# Patient Record
Sex: FEMALE | Race: BLACK OR AFRICAN AMERICAN | NOT HISPANIC OR LATINO | Employment: PART TIME | URBAN - METROPOLITAN AREA
[De-identification: names, ages, dates, MRNs, and addresses within clinical notes are randomized per-mention and may not be internally consistent; named-entity substitution may affect disease eponyms.]

---

## 2017-01-05 ENCOUNTER — CONSULT (OUTPATIENT)
Dept: ONCOLOGY | Facility: CLINIC | Age: 32
End: 2017-01-05
Attending: INTERNAL MEDICINE

## 2017-01-05 ENCOUNTER — APPOINTMENT (OUTPATIENT)
Dept: ONCOLOGY | Facility: CLINIC | Age: 32
End: 2017-01-05

## 2017-01-05 ENCOUNTER — LAB (OUTPATIENT)
Dept: LAB | Facility: HOSPITAL | Age: 32
End: 2017-01-05
Attending: INTERNAL MEDICINE

## 2017-01-05 ENCOUNTER — APPOINTMENT (OUTPATIENT)
Dept: LAB | Facility: HOSPITAL | Age: 32
End: 2017-01-05
Attending: INTERNAL MEDICINE

## 2017-01-05 VITALS
SYSTOLIC BLOOD PRESSURE: 92 MMHG | TEMPERATURE: 98.2 F | RESPIRATION RATE: 16 BRPM | BODY MASS INDEX: 20.66 KG/M2 | WEIGHT: 121 LBS | OXYGEN SATURATION: 100 % | DIASTOLIC BLOOD PRESSURE: 60 MMHG | HEART RATE: 99 BPM | HEIGHT: 64 IN

## 2017-01-05 DIAGNOSIS — D50.0 IRON DEFICIENCY ANEMIA DUE TO CHRONIC BLOOD LOSS: Primary | ICD-10-CM

## 2017-01-05 DIAGNOSIS — D50.8 OTHER IRON DEFICIENCY ANEMIA: Primary | ICD-10-CM

## 2017-01-05 LAB
BASOPHILS # BLD AUTO: 0.01 10*3/MM3 (ref 0–0.1)
BASOPHILS NFR BLD AUTO: 0.1 % (ref 0–1.1)
DEPRECATED RDW RBC AUTO: 40.6 FL (ref 37–49)
EOSINOPHIL # BLD AUTO: 0.08 10*3/MM3 (ref 0–0.36)
EOSINOPHIL NFR BLD AUTO: 1.2 % (ref 1–5)
ERYTHROCYTE [DISTWIDTH] IN BLOOD BY AUTOMATED COUNT: 15.5 % (ref 11.7–14.5)
FERRITIN SERPL-MCNC: 23.8 NG/ML (ref 11–207)
HCT VFR BLD AUTO: 35 % (ref 34–45)
HGB BLD-MCNC: 10.7 G/DL (ref 11.5–14.9)
HOLD SPECIMEN: NORMAL
IMM GRANULOCYTES # BLD: 0.01 10*3/MM3 (ref 0–0.03)
IMM GRANULOCYTES NFR BLD: 0.1 % (ref 0–0.5)
IRON 24H UR-MRATE: 65 MCG/DL (ref 37–145)
IRON SATN MFR SERPL: 16 % (ref 14–48)
LYMPHOCYTES # BLD AUTO: 3.02 10*3/MM3 (ref 1–3.5)
LYMPHOCYTES NFR BLD AUTO: 43.6 % (ref 20–49)
MCH RBC QN AUTO: 22.3 PG (ref 27–33)
MCHC RBC AUTO-ENTMCNC: 30.6 G/DL (ref 32–35)
MCV RBC AUTO: 73.1 FL (ref 83–97)
MONOCYTES # BLD AUTO: 0.36 10*3/MM3 (ref 0.25–0.8)
MONOCYTES NFR BLD AUTO: 5.2 % (ref 4–12)
NEUTROPHILS # BLD AUTO: 3.45 10*3/MM3 (ref 1.5–7)
NEUTROPHILS NFR BLD AUTO: 49.8 % (ref 39–75)
NRBC BLD MANUAL-RTO: 0 /100 WBC (ref 0–0)
PLATELET # BLD AUTO: 277 10*3/MM3 (ref 150–375)
PMV BLD AUTO: 9.8 FL (ref 8.9–12.1)
RBC # BLD AUTO: 4.79 10*6/MM3 (ref 3.9–5)
TIBC SERPL-MCNC: 414 MCG/DL (ref 249–505)
TRANSFERRIN SERPL-MCNC: 296 MG/DL (ref 200–360)
WBC NRBC COR # BLD: 6.93 10*3/MM3 (ref 4–10)
WHOLE BLOOD HOLD SPECIMEN: NORMAL

## 2017-01-05 PROCEDURE — 85025 COMPLETE CBC W/AUTO DIFF WBC: CPT | Performed by: INTERNAL MEDICINE

## 2017-01-05 PROCEDURE — 83540 ASSAY OF IRON: CPT | Performed by: INTERNAL MEDICINE

## 2017-01-05 PROCEDURE — 36415 COLL VENOUS BLD VENIPUNCTURE: CPT

## 2017-01-05 PROCEDURE — 99244 OFF/OP CNSLTJ NEW/EST MOD 40: CPT | Performed by: INTERNAL MEDICINE

## 2017-01-05 PROCEDURE — 82728 ASSAY OF FERRITIN: CPT | Performed by: INTERNAL MEDICINE

## 2017-01-05 PROCEDURE — 84466 ASSAY OF TRANSFERRIN: CPT | Performed by: INTERNAL MEDICINE

## 2017-01-05 RX ORDER — METHYLPREDNISOLONE 4 MG/1
TABLET ORAL
Qty: 21 TABLET | Refills: 0 | Status: SHIPPED | OUTPATIENT
Start: 2017-01-05 | End: 2017-03-06

## 2017-01-05 RX ORDER — PROPRANOLOL HCL 60 MG
CAPSULE, EXTENDED RELEASE 24HR ORAL
Refills: 5 | COMMUNITY
Start: 2016-12-30 | End: 2017-02-09

## 2017-01-05 RX ORDER — DIPHENHYDRAMINE HCL 25 MG
25 CAPSULE ORAL ONCE
Status: CANCELLED | OUTPATIENT
Start: 2017-01-09

## 2017-01-05 RX ORDER — SODIUM CHLORIDE 9 MG/ML
250 INJECTION, SOLUTION INTRAVENOUS ONCE
Status: CANCELLED | OUTPATIENT
Start: 2017-01-09

## 2017-01-05 RX ORDER — DESOGESTREL AND ETHINYL ESTRADIOL 0.15-0.03
KIT ORAL
Refills: 3 | COMMUNITY
Start: 2016-12-20 | End: 2017-03-06

## 2017-01-05 NOTE — PROGRESS NOTES
Subjective     REASON FOR CONSULTATION:  Iron deficiency anemia, intolerant of oral iron therapy; iron deficiency most likely secondary to menorrhagia  Provide an opinion on any further workup or treatment                             REQUESTING PHYSICIAN:  K[place]    RECORDS OBTAINED:  Records of the patients history including those obtained from the referring provider were reviewed and summarized in detail.    History of Present Illness   Ms. Espino is a 31-year-old woman seen today for evaluation of anemia at the request of her primary care physician.  She indicates that she has had a lifelong issue with anemia.  Reviewing her records, she actually had a fairly normal CBC in January 2014 with hemoglobin of 12.0 and MCV of 82.  However, over the past 3 years she has had a steady decline in the hemoglobin currently to 10.7, and her red blood cell indices have become more microcytic and hypochromic with a current MCV of 73 and MCHC 30.6.  She had iron studies performed in November which showed a ferritin of 33 and an iron saturation of 22%.  The patient has also been having recent issues with hyperthyroidism, so her ferritin may be partially falsely elevated as an acute phase reactant.  B12 and folic acid levels were normal in November.  She does report very heavy menstrual cycles her entire adult life with 5-7 days of heavy bleeding.  She has tried to take oral iron in the past but cannot tolerate iron because of GI upset.  She has never required packed red blood cell transfusion or iron infusions.    Past Medical History   Diagnosis Date   • Allergic rhinitis    • Anemia    • Asthma    • GERD (gastroesophageal reflux disease)    • Hyperthyroidism    • Ovarian cyst         No past surgical history on file.     Current Outpatient Prescriptions on File Prior to Visit   Medication Sig Dispense Refill   • albuterol (PROVENTIL HFA;VENTOLIN HFA) 108 (90 BASE) MCG/ACT inhaler Inhale 2 puffs Every 6 (Six) Hours.      • b complex vitamins capsule Take 1 capsule by mouth Daily. 30 capsule 11   • budesonide-formoterol (SYMBICORT) 160-4.5 MCG/ACT inhaler Symbicort 160-4.5 MCG/ACT Inhalation Aerosol; Patient Sig: Symbicort 160-4.5 MCG/ACT Inhalation Aerosol INHALE TWO PUFFS BY MOUTH TWICE A DAY; 10.2; 11; 05-Nov-2014; Active     • ergocalciferol (DRISDOL) 37652 UNITS capsule Take 1 capsule by mouth 1 (One) Time Per Week. 13 capsule 3   • levocetirizine (XYZAL) 5 MG tablet TAKE 1 TABLET BY MOUTH AT BEDTIME  3   • montelukast (SINGULAIR) 10 MG tablet TAKE 1 TABLET BY MOUTH AT BEDTIME  3   • raNITIdine (ZANTAC) 300 MG tablet TAKE 1 TABLET BY MOUTH AT BEDTIME  11   • [DISCONTINUED] propranolol (INDERAL) 20 MG tablet Take 1 tablet by mouth 2 (Two) Times a Day. 60 tablet 5     No current facility-administered medications on file prior to visit.         ALLERGIES:    Allergies   Allergen Reactions   • Metoprolol      Face swelling, itching     • Propranolol      Face swelling, itching          Social History     Social History   • Marital status: Single     Spouse name: N/A   • Number of children: N/A   • Years of education: N/A     Occupational History   •  [employer]     Social History Main Topics   • Smoking status: Never Smoker   • Smokeless tobacco: None   • Alcohol use Yes      Comment: Occasional   • Drug use: No   • Sexual activity: Yes     Partners: Male     Other Topics Concern   • None     Social History Narrative        Family History   Problem Relation Age of Onset   • Hypertension Mother    • Diabetes Mother    • Hypertension Father    • Hyperlipidemia Father    • Diabetes Father    • Alcohol abuse Other    • Diabetes Other    • Hypertension Other         Review of Systems   Constitutional: Positive for fatigue. Negative for appetite change and fever.        Hyperthyroidism   HENT: Positive for nosebleeds. Negative for congestion, hearing loss and mouth sores.    Eyes: Negative for photophobia and redness.   Respiratory: Positive  "for shortness of breath. Negative for choking, chest tightness and wheezing.    Cardiovascular: Positive for palpitations. Negative for chest pain and leg swelling.   Gastrointestinal: Positive for nausea and vomiting. Negative for abdominal distention, anal bleeding and blood in stool.   Endocrine: Positive for polyuria. Negative for polydipsia.   Genitourinary: Positive for frequency and menstrual problem. Negative for difficulty urinating, vaginal discharge and vaginal pain.   Musculoskeletal: Positive for arthralgias. Negative for gait problem and neck stiffness.   Skin: Positive for rash. Negative for color change, pallor and wound.   Neurological: Positive for dizziness. Negative for weakness and numbness.   Hematological: Negative for adenopathy. Does not bruise/bleed easily.   Psychiatric/Behavioral: Negative for agitation.        Objective     Vitals:    01/05/17 1258   BP: 92/60   Pulse: 99   Resp: 16   Temp: 98.2 °F (36.8 °C)   TempSrc: Oral   SpO2: 100%   Weight: 121 lb (54.9 kg)   Height: 64\" (162.6 cm)  Comment: new    PainSc: 4  Comment: c/o stomach cramps today.     Current Status 1/5/2017   ECOG score 0       Physical Exam    Gen: pleasant young black female nad  HEENT: no icterus or thrush, face mildly swollen?  NECK: no jvd  CV: RRR, S1S2  CHEST: CTA bilat  ABD: soft, ndnd, +bs, no edema  MS: no edema  NEURO: no focal deficits    RECENT LABS:  Hematology WBC   Date Value Ref Range Status   01/05/2017 6.93 4.00 - 10.00 10*3/mm3 Final   11/14/2016 5.38 4.50 - 10.70 10*3/mm3 Final     RBC   Date Value Ref Range Status   01/05/2017 4.79 3.90 - 5.00 10*6/mm3 Final   11/14/2016 4.80 3.90 - 5.20 10*6/mm3 Final     HEMOGLOBIN   Date Value Ref Range Status   01/05/2017 10.7 (L) 11.5 - 14.9 g/dL Final   11/14/2016 10.9 (L) 11.9 - 15.5 g/dL Final     HEMATOCRIT   Date Value Ref Range Status   01/05/2017 35.0 34.0 - 45.0 % Final   11/14/2016 35.0 (L) 35.6 - 45.5 % Final     PLATELETS   Date Value Ref Range " Status   01/05/2017 277 150 - 375 10*3/mm3 Final   11/14/2016 246 140 - 500 10*3/mm3 Final          Assessment/Plan     Ms. Espino is a 31-year-old woman with microcytic and hypochromic anemia with history of menorrhagia.  This is most likely iron deficiency secondary to chronic blood loss from menorrhagia.  Even though her iron studies in November did not look horrible, they were checked while the patient was  hyperthyroid so the ferritin is likely falsely elevated as an inflammatory marker.  The patient cannot tolerate oral iron because of GI upset.  I recommended that we give her 2 doses of intravenous iron with Feraheme and discussed with her the risk of infusion-related reactions, headache, skin discoloration if extravasation occurs.  She agreed to proceed with intravenous iron.  I recommended we recheck her CBC and iron studies 2 months after.  She has a fairly extensive positive review of systems most of which are probably related to her hyperthyroidism as opposed to anemia and/or iron deficiency.

## 2017-01-06 ENCOUNTER — TELEPHONE (OUTPATIENT)
Dept: ENDOCRINOLOGY | Age: 32
End: 2017-01-06

## 2017-01-06 NOTE — TELEPHONE ENCOUNTER
----- Message from Rashmi Iqbal MD sent at 1/5/2017  2:49 PM EST -----  Contact: patient  I have is started her on Medrol Dosepak for her allergic reaction.  Also find out when she is going to have her radioactive iodine therapy?  It should be very soon.  ----- Message -----     From: Carmencita Dallas MA     Sent: 1/5/2017   1:51 PM       To: Rashmi Iqbal MD        ----- Message -----     From: Misty Trejo     Sent: 1/5/2017   1:48 PM       To: Carmencita Dallas MA     Switched to propranolol LA INDERAL LA 60 MG 24 hr capsule  same symptoms  Face is swelling  Stopped taking it    Benadryl is not working       ----- Message -----     From: Misty Trejo     Sent: 12/29/2016   2:38 PM       To: Carmencita Dallas MA    metoprolol succinate XL (TOPROL XL) 50 MG 24 hr tablet 30 tablet 11 12/15/2016 12/15/2017      Take 1 tablet by mouth Daily.     Started medicine last week  Hives and rashes and welps all over her body  Body is hot with redness     Pt is taking the vitamin d but didn't take the above and still has the symptoms           Spoke to patient. She is waiting for a call back to schedule. i informed her to call next week if she has not heard anything from the hospital. She is going to let us know of scheduled date for the therapy when it gets scheduled. 01/06/2017

## 2017-01-10 ENCOUNTER — INFUSION (OUTPATIENT)
Dept: ONCOLOGY | Facility: HOSPITAL | Age: 32
End: 2017-01-10
Attending: INTERNAL MEDICINE

## 2017-01-10 VITALS
TEMPERATURE: 98.1 F | HEART RATE: 106 BPM | BODY MASS INDEX: 21.15 KG/M2 | DIASTOLIC BLOOD PRESSURE: 67 MMHG | WEIGHT: 123.2 LBS | SYSTOLIC BLOOD PRESSURE: 110 MMHG

## 2017-01-10 DIAGNOSIS — D50.0 IRON DEFICIENCY ANEMIA DUE TO CHRONIC BLOOD LOSS: Primary | ICD-10-CM

## 2017-01-10 PROCEDURE — 96374 THER/PROPH/DIAG INJ IV PUSH: CPT | Performed by: INTERNAL MEDICINE

## 2017-01-10 PROCEDURE — 96375 TX/PRO/DX INJ NEW DRUG ADDON: CPT | Performed by: INTERNAL MEDICINE

## 2017-01-10 PROCEDURE — 63710000001 DIPHENHYDRAMINE PER 50 MG: Performed by: INTERNAL MEDICINE

## 2017-01-10 PROCEDURE — 25010000002 FERUMOXYTOL 510 MG/17ML SOLUTION 510 MG VIAL: Performed by: INTERNAL MEDICINE

## 2017-01-10 RX ORDER — SODIUM CHLORIDE 9 MG/ML
250 INJECTION, SOLUTION INTRAVENOUS ONCE
Status: COMPLETED | OUTPATIENT
Start: 2017-01-10 | End: 2017-01-10

## 2017-01-10 RX ORDER — DIPHENHYDRAMINE HCL 25 MG
25 CAPSULE ORAL ONCE
Status: CANCELLED | OUTPATIENT
Start: 2017-01-16

## 2017-01-10 RX ORDER — SODIUM CHLORIDE 9 MG/ML
250 INJECTION, SOLUTION INTRAVENOUS ONCE
Status: CANCELLED | OUTPATIENT
Start: 2017-01-16

## 2017-01-10 RX ORDER — DIPHENHYDRAMINE HCL 25 MG
25 CAPSULE ORAL ONCE
Status: COMPLETED | OUTPATIENT
Start: 2017-01-10 | End: 2017-01-10

## 2017-01-10 RX ADMIN — SODIUM CHLORIDE 250 ML: 900 INJECTION, SOLUTION INTRAVENOUS at 08:34

## 2017-01-10 RX ADMIN — FERUMOXYTOL 510 MG: 510 INJECTION INTRAVENOUS at 09:02

## 2017-01-10 RX ADMIN — FAMOTIDINE 20 MG: 10 INJECTION INTRAVENOUS at 08:34

## 2017-01-10 RX ADMIN — DIPHENHYDRAMINE HYDROCHLORIDE 25 MG: 25 CAPSULE ORAL at 08:34

## 2017-01-12 ENCOUNTER — DOCUMENTATION (OUTPATIENT)
Dept: INTERNAL MEDICINE | Facility: CLINIC | Age: 32
End: 2017-01-12

## 2017-01-12 ENCOUNTER — TELEPHONE (OUTPATIENT)
Dept: ENDOCRINOLOGY | Age: 32
End: 2017-01-12

## 2017-01-12 RX ORDER — HYDROXYZINE HYDROCHLORIDE 10 MG/1
10 TABLET, FILM COATED ORAL 3 TIMES DAILY PRN
Qty: 30 TABLET | Refills: 0 | Status: SHIPPED | OUTPATIENT
Start: 2017-01-12 | End: 2017-03-06

## 2017-01-12 RX ORDER — METHYLPREDNISOLONE 4 MG/1
TABLET ORAL
Qty: 21 TABLET | Refills: 0 | Status: SHIPPED | OUTPATIENT
Start: 2017-01-12 | End: 2017-03-02

## 2017-01-12 NOTE — TELEPHONE ENCOUNTER
----- Message from Rashmi Iqbal MD sent at 1/11/2017  1:56 PM EST -----  Contact: patient  She needs to take all her medications and be sure to avoid any major exercise and pressure as well as taking any non-is steroidal medications such as Aleve or Advil.  ----- Message -----     From: Carmencita Dallas MA     Sent: 1/11/2017  12:44 PM       To: Rashmi Iqbal MD        ----- Message -----     From: Misty Trejo     Sent: 1/10/2017   2:30 PM       To: Carmencita Dallas MA    WANTS NITIN TO BE AWARE THAT PT HAS SCHEDULED AN APPOINTMENT FOR HER ABRASION ON Monday 1/30/2017    Spoke to patient 01/12/2017 2:24PM

## 2017-01-17 ENCOUNTER — INFUSION (OUTPATIENT)
Dept: ONCOLOGY | Facility: HOSPITAL | Age: 32
End: 2017-01-17
Attending: INTERNAL MEDICINE

## 2017-01-17 VITALS
TEMPERATURE: 98.3 F | WEIGHT: 120.6 LBS | SYSTOLIC BLOOD PRESSURE: 115 MMHG | BODY MASS INDEX: 20.7 KG/M2 | HEART RATE: 121 BPM | DIASTOLIC BLOOD PRESSURE: 56 MMHG

## 2017-01-17 DIAGNOSIS — D50.0 IRON DEFICIENCY ANEMIA DUE TO CHRONIC BLOOD LOSS: Primary | ICD-10-CM

## 2017-01-17 PROCEDURE — 63710000001 DIPHENHYDRAMINE PER 50 MG: Performed by: INTERNAL MEDICINE

## 2017-01-17 PROCEDURE — 25010000002 FERUMOXYTOL 510 MG/17ML SOLUTION 510 MG VIAL: Performed by: INTERNAL MEDICINE

## 2017-01-17 RX ORDER — SODIUM CHLORIDE 9 MG/ML
250 INJECTION, SOLUTION INTRAVENOUS ONCE
Status: CANCELLED | OUTPATIENT
Start: 2017-01-24

## 2017-01-17 RX ORDER — DIPHENHYDRAMINE HCL 25 MG
25 CAPSULE ORAL ONCE
Status: CANCELLED | OUTPATIENT
Start: 2017-01-24

## 2017-01-17 RX ORDER — SODIUM CHLORIDE 9 MG/ML
250 INJECTION, SOLUTION INTRAVENOUS ONCE
Status: COMPLETED | OUTPATIENT
Start: 2017-01-17 | End: 2017-01-17

## 2017-01-17 RX ORDER — DIPHENHYDRAMINE HCL 25 MG
25 CAPSULE ORAL ONCE
Status: COMPLETED | OUTPATIENT
Start: 2017-01-17 | End: 2017-01-17

## 2017-01-17 RX ADMIN — FERUMOXYTOL 510 MG: 510 INJECTION INTRAVENOUS at 09:41

## 2017-01-17 RX ADMIN — DIPHENHYDRAMINE HYDROCHLORIDE 25 MG: 25 CAPSULE ORAL at 09:17

## 2017-01-17 RX ADMIN — FAMOTIDINE 20 MG: 10 INJECTION INTRAVENOUS at 09:18

## 2017-01-17 RX ADMIN — SODIUM CHLORIDE 250 ML: 900 INJECTION, SOLUTION INTRAVENOUS at 09:18

## 2017-01-26 DIAGNOSIS — N91.2 AMENORRHEA: Primary | ICD-10-CM

## 2017-01-30 ENCOUNTER — HOSPITAL ENCOUNTER (OUTPATIENT)
Dept: NUCLEAR MEDICINE | Facility: HOSPITAL | Age: 32
Discharge: HOME | End: 2017-01-30
Attending: INTERNAL MEDICINE

## 2017-01-30 ENCOUNTER — APPOINTMENT (OUTPATIENT)
Dept: LAB | Facility: HOSPITAL | Age: 32
End: 2017-01-30

## 2017-01-30 DIAGNOSIS — E05.00 THYROTOXICOSIS WITH DIFFUSE GOITER AND WITHOUT THYROID STORM: ICD-10-CM

## 2017-01-30 LAB — HCG SERPL QL: NEGATIVE

## 2017-01-30 PROCEDURE — 36415 COLL VENOUS BLD VENIPUNCTURE: CPT | Performed by: INTERNAL MEDICINE

## 2017-01-30 PROCEDURE — 84703 CHORIONIC GONADOTROPIN ASSAY: CPT | Performed by: INTERNAL MEDICINE

## 2017-01-30 PROCEDURE — 79005 NUCLEAR RX ORAL ADMIN: CPT

## 2017-01-30 PROCEDURE — A9517 I131 IODIDE CAP, RX: HCPCS | Performed by: INTERNAL MEDICINE

## 2017-01-30 PROCEDURE — 0 SODIUM IODIDE CAPSULE: Performed by: INTERNAL MEDICINE

## 2017-01-30 RX ADMIN — SODIUM IODIDE I 131 1 CAPSULE: 100 CAPSULE ORAL at 10:00

## 2017-01-31 ENCOUNTER — RESULTS ENCOUNTER (OUTPATIENT)
Dept: ENDOCRINOLOGY | Age: 32
End: 2017-01-31

## 2017-01-31 ENCOUNTER — TELEPHONE (OUTPATIENT)
Dept: ENDOCRINOLOGY | Age: 32
End: 2017-01-31

## 2017-01-31 DIAGNOSIS — N91.2 AMENORRHEA: ICD-10-CM

## 2017-01-31 NOTE — TELEPHONE ENCOUNTER
Message  Received: Today       MD Carmencita Ruiz MA       Caller: PATIENT                     She can take Benadryl 25 mg 3-4 times a day as well as I will send her prescription for Zantac 150 mg twice daily.       Message  Received: Today       MD Carmencita Ruiz MA       Caller: PATIENT                     Also last time she called I sent her a prescription for an ointment to be used twice daily.       ----- Message from Rashmi Iqbal MD sent at 1/31/2017 11:31 AM EST -----  Contact: patient  I think she is taking everything that fights rash and hives.  This probably related to her Graves' disease and as her thyroid becomes more normal she should get better otherwise she needs to see an allergist if it does not get any better in that couple of weeks.  ----- Message -----     From: Carmencita Dallas MA     Sent: 1/30/2017   9:55 AM       To: Rashmi Iqbal MD    Patient has been doing the cream you prescribed and she said it isn't helping much.   ----- Message -----     From: Leta Holliday     Sent: 1/30/2017   9:43 AM       To: Carmencita Dallas MA    She said it hasn't helped much.  ----- Message -----     From: Carmencita Dallas MA     Sent: 1/30/2017   9:21 AM       To: Leta Iqbal sent a RX to her pharmacy for an ointment to put on her rash to help with the itching. Has she started this?  ----- Message -----     From: Leta Holliday     Sent: 1/30/2017   8:44 AM       To: Carmencita Dallas MA    Patient says she is calling back because her hives still itch and she would like something else called in. She says the steroids have not helped  Send to [facility]  [phone] (Phone)  [phone] (Fax)    Spoke to patient. She will start Zantac and continue with Benadryl and ointment. I informed her to follow up with an allergist in a couple weeks and give time for the iodine treatment to help as her thyroid becomes normal as per Dr. Iqbal message 01/31/2017 12:58PM

## 2017-02-04 ENCOUNTER — DOCUMENTATION (OUTPATIENT)
Dept: INTERNAL MEDICINE | Facility: CLINIC | Age: 32
End: 2017-02-04

## 2017-02-04 RX ORDER — HYDROXYZINE PAMOATE 25 MG/1
25 CAPSULE ORAL 3 TIMES DAILY PRN
Qty: 30 CAPSULE | Refills: 0 | Status: SHIPPED | OUTPATIENT
Start: 2017-02-04 | End: 2017-03-06

## 2017-02-04 RX ORDER — PREDNISONE 10 MG/1
10 TABLET ORAL DAILY
Qty: 5 TABLET | Refills: 0 | Status: SHIPPED | OUTPATIENT
Start: 2017-02-04 | End: 2017-03-06

## 2017-02-09 ENCOUNTER — OFFICE VISIT (OUTPATIENT)
Dept: INTERNAL MEDICINE | Facility: CLINIC | Age: 32
End: 2017-02-09

## 2017-02-09 VITALS
BODY MASS INDEX: 20.99 KG/M2 | SYSTOLIC BLOOD PRESSURE: 126 MMHG | TEMPERATURE: 98.4 F | DIASTOLIC BLOOD PRESSURE: 66 MMHG | WEIGHT: 126 LBS | HEIGHT: 65 IN

## 2017-02-09 DIAGNOSIS — G47.01 INSOMNIA DUE TO MEDICAL CONDITION: ICD-10-CM

## 2017-02-09 DIAGNOSIS — L50.9 URTICARIA: Primary | ICD-10-CM

## 2017-02-09 DIAGNOSIS — J30.1 NON-SEASONAL ALLERGIC RHINITIS DUE TO POLLEN: ICD-10-CM

## 2017-02-09 PROCEDURE — 99213 OFFICE O/P EST LOW 20 MIN: CPT | Performed by: INTERNAL MEDICINE

## 2017-02-09 RX ORDER — TRAZODONE HYDROCHLORIDE 50 MG/1
50 TABLET ORAL NIGHTLY
Qty: 90 TABLET | Refills: 1 | Status: SHIPPED | OUTPATIENT
Start: 2017-02-09 | End: 2017-03-08

## 2017-02-17 ENCOUNTER — HOSPITAL ENCOUNTER (EMERGENCY)
Facility: HOSPITAL | Age: 32
Discharge: HOME | End: 2017-02-17
Attending: EMERGENCY MEDICINE | Admitting: EMERGENCY MEDICINE

## 2017-02-17 ENCOUNTER — TELEPHONE (OUTPATIENT)
Dept: ENDOCRINOLOGY | Age: 32
End: 2017-02-17

## 2017-02-17 ENCOUNTER — APPOINTMENT (OUTPATIENT)
Dept: GENERAL RADIOLOGY | Facility: HOSPITAL | Age: 32
End: 2017-02-17

## 2017-02-17 ENCOUNTER — TELEPHONE (OUTPATIENT)
Dept: INTERNAL MEDICINE | Facility: CLINIC | Age: 32
End: 2017-02-17

## 2017-02-17 VITALS
WEIGHT: 125 LBS | BODY MASS INDEX: 21.34 KG/M2 | DIASTOLIC BLOOD PRESSURE: 68 MMHG | TEMPERATURE: 98.6 F | OXYGEN SATURATION: 99 % | HEART RATE: 10 BPM | HEIGHT: 64 IN | SYSTOLIC BLOOD PRESSURE: 102 MMHG | RESPIRATION RATE: 16 BRPM

## 2017-02-17 DIAGNOSIS — J04.0 LARYNGITIS: Primary | ICD-10-CM

## 2017-02-17 DIAGNOSIS — J40 BRONCHITIS: ICD-10-CM

## 2017-02-17 LAB
FLUAV AG NPH QL: NEGATIVE
FLUBV AG NPH QL IA: NEGATIVE
S PYO AG THROAT QL: NEGATIVE

## 2017-02-17 PROCEDURE — 87880 STREP A ASSAY W/OPTIC: CPT | Performed by: EMERGENCY MEDICINE

## 2017-02-17 PROCEDURE — 87081 CULTURE SCREEN ONLY: CPT | Performed by: EMERGENCY MEDICINE

## 2017-02-17 PROCEDURE — 87804 INFLUENZA ASSAY W/OPTIC: CPT | Performed by: EMERGENCY MEDICINE

## 2017-02-17 PROCEDURE — 99283 EMERGENCY DEPT VISIT LOW MDM: CPT

## 2017-02-17 PROCEDURE — 71020 HC CHEST PA AND LATERAL: CPT

## 2017-02-17 RX ORDER — AZITHROMYCIN 250 MG/1
TABLET, FILM COATED ORAL
Qty: 6 TABLET | Refills: 0 | Status: SHIPPED | OUTPATIENT
Start: 2017-02-17 | End: 2017-03-06

## 2017-02-17 NOTE — ED NOTES
Pt reports green/yellow sputum with productive cough x few days      Maksim Coronado, MARISSA  02/17/17 1545

## 2017-02-17 NOTE — TELEPHONE ENCOUNTER
----- Message from Rashmi Taveras MD sent at 2/17/2017  3:10 PM EST -----  Contact: PATIENT  This does not seem to be related to thyroid.  I think she needs to go to any mediate care can be checked out for any respiratory infection.  ----- Message -----     From: Carmencita Dallas MA     Sent: 2/17/2017  10:54 AM       To: Rashmi Taveras MD        ----- Message -----     From: Kayy Berrios     Sent: 2/17/2017  10:45 AM       To: Carmencita Dallas MA    PATIENT LAST SAW DR. TAVERAS   SHE SAID SHE HAD RADIOACTIVE IODINE 2 WEEKS AGO AND IS SICK WITH SYMPTOMS COUGHING NON STOP SINCE LAST WEEK.   PHELGM STARTED YESTERDAY  AND  LOST HER VOICE YESTERDAY.     SHE HAS HAD A  HEADACHE OFF AND ON FOR A WEEK BUT YESTERDAY ALL DAY.     HER FACE AND LIPS HAS BEEN SWOLLEN SINCE SHE HAD HIVES FOR  4 WEEKS AND WENT AWAY A COUPLE OF DAYS AGO.   SHE IS WONDERING IF THIS IS HER THYROID AND NOT A COLD.   SHE WAS TOLD THAT SHE COULD TAKE TYLENOL AND ASKING IF SHE CAN TAKE SOMETHING ELSE? -[phone]    I spoke with the patient and passed along the message that Dr. Taveras suggested that she go to Urgent Treatment.

## 2017-02-18 NOTE — ED PROVIDER NOTES
EMERGENCY DEPARTMENT ENCOUNTER       CHIEF COMPLAINT  Chief Complaint: Cough  History given by: Patient  History limited by: N/A  Room Number: 30/30  PMD: Paras Hi MD      HPI:  HPI Comments: Pt c/o productive cough with green/yellow sputum onset about 3-4 days ago. Pt has also had a hoarse voice, nausea, and mild diarrhea, but denies CP, dyspnea, sore throat, vomiting, and documented fever. Pt has received the influenza vaccine this season. There are no other complaints at this time.     Patient is a 31 y.o. female presenting with cough.   Cough   Cough characteristics:  Productive  Sputum characteristics:  Green and yellow  Severity:  Moderate  Onset quality:  Gradual  Duration: onset about 3-4 days ago.  Timing:  Intermittent  Progression:  Waxing and waning  Context: upper respiratory infection    Relieved by:  Nothing  Worsened by:  Nothing  Associated symptoms: no chest pain, no fever (pt denies documented fever), no headaches, no shortness of breath, no sinus congestion and no sore throat          PAST MEDICAL HISTORY  Active Ambulatory Problems     Diagnosis Date Noted   • Magnetic resonance imaging of brain abnormal 10/21/2013   • Abnormal electrocardiogram 11/14/2016   • Abnormal magnetic resonance imaging study 08/28/2014   • Allergic rhinitis 11/14/2016   • Anemia 11/14/2016   • Asthma 05/01/2013   • Chronic post-traumatic headache 10/21/2013   • Gastroesophageal reflux disease 11/14/2016   • Intractable chronic migraine without aura 05/01/2013   • Lumbosacral radiculopathy 01/06/2014   • Menorrhagia 05/01/2013   • Menstrual disorder 11/14/2016   • Neck pain 10/21/2013   • Paresthesia of lower extremity 10/21/2013   • Numbness and tingling sensation of skin 05/01/2013   • Muscle weakness (generalized) 08/21/2013   • Health care maintenance 11/14/2016   • Thyrotoxicosis without thyroid storm 12/15/2016   • Weight loss 12/15/2016   • Urticaria 02/09/2017   • Insomnia due to medical condition  02/09/2017     Resolved Ambulatory Problems     Diagnosis Date Noted   • No Resolved Ambulatory Problems     Past Medical History   Diagnosis Date   • GERD (gastroesophageal reflux disease)    • Hyperthyroidism    • Ovarian cyst          PAST SURGICAL HISTORY  Past Surgical History   Procedure Laterality Date   • Mandible surgery     • Appendectomy  2012         FAMILY HISTORY  Family History   Problem Relation Age of Onset   • Hypertension Mother    • Diabetes Mother    • Hypertension Father    • Hyperlipidemia Father    • Diabetes Father    • Alcohol abuse Other    • Diabetes Other    • Hypertension Other    • Diabetes Maternal Aunt    • Hypertension Maternal Uncle    • Diabetes Maternal Uncle          SOCIAL HISTORY  Social History     Social History   • Marital status: Single     Spouse name: N/A   • Number of children: N/A   • Years of education: College     Occupational History   •  [employer]     Social History Main Topics   • Smoking status: Never Smoker   • Smokeless tobacco: Never Used   • Alcohol use Yes      Comment: Occasional   • Drug use: No   • Sexual activity: Yes     Partners: Male     Other Topics Concern   • Not on file     Social History Narrative   • No narrative on file         ALLERGIES  Beta adrenergic blockers; Metoprolol; and Propranolol        REVIEW OF SYSTEMS  Review of Systems   Constitutional: Negative.  Negative for fever (pt denies documented fever).   HENT: Negative for sore throat.         Voice hoarseness   Eyes: Negative for visual disturbance.   Respiratory: Positive for cough. Negative for shortness of breath.    Cardiovascular: Negative for chest pain.   Gastrointestinal: Positive for diarrhea (mild) and nausea. Negative for abdominal pain and vomiting.   Musculoskeletal: Negative for neck pain.   Skin: Negative.    Neurological: Negative for syncope, weakness, numbness and headaches.   Psychiatric/Behavioral: Negative.    All other systems reviewed and are  negative.           PHYSICAL EXAM  ED Triage Vitals   Temp Heart Rate Resp BP SpO2   02/17/17 1815 02/17/17 1805 02/17/17 1805 02/17/17 1813 02/17/17 1805   97.6 °F (36.4 °C) 111 18 102/69 100 % WNL      Temp src Heart Rate Source Patient Position BP Location FiO2 (%)   02/17/17 1815 02/17/17 1805 -- 02/17/17 1813 --   Tympanic Monitor  Right arm        Physical Exam   Constitutional: She is oriented to person, place, and time and well-developed, well-nourished, and in no distress.   HENT:   Mouth/Throat: No oropharyngeal exudate or posterior oropharyngeal erythema.   Hoarse voice.    Eyes: EOM are normal.   Neck: Normal range of motion. Neck supple.   Cardiovascular: Normal rate and regular rhythm.    Pulmonary/Chest: Effort normal and breath sounds normal. No respiratory distress. She has no decreased breath sounds. She has no wheezes. She has no rhonchi. She has no rales.   Neurological: She is alert and oriented to person, place, and time. She has normal sensation and normal strength.   Skin: Skin is warm and dry.   Psychiatric: Affect normal.   Nursing note and vitals reviewed.          LAB RESULTS  Recent Results (from the past 24 hour(s))   Influenza Antigen    Collection Time: 02/17/17  7:12 PM   Result Value Ref Range    Influenza A Ag, EIA Negative Negative    Influenza B Ag, EIA Negative Negative   Rapid Strep A Screen    Collection Time: 02/17/17  7:32 PM   Result Value Ref Range    Strep A Ag Negative Negative       Ordered the above labs and reviewed the results.        RADIOLOGY  XR Chest 2 View - Negative acute. Interpreted by radiologist. Independently viewed by me.             Ordered the above noted radiological studies. Reviewed by me in PACS.       PROCEDURES  Procedures        PROGRESS AND CONSULTS  ED Course   Comment By Time   9:02 PM  Patient here for cough, congestion and laryngitis.  Work up here is negative.  Most likely viral.  Patient concerned about bacterial causes.  Will give  azithromycin but told to hold for a few days.  If it gets worse she can start antibiotic. Quinton Bowen MD 02/17 2103     7:07 PM: Strep and flu screens and CXR ordered for further evaluation.     8:55 PM: Rechecked pt. Pt is resting comfortably and appears in no acute distress. Informed pt that her flu and strep screens are both negative. CXR is negative acute. Pt advised to f/u with PMD. Will provide rx for Azithromycin (pt advised to hold this for several days and to fill it if pt's sx persist or worsen). RTER warnings given. Pt agrees with plan for discharge.           MEDICAL DECISION MAKING      MDM  Number of Diagnoses or Management Options     Amount and/or Complexity of Data Reviewed  Clinical lab tests: ordered and reviewed (Strep and flu screens are negative.)  Tests in the radiology section of CPT®: ordered and reviewed (CXR is negative acute. )    Patient Progress  Patient progress: stable             DIAGNOSIS  Final diagnoses:   Laryngitis   Bronchitis         DISPOSITION  Pt discharged.    DISCHARGE    Patient discharged in stable condition.    Reviewed implications of results, diagnosis, meds, responsibility to follow up, warning signs and symptoms of possible worsening, potential complications and reasons to return to ER.    Patient/Family voiced understanding of above instructions.    Discussed plan for discharge, as there is no emergent indication for admission.  Pt/family is agreeable and understands need for follow up and repeat testing.  Pt is aware that discharge does not mean that nothing is wrong but it indicates no emergency is present that requires admission and they must continue care with follow-up as given below or physician of their choice.     FOLLOW-UP  Paras Hi MD  [address] [id number] [phone]    Schedule an appointment as soon as possible for a visit           Medication List      Current Discharge Medication List      START taking  these medications    Details   azithromycin (ZITHROMAX) 250 MG tablet Take 2 tablets the first day, then 1 tablet daily for 4 days.  Qty: 6 tablet, Refills: 0             Latest Documented Vital Signs:  As of 9:04 PM  BP- 110/67 HR- 97 Temp- 97.6 °F (36.4 °C) (Tympanic) O2 sat- 99%        --  Documentation assistance provided by lizzette English for Dr. Andrés MD.  Information recorded by the scribe was done at my direction and has been verified and validated by me.            Mary English  02/17/17 4732       Quinton Bowen MD  02/17/17 6296

## 2017-02-19 LAB — BACTERIA SPEC AEROBE CULT: NORMAL

## 2017-02-19 NOTE — PROGRESS NOTES
Subjective   Sundeep Espino is a 31 y.o. female.   She is here today for urticaria along with allergic rhinitis and insomnia and so far the workup has not found the cause of the urticaria  History of Present Illness   She is here today for urticaria along with allergic rhinitis and insomnia  The following portions of the patient's history were reviewed and updated as appropriate: allergies, current medications, past family history, past medical history, past social history, past surgical history and problem list.    Review of Systems   Skin: Positive for rash (urticaria which comes and goes).   All other systems reviewed and are negative.      Objective   Physical Exam   Constitutional: She is oriented to person, place, and time. She appears well-developed and well-nourished. She is cooperative.   HENT:   Head: Normocephalic and atraumatic.   Right Ear: Hearing, tympanic membrane, external ear and ear canal normal.   Left Ear: Hearing, tympanic membrane, external ear and ear canal normal.   Nose: Nose normal.   Mouth/Throat: Oropharynx is clear and moist.   Eyes: Conjunctivae, EOM and lids are normal. Pupils are equal, round, and reactive to light.   Neck: Phonation normal. Neck supple. Carotid bruit is not present.   Cardiovascular: Normal rate, regular rhythm and normal heart sounds.  Exam reveals no gallop and no friction rub.    No murmur heard.  Pulmonary/Chest: Effort normal and breath sounds normal. No respiratory distress.   Abdominal: Soft. Bowel sounds are normal. She exhibits no distension and no mass. There is no hepatosplenomegaly. There is no tenderness. There is no rebound and no guarding. No hernia.   Musculoskeletal: She exhibits no edema.   Neurological: She is alert and oriented to person, place, and time. Coordination and gait normal.   Skin: Skin is warm and dry. Rash noted. Rash is urticarial (over various extremities).   Psychiatric: She has a normal mood and affect. Her speech is normal and  behavior is normal. Judgment and thought content normal.   Nursing note and vitals reviewed.      Assessment/Plan   Diagnoses and all orders for this visit:    Urticaria  -     Ambulatory Referral to Dermatology    Non-seasonal allergic rhinitis due to pollen    Insomnia due to medical condition    Other orders  -     traZODone (DESYREL) 50 MG tablet; Take 1 tablet by mouth Every Night.      Urticaria refer to dermatology  Allergic rhinitis supportive meds  Insomnia we will try trazodone

## 2017-02-20 ENCOUNTER — TELEPHONE (OUTPATIENT)
Dept: SOCIAL WORK | Facility: HOSPITAL | Age: 32
End: 2017-02-20

## 2017-02-20 NOTE — TELEPHONE ENCOUNTER
Spoke with pt today in f/u and she states that she is still horse but is doing a little better. She was able to fill her script for ABX and is taking as directed. She has a f/u appt with her PCP in early March. No other questions or concerns voiced by pt at this time. Diana GILL

## 2017-03-02 ENCOUNTER — LAB (OUTPATIENT)
Dept: LAB | Facility: HOSPITAL | Age: 32
End: 2017-03-02
Attending: INTERNAL MEDICINE

## 2017-03-02 ENCOUNTER — OFFICE VISIT (OUTPATIENT)
Dept: ONCOLOGY | Facility: CLINIC | Age: 32
End: 2017-03-02
Attending: INTERNAL MEDICINE

## 2017-03-02 VITALS
HEIGHT: 64 IN | RESPIRATION RATE: 16 BRPM | WEIGHT: 135.6 LBS | BODY MASS INDEX: 23.15 KG/M2 | OXYGEN SATURATION: 100 % | TEMPERATURE: 98.5 F | HEART RATE: 76 BPM | DIASTOLIC BLOOD PRESSURE: 70 MMHG | SYSTOLIC BLOOD PRESSURE: 108 MMHG

## 2017-03-02 DIAGNOSIS — D50.0 IRON DEFICIENCY ANEMIA DUE TO CHRONIC BLOOD LOSS: Primary | ICD-10-CM

## 2017-03-02 DIAGNOSIS — R90.89 MAGNETIC RESONANCE IMAGING OF BRAIN ABNORMAL: Primary | ICD-10-CM

## 2017-03-02 LAB
BASOPHILS # BLD AUTO: 0 10*3/MM3 (ref 0–0.1)
BASOPHILS NFR BLD AUTO: 0 % (ref 0–1.1)
DEPRECATED RDW RBC AUTO: 51.6 FL (ref 37–49)
EOSINOPHIL # BLD AUTO: 0.25 10*3/MM3 (ref 0–0.36)
EOSINOPHIL NFR BLD AUTO: 5 % (ref 1–5)
ERYTHROCYTE [DISTWIDTH] IN BLOOD BY AUTOMATED COUNT: 18.8 % (ref 11.7–14.5)
FERRITIN SERPL-MCNC: 277.2 NG/ML (ref 11–207)
HCT VFR BLD AUTO: 38.1 % (ref 34–45)
HGB BLD-MCNC: 12.1 G/DL (ref 11.5–14.9)
HOLD SPECIMEN: NORMAL
HOLD SPECIMEN: NORMAL
IMM GRANULOCYTES # BLD: 0.01 10*3/MM3 (ref 0–0.03)
IMM GRANULOCYTES NFR BLD: 0.2 % (ref 0–0.5)
IRON 24H UR-MRATE: 83 MCG/DL (ref 37–145)
IRON SATN MFR SERPL: 34 % (ref 14–48)
LYMPHOCYTES # BLD AUTO: 2.11 10*3/MM3 (ref 1–3.5)
LYMPHOCYTES NFR BLD AUTO: 42.5 % (ref 20–49)
MCH RBC QN AUTO: 24.7 PG (ref 27–33)
MCHC RBC AUTO-ENTMCNC: 31.8 G/DL (ref 32–35)
MCV RBC AUTO: 77.8 FL (ref 83–97)
MONOCYTES # BLD AUTO: 0.37 10*3/MM3 (ref 0.25–0.8)
MONOCYTES NFR BLD AUTO: 7.4 % (ref 4–12)
NEUTROPHILS # BLD AUTO: 2.23 10*3/MM3 (ref 1.5–7)
NEUTROPHILS NFR BLD AUTO: 44.9 % (ref 39–75)
NRBC BLD MANUAL-RTO: 0 /100 WBC (ref 0–0)
PLATELET # BLD AUTO: 180 10*3/MM3 (ref 150–375)
PMV BLD AUTO: 9.1 FL (ref 8.9–12.1)
RBC # BLD AUTO: 4.9 10*6/MM3 (ref 3.9–5)
TIBC SERPL-MCNC: 244 MCG/DL (ref 249–505)
TRANSFERRIN SERPL-MCNC: 174 MG/DL (ref 200–360)
WBC NRBC COR # BLD: 4.97 10*3/MM3 (ref 4–10)

## 2017-03-02 PROCEDURE — 82728 ASSAY OF FERRITIN: CPT

## 2017-03-02 PROCEDURE — 84466 ASSAY OF TRANSFERRIN: CPT

## 2017-03-02 PROCEDURE — 83540 ASSAY OF IRON: CPT

## 2017-03-02 PROCEDURE — 36415 COLL VENOUS BLD VENIPUNCTURE: CPT

## 2017-03-02 PROCEDURE — 85025 COMPLETE CBC W/AUTO DIFF WBC: CPT

## 2017-03-02 PROCEDURE — 99213 OFFICE O/P EST LOW 20 MIN: CPT | Performed by: INTERNAL MEDICINE

## 2017-03-02 NOTE — PROGRESS NOTES
Subjective     REASON FOR FOLLOW-UP:  Iron deficiency anemia, intolerant of oral iron therapy; iron deficiency most likely secondary to menorrhagia, is post 2 doses of Feraheme January 2017                               REQUESTING PHYSICIAN:  [place]    RECORDS OBTAINED:  Records of the patients history including those obtained from the referring provider were reviewed and summarized in detail.    History of Present Illness   Ms. Espino is a 31-year-old woman seen today for evaluation of anemia at the request of her primary care physician.  She indicates that she has had a lifelong issue with anemia.  Reviewing her records, she actually had a fairly normal CBC in January 2014 with hemoglobin of 12.0 and MCV of 82.  However, over the past 3 years she has had a steady decline in the hemoglobin currently to 10.7, and her red blood cell indices have become more microcytic and hypochromic with a current MCV of 73 and MCHC 30.6.  She had iron studies performed in November which showed a ferritin of 33 and an iron saturation of 22%.  The patient has also been having recent issues with hyperthyroidism, so her ferritin may be partially falsely elevated as an acute phase reactant.  B12 and folic acid levels were normal in November.  She does report very heavy menstrual cycles her entire adult life with 5-7 days of heavy bleeding.  She has tried to take oral iron in the past but cannot tolerate iron because of GI upset.  She has never required packed red blood cell transfusion or iron infusions.    She was given 2 doses of Feraheme in January which she tolerated well.  Her hemoglobin has normalized but she has not noted any significant improvement in her fatigue.  She complains now of weight gain and facial swelling.    Past Medical History   Diagnosis Date   • Allergic rhinitis    • Anemia    • Asthma    • GERD (gastroesophageal reflux disease)    • Hyperthyroidism    • Ovarian cyst         Past Surgical History    Procedure Laterality Date   • Mandible surgery     • Appendectomy  2012        Current Outpatient Prescriptions on File Prior to Visit   Medication Sig Dispense Refill   • albuterol (PROVENTIL HFA;VENTOLIN HFA) 108 (90 BASE) MCG/ACT inhaler Inhale 2 puffs Every 6 (Six) Hours.     • APRI 0.15-30 MG-MCG per tablet TAKE 1 TABLET BY MOUTH DAILY FOR 90 DAYS.  3   • azithromycin (ZITHROMAX) 250 MG tablet Take 2 tablets the first day, then 1 tablet daily for 4 days. 6 tablet 0   • b complex vitamins capsule Take 1 capsule by mouth Daily. 30 capsule 11   • budesonide-formoterol (SYMBICORT) 160-4.5 MCG/ACT inhaler Symbicort 160-4.5 MCG/ACT Inhalation Aerosol; Patient Sig: Symbicort 160-4.5 MCG/ACT Inhalation Aerosol INHALE TWO PUFFS BY MOUTH TWICE A DAY; 10.2; 11; 05-Nov-2014; Active     • ergocalciferol (DRISDOL) 89149 UNITS capsule Take 1 capsule by mouth 1 (One) Time Per Week. 13 capsule 3   • hydrOXYzine (ATARAX) 10 MG tablet Take 1 tablet by mouth 3 (Three) Times a Day As Needed for itching. 30 tablet 0   • hydrOXYzine (VISTARIL) 25 MG capsule Take 1 capsule by mouth 3 (Three) Times a Day As Needed for itching. 30 capsule 0   • levocetirizine (XYZAL) 5 MG tablet TAKE 1 TABLET BY MOUTH AT BEDTIME  3   • MethylPREDNISolone (MEDROL, NUSRAT,) 4 MG tablet Take as directed on package instructions. 21 tablet 0   • montelukast (SINGULAIR) 10 MG tablet TAKE 1 TABLET BY MOUTH AT BEDTIME  3   • predniSONE (DELTASONE) 10 MG tablet Take 1 tablet by mouth Daily. 5 tablet 0   • raNITIdine (ZANTAC) 300 MG tablet TAKE 1 TABLET BY MOUTH AT BEDTIME  11   • traZODone (DESYREL) 50 MG tablet Take 1 tablet by mouth Every Night. 90 tablet 1   • triamcinolone (KENALOG) 0.1 % ointment Apply  topically 2 (Two) Times a Day. 80 g 2   • [DISCONTINUED] MethylPREDNISolone (MEDROL) 4 MG tablet Take as directed on package instructions. 21 tablet 0     No current facility-administered medications on file prior to visit.         ALLERGIES:    Allergies    Allergen Reactions   • Beta Adrenergic Blockers    • Metoprolol      Face swelling, itching     • Propranolol      Face swelling, itching          Social History     Social History   • Marital status: Single     Spouse name: N/A   • Number of children: N/A   • Years of education: College     Occupational History   •  [employer]     Social History Main Topics   • Smoking status: Never Smoker   • Smokeless tobacco: Never Used   • Alcohol use Yes      Comment: Occasional   • Drug use: No   • Sexual activity: Yes     Partners: Male     Other Topics Concern   • None     Social History Narrative        Family History   Problem Relation Age of Onset   • Hypertension Mother    • Diabetes Mother    • Hypertension Father    • Hyperlipidemia Father    • Diabetes Father    • Alcohol abuse Other    • Diabetes Other    • Hypertension Other    • Diabetes Maternal Aunt    • Hypertension Maternal Uncle    • Diabetes Maternal Uncle         Review of Systems   Constitutional: Positive for fatigue and unexpected weight change. Negative for appetite change and fever.        Hyperthyroidism   HENT: Positive for nosebleeds. Negative for congestion, hearing loss and mouth sores.    Eyes: Negative for photophobia and redness.   Respiratory: Positive for shortness of breath. Negative for choking, chest tightness and wheezing.    Cardiovascular: Positive for palpitations. Negative for chest pain and leg swelling.   Gastrointestinal: Positive for nausea and vomiting. Negative for abdominal distention, anal bleeding and blood in stool.   Endocrine: Positive for polyuria. Negative for polydipsia.   Genitourinary: Positive for frequency and menstrual problem. Negative for difficulty urinating, vaginal discharge and vaginal pain.   Musculoskeletal: Positive for arthralgias. Negative for gait problem and neck stiffness.   Skin: Positive for rash. Negative for color change, pallor and wound.   Neurological: Positive for dizziness.  "Negative for weakness and numbness.   Hematological: Negative for adenopathy. Does not bruise/bleed easily.   Psychiatric/Behavioral: Negative for agitation.        Objective     Vitals:    03/02/17 0942   BP: 108/70   Pulse: 76   Resp: 16   Temp: 98.5 °F (36.9 °C)   SpO2: 100%   Weight: 135 lb 9.6 oz (61.5 kg)   Height: 64\" (162.6 cm)   PainSc: 8  Comment: back pain     Current Status 3/2/2017   ECOG score 0       Physical Exam    Gen: pleasant young black female nad  HEENT: no icterus or thrush, face mildly swollen?  NECK: no jvd  CV: RRR, S1S2  CHEST: CTA bilat  ABD: soft, ndnd, +bs, no edema  MS: no edema  NEURO: no focal deficits    RECENT LABS:  Hematology WBC   Date Value Ref Range Status   03/02/2017 4.97 4.00 - 10.00 10*3/mm3 Final   11/14/2016 5.38 4.50 - 10.70 10*3/mm3 Final     RBC   Date Value Ref Range Status   03/02/2017 4.90 3.90 - 5.00 10*6/mm3 Final   11/14/2016 4.80 3.90 - 5.20 10*6/mm3 Final     HEMOGLOBIN   Date Value Ref Range Status   03/02/2017 12.1 11.5 - 14.9 g/dL Final   11/14/2016 10.9 (L) 11.9 - 15.5 g/dL Final     HEMATOCRIT   Date Value Ref Range Status   03/02/2017 38.1 34.0 - 45.0 % Final   11/14/2016 35.0 (L) 35.6 - 45.5 % Final     PLATELETS   Date Value Ref Range Status   03/02/2017 180 150 - 375 10*3/mm3 Final   11/14/2016 246 140 - 500 10*3/mm3 Final          Assessment/Plan     Ms. Espino is a 31-year-old woman with microcytic and hypochromic anemia with history of menorrhagia.  This is most likely iron deficiency secondary to chronic blood loss from menorrhagia.  She received 2 doses of Feraheme in January with normalization of the hemoglobin today to 12.1.  Her MCV has increased to 77.  The RDW is elevated indicating ongoing utilization of the previous iron.  I recommended that we repeat her CBC and iron studies in 3 months and we can give further Feraheme if required.  She has multiple other complaints of fatigue, weight gain, shortness of breath etc. which I think is more " likely related to her thyroid dysregulation.  I recommended she follow-up with endocrinology.

## 2017-03-06 ENCOUNTER — OFFICE VISIT (OUTPATIENT)
Dept: INTERNAL MEDICINE | Facility: CLINIC | Age: 32
End: 2017-03-06

## 2017-03-06 VITALS
DIASTOLIC BLOOD PRESSURE: 72 MMHG | WEIGHT: 132 LBS | BODY MASS INDEX: 22.53 KG/M2 | TEMPERATURE: 98 F | SYSTOLIC BLOOD PRESSURE: 108 MMHG | OXYGEN SATURATION: 99 % | HEART RATE: 74 BPM | HEIGHT: 64 IN

## 2017-03-06 DIAGNOSIS — L50.9 URTICARIA: ICD-10-CM

## 2017-03-06 DIAGNOSIS — K21.9 GASTROESOPHAGEAL REFLUX DISEASE WITHOUT ESOPHAGITIS: ICD-10-CM

## 2017-03-06 DIAGNOSIS — J30.1 SEASONAL ALLERGIC RHINITIS DUE TO POLLEN: ICD-10-CM

## 2017-03-06 DIAGNOSIS — D47.09 MAST CELL DISEASE: ICD-10-CM

## 2017-03-06 DIAGNOSIS — J45.20 MILD INTERMITTENT ASTHMA WITHOUT COMPLICATION: ICD-10-CM

## 2017-03-06 DIAGNOSIS — R22.0 FACIAL SWELLING: Primary | ICD-10-CM

## 2017-03-06 PROCEDURE — 99214 OFFICE O/P EST MOD 30 MIN: CPT | Performed by: INTERNAL MEDICINE

## 2017-03-08 ENCOUNTER — RESULTS ENCOUNTER (OUTPATIENT)
Dept: ENDOCRINOLOGY | Age: 32
End: 2017-03-08

## 2017-03-08 ENCOUNTER — OFFICE VISIT (OUTPATIENT)
Dept: ENDOCRINOLOGY | Age: 32
End: 2017-03-08

## 2017-03-08 VITALS
HEIGHT: 65 IN | DIASTOLIC BLOOD PRESSURE: 68 MMHG | SYSTOLIC BLOOD PRESSURE: 104 MMHG | WEIGHT: 137 LBS | BODY MASS INDEX: 22.82 KG/M2

## 2017-03-08 DIAGNOSIS — N92.6 MENSTRUAL DISORDER: ICD-10-CM

## 2017-03-08 DIAGNOSIS — E05.00 GRAVES DISEASE: ICD-10-CM

## 2017-03-08 DIAGNOSIS — M62.81 MUSCLE WEAKNESS (GENERALIZED): ICD-10-CM

## 2017-03-08 DIAGNOSIS — R63.4 WEIGHT LOSS: ICD-10-CM

## 2017-03-08 DIAGNOSIS — R20.2 NUMBNESS AND TINGLING SENSATION OF SKIN: ICD-10-CM

## 2017-03-08 DIAGNOSIS — R20.2 PARESTHESIA OF LOWER EXTREMITY: ICD-10-CM

## 2017-03-08 DIAGNOSIS — R63.5 ABNORMAL WEIGHT GAIN: Primary | ICD-10-CM

## 2017-03-08 DIAGNOSIS — E05.90 THYROTOXICOSIS WITHOUT THYROID STORM, UNSPECIFIED THYROTOXICOSIS TYPE: ICD-10-CM

## 2017-03-08 DIAGNOSIS — R20.0 NUMBNESS AND TINGLING SENSATION OF SKIN: ICD-10-CM

## 2017-03-08 DIAGNOSIS — Z92.3 H/O RADIOACTIVE IODINE THYROID ABLATION: ICD-10-CM

## 2017-03-08 PROCEDURE — 99214 OFFICE O/P EST MOD 30 MIN: CPT | Performed by: NURSE PRACTITIONER

## 2017-03-08 NOTE — PROGRESS NOTES
"Subjective   Sundeep Espino is a 31 y.o. female is here today for follow-up.  Chief Complaint   Patient presents with   • Hyperthyroidism     labs done at [facility], patient is experincing swelling in your, loss of voice, heavy menstrual cycle   • Vitamin D Deficiency     Visit Vitals   • /68   • Ht 64.5\" (163.8 cm)   • Wt 137 lb (62.1 kg)   • LMP 02/12/2017   • BMI 23.15 kg/m2     Current Outpatient Prescriptions on File Prior to Visit   Medication Sig   • albuterol (PROVENTIL HFA;VENTOLIN HFA) 108 (90 BASE) MCG/ACT inhaler Inhale 2 puffs Every 6 (Six) Hours.   • b complex vitamins capsule Take 1 capsule by mouth Daily.   • budesonide-formoterol (SYMBICORT) 160-4.5 MCG/ACT inhaler Symbicort 160-4.5 MCG/ACT Inhalation Aerosol; Patient Sig: Symbicort 160-4.5 MCG/ACT Inhalation Aerosol INHALE TWO PUFFS BY MOUTH TWICE A DAY; 10.2; 11; 05-Nov-2014; Active   • ergocalciferol (DRISDOL) 21066 UNITS capsule Take 1 capsule by mouth 1 (One) Time Per Week.   • levocetirizine (XYZAL) 5 MG tablet TAKE 1 TABLET BY MOUTH AT BEDTIME   • montelukast (SINGULAIR) 10 MG tablet TAKE 1 TABLET BY MOUTH AT BEDTIME   • raNITIdine (ZANTAC) 300 MG tablet TAKE 1 TABLET BY MOUTH AT BEDTIME   • traZODone (DESYREL) 50 MG tablet Take 1 tablet by mouth Every Night.     No current facility-administered medications on file prior to visit.      Family History   Problem Relation Age of Onset   • Hypertension Mother    • Diabetes Mother    • Hypertension Father    • Hyperlipidemia Father    • Diabetes Father    • Alcohol abuse Other    • Diabetes Other    • Hypertension Other    • Diabetes Maternal Aunt    • Hypertension Maternal Uncle    • Diabetes Maternal Uncle      Social History   Substance Use Topics   • Smoking status: Never Smoker   • Smokeless tobacco: Never Used   • Alcohol use Yes      Comment: Occasional     Allergies   Allergen Reactions   • Beta Adrenergic Blockers    • Metoprolol      Face swelling, itching     • Propranolol      Face " swelling, itching           History of Present Illness  Encounter Diagnoses   Name Primary?   • Abnormal weight gain Yes   • Thyrotoxicosis without thyroid storm, unspecified thyrotoxicosis type    • Menstrual disorder     this is a 31-year-old female patient here today for routine follow-up visit of the above-mentioned problems.  She states on January.  She had her thyroid ablated.  Since then she has gained weight.  Her highest weight though was 170 pounds.  She has not been exercising due to thyrotoxicosis.  She states her heart rate now is down in the 60s where it was in the 120s.  She has had no tremors or palpitations.  She has a history of iron deficiency anemia and is followed by [facility] for iron transfusions.  Her energy level is down.  Her menses has become more heavy.  She is currently not on any birth control pills.  She was unable to take a beta blocker and developed hives as a result.  She is followed by an allergist for treatment of urticaria.  She states she is taking her medications as prescribed.  She is currently on no medication for graves.  She complains of facial swelling and swelling around her ankles above her socks.      The following portions of the patient's history were reviewed and updated as appropriate: allergies, current medications, past family history, past medical history, past social history, past surgical history and problem list.    Review of Systems   Constitutional: Positive for fatigue.   HENT: Positive for voice change.    Eyes: Negative for visual disturbance.   Respiratory: Negative for shortness of breath.    Cardiovascular: Negative for leg swelling.   Gastrointestinal: Negative for nausea.   Endocrine: Negative for polydipsia.   Genitourinary: Negative for urgency.   Musculoskeletal: Negative for joint swelling.   Skin: Negative for wound.   Allergic/Immunologic: Positive for food allergies.   Neurological: Positive for headaches.   Hematological: Does not  bruise/bleed easily.   Psychiatric/Behavioral: Positive for sleep disturbance.       Objective   Physical Exam   Constitutional: She is oriented to person, place, and time. She appears well-developed and well-nourished. No distress.   HENT:   Head: Normocephalic and atraumatic.   Right Ear: External ear normal.   Left Ear: External ear normal.   Nose: Nose normal.   Mouth/Throat: Oropharynx is clear and moist. No oropharyngeal exudate.   Face puffy. C/o face swelling.    Eyes: Conjunctivae and EOM are normal. Pupils are equal, round, and reactive to light. Right eye exhibits no discharge. Left eye exhibits no discharge.   Asymmetric eyes ,left eye is more prominent than the right.   Neck: Normal range of motion. Neck supple. No JVD present. No tracheal deviation present. Thyromegaly present.   Diffuse the enlarged thyroid gland to about 3-4 times normal.  It moves freely with swallowing and is nontender.   Cardiovascular: Normal rate, regular rhythm, normal heart sounds and intact distal pulses.  Exam reveals no gallop and no friction rub.    No murmur heard.  Pulmonary/Chest: Effort normal and breath sounds normal. No stridor. No respiratory distress. She has no wheezes. She has no rales. She exhibits no tenderness.   Abdominal: Soft. Bowel sounds are normal. She exhibits no distension and no mass. There is no tenderness. There is no rebound and no guarding. No hernia.   Musculoskeletal: Normal range of motion. She exhibits no edema, tenderness or deformity.   Lymphadenopathy:     She has no cervical adenopathy.   Neurological: She is alert and oriented to person, place, and time. She has normal reflexes. She displays normal reflexes. No cranial nerve deficit. She exhibits normal muscle tone. Coordination normal.   Skin: Skin is warm and dry. No rash noted. She is not diaphoretic. No erythema. No pallor.   Psychiatric: She has a normal mood and affect. Her behavior is normal. Judgment and thought content normal.    Nursing note and vitals reviewed.        Assessment/Plan   Sundeep was seen today for hyperthyroidism and vitamin d deficiency.    Diagnoses and all orders for this visit:    Abnormal weight gain    Thyrotoxicosis without thyroid storm, unspecified thyrotoxicosis type    Menstrual disorder    H/O radioactive iodine thyroid ablation    Graves disease      In summary, patient was seen and examined.  She will continue all her current medications as prescribed and will have labs done today.  She'll be notified of the results along with any further recommendations.  At today's visit she exhibits no signs and symptoms of hyperthyroidism.  Her weight is stable.  Her blood pressure is stable.  Her previous labs and thyroid uptake and scan were reviewed.  She will follow-up with Dr. Iqbal or myself in 4 months with labs prior.  I've encouraged her to contact the office should she have any questions or concerns prior to then.

## 2017-03-09 LAB
25(OH)D3+25(OH)D2 SERPL-MCNC: 26.3 NG/ML (ref 30–100)
ALBUMIN SERPL-MCNC: 3.7 G/DL (ref 3.5–5.2)
ALBUMIN/GLOB SERPL: 1.3 G/DL
ALP SERPL-CCNC: 98 U/L (ref 39–117)
ALT SERPL-CCNC: 15 U/L (ref 1–33)
AST SERPL-CCNC: 20 U/L (ref 1–32)
BILIRUB SERPL-MCNC: 0.2 MG/DL (ref 0.1–1.2)
BUN SERPL-MCNC: 8 MG/DL (ref 6–20)
BUN/CREAT SERPL: 11.1 (ref 7–25)
CALCIUM SERPL-MCNC: 9.2 MG/DL (ref 8.6–10.5)
CHLORIDE SERPL-SCNC: 103 MMOL/L (ref 98–107)
CO2 SERPL-SCNC: 26.1 MMOL/L (ref 22–29)
CREAT SERPL-MCNC: 0.72 MG/DL (ref 0.57–1)
FT4I SERPL CALC-MCNC: 0.2 (ref 1.2–4.9)
GLOBULIN SER CALC-MCNC: 2.8 GM/DL
GLUCOSE SERPL-MCNC: 93 MG/DL (ref 65–99)
POTASSIUM SERPL-SCNC: 4.2 MMOL/L (ref 3.5–5.2)
PROT SERPL-MCNC: 6.5 G/DL (ref 6–8.5)
SODIUM SERPL-SCNC: 140 MMOL/L (ref 136–145)
T3FREE SERPL-MCNC: 0.5 PG/ML (ref 2–4.4)
T3RU NFR SERPL: 22 % (ref 24–39)
T4 FREE SERPL-MCNC: 0.16 NG/DL (ref 0.93–1.7)
T4 SERPL-MCNC: 1 UG/DL (ref 4.5–12)
THYROGLOB AB SERPL-ACNC: 16.2 IU/ML (ref 0–0.9)
THYROPEROXIDASE AB SERPL-ACNC: 383 IU/ML (ref 0–34)
TSH SERPL DL<=0.005 MIU/L-ACNC: 26.48 UIU/ML (ref 0.45–4.5)

## 2017-03-10 DIAGNOSIS — E55.9 VITAMIN D DEFICIENCY: ICD-10-CM

## 2017-03-10 DIAGNOSIS — E03.9 PRIMARY HYPOTHYROIDISM: Primary | ICD-10-CM

## 2017-03-10 RX ORDER — ERGOCALCIFEROL 1.25 MG/1
CAPSULE ORAL
Qty: 24 CAPSULE | Refills: 1 | Status: SHIPPED | OUTPATIENT
Start: 2017-03-10 | End: 2017-08-01 | Stop reason: SDUPTHER

## 2017-03-10 RX ORDER — LEVOTHYROXINE SODIUM 75 MCG
75 TABLET ORAL DAILY
Qty: 30 TABLET | Refills: 5 | Status: SHIPPED | OUTPATIENT
Start: 2017-03-10 | End: 2017-06-05 | Stop reason: SDUPTHER

## 2017-03-13 ENCOUNTER — DOCUMENTATION (OUTPATIENT)
Dept: ENDOCRINOLOGY | Age: 32
End: 2017-03-13

## 2017-03-15 ENCOUNTER — APPOINTMENT (OUTPATIENT)
Dept: ONCOLOGY | Facility: CLINIC | Age: 32
End: 2017-03-15
Attending: INTERNAL MEDICINE

## 2017-03-15 ENCOUNTER — APPOINTMENT (OUTPATIENT)
Dept: LAB | Facility: HOSPITAL | Age: 32
End: 2017-03-15
Attending: INTERNAL MEDICINE

## 2017-03-17 NOTE — PROGRESS NOTES
Subjective   Sundeep Espino is a 31 y.o. female.   She is here today for facial swelling bilaterally along with allergic rhinitis asthma GERD and urticaria at times as well as mast cell disease for which she gets hives  History of Present Illness    She is here today for facial swelling bilaterally along with allergic rhinitis asthma GERD and urticaria at times as well which comes and goes  The following portions of the patient's history were reviewed and updated as appropriate: allergies, current medications, past family history, past medical history, past social history, past surgical history and problem list.    Review of Systems   HENT: Positive for facial swelling.    Skin:        Urticaria at times throughout body   All other systems reviewed and are negative.      Objective   Physical Exam   Constitutional: She is oriented to person, place, and time. She appears well-developed and well-nourished. She is cooperative.   HENT:   Head: Normocephalic and atraumatic.       Right Ear: Hearing, tympanic membrane, external ear and ear canal normal.   Left Ear: Hearing, tympanic membrane, external ear and ear canal normal.   Nose: Nose normal.   Mouth/Throat: Uvula is midline, oropharynx is clear and moist and mucous membranes are normal.   Eyes: Conjunctivae, EOM and lids are normal. Pupils are equal, round, and reactive to light.   Neck: Phonation normal. Neck supple. Carotid bruit is not present.   Cardiovascular: Normal rate, regular rhythm and normal heart sounds.  Exam reveals no gallop and no friction rub.    No murmur heard.  Pulmonary/Chest: Effort normal and breath sounds normal. No respiratory distress.   Abdominal: Soft. Bowel sounds are normal. She exhibits no distension and no mass. There is no hepatosplenomegaly. There is no tenderness. There is no rebound and no guarding. No hernia.   Musculoskeletal: She exhibits no edema.   Neurological: She is alert and oriented to person, place, and time.  Coordination and gait normal.   Skin: Skin is warm and dry. Rash noted. Rash is urticarial (various parts of her body).   Psychiatric: She has a normal mood and affect. Her speech is normal and behavior is normal. Judgment and thought content normal.   Nursing note and vitals reviewed.      Assessment/Plan   Diagnoses and all orders for this visit:    Facial swelling    Seasonal allergic rhinitis due to pollen    Mild intermittent asthma without complication    Gastroesophageal reflux disease without esophagitis    Urticaria    Mast cell disease  -     Ambulatory Referral to Hematology / Oncology      Facial swelling may be related to mast cell disease and get x-rays well to rule out sprue vena cava syndrome  Seasonal allergic rhinitis supportive meds for this  Asthma stable on current medication  GERD stable on current medication  Urticaria C above and refer to hematology for possible mast cell disease  Mast cell disease referral to hematology

## 2017-03-20 ENCOUNTER — OFFICE VISIT (OUTPATIENT)
Dept: ONCOLOGY | Facility: CLINIC | Age: 32
End: 2017-03-20
Attending: INTERNAL MEDICINE

## 2017-03-20 ENCOUNTER — LAB (OUTPATIENT)
Dept: LAB | Facility: HOSPITAL | Age: 32
End: 2017-03-20
Attending: INTERNAL MEDICINE

## 2017-03-20 VITALS
RESPIRATION RATE: 14 BRPM | DIASTOLIC BLOOD PRESSURE: 68 MMHG | OXYGEN SATURATION: 100 % | TEMPERATURE: 98 F | BODY MASS INDEX: 22.81 KG/M2 | SYSTOLIC BLOOD PRESSURE: 106 MMHG | WEIGHT: 133.6 LBS | HEIGHT: 64 IN | HEART RATE: 70 BPM

## 2017-03-20 DIAGNOSIS — D47.09 MAST CELL DISEASE: Primary | ICD-10-CM

## 2017-03-20 DIAGNOSIS — D50.0 IRON DEFICIENCY ANEMIA DUE TO CHRONIC BLOOD LOSS: Primary | ICD-10-CM

## 2017-03-20 LAB
BASOPHILS # BLD AUTO: 0.01 10*3/MM3 (ref 0–0.1)
BASOPHILS NFR BLD AUTO: 0.2 % (ref 0–1.1)
DEPRECATED RDW RBC AUTO: 55.8 FL (ref 37–49)
EOSINOPHIL # BLD AUTO: 0.29 10*3/MM3 (ref 0–0.36)
EOSINOPHIL NFR BLD AUTO: 4.6 % (ref 1–5)
ERYTHROCYTE [DISTWIDTH] IN BLOOD BY AUTOMATED COUNT: 19.8 % (ref 11.7–14.5)
HCT VFR BLD AUTO: 37.3 % (ref 34–45)
HGB BLD-MCNC: 12 G/DL (ref 11.5–14.9)
IMM GRANULOCYTES # BLD: 0.01 10*3/MM3 (ref 0–0.03)
IMM GRANULOCYTES NFR BLD: 0.2 % (ref 0–0.5)
LYMPHOCYTES # BLD AUTO: 4.09 10*3/MM3 (ref 1–3.5)
LYMPHOCYTES NFR BLD AUTO: 64.9 % (ref 20–49)
MCH RBC QN AUTO: 25.5 PG (ref 27–33)
MCHC RBC AUTO-ENTMCNC: 32.2 G/DL (ref 32–35)
MCV RBC AUTO: 79.4 FL (ref 83–97)
MONOCYTES # BLD AUTO: 0.4 10*3/MM3 (ref 0.25–0.8)
MONOCYTES NFR BLD AUTO: 6.3 % (ref 4–12)
NEUTROPHILS # BLD AUTO: 1.5 10*3/MM3 (ref 1.5–7)
NEUTROPHILS NFR BLD AUTO: 23.8 % (ref 39–75)
NRBC BLD MANUAL-RTO: 0 /100 WBC (ref 0–0)
PLATELET # BLD AUTO: 230 10*3/MM3 (ref 150–375)
PMV BLD AUTO: 9.9 FL (ref 8.9–12.1)
RBC # BLD AUTO: 4.7 10*6/MM3 (ref 3.9–5)
WBC NRBC COR # BLD: 6.3 10*3/MM3 (ref 4–10)

## 2017-03-20 PROCEDURE — 36416 COLLJ CAPILLARY BLOOD SPEC: CPT | Performed by: INTERNAL MEDICINE

## 2017-03-20 PROCEDURE — 85025 COMPLETE CBC W/AUTO DIFF WBC: CPT | Performed by: INTERNAL MEDICINE

## 2017-03-20 PROCEDURE — 36415 COLL VENOUS BLD VENIPUNCTURE: CPT | Performed by: INTERNAL MEDICINE

## 2017-03-20 PROCEDURE — 99214 OFFICE O/P EST MOD 30 MIN: CPT | Performed by: INTERNAL MEDICINE

## 2017-03-20 NOTE — PROGRESS NOTES
Subjective     REASON FOR FOLLOW-UP:  Iron deficiency anemia, intolerant of oral iron therapy; iron deficiency most likely secondary to menorrhagia, is post 2 doses of Feraheme January 2017  I am asked to see the patient back today for evaluation of possible mast cell disease                               REQUESTING PHYSICIAN:  Kolton    RECORDS OBTAINED:  Records of the patients history including those obtained from the referring provider were reviewed and summarized in detail.    History of Present Illness   Ms. Espino is a 31-year-old woman seen previously by me for evaluation of anemia. She indicates that she has had a lifelong issue with anemia.  Reviewing her records, she actually had a fairly normal CBC in January 2014 with hemoglobin of 12.0 and MCV of 82.  However, over the past 3 years she has had a steady decline in the hemoglobin currently to 10.7, and her red blood cell indices have become more microcytic and hypochromic with a current MCV of 73 and MCHC 30.6.  B12 and folic acid levels were normal in November.  She does report very heavy menstrual cycles her entire adult life with 5-7 days of heavy bleeding.  She has tried to take oral iron in the past but cannot tolerate iron because of GI upset. She had iron studies performed in November 2016 which showed a ferritin of 33 and an iron saturation of 22%. She was given 2 doses of Feraheme in January which she tolerated well.  Her hemoglobin has normalized but she has not noted any significant improvement in her fatigue.     The patient has also been having recent issues with hyperthyroidism.  Several weeks ago she was placed on 2 separate beta blockers which caused the patient to develop urticaria.  Urticaria was difficult to treat with poor response to antihistamines and she eventually required steroid treatment which helped some.  Her urticaria has completely resolved since discontinuation of the beta blockers.  She denies any other skin rash  having noted no hyperpigmented skin lesions, macules, or papules.  She denies pruritus of the skin.  She denies Darier's sign.  She does complain of facial swelling which has been present for approximately 2-3 months.  She notices the swelling primarily periorbital location.  The swelling is constant without any relieving or exacerbating factors.  She has not noted association with any certain foods, stress, or other triggers.  She denies shortness of breath or stridor.  A recent chest x-ray was normal.    The patient denies any history of anaphylactic episodes.  She states her blood pressure commonly runs low, but this is normal for her. She is not excessively symptomatic to bee stings.  She has not experienced any weight loss.  She has had normal liverer function testing and no cytopenias since correction of her iron deficiency.  She states that she coughs sometimes while eating but denies significant nausea or diarrhea.    Past Medical History   Diagnosis Date   • Allergic rhinitis    • Anemia    • Asthma    • GERD (gastroesophageal reflux disease)    • Hyperthyroidism    • Ovarian cyst    • Vitamin D deficiency         Past Surgical History   Procedure Laterality Date   • Mandible surgery     • Appendectomy  2012        Current Outpatient Prescriptions on File Prior to Visit   Medication Sig Dispense Refill   • albuterol (PROVENTIL HFA;VENTOLIN HFA) 108 (90 BASE) MCG/ACT inhaler Inhale 2 puffs Every 6 (Six) Hours.     • b complex vitamins capsule Take 1 capsule by mouth Daily. 30 capsule 11   • budesonide-formoterol (SYMBICORT) 160-4.5 MCG/ACT inhaler Symbicort 160-4.5 MCG/ACT Inhalation Aerosol; Patient Sig: Symbicort 160-4.5 MCG/ACT Inhalation Aerosol INHALE TWO PUFFS BY MOUTH TWICE A DAY; 10.2; 11; 05-Nov-2014; Active     • ergocalciferol (DRISDOL) 36578 UNITS capsule Take 1 cap twice weekly 24 capsule 1   • levocetirizine (XYZAL) 5 MG tablet TAKE 1 TABLET BY MOUTH AT BEDTIME  3   • montelukast (SINGULAIR) 10  MG tablet TAKE 1 TABLET BY MOUTH AT BEDTIME  3   • raNITIdine (ZANTAC) 300 MG tablet TAKE 1 TABLET BY MOUTH AT BEDTIME  11   • SYNTHROID 75 MCG tablet Take 1 tablet by mouth Daily. 30 tablet 5     No current facility-administered medications on file prior to visit.         ALLERGIES:    Allergies   Allergen Reactions   • Beta Adrenergic Blockers    • Metoprolol      Face swelling, itching     • Propranolol      Face swelling, itching          Social History     Social History   • Marital status: Single     Spouse name: N/A   • Number of children: N/A   • Years of education: College     Occupational History   •  [employer]     Social History Main Topics   • Smoking status: Never Smoker   • Smokeless tobacco: Never Used   • Alcohol use Yes      Comment: Occasional   • Drug use: No   • Sexual activity: Yes     Partners: Male     Other Topics Concern   • None     Social History Narrative        Family History   Problem Relation Age of Onset   • Hypertension Mother    • Diabetes Mother    • Hypertension Father    • Hyperlipidemia Father    • Diabetes Father    • Alcohol abuse Other    • Diabetes Other    • Hypertension Other    • Diabetes Maternal Aunt    • Hypertension Maternal Uncle    • Diabetes Maternal Uncle         Review of Systems   Constitutional: Positive for fatigue. Negative for appetite change, fever and unexpected weight change.        Hyperthyroidism   HENT: Negative for congestion, hearing loss, mouth sores and nosebleeds.    Eyes: Negative for photophobia and redness.   Respiratory: Positive for shortness of breath. Negative for choking, chest tightness and wheezing.    Cardiovascular: Negative for chest pain, palpitations and leg swelling.   Gastrointestinal: Negative for abdominal distention, anal bleeding, blood in stool, nausea and vomiting.   Endocrine: Positive for polyuria. Negative for polydipsia.   Genitourinary: Positive for frequency and menstrual problem. Negative for  "difficulty urinating, vaginal discharge and vaginal pain.   Musculoskeletal: Positive for arthralgias. Negative for gait problem and neck stiffness.   Skin: Negative for color change, pallor, rash and wound.   Neurological: Positive for dizziness. Negative for weakness and numbness.   Hematological: Negative for adenopathy. Does not bruise/bleed easily.   Psychiatric/Behavioral: Negative for agitation.        Objective     Vitals:    03/20/17 0750   BP: 106/68   Pulse: 70   Resp: 14   Temp: 98 °F (36.7 °C)   SpO2: 100%   Weight: 133 lb 9.6 oz (60.6 kg)   Height: 64\" (162.6 cm)   PainSc: 0-No pain     Current Status 3/20/2017   ECOG score 0       Physical Exam    Gen: pleasant young black female nad  HEENT: no icterus or thrush, ? Mild periorbital edema per her but difficult to see without knowing her baseline  NECK: no jvd  CV: RRR, S1S2  CHEST: CTA bilat  ABD: soft, ndnd, +bs, no edema  MS: no edema  NEURO: no focal deficits  SKIN: no rash, macules, papules, hives; negative Darier's sign    RECENT LABS:  Hematology WBC   Date Value Ref Range Status   03/20/2017 6.30 4.00 - 10.00 10*3/mm3 Final   11/14/2016 5.38 4.50 - 10.70 10*3/mm3 Final     RBC   Date Value Ref Range Status   03/20/2017 4.70 3.90 - 5.00 10*6/mm3 Final   11/14/2016 4.80 3.90 - 5.20 10*6/mm3 Final     HEMOGLOBIN   Date Value Ref Range Status   03/20/2017 12.0 11.5 - 14.9 g/dL Final   11/14/2016 10.9 (L) 11.9 - 15.5 g/dL Final     HEMATOCRIT   Date Value Ref Range Status   03/20/2017 37.3 34.0 - 45.0 % Final   11/14/2016 35.0 (L) 35.6 - 45.5 % Final     PLATELETS   Date Value Ref Range Status   03/20/2017 230 150 - 375 10*3/mm3 Final   11/14/2016 246 140 - 500 10*3/mm3 Final        Lab Results   Component Value Date    GLUCOSE 108 (H) 01/09/2014    BUN 8 03/08/2017    CREATININE 0.72 03/08/2017    EGFRIFNONA 95 03/08/2017    EGFRIFAFRI 115 03/08/2017    BCR 11.1 03/08/2017    K 4.2 03/08/2017    CO2 26.1 03/08/2017    CALCIUM 9.2 03/08/2017    " PROTENTOTREF 6.5 03/08/2017    ALBUMIN 3.70 03/08/2017    LABIL2 1.3 03/08/2017    AST 20 03/08/2017    ALT 15 03/08/2017         Assessment/Plan     1.  Iron deficiency anemia likely secondary to menorrhagia.e received 2 doses of Feraheme in January with normalization of the hemoglobin.  She has follow-up CBC and iron studies scheduled in 2 months.    2.  She is seen back today for evaluation of possible mast cell disease.  This was brought into question after the patient developed recent urticaria related to a beta blocker allergy.  Urticaria was apparently difficult to treat, but is now completely resolved.  She also complains of mild periorbital edema.  Her history and exam are negative for current skin rash, pruritus or hives.  She has no history of anaphylactic-type episodes.  She had a negative Darier's sign today.  She has no cytopenias now that her iron has been replaced and a normal CMP.  My suspicion for systemic mastocytosis is low.  I will screen with a serum tryptase level which is usually greater than 20 in the setting of systemic mastocytosis.  I would not plan a bone marrow exam at this time unless her serum tryptase level returns elevated.  Could her periorbital edema be related to her thyroid disease?

## 2017-03-22 LAB — TRYPTASE SERPL-MCNC: 3.9 UG/L (ref 2.2–13.2)

## 2017-03-23 ENCOUNTER — TELEPHONE (OUTPATIENT)
Dept: ONCOLOGY | Facility: HOSPITAL | Age: 32
End: 2017-03-23

## 2017-03-23 NOTE — TELEPHONE ENCOUNTER
----- Message from Javon Schaeffer MD sent at 3/23/2017 10:54 AM EDT -----  Please let her know tryptase level was normal so my suspicion for mast cell disease is low.    ----- Message -----     From: Lab, Background User     Sent: 3/22/2017   3:26 PM       To: Javon Schaeffer MD          Tried calling patient, no answer. Left v/m for her to call us back to go over lab result from .

## 2017-05-10 ENCOUNTER — RESULTS ENCOUNTER (OUTPATIENT)
Dept: ENDOCRINOLOGY | Age: 32
End: 2017-05-10

## 2017-05-10 DIAGNOSIS — E03.9 PRIMARY HYPOTHYROIDISM: ICD-10-CM

## 2017-05-19 ENCOUNTER — LAB (OUTPATIENT)
Dept: LAB | Facility: HOSPITAL | Age: 32
End: 2017-05-19
Attending: INTERNAL MEDICINE

## 2017-05-19 DIAGNOSIS — D50.0 IRON DEFICIENCY ANEMIA DUE TO CHRONIC BLOOD LOSS: ICD-10-CM

## 2017-05-19 LAB
BASOPHILS # BLD AUTO: 0 10*3/MM3 (ref 0–0.1)
BASOPHILS NFR BLD AUTO: 0 % (ref 0–1.1)
DEPRECATED RDW RBC AUTO: 44.8 FL (ref 37–49)
EOSINOPHIL # BLD AUTO: 0.13 10*3/MM3 (ref 0–0.36)
EOSINOPHIL NFR BLD AUTO: 2.2 % (ref 1–5)
ERYTHROCYTE [DISTWIDTH] IN BLOOD BY AUTOMATED COUNT: 13.9 % (ref 11.7–14.5)
FERRITIN SERPL-MCNC: 73.9 NG/ML (ref 11–207)
HCT VFR BLD AUTO: 37 % (ref 34–45)
HGB BLD-MCNC: 12.2 G/DL (ref 11.5–14.9)
IMM GRANULOCYTES # BLD: 0.01 10*3/MM3 (ref 0–0.03)
IMM GRANULOCYTES NFR BLD: 0.2 % (ref 0–0.5)
IRON 24H UR-MRATE: 47 MCG/DL (ref 37–145)
IRON SATN MFR SERPL: 15 % (ref 14–48)
LYMPHOCYTES # BLD AUTO: 2.8 10*3/MM3 (ref 1–3.5)
LYMPHOCYTES NFR BLD AUTO: 47.1 % (ref 20–49)
MCH RBC QN AUTO: 29 PG (ref 27–33)
MCHC RBC AUTO-ENTMCNC: 33 G/DL (ref 32–35)
MCV RBC AUTO: 88.1 FL (ref 83–97)
MONOCYTES # BLD AUTO: 0.54 10*3/MM3 (ref 0.25–0.8)
MONOCYTES NFR BLD AUTO: 9.1 % (ref 4–12)
NEUTROPHILS # BLD AUTO: 2.47 10*3/MM3 (ref 1.5–7)
NEUTROPHILS NFR BLD AUTO: 41.4 % (ref 39–75)
NRBC BLD MANUAL-RTO: 0 /100 WBC (ref 0–0)
PLATELET # BLD AUTO: 175 10*3/MM3 (ref 150–375)
PMV BLD AUTO: 9.1 FL (ref 8.9–12.1)
RBC # BLD AUTO: 4.2 10*6/MM3 (ref 3.9–5)
TIBC SERPL-MCNC: 307 MCG/DL (ref 249–505)
TRANSFERRIN SERPL-MCNC: 219 MG/DL (ref 200–360)
WBC NRBC COR # BLD: 5.95 10*3/MM3 (ref 4–10)

## 2017-05-19 PROCEDURE — 36415 COLL VENOUS BLD VENIPUNCTURE: CPT | Performed by: INTERNAL MEDICINE

## 2017-05-19 PROCEDURE — 83540 ASSAY OF IRON: CPT | Performed by: INTERNAL MEDICINE

## 2017-05-19 PROCEDURE — 82728 ASSAY OF FERRITIN: CPT | Performed by: INTERNAL MEDICINE

## 2017-05-19 PROCEDURE — 85025 COMPLETE CBC W/AUTO DIFF WBC: CPT | Performed by: INTERNAL MEDICINE

## 2017-05-19 PROCEDURE — 84466 ASSAY OF TRANSFERRIN: CPT | Performed by: INTERNAL MEDICINE

## 2017-05-26 ENCOUNTER — APPOINTMENT (OUTPATIENT)
Dept: LAB | Facility: HOSPITAL | Age: 32
End: 2017-05-26
Attending: INTERNAL MEDICINE

## 2017-05-26 ENCOUNTER — OFFICE VISIT (OUTPATIENT)
Dept: ONCOLOGY | Facility: CLINIC | Age: 32
End: 2017-05-26
Attending: INTERNAL MEDICINE

## 2017-05-26 ENCOUNTER — APPOINTMENT (OUTPATIENT)
Dept: ONCOLOGY | Facility: HOSPITAL | Age: 32
End: 2017-05-26
Attending: INTERNAL MEDICINE

## 2017-05-26 VITALS
BODY MASS INDEX: 25.72 KG/M2 | RESPIRATION RATE: 14 BRPM | WEIGHT: 154.4 LBS | OXYGEN SATURATION: 100 % | HEART RATE: 70 BPM | SYSTOLIC BLOOD PRESSURE: 114 MMHG | HEIGHT: 65 IN | TEMPERATURE: 98.4 F | DIASTOLIC BLOOD PRESSURE: 70 MMHG

## 2017-05-26 DIAGNOSIS — D50.0 IRON DEFICIENCY ANEMIA DUE TO CHRONIC BLOOD LOSS: Primary | ICD-10-CM

## 2017-05-26 PROCEDURE — G0463 HOSPITAL OUTPT CLINIC VISIT: HCPCS | Performed by: INTERNAL MEDICINE

## 2017-05-26 PROCEDURE — 99213 OFFICE O/P EST LOW 20 MIN: CPT | Performed by: INTERNAL MEDICINE

## 2017-06-05 RX ORDER — LEVOTHYROXINE SODIUM 75 MCG
75 TABLET ORAL DAILY
Qty: 90 TABLET | Refills: 0 | Status: SHIPPED | OUTPATIENT
Start: 2017-06-05 | End: 2017-06-12 | Stop reason: SDUPTHER

## 2017-06-12 RX ORDER — LEVOTHYROXINE SODIUM 0.07 MG/1
75 TABLET ORAL DAILY
Qty: 90 TABLET | Refills: 0 | Status: SHIPPED | OUTPATIENT
Start: 2017-06-12 | End: 2017-08-01

## 2017-07-14 ENCOUNTER — LAB (OUTPATIENT)
Dept: LAB | Facility: HOSPITAL | Age: 32
End: 2017-07-14
Attending: INTERNAL MEDICINE

## 2017-07-14 DIAGNOSIS — D50.0 IRON DEFICIENCY ANEMIA DUE TO CHRONIC BLOOD LOSS: ICD-10-CM

## 2017-07-14 LAB
BASOPHILS # BLD AUTO: 0.01 10*3/MM3 (ref 0–0.1)
BASOPHILS NFR BLD AUTO: 0.2 % (ref 0–1.1)
DEPRECATED RDW RBC AUTO: 41.4 FL (ref 37–49)
EOSINOPHIL # BLD AUTO: 0.12 10*3/MM3 (ref 0–0.36)
EOSINOPHIL NFR BLD AUTO: 2.1 % (ref 1–5)
ERYTHROCYTE [DISTWIDTH] IN BLOOD BY AUTOMATED COUNT: 12.6 % (ref 11.7–14.5)
FERRITIN SERPL-MCNC: 54.8 NG/ML (ref 11–207)
HCT VFR BLD AUTO: 38.7 % (ref 34–45)
HGB BLD-MCNC: 12.2 G/DL (ref 11.5–14.9)
IMM GRANULOCYTES # BLD: 0.01 10*3/MM3 (ref 0–0.03)
IMM GRANULOCYTES NFR BLD: 0.2 % (ref 0–0.5)
IRON 24H UR-MRATE: 44 MCG/DL (ref 37–145)
IRON SATN MFR SERPL: 14 % (ref 14–48)
LYMPHOCYTES # BLD AUTO: 2.76 10*3/MM3 (ref 1–3.5)
LYMPHOCYTES NFR BLD AUTO: 47.5 % (ref 20–49)
MCH RBC QN AUTO: 28.2 PG (ref 27–33)
MCHC RBC AUTO-ENTMCNC: 31.5 G/DL (ref 32–35)
MCV RBC AUTO: 89.4 FL (ref 83–97)
MONOCYTES # BLD AUTO: 0.49 10*3/MM3 (ref 0.25–0.8)
MONOCYTES NFR BLD AUTO: 8.4 % (ref 4–12)
NEUTROPHILS # BLD AUTO: 2.42 10*3/MM3 (ref 1.5–7)
NEUTROPHILS NFR BLD AUTO: 41.6 % (ref 39–75)
NRBC BLD MANUAL-RTO: 0 /100 WBC (ref 0–0)
PLATELET # BLD AUTO: 213 10*3/MM3 (ref 150–375)
PMV BLD AUTO: 9.3 FL (ref 8.9–12.1)
RBC # BLD AUTO: 4.33 10*6/MM3 (ref 3.9–5)
TIBC SERPL-MCNC: 325 MCG/DL (ref 249–505)
TRANSFERRIN SERPL-MCNC: 232 MG/DL (ref 200–360)
WBC NRBC COR # BLD: 5.81 10*3/MM3 (ref 4–10)

## 2017-07-14 PROCEDURE — 84466 ASSAY OF TRANSFERRIN: CPT

## 2017-07-14 PROCEDURE — 36415 COLL VENOUS BLD VENIPUNCTURE: CPT

## 2017-07-14 PROCEDURE — 82728 ASSAY OF FERRITIN: CPT

## 2017-07-14 PROCEDURE — 83540 ASSAY OF IRON: CPT

## 2017-07-14 PROCEDURE — 85025 COMPLETE CBC W/AUTO DIFF WBC: CPT

## 2017-07-18 LAB
25(OH)D3+25(OH)D2 SERPL-MCNC: 23.9 NG/ML (ref 30–100)
ALBUMIN SERPL-MCNC: 3.9 G/DL (ref 3.5–5.2)
ALBUMIN/GLOB SERPL: 1.3 G/DL
ALP SERPL-CCNC: 120 U/L (ref 39–117)
ALT SERPL-CCNC: 17 U/L (ref 1–33)
AST SERPL-CCNC: 20 U/L (ref 1–32)
BILIRUB SERPL-MCNC: 0.3 MG/DL (ref 0.1–1.2)
BUN SERPL-MCNC: 11 MG/DL (ref 6–20)
BUN/CREAT SERPL: 11 (ref 7–25)
CALCIUM SERPL-MCNC: 9.2 MG/DL (ref 8.6–10.5)
CHLORIDE SERPL-SCNC: 100 MMOL/L (ref 98–107)
CHOLEST SERPL-MCNC: 171 MG/DL (ref 0–200)
CO2 SERPL-SCNC: 25.6 MMOL/L (ref 22–29)
CREAT SERPL-MCNC: 1 MG/DL (ref 0.57–1)
GLOBULIN SER CALC-MCNC: 3.1 GM/DL
GLUCOSE SERPL-MCNC: 110 MG/DL (ref 65–99)
HDLC SERPL-MCNC: 53 MG/DL (ref 40–60)
LDLC SERPL CALC-MCNC: 99 MG/DL (ref 0–100)
POTASSIUM SERPL-SCNC: 4.2 MMOL/L (ref 3.5–5.2)
PROT SERPL-MCNC: 7 G/DL (ref 6–8.5)
SODIUM SERPL-SCNC: 138 MMOL/L (ref 136–145)
T3FREE SERPL-MCNC: 1.8 PG/ML (ref 2–4.4)
T4 FREE SERPL-MCNC: 0.92 NG/DL (ref 0.93–1.7)
T4 SERPL-MCNC: 5.32 MCG/DL (ref 4.5–11.7)
THYROGLOB AB SERPL-ACNC: 39.2 IU/ML (ref 0–0.9)
THYROGLOB SERPL-MCNC: 16 NG/ML
TRIGL SERPL-MCNC: 97 MG/DL (ref 0–150)
TSH SERPL DL<=0.005 MIU/L-ACNC: 54.01 MIU/ML (ref 0.27–4.2)
VLDLC SERPL CALC-MCNC: 19.4 MG/DL (ref 5–40)

## 2017-07-21 ENCOUNTER — APPOINTMENT (OUTPATIENT)
Dept: ONCOLOGY | Facility: HOSPITAL | Age: 32
End: 2017-07-21
Attending: INTERNAL MEDICINE

## 2017-07-21 ENCOUNTER — APPOINTMENT (OUTPATIENT)
Dept: LAB | Facility: HOSPITAL | Age: 32
End: 2017-07-21
Attending: INTERNAL MEDICINE

## 2017-07-21 ENCOUNTER — APPOINTMENT (OUTPATIENT)
Dept: ONCOLOGY | Facility: CLINIC | Age: 32
End: 2017-07-21
Attending: INTERNAL MEDICINE

## 2017-07-24 ENCOUNTER — INFUSION (OUTPATIENT)
Dept: ONCOLOGY | Facility: HOSPITAL | Age: 32
End: 2017-07-24
Attending: INTERNAL MEDICINE

## 2017-07-24 ENCOUNTER — OFFICE VISIT (OUTPATIENT)
Dept: ONCOLOGY | Facility: CLINIC | Age: 32
End: 2017-07-24
Attending: INTERNAL MEDICINE

## 2017-07-24 ENCOUNTER — APPOINTMENT (OUTPATIENT)
Dept: LAB | Facility: HOSPITAL | Age: 32
End: 2017-07-24
Attending: INTERNAL MEDICINE

## 2017-07-24 VITALS
HEART RATE: 84 BPM | OXYGEN SATURATION: 98 % | TEMPERATURE: 98.2 F | RESPIRATION RATE: 18 BRPM | WEIGHT: 165.4 LBS | BODY MASS INDEX: 27.56 KG/M2 | HEIGHT: 65 IN | DIASTOLIC BLOOD PRESSURE: 68 MMHG | SYSTOLIC BLOOD PRESSURE: 108 MMHG

## 2017-07-24 VITALS — DIASTOLIC BLOOD PRESSURE: 44 MMHG | SYSTOLIC BLOOD PRESSURE: 110 MMHG

## 2017-07-24 DIAGNOSIS — D50.0 IRON DEFICIENCY ANEMIA DUE TO CHRONIC BLOOD LOSS: Primary | ICD-10-CM

## 2017-07-24 PROBLEM — D47.09 MAST CELL DISEASE: Status: RESOLVED | Noted: 2017-03-06 | Resolved: 2017-07-24

## 2017-07-24 PROCEDURE — 63710000001 DIPHENHYDRAMINE PER 50 MG: Performed by: INTERNAL MEDICINE

## 2017-07-24 PROCEDURE — 25010000002 FERUMOXYTOL 510 MG/17ML SOLUTION 510 MG VIAL: Performed by: INTERNAL MEDICINE

## 2017-07-24 PROCEDURE — 96374 THER/PROPH/DIAG INJ IV PUSH: CPT

## 2017-07-24 PROCEDURE — 99213 OFFICE O/P EST LOW 20 MIN: CPT | Performed by: INTERNAL MEDICINE

## 2017-07-24 PROCEDURE — 96375 TX/PRO/DX INJ NEW DRUG ADDON: CPT

## 2017-07-24 RX ORDER — SODIUM CHLORIDE 9 MG/ML
250 INJECTION, SOLUTION INTRAVENOUS ONCE
Status: CANCELLED | OUTPATIENT
Start: 2017-07-24

## 2017-07-24 RX ORDER — DIPHENHYDRAMINE HCL 25 MG
25 CAPSULE ORAL ONCE
Status: COMPLETED | OUTPATIENT
Start: 2017-07-24 | End: 2017-07-24

## 2017-07-24 RX ORDER — DIPHENHYDRAMINE HCL 25 MG
25 CAPSULE ORAL ONCE
Status: CANCELLED | OUTPATIENT
Start: 2017-07-24

## 2017-07-24 RX ORDER — SODIUM CHLORIDE 9 MG/ML
250 INJECTION, SOLUTION INTRAVENOUS ONCE
Status: COMPLETED | OUTPATIENT
Start: 2017-07-24 | End: 2017-07-24

## 2017-07-24 RX ADMIN — FERUMOXYTOL 510 MG: 510 INJECTION INTRAVENOUS at 10:29

## 2017-07-24 RX ADMIN — FAMOTIDINE 20 MG: 10 INJECTION INTRAVENOUS at 09:53

## 2017-07-24 RX ADMIN — DIPHENHYDRAMINE HYDROCHLORIDE 25 MG: 25 CAPSULE ORAL at 09:53

## 2017-07-24 RX ADMIN — SODIUM CHLORIDE 250 ML: 900 INJECTION, SOLUTION INTRAVENOUS at 09:53

## 2017-07-24 NOTE — PROGRESS NOTES
Subjective     REASON FOR FOLLOW-UP:  Iron deficiency anemia, intolerant of oral iron therapy; iron deficiency most likely secondary to menorrhagia, is post 2 doses of Feraheme January 2017                                   RECORDS OBTAINED:  Records of the patients history including those obtained from the referring provider were reviewed and summarized in detail.    History of Present Illness   Ms. Espino is a 31-year-old woman seen previously by me for evaluation of anemia. She indicates that she has had a lifelong issue with anemia.  Reviewing her records, she actually had a fairly normal CBC in January 2014 with hemoglobin of 12.0 and MCV of 82.  However, over the past 3 years she has had a steady decline in the hemoglobin currently to 10.7, and her red blood cell indices have become more microcytic and hypochromic with a current MCV of 73 and MCHC 30.6.  B12 and folic acid levels were normal in November.  She does report very heavy menstrual cycles her entire adult life with 5-7 days of heavy bleeding.  She has tried to take oral iron in the past but cannot tolerate iron because of GI upset. She had iron studies performed in November 2016 which showed a ferritin of 33 and an iron saturation of 22%. She was given 2 doses of Feraheme in January 2017 which she tolerated well.       She returns today complaining of fatigue.  She continues to gain weight most likely related to her thyroid disease and regulation.  She has hives undergoing evaluation by allergy and immunology.  She denies shortness of breath or lightheadedness.  She continues to have heavy menstrual cycles.    Past Medical History:   Diagnosis Date   • Allergic rhinitis    • Anemia    • Asthma    • GERD (gastroesophageal reflux disease)    • Hyperthyroidism    • Ovarian cyst    • Vitamin D deficiency         Past Surgical History:   Procedure Laterality Date   • APPENDECTOMY  2012   • MANDIBLE SURGERY  05/2016        Current Outpatient  Prescriptions on File Prior to Visit   Medication Sig Dispense Refill   • albuterol (PROVENTIL HFA;VENTOLIN HFA) 108 (90 BASE) MCG/ACT inhaler Inhale 2 puffs Every 6 (Six) Hours.     • b complex vitamins capsule Take 1 capsule by mouth Daily. 30 capsule 11   • budesonide-formoterol (SYMBICORT) 160-4.5 MCG/ACT inhaler Symbicort 160-4.5 MCG/ACT Inhalation Aerosol; Patient Sig: Symbicort 160-4.5 MCG/ACT Inhalation Aerosol INHALE TWO PUFFS BY MOUTH TWICE A DAY; 10.2; 11; 05-Nov-2014; Active     • ergocalciferol (DRISDOL) 91759 UNITS capsule Take 1 cap twice weekly 24 capsule 1   • levocetirizine (XYZAL) 5 MG tablet TAKE 1 TABLET BY MOUTH AT BEDTIME  3   • levothyroxine (SYNTHROID) 75 MCG tablet Take 1 tablet by mouth Daily. 90 tablet 0   • montelukast (SINGULAIR) 10 MG tablet TAKE 1 TABLET BY MOUTH AT BEDTIME  3   • raNITIdine (ZANTAC) 300 MG tablet TAKE 1 TABLET BY MOUTH AT BEDTIME  11     No current facility-administered medications on file prior to visit.         ALLERGIES:    Allergies   Allergen Reactions   • Beta Adrenergic Blockers    • Metoprolol      Face swelling, itching     • Propranolol      Face swelling, itching          Social History     Social History   • Marital status: Single     Spouse name: N/A   • Number of children: 0   • Years of education: College     Occupational History   •  [employer]     Social History Main Topics   • Smoking status: Never Smoker   • Smokeless tobacco: Never Used   • Alcohol use Yes      Comment: Occasional   • Drug use: No   • Sexual activity: Yes     Partners: Male     Other Topics Concern   • None     Social History Narrative        Family History   Problem Relation Age of Onset   • Hypertension Mother    • Diabetes Mother    • Hypertension Father    • Hyperlipidemia Father    • Diabetes Father    • Heart disease Father    • Alcohol abuse Other    • Diabetes Other    • Hypertension Other    • Diabetes Maternal Aunt    • Hypertension  "Maternal Uncle    • Diabetes Maternal Uncle         Review of Systems   Constitutional: Positive for fatigue. Negative for appetite change, fever and unexpected weight change.        Hyperthyroidism   HENT: Negative for congestion, hearing loss, mouth sores and nosebleeds.    Eyes: Negative for photophobia and redness.   Respiratory: Negative for choking, chest tightness and wheezing.    Cardiovascular: Negative for chest pain, palpitations and leg swelling.   Gastrointestinal: Negative for abdominal distention, anal bleeding, blood in stool, nausea and vomiting.   Endocrine: Negative for polydipsia.   Genitourinary: Positive for menstrual problem. Negative for difficulty urinating, vaginal discharge and vaginal pain.   Musculoskeletal: Positive for arthralgias. Negative for gait problem and neck stiffness.   Skin: Negative for color change, pallor, rash and wound.   Neurological: Negative for weakness and numbness.   Hematological: Negative for adenopathy. Does not bruise/bleed easily.   Psychiatric/Behavioral: Negative for agitation.        Objective     Vitals:    07/24/17 0903   BP: 108/68   Pulse: 84   Resp: 18   Temp: 98.2 °F (36.8 °C)   SpO2: 98%   Weight: 165 lb 6.4 oz (75 kg)   Height: 64.5\" (163.8 cm)   PainSc: 0-No pain     Current Status 7/24/2017   ECOG score 0       Physical Exam    Gen: pleasant young black female nad  HEENT: no icterus or thrush, ? Mild periorbital edema per her but difficult to see without knowing her baseline  NECK: no jvd  CV: RRR, S1S2  CHEST: CTA bilat  ABD: soft, ndnd, +bs, no edema  MS: no edema  NEURO: no focal deficits  SKIN: no rash, macules, papules, hives; negative Darier's sign    RECENT LABS:  Hematology WBC   Date Value Ref Range Status   07/14/2017 5.81 4.00 - 10.00 10*3/mm3 Final     RBC   Date Value Ref Range Status   07/14/2017 4.33 3.90 - 5.00 10*6/mm3 Final     Hemoglobin   Date Value Ref Range Status   07/14/2017 12.2 11.5 - 14.9 g/dL Final     Hematocrit   Date " Value Ref Range Status   07/14/2017 38.7 34.0 - 45.0 % Final     Platelets   Date Value Ref Range Status   07/14/2017 213 150 - 375 10*3/mm3 Final        Lab Results   Component Value Date    GLUCOSE 108 (H) 01/09/2014    BUN 11 07/11/2017    CREATININE 1.00 07/11/2017    EGFRIFNONA 65 07/11/2017    EGFRIFAFRI 79 07/11/2017    BCR 11.0 07/11/2017    K 4.2 07/11/2017    CO2 25.6 07/11/2017    CALCIUM 9.2 07/11/2017    PROTENTOTREF 7.0 07/11/2017    ALBUMIN 3.90 07/11/2017    LABIL2 1.3 07/11/2017    AST 20 07/11/2017    ALT 17 07/11/2017     Ferritin 54, iron saturation 14%    Assessment/Plan     Recurrent iron deficiency anemia secondary to chronic blood loss from menorrhagia:  Her iron levels have again dropped to an iron saturation 14% and ferritin is 54 which is low normal.  She is intolerant to oral iron, so I recommended 2 additional doses of Feraheme.  She continues to have menorrhagia so we will recheck her CBC and iron studies in 6 months and replace as needed.

## 2017-08-01 ENCOUNTER — OFFICE VISIT (OUTPATIENT)
Dept: ENDOCRINOLOGY | Age: 32
End: 2017-08-01

## 2017-08-01 VITALS
WEIGHT: 165.8 LBS | HEIGHT: 65 IN | DIASTOLIC BLOOD PRESSURE: 68 MMHG | BODY MASS INDEX: 27.63 KG/M2 | SYSTOLIC BLOOD PRESSURE: 112 MMHG

## 2017-08-01 DIAGNOSIS — E05.00 GRAVES DISEASE: Primary | ICD-10-CM

## 2017-08-01 DIAGNOSIS — Z92.3 H/O RADIOACTIVE IODINE THYROID ABLATION: ICD-10-CM

## 2017-08-01 DIAGNOSIS — R63.5 ABNORMAL WEIGHT GAIN: ICD-10-CM

## 2017-08-01 DIAGNOSIS — L50.9 URTICARIA: ICD-10-CM

## 2017-08-01 DIAGNOSIS — E55.9 VITAMIN D DEFICIENCY: ICD-10-CM

## 2017-08-01 PROCEDURE — 99214 OFFICE O/P EST MOD 30 MIN: CPT | Performed by: NURSE PRACTITIONER

## 2017-08-01 RX ORDER — RANITIDINE 150 MG/1
150 TABLET ORAL 2 TIMES DAILY
Refills: 11 | COMMUNITY
Start: 2017-07-27 | End: 2018-04-16 | Stop reason: SDUPTHER

## 2017-08-01 RX ORDER — OMALIZUMAB 202.5 MG/1.4ML
150 INJECTION, SOLUTION SUBCUTANEOUS
COMMUNITY
Start: 2017-07-31 | End: 2018-01-22

## 2017-08-01 RX ORDER — ERGOCALCIFEROL 1.25 MG/1
CAPSULE ORAL
Qty: 36 CAPSULE | Refills: 1 | Status: SHIPPED | OUTPATIENT
Start: 2017-08-01 | End: 2021-06-28

## 2017-08-01 RX ORDER — LEVOTHYROXINE SODIUM 150 MCG
150 TABLET ORAL DAILY
Qty: 30 TABLET | Refills: 5 | Status: SHIPPED | OUTPATIENT
Start: 2017-08-01 | End: 2018-01-22

## 2017-08-01 NOTE — PROGRESS NOTES
"Subjective   Sundeep Espino is a 31 y.o. female is here today for follow-up.  Chief Complaint   Patient presents with   • Hypothyroidism     recent labs. patient is experiencing hives, pt also states heart rate is elevated   • Vitamin D Deficiency     /68  Ht 65\" (165.1 cm)  Wt 165 lb 12.8 oz (75.2 kg)  BMI 27.59 kg/m2  Current Outpatient Prescriptions on File Prior to Visit   Medication Sig   • albuterol (PROVENTIL HFA;VENTOLIN HFA) 108 (90 BASE) MCG/ACT inhaler Inhale 2 puffs Every 6 (Six) Hours.   • b complex vitamins capsule Take 1 capsule by mouth Daily.   • budesonide-formoterol (SYMBICORT) 160-4.5 MCG/ACT inhaler Symbicort 160-4.5 MCG/ACT Inhalation Aerosol; Patient Sig: Symbicort 160-4.5 MCG/ACT Inhalation Aerosol INHALE TWO PUFFS BY MOUTH TWICE A DAY; 10.2; 11; 05-Nov-2014; Active   • ergocalciferol (DRISDOL) 67444 UNITS capsule Take 1 cap twice weekly   • levocetirizine (XYZAL) 5 MG tablet TAKE 1 TABLET BY MOUTH AT BEDTIME   • montelukast (SINGULAIR) 10 MG tablet TAKE 1 TABLET BY MOUTH AT BEDTIME   • [DISCONTINUED] levothyroxine (SYNTHROID) 75 MCG tablet Take 1 tablet by mouth Daily.   • [DISCONTINUED] raNITIdine (ZANTAC) 300 MG tablet TAKE 1 TABLET BY MOUTH AT BEDTIME     No current facility-administered medications on file prior to visit.      Family History   Problem Relation Age of Onset   • Hypertension Mother    • Diabetes Mother    • Hypertension Father    • Hyperlipidemia Father    • Diabetes Father    • Heart disease Father    • Alcohol abuse Other    • Diabetes Other    • Hypertension Other    • Diabetes Maternal Aunt    • Hypertension Maternal Uncle    • Diabetes Maternal Uncle          History of Present Illness   Encounter Diagnoses   Name Primary?   • Graves disease Yes   • Abnormal weight gain    • H/O radioactive iodine thyroid ablation    This is a 31-year-old female patient here today for routine follow-up visit for the above-mentioned problems.  She is having problems with " chronic urticaria and is being seen by allergy.  She is getting ready start Xolair for treatment.  She is complaining of significant weight gain since her thyroid ablation back in January.  She has gone from 115 pounds to 165.  She states she is taking her thyroid medication consistently on empty stomach.  She also has a history of heavy menses and gets regular iron infusions at the [facility] upstairs.  She says there are chronic hives have been going on for the past month.  She states steroids did not help her.  She is currently on several medications for treatment.  She is currently not on any hormone therapy for her menses.  She does complain of chronic fatigue but does have some side effects of her medications.  According her recent labs she is hypothyroid.        The following portions of the patient's history were reviewed and updated as appropriate: allergies, current medications, past family history, past medical history, past social history, past surgical history and problem list.    Review of Systems   Constitutional: Positive for unexpected weight change. Negative for fatigue.   HENT: Negative for trouble swallowing.    Eyes: Negative for visual disturbance.   Respiratory: Negative for shortness of breath.    Cardiovascular: Positive for leg swelling.   Endocrine: Negative for polyphagia.   Skin: Negative for wound.   Neurological: Negative for numbness.       Objective   Physical Exam   Constitutional: She is oriented to person, place, and time. She appears well-developed and well-nourished. No distress.   HENT:   Head: Normocephalic and atraumatic.   Right Ear: External ear normal.   Left Ear: External ear normal.   Nose: Nose normal.   Mouth/Throat: Oropharynx is clear and moist. No oropharyngeal exudate.   Face puffy. C/o face swelling.    Eyes: Conjunctivae and EOM are normal. Pupils are equal, round, and reactive to light. Right eye exhibits no discharge. Left eye exhibits no discharge.   Asymmetric  eyes ,left eye is more prominent than the right.   Neck: Normal range of motion. Neck supple. No JVD present. No tracheal deviation present. No thyromegaly present.   Diffuse the enlarged thyroid gland to about 3-4 times normal.  It moves freely with swallowing and is nontender.   Cardiovascular: Normal rate, regular rhythm, normal heart sounds and intact distal pulses.  Exam reveals no gallop and no friction rub.    No murmur heard.  Pulmonary/Chest: Effort normal and breath sounds normal. No stridor. No respiratory distress. She has no wheezes. She has no rales. She exhibits no tenderness.   Abdominal: Soft. Bowel sounds are normal. She exhibits no distension and no mass. There is no tenderness. There is no rebound and no guarding. No hernia.   Musculoskeletal: Normal range of motion. She exhibits no edema, tenderness or deformity.   Lymphadenopathy:     She has no cervical adenopathy.   Neurological: She is alert and oriented to person, place, and time. She has normal reflexes. She displays normal reflexes. No cranial nerve deficit. She exhibits normal muscle tone. Coordination normal.   Skin: Skin is warm and dry. No rash noted. She is not diaphoretic. No erythema. No pallor.   Psychiatric: She has a normal mood and affect. Her behavior is normal. Judgment and thought content normal.   Nursing note and vitals reviewed.    Results for orders placed or performed in visit on 07/14/17   Ferritin   Result Value Ref Range    Ferritin 54.80 11.00 - 207.00 ng/mL   Iron Profile   Result Value Ref Range    Iron 44 37 - 145 mcg/dL    Iron Saturation 14 14 - 48 %    Transferrin 232 200 - 360 mg/dL    TIBC 325 249 - 505 mcg/dL   CBC Auto Differential   Result Value Ref Range    WBC 5.81 4.00 - 10.00 10*3/mm3    RBC 4.33 3.90 - 5.00 10*6/mm3    Hemoglobin 12.2 11.5 - 14.9 g/dL    Hematocrit 38.7 34.0 - 45.0 %    MCV 89.4 83.0 - 97.0 fL    MCH 28.2 27.0 - 33.0 pg    MCHC 31.5 (L) 32.0 - 35.0 g/dL    RDW 12.6 11.7 - 14.5 %     RDW-SD 41.4 37.0 - 49.0 fl    MPV 9.3 8.9 - 12.1 fL    Platelets 213 150 - 375 10*3/mm3    Neutrophil % 41.6 39.0 - 75.0 %    Lymphocyte % 47.5 20.0 - 49.0 %    Monocyte % 8.4 4.0 - 12.0 %    Eosinophil % 2.1 1.0 - 5.0 %    Basophil % 0.2 0.0 - 1.1 %    Immature Grans % 0.2 0.0 - 0.5 %    Neutrophils, Absolute 2.42 1.50 - 7.00 10*3/mm3    Lymphocytes, Absolute 2.76 1.00 - 3.50 10*3/mm3    Monocytes, Absolute 0.49 0.25 - 0.80 10*3/mm3    Eosinophils, Absolute 0.12 0.00 - 0.36 10*3/mm3    Basophils, Absolute 0.01 0.00 - 0.10 10*3/mm3    Immature Grans, Absolute 0.01 0.00 - 0.03 10*3/mm3    nRBC 0.0 0.0 - 0.0 /100 WBC     Lab Results   Component Value Date    TSH 54.010 (H) 07/11/2017     Lab Results   Component Value Date    GLUCOSE 108 (H) 01/09/2014    BUN 11 07/11/2017    CREATININE 1.00 07/11/2017    EGFRIFNONA 65 07/11/2017    EGFRIFAFRI 79 07/11/2017    BCR 11.0 07/11/2017    K 4.2 07/11/2017    CO2 25.6 07/11/2017    CALCIUM 9.2 07/11/2017    PROTENTOTREF 7.0 07/11/2017    ALBUMIN 3.90 07/11/2017    LABIL2 1.3 07/11/2017    AST 20 07/11/2017    ALT 17 07/11/2017     No results found for: CHOL  Lab Results   Component Value Date    TRIG 97 07/11/2017    TRIG 31 12/15/2016    TRIG 72 11/14/2016     Lab Results   Component Value Date    HDL 53 07/11/2017    HDL 50 12/15/2016    HDL 38 (L) 11/14/2016     No results found for: LDLCALC  Lab Results   Component Value Date    LDL 99 07/11/2017    LDL 67 12/15/2016    LDL 60 11/14/2016     No results found for: HDLLDLRATIO  No components found for: CHOLHDL      Assessment/Plan   Sundeep was seen today for hypothyroidism and vitamin d deficiency.    Diagnoses and all orders for this visit:    Graves disease    Abnormal weight gain    H/O radioactive iodine thyroid ablation    Urticaria    Other orders  -     SYNTHROID 150 MCG tablet; Take 1 tablet by mouth Daily.        In summary, patient was seen and examined.  I've increased her Synthroid to 150 µg once daily.  She is to  repeat her labs in 2 months and follow-up in 6 months.  I have requested that she take name brand Synthroid.  She was provided some samples as well as a coupon at today's visit.  She will follow-up with her allergist regarding her chronic urticaria.  There have been some previous research states that shows that chronic urticaria can be related to thyroid.  She's had abnormal weight gain as result of her thyroid ablation as well as being hypothyroid.  Currently her thyroid is not under good control.

## 2017-08-01 NOTE — PATIENT INSTRUCTIONS
Follow up 6 mo  Repeat labs in 2 mo  Increase synthroid to 150 mcg once daily on empty stomach  Increase vitamin d to 1 capsule 3 times weekly

## 2017-08-03 ENCOUNTER — INFUSION (OUTPATIENT)
Dept: ONCOLOGY | Facility: HOSPITAL | Age: 32
End: 2017-08-03
Attending: INTERNAL MEDICINE

## 2017-08-03 VITALS
HEART RATE: 88 BPM | TEMPERATURE: 98.6 F | WEIGHT: 170 LBS | SYSTOLIC BLOOD PRESSURE: 110 MMHG | DIASTOLIC BLOOD PRESSURE: 54 MMHG | BODY MASS INDEX: 28.29 KG/M2

## 2017-08-03 DIAGNOSIS — D50.0 IRON DEFICIENCY ANEMIA DUE TO CHRONIC BLOOD LOSS: Primary | ICD-10-CM

## 2017-08-03 PROCEDURE — 63710000001 DIPHENHYDRAMINE PER 50 MG: Performed by: INTERNAL MEDICINE

## 2017-08-03 PROCEDURE — 96374 THER/PROPH/DIAG INJ IV PUSH: CPT

## 2017-08-03 PROCEDURE — 25010000002 FERUMOXYTOL 510 MG/17ML SOLUTION 510 MG VIAL: Performed by: INTERNAL MEDICINE

## 2017-08-03 PROCEDURE — 96375 TX/PRO/DX INJ NEW DRUG ADDON: CPT

## 2017-08-03 RX ORDER — DIPHENHYDRAMINE HCL 25 MG
25 CAPSULE ORAL ONCE
Status: COMPLETED | OUTPATIENT
Start: 2017-08-03 | End: 2017-08-03

## 2017-08-03 RX ORDER — SODIUM CHLORIDE 9 MG/ML
250 INJECTION, SOLUTION INTRAVENOUS ONCE
Status: COMPLETED | OUTPATIENT
Start: 2017-08-03 | End: 2017-08-03

## 2017-08-03 RX ORDER — DIPHENHYDRAMINE HCL 25 MG
25 CAPSULE ORAL ONCE
Status: CANCELLED | OUTPATIENT
Start: 2017-08-03

## 2017-08-03 RX ORDER — SODIUM CHLORIDE 9 MG/ML
250 INJECTION, SOLUTION INTRAVENOUS ONCE
Status: CANCELLED | OUTPATIENT
Start: 2017-08-03

## 2017-08-03 RX ADMIN — FERUMOXYTOL 510 MG: 510 INJECTION INTRAVENOUS at 10:19

## 2017-08-03 RX ADMIN — DIPHENHYDRAMINE HYDROCHLORIDE 25 MG: 25 CAPSULE ORAL at 09:51

## 2017-08-03 RX ADMIN — FAMOTIDINE 20 MG: 10 INJECTION INTRAVENOUS at 09:51

## 2017-08-03 RX ADMIN — SODIUM CHLORIDE 250 ML: 900 INJECTION, SOLUTION INTRAVENOUS at 09:51

## 2017-08-08 RX ORDER — TRAZODONE HYDROCHLORIDE 50 MG/1
TABLET ORAL
Qty: 90 TABLET | Refills: 1 | Status: SHIPPED | OUTPATIENT
Start: 2017-08-08 | End: 2018-02-13 | Stop reason: SDUPTHER

## 2017-11-07 ENCOUNTER — HOSPITAL ENCOUNTER (EMERGENCY)
Facility: HOSPITAL | Age: 32
Discharge: HOME | End: 2017-11-07
Attending: EMERGENCY MEDICINE | Admitting: EMERGENCY MEDICINE

## 2017-11-07 VITALS
HEIGHT: 65 IN | OXYGEN SATURATION: 98 % | DIASTOLIC BLOOD PRESSURE: 59 MMHG | BODY MASS INDEX: 30.32 KG/M2 | TEMPERATURE: 98.2 F | SYSTOLIC BLOOD PRESSURE: 118 MMHG | RESPIRATION RATE: 19 BRPM | WEIGHT: 182 LBS | HEART RATE: 89 BPM

## 2017-11-07 DIAGNOSIS — B02.9 HERPES ZOSTER WITHOUT COMPLICATION: Primary | ICD-10-CM

## 2017-11-07 PROCEDURE — 99284 EMERGENCY DEPT VISIT MOD MDM: CPT

## 2017-11-07 RX ORDER — PREDNISONE 20 MG/1
60 TABLET ORAL DAILY
Qty: 15 TABLET | Refills: 0 | Status: SHIPPED | OUTPATIENT
Start: 2017-11-07 | End: 2018-01-22

## 2017-11-07 RX ORDER — VALACYCLOVIR HYDROCHLORIDE 1 G/1
1000 TABLET, FILM COATED ORAL 3 TIMES DAILY
Qty: 30 TABLET | Refills: 0 | Status: SHIPPED | OUTPATIENT
Start: 2017-11-07 | End: 2017-11-10 | Stop reason: SDUPTHER

## 2017-11-07 NOTE — ED NOTES
Pt reports s/s initiated while shopping in the mall last Friday 11/3/2017. Pt reports lip edema, blistering of facial skin, blistering inside of her mouth.  Pt reports no recent changes to diet, activity, cleansers, clothing, detergents. Pt denies fever, chills.     Brooke Rodrigues RN  11/07/17 [id number]

## 2017-11-07 NOTE — ED PROVIDER NOTES
" EMERGENCY DEPARTMENT ENCOUNTER    CHIEF COMPLAINT  Chief Complaint: Blister  History given by: Pt  History limited by: N/A  Room Number: 27/27  PMD: Paras Hi MD      HPI:  Pt is a 32 y.o. female who presents complaining of blisters to the R side of her face, mouth, and lips, that began as mouth swelling and a sore throat 4 days ago. She also c/o mild HA 4 days ago, not currently present but denies any nausea or vomiting, fever, difficulty swallowing/breathing/eye irritation. There are no other complaints.  Pt states there is no chance she could be pregnant.    Duration:  4 days ago  Onset: gradual   Timing: constant  Location: R side of her face  Radiation: none  Quality: \"hives and blisters\"  Intensity/Severity: moderate  Progression: worsened  Associated Symptoms: sore lips  Aggravating Factors: none  Alleviating Factors: none  Previous Episodes: No history of similar symptoms.   Treatment before arrival: No treatment PTA.     PAST MEDICAL HISTORY  Active Ambulatory Problems     Diagnosis Date Noted   • Magnetic resonance imaging of brain abnormal 10/21/2013   • Abnormal electrocardiogram 11/14/2016   • Abnormal magnetic resonance imaging study 08/28/2014   • Allergic rhinitis 11/14/2016   • Iron deficiency anemia due to chronic blood loss 11/14/2016   • Asthma 05/01/2013   • Chronic post-traumatic headache 10/21/2013   • Gastroesophageal reflux disease 11/14/2016   • Intractable chronic migraine without aura 05/01/2013   • Lumbosacral radiculopathy 01/06/2014   • Menorrhagia 05/01/2013   • Menstrual disorder 11/14/2016   • Neck pain 10/21/2013   • Paresthesia of lower extremity 10/21/2013   • Numbness and tingling sensation of skin 05/01/2013   • Muscle weakness (generalized) 08/21/2013   • Health care maintenance 11/14/2016   • Thyrotoxicosis without thyroid storm 12/15/2016   • Weight loss 12/15/2016   • Urticaria 02/09/2017   • Insomnia due to medical condition 02/09/2017   • Facial swelling " 03/06/2017   • Abnormal weight gain 03/08/2017   • H/O radioactive iodine thyroid ablation 03/08/2017   • Graves disease 03/08/2017   • Other symptoms and signs involving the musculoskeletal system 08/21/2013     Resolved Ambulatory Problems     Diagnosis Date Noted   • Mast cell disease 03/06/2017     Past Medical History:   Diagnosis Date   • Allergic rhinitis    • Anemia    • Asthma    • GERD (gastroesophageal reflux disease)    • Hyperthyroidism    • Ovarian cyst    • Vitamin D deficiency        PAST SURGICAL HISTORY  Past Surgical History:   Procedure Laterality Date   • APPENDECTOMY  2012   • MANDIBLE SURGERY  05/2016       FAMILY HISTORY  Family History   Problem Relation Age of Onset   • Hypertension Mother    • Diabetes Mother    • Hypertension Father    • Hyperlipidemia Father    • Diabetes Father    • Heart disease Father    • Alcohol abuse Other    • Diabetes Other    • Hypertension Other    • Diabetes Maternal Aunt    • Hypertension Maternal Uncle    • Diabetes Maternal Uncle        SOCIAL HISTORY  Social History     Social History   • Marital status: Single     Spouse name: N/A   • Number of children: 0   • Years of education: College     Occupational History   •  [employer]     Social History Main Topics   • Smoking status: Never Smoker   • Smokeless tobacco: Never Used   • Alcohol use Yes      Comment: Occasional   • Drug use: No   • Sexual activity: Yes     Partners: Male     Other Topics Concern   • Not on file     Social History Narrative       ALLERGIES  Beta adrenergic blockers; Metoprolol; and Propranolol    REVIEW OF SYSTEMS  Review of Systems   Constitutional: Negative for chills and fever.   HENT: Negative for rhinorrhea and sore throat.    Eyes: Negative for visual disturbance.   Respiratory: Positive for shortness of breath. Negative for cough.    Cardiovascular: Negative for chest pain, palpitations and leg swelling.   Gastrointestinal: Negative for  abdominal pain, diarrhea and vomiting.   Endocrine: Negative.    Genitourinary: Negative for decreased urine volume, dysuria and frequency.   Musculoskeletal: Negative for neck pain.   Skin: Positive for rash (blisters to the R side of her face and lips ).   Neurological: Negative for syncope and headaches.   Psychiatric/Behavioral: Negative.    All other systems reviewed and are negative.      PHYSICAL EXAM  ED Triage Vitals   Temp Heart Rate Resp BP SpO2   11/07/17 1524 11/07/17 1524 11/07/17 1524 11/07/17 1530 11/07/17 1524   98 °F (36.7 °C) 116 18 125/73 100 %      Temp src Heart Rate Source Patient Position BP Location FiO2 (%)   11/07/17 1524 11/07/17 1723 11/07/17 1723 11/07/17 1723 --   Tympanic Monitor Sitting Right arm        Physical Exam   Constitutional: She is oriented to person, place, and time.  Non-toxic appearance. She appears distressed (mild).   HENT:   Head: Normocephalic and atraumatic.   Mouth/Throat: Oropharynx is clear and moist. No posterior oropharyngeal edema.   Pt has blisters on her R eardrum. No sublingual swelling, or oral lesions.    Eyes: Conjunctivae and EOM are normal. Pupils are equal, round, and reactive to light. Right eye exhibits no discharge. Left eye exhibits no discharge.   Neck: Normal range of motion. Neck supple.   Cardiovascular: Normal rate, regular rhythm, normal heart sounds and intact distal pulses.    No murmur heard.  Pulses:       Posterior tibial pulses are 2+ on the right side, and 2+ on the left side.   Pulmonary/Chest: Effort normal and breath sounds normal. No respiratory distress.   Abdominal: Soft. Bowel sounds are normal. There is no tenderness. There is no rebound and no guarding.   Musculoskeletal: Normal range of motion. She exhibits no edema.   Neurological: She is alert and oriented to person, place, and time. She displays facial symmetry.   Skin: Skin is warm and dry. Rash (vesicular rash that runs along cranial nerve 5, and V2 and V3 distrubution  ) noted.   Vesicular rash right mid/lower face, tip of nose not involved.   Psychiatric: Affect normal.   Nursing note and vitals reviewed.      PROCEDURES  Procedures      PROGRESS AND CONSULTS  ED Course     1814  I informed pt of plan to discharge home with meds to treat her for Shingles, and f/u to her PCP. Pt understands she needs to f/u and states she has an appointment with the APRN that she is keeping, and agrees with plan. All questions addressed.     MEDICAL DECISION MAKING  Results were reviewed/discussed with the patient and they were also made aware of online access. Pt also made aware that some labs, such as cultures, will not be resulted during ER visit and follow up with PMD is necessary.     MDM  Number of Diagnoses or Management Options  Herpes zoster without complication:   Patient Progress  Patient progress: stable         DIAGNOSIS  Final diagnoses:   Herpes zoster without complication       DISPOSITION  DISCHARGE    Patient discharged in stable condition.    Reviewed implications of results, diagnosis, meds, responsibility to follow up, warning signs and symptoms of possible worsening, potential complications and reasons to return to ER.    Patient/Family voiced understanding of above instructions.    Discussed plan for discharge, as there is no emergent indication for admission.  Pt/family is agreeable and understands need for follow up and repeat testing.  Pt is aware that discharge does not mean that nothing is wrong but it indicates no emergency is present that requires admission and they must continue care with follow-up as given below or physician of their choice.     FOLLOW-UP  Paras Hi MD  [address] [id number] [phone]    Schedule an appointment as soon as possible for a visit in 1 day  EVEN IF WELL         Medication List      New Prescriptions          predniSONE 20 MG tablet   Commonly known as:  DELTASONE   Take 3 tablets by mouth Daily.        valACYclovir 1000 MG tablet   Commonly known as:  VALTREX   Take 1 tablet by mouth 3 (Three) Times a Day for 10 days.               Latest Documented Vital Signs:  As of 6:18 PM  BP- 114/66 HR- 92 Temp- 98 °F (36.7 °C) (Tympanic) O2 sat- 98%    --  Documentation assistance provided by lizzette Hatch for Dr.Tara Altaf MD.  Information recorded by the scribe was done at my direction and has been verified and validated by me.         Trish Lovelace  11/07/17 2892       Nancy Solitairo MD  11/08/17 1430

## 2017-11-07 NOTE — DISCHARGE INSTRUCTIONS
You are advised to follow closely with Dr Hi in 1 days for recheck,  and further testing/treatment as needed.    Drink plenty of fluids in small amounts and advance diet as tolerated    Wash hands frequently   Avoid contact with immunocomprimised and pregnant people.    Please return to the emergency department immediately with chest pain different than usual for you, shortness of air, abdominal pain, persistent vomiting/fever, blood in emesis or stool, lightheadedness/fainting, problems with speech, one sided weakness/numbness, new incontinence, problems with vision, eye pain, visual disturbance,  or for worsening of symptoms or other concerns.

## 2017-11-07 NOTE — ED TRIAGE NOTES
Patient states she has blisters all over her face and throat. She states it started this weekend with her eyes and mouth swelling, then she developed blisters that are on her face lip and tongue now.

## 2017-11-10 ENCOUNTER — OFFICE VISIT (OUTPATIENT)
Dept: INTERNAL MEDICINE | Facility: CLINIC | Age: 32
End: 2017-11-10

## 2017-11-10 ENCOUNTER — TELEPHONE (OUTPATIENT)
Dept: SOCIAL WORK | Facility: HOSPITAL | Age: 32
End: 2017-11-10

## 2017-11-10 VITALS
SYSTOLIC BLOOD PRESSURE: 114 MMHG | TEMPERATURE: 96.9 F | HEART RATE: 55 BPM | DIASTOLIC BLOOD PRESSURE: 68 MMHG | WEIGHT: 179 LBS | OXYGEN SATURATION: 98 % | BODY MASS INDEX: 29.79 KG/M2

## 2017-11-10 DIAGNOSIS — B02.21 RAMSAY HUNT SYNDROME (GENICULATE HERPES ZOSTER): Primary | ICD-10-CM

## 2017-11-10 DIAGNOSIS — B02.29 OTHER POSTHERPETIC NERVOUS SYSTEM INVOLVEMENT: ICD-10-CM

## 2017-11-10 PROCEDURE — 99214 OFFICE O/P EST MOD 30 MIN: CPT | Performed by: NURSE PRACTITIONER

## 2017-11-10 RX ORDER — CIPROFLOXACIN AND DEXAMETHASONE 3; 1 MG/ML; MG/ML
4 SUSPENSION/ DROPS AURICULAR (OTIC) 2 TIMES DAILY
Qty: 7.5 ML | Refills: 0 | Status: SHIPPED | OUTPATIENT
Start: 2017-11-10 | End: 2018-01-22

## 2017-11-10 RX ORDER — VALACYCLOVIR HYDROCHLORIDE 1 G/1
1000 TABLET, FILM COATED ORAL 3 TIMES DAILY
Qty: 30 TABLET | Refills: 0 | Status: SHIPPED | OUTPATIENT
Start: 2017-11-10 | End: 2017-11-10 | Stop reason: SDUPTHER

## 2017-11-10 RX ORDER — VALACYCLOVIR HYDROCHLORIDE 1 G/1
TABLET, FILM COATED ORAL
Qty: 30 TABLET | Refills: 0 | Status: SHIPPED | OUTPATIENT
Start: 2017-11-10 | End: 2018-01-22

## 2017-11-10 NOTE — PROGRESS NOTES
Vitals:    11/10/17 1434   BP: 114/68   Pulse: 55   Temp: 96.9 °F (36.1 °C)   SpO2: 98%     Last 2 weights    11/10/17  1434   Weight: 179 lb (81.2 kg)     Social History   Substance Use Topics   • Smoking status: Never Smoker   • Smokeless tobacco: Never Used   • Alcohol use Yes      Comment: Occasional       Subjective     HPI  Pt presents to office today with Er follow up shingles on right side of face and right inner ear that started nov 3rd. She went to ER 4 days later with mouth swelling and sore throat, and blisters on tongue, right side of lips and face. She was given steroids and valacyclovir. Pt states it is getting a little better and she has been keeping it covered when possible. It had been oozing but slowly crusting over now. She did mention puss coming out of her right ear after using peroxide. Denies any worsening hearing loss in comparison to her baseline. She just states it feels irritated and itchy.    The following portions of the patient's history were reviewed and updated as appropriate: allergies, current medications, past medical history, past social history and problem list.    Review of Systems   Constitutional: Negative.    HENT: Positive for ear pain (right with itchiness/irritation).    Respiratory: Negative.    Cardiovascular: Negative.    Skin:        Shingles on face and on tongue       Objective     Physical Exam   Constitutional: She is oriented to person, place, and time. Vital signs are normal. She appears well-developed and well-nourished.   HENT:   Head: Normocephalic and atraumatic.   Right Ear: There is swelling and tenderness (small blister seen on ear drum and inner ear canal). Tympanic membrane is erythematous.   Neck: Normal range of motion.   Cardiovascular: Normal rate, regular rhythm and normal heart sounds.    Pulmonary/Chest: Effort normal and breath sounds normal.   Musculoskeletal: Normal range of motion.   Neurological: She is oriented to person, place, and time.    Skin: Lesion and rash noted. Rash is vesicular. There is erythema.   Nursing note and vitals reviewed.      Assessment/Plan   Diagnoses and all orders for this visit:    Richardson Hunt syndrome (geniculate herpes zoster)  -     Ambulatory Referral to ENT (Otolaryngology)    Other postherpetic nervous system involvement  -     Ambulatory Referral to ENT (Otolaryngology)    Other orders  -     valACYclovir (VALTREX) 1000 MG tablet; Take 1 tablet by mouth 3 (Three) Times a Day for 10 days.  -     ciprofloxacin-dexamethasone (CIPRODEX) 0.3-0.1 % otic suspension; Administer 4 drops to the right ear 2 (Two) Times a Day.           -spoke to dr terry who suggested pt to use ciprodex ggt to right ear and avoid using any other solution in ear until seen by him next week  -cont home meds  -extend valtrex another 10 days per dr porter  -hand hygiene, keep shingles covered. Pt aware of precautions needed to take place  -FU prn or if symptoms persist/worsen

## 2017-11-11 ENCOUNTER — HOSPITAL ENCOUNTER (EMERGENCY)
Facility: HOSPITAL | Age: 32
Discharge: HOME | End: 2017-11-11
Attending: EMERGENCY MEDICINE | Admitting: EMERGENCY MEDICINE

## 2017-11-11 VITALS
OXYGEN SATURATION: 99 % | RESPIRATION RATE: 20 BRPM | HEIGHT: 65 IN | DIASTOLIC BLOOD PRESSURE: 76 MMHG | TEMPERATURE: 96.8 F | WEIGHT: 179 LBS | SYSTOLIC BLOOD PRESSURE: 116 MMHG | HEART RATE: 87 BPM | BODY MASS INDEX: 29.82 KG/M2

## 2017-11-11 DIAGNOSIS — L50.9 URTICARIA: Primary | ICD-10-CM

## 2017-11-11 DIAGNOSIS — B02.9 HERPES ZOSTER WITHOUT COMPLICATION: ICD-10-CM

## 2017-11-11 PROCEDURE — 96375 TX/PRO/DX INJ NEW DRUG ADDON: CPT

## 2017-11-11 PROCEDURE — 99282 EMERGENCY DEPT VISIT SF MDM: CPT

## 2017-11-11 PROCEDURE — 25010000002 DIPHENHYDRAMINE PER 50 MG: Performed by: PHYSICIAN ASSISTANT

## 2017-11-11 PROCEDURE — 25010000002 METHYLPREDNISOLONE PER 125 MG: Performed by: PHYSICIAN ASSISTANT

## 2017-11-11 PROCEDURE — 96374 THER/PROPH/DIAG INJ IV PUSH: CPT

## 2017-11-11 RX ORDER — FAMOTIDINE 10 MG/ML
20 INJECTION, SOLUTION INTRAVENOUS ONCE
Status: COMPLETED | OUTPATIENT
Start: 2017-11-11 | End: 2017-11-11

## 2017-11-11 RX ORDER — SODIUM CHLORIDE 0.9 % (FLUSH) 0.9 %
10 SYRINGE (ML) INJECTION AS NEEDED
Status: DISCONTINUED | OUTPATIENT
Start: 2017-11-11 | End: 2017-11-11 | Stop reason: HOSPADM

## 2017-11-11 RX ORDER — METHYLPREDNISOLONE SODIUM SUCCINATE 125 MG/2ML
125 INJECTION, POWDER, LYOPHILIZED, FOR SOLUTION INTRAMUSCULAR; INTRAVENOUS ONCE
Status: COMPLETED | OUTPATIENT
Start: 2017-11-11 | End: 2017-11-11

## 2017-11-11 RX ORDER — DIPHENHYDRAMINE HYDROCHLORIDE 50 MG/ML
25 INJECTION INTRAMUSCULAR; INTRAVENOUS ONCE
Status: COMPLETED | OUTPATIENT
Start: 2017-11-11 | End: 2017-11-11

## 2017-11-11 RX ORDER — PREDNISONE 20 MG/1
20 TABLET ORAL 2 TIMES DAILY
Qty: 10 TABLET | Refills: 0 | Status: SHIPPED | OUTPATIENT
Start: 2017-11-11 | End: 2018-01-22

## 2017-11-11 RX ORDER — HYDROCODONE BITARTRATE AND ACETAMINOPHEN 5; 325 MG/1; MG/1
1 TABLET ORAL EVERY 6 HOURS PRN
Qty: 12 TABLET | Refills: 0 | Status: SHIPPED | OUTPATIENT
Start: 2017-11-11 | End: 2018-01-22

## 2017-11-11 RX ADMIN — DIPHENHYDRAMINE HYDROCHLORIDE 25 MG: 50 INJECTION, SOLUTION INTRAMUSCULAR; INTRAVENOUS at 04:25

## 2017-11-11 RX ADMIN — METHYLPREDNISOLONE SODIUM SUCCINATE 125 MG: 125 INJECTION, POWDER, FOR SOLUTION INTRAMUSCULAR; INTRAVENOUS at 04:25

## 2017-11-11 RX ADMIN — FAMOTIDINE 20 MG: 10 INJECTION, SOLUTION INTRAVENOUS at 04:24

## 2017-11-11 NOTE — ED TRIAGE NOTES
Pt states was diagnosed with shingles on her face and mouth.  States she broke out in hives just prior to going to bed tonight.  Pt states she can feel the hives in her throat

## 2017-11-11 NOTE — ED PROVIDER NOTES
EMERGENCY DEPARTMENT ENCOUNTER    CHIEF COMPLAINT  Chief Complaint: Allergic Reaction  History given by: Patient  History limited by: none  Room Number: 35/35  PMD: Paras Hi MD      HPI:  Pt is a 32 y.o. female who presents complaining of urticaria that began tonight after eating grits, prior to going to bed. Pt states the reaction began with urticaria on face, then escalated into swelling in throat, and swelling on face and lips, itching, and elevated HR. Pt states she can feel hives in throat. Pt was diagnosed with Shingles on face and mouth on Tuesday by Dr. Solitario. Pt does not know what triggered reaction. Pt took allergy medication, Zyrtec and Zantac, at 2300, prior to arrival at ED. Pt states she has had chronic hives for a year without knowing what causes them.    Duration:  5 hours  Onset: gradual  Timing: constant  Location: face, mouth and throat  Radiation: none  Quality: urticaria  Intensity/Severity: mild  Progression: improved  Associated Symptoms: itching  Aggravating Factors: none  Alleviating Factors: Zyrtec and Zantac   Previous Episodes: Pt has had chronic hives for a year.  Treatment before arrival: Zyrtec and Zantac    PAST MEDICAL HISTORY  Active Ambulatory Problems     Diagnosis Date Noted   • Magnetic resonance imaging of brain abnormal 10/21/2013   • Abnormal electrocardiogram 11/14/2016   • Abnormal magnetic resonance imaging study 08/28/2014   • Allergic rhinitis 11/14/2016   • Iron deficiency anemia due to chronic blood loss 11/14/2016   • Asthma 05/01/2013   • Chronic post-traumatic headache 10/21/2013   • Gastroesophageal reflux disease 11/14/2016   • Intractable chronic migraine without aura 05/01/2013   • Lumbosacral radiculopathy 01/06/2014   • Menorrhagia 05/01/2013   • Menstrual disorder 11/14/2016   • Neck pain 10/21/2013   • Paresthesia of lower extremity 10/21/2013   • Numbness and tingling sensation of skin 05/01/2013   • Muscle weakness (generalized) 08/21/2013   •  Health care maintenance 11/14/2016   • Thyrotoxicosis without thyroid storm 12/15/2016   • Weight loss 12/15/2016   • Urticaria 02/09/2017   • Insomnia due to medical condition 02/09/2017   • Facial swelling 03/06/2017   • Abnormal weight gain 03/08/2017   • H/O radioactive iodine thyroid ablation 03/08/2017   • Graves disease 03/08/2017   • Other symptoms and signs involving the musculoskeletal system 08/21/2013     Resolved Ambulatory Problems     Diagnosis Date Noted   • Mast cell disease 03/06/2017     Past Medical History:   Diagnosis Date   • Allergic rhinitis    • Anemia    • Asthma    • GERD (gastroesophageal reflux disease)    • Hyperthyroidism    • Ovarian cyst    • Vitamin D deficiency        PAST SURGICAL HISTORY  Past Surgical History:   Procedure Laterality Date   • APPENDECTOMY  2012   • MANDIBLE SURGERY  05/2016       FAMILY HISTORY  Family History   Problem Relation Age of Onset   • Hypertension Mother    • Diabetes Mother    • Hypertension Father    • Hyperlipidemia Father    • Diabetes Father    • Heart disease Father    • Alcohol abuse Other    • Diabetes Other    • Hypertension Other    • Diabetes Maternal Aunt    • Hypertension Maternal Uncle    • Diabetes Maternal Uncle        SOCIAL HISTORY  Social History     Social History   • Marital status: Single     Spouse name: N/A   • Number of children: 0   • Years of education: College     Occupational History   •  [employer]     Social History Main Topics   • Smoking status: Never Smoker   • Smokeless tobacco: Never Used   • Alcohol use Yes      Comment: Occasional   • Drug use: No   • Sexual activity: Yes     Partners: Male     Other Topics Concern   • Not on file     Social History Narrative   • No narrative on file       ALLERGIES  Beta adrenergic blockers; Metoprolol; and Propranolol    REVIEW OF SYSTEMS  Review of Systems   Constitutional: Negative for fever.   HENT: Positive for facial swelling and sore  throat (Swelling and uricaria in throat).    Eyes: Negative.    Respiratory: Negative for cough and shortness of breath.    Cardiovascular: Negative for chest pain.   Gastrointestinal: Negative for abdominal pain, diarrhea and vomiting.   Genitourinary: Negative for dysuria.   Musculoskeletal: Negative for neck pain.   Skin: Positive for rash (urticaria on face, in mouth and throat, with itching. ).   Neurological: Negative for weakness, numbness and headaches.   Hematological: Negative.    Psychiatric/Behavioral: Negative.    All other systems reviewed and are negative.      PHYSICAL EXAM  ED Triage Vitals   Temp Heart Rate Resp BP SpO2   11/11/17 0243 11/11/17 0243 11/11/17 0243 -- 11/11/17 0243   96.8 °F (36 °C) 87 20  99 %      Temp src Heart Rate Source Patient Position BP Location FiO2 (%)   11/11/17 0243 -- -- -- --   Tympanic           Physical Exam   Constitutional: She is oriented to person, place, and time and well-developed, well-nourished, and in no distress. No distress.   HENT:   Head: Normocephalic and atraumatic.   Mouth/Throat: Mucous membranes are normal.   Eyes: EOM are normal. Pupils are equal, round, and reactive to light.   Neck: Normal range of motion. Neck supple.   Cardiovascular: Normal rate, regular rhythm and normal heart sounds.    Pulmonary/Chest: Effort normal and breath sounds normal. No respiratory distress. She has no wheezes. She has no rhonchi. She has no rales.   Abdominal: Soft. There is no tenderness. There is no rebound and no guarding.   Musculoskeletal: Normal range of motion. She exhibits no edema.   Neurological: She is alert and oriented to person, place, and time. She has normal sensation and normal strength.   Skin: Skin is warm and dry. Rash (on face) noted. Rash is macular (one raised red patch on each cheek).   Right side of face swollen.      Psychiatric: Mood and affect normal.   Nursing note and vitals reviewed.    Procedures      PROGRESS AND CONSULTS  ED Course      0319  SOLU-Medrol, Benadryl, and Pepcid ordered to manage symptoms.    0431  Pt rechecked and facial swelling reduced, wheals faded. Pt stated that her ear is hurting. On exam, there is erythema of Right TM. Suggested treating nasal spray to open eustachian tubes w/o antibiotic due to pt's proclivity to having allergic reactions.  Discussed plan for discharge with follow up to ENT. Pt understands and agrees with plan, all questions answered.    MEDICAL DECISION MAKING  Results were reviewed/discussed with the patient and they were also made aware of online access. Pt also made aware that some labs, such as cultures, will not be resulted during ER visit and follow up with PMD is necessary.     MDM  Number of Diagnoses or Management Options         DIAGNOSIS  Final diagnoses:   Urticaria   Herpes zoster without complication       DISPOSITION  DISCHARGE    Patient discharged in stable condition.    Reviewed implications of results, diagnosis, meds, responsibility to follow up, warning signs and symptoms of possible worsening, potential complications and reasons to return to ER.    Patient/Family voiced understanding of above instructions.    Discussed plan for discharge, as there is no emergent indication for admission.  Pt/family is agreeable and understands need for follow up and repeat testing.  Pt is aware that discharge does not mean that nothing is wrong but it indicates no emergency is present that requires admission and they must continue care with follow-up as given below or physician of their choice.     FOLLOW-UP  Your Allergist    Schedule an appointment as soon as possible for a visit  For further evaluation and treatment    Your ENT     Schedule an appointment as soon as possible for a visit  For further evaluation and treatment of ear pain         Medication List      New Prescriptions          HYDROcodone-acetaminophen 5-325 MG per tablet   Commonly known as:  NORCO   Take 1 tablet by mouth Every 6  (Six) Hours As Needed for Severe Pain .         Changed          predniSONE 20 MG tablet   Commonly known as:  DELTASONE   Take 1 tablet by mouth 2 (Two) Times a Day.   What changed:    - how much to take  - when to take this         Stop          valACYclovir 1000 MG tablet   Commonly known as:  VALTREX             Latest Documented Vital Signs:  As of 6:00 AM  BP- 116/76 HR- 87 Temp- 96.8 °F (36 °C) (Tympanic) O2 sat- 99%    --  Documentation assistance provided by lizzette Alexandra for SALMA Valencia.  Information recorded by the vitoribe was done at my direction and has been verified and validated by me.                   Elke Alexandra  11/11/17 0508       SALMA Davies III  11/11/17 0600

## 2017-11-11 NOTE — ED NOTES
Seen tues for shingles and now has hives and throat swelling     Dejon Suárez, MARISSA  11/11/17 5765

## 2017-11-11 NOTE — DISCHARGE INSTRUCTIONS
Return to the ER with any further concerns or should your condition change or worsen.  Continue with your zantac 2x day and your zyrtec 1 x a day.  Increase the prednisone from 20mg a day to 40 mg a day for 5 days.  Stop the Valtrex.

## 2017-11-15 ENCOUNTER — TELEPHONE (OUTPATIENT)
Dept: SOCIAL WORK | Facility: HOSPITAL | Age: 32
End: 2017-11-15

## 2017-11-15 NOTE — TELEPHONE ENCOUNTER
ER F/U phone call:   Pt states that she is doing fine. SHe just saw the ENT MD. No other questions or concerns voiced at this time. Belen Lawton RN

## 2018-01-08 ENCOUNTER — LAB (OUTPATIENT)
Dept: LAB | Facility: HOSPITAL | Age: 33
End: 2018-01-08

## 2018-01-08 DIAGNOSIS — D50.0 IRON DEFICIENCY ANEMIA DUE TO CHRONIC BLOOD LOSS: ICD-10-CM

## 2018-01-08 LAB
BASOPHILS # BLD AUTO: 0.01 10*3/MM3 (ref 0–0.1)
BASOPHILS NFR BLD AUTO: 0.2 % (ref 0–1.1)
DEPRECATED RDW RBC AUTO: 41 FL (ref 37–49)
EOSINOPHIL # BLD AUTO: 0.11 10*3/MM3 (ref 0–0.36)
EOSINOPHIL NFR BLD AUTO: 2.1 % (ref 1–5)
ERYTHROCYTE [DISTWIDTH] IN BLOOD BY AUTOMATED COUNT: 13.2 % (ref 11.7–14.5)
FERRITIN SERPL-MCNC: 262.7 NG/ML (ref 11–207)
HCT VFR BLD AUTO: 40.8 % (ref 34–45)
HGB BLD-MCNC: 13 G/DL (ref 11.5–14.9)
IMM GRANULOCYTES # BLD: 0 10*3/MM3 (ref 0–0.03)
IMM GRANULOCYTES NFR BLD: 0 % (ref 0–0.5)
IRON 24H UR-MRATE: 79 MCG/DL (ref 37–145)
IRON SATN MFR SERPL: 29 % (ref 14–48)
LYMPHOCYTES # BLD AUTO: 2.06 10*3/MM3 (ref 1–3.5)
LYMPHOCYTES NFR BLD AUTO: 40.2 % (ref 20–49)
MCH RBC QN AUTO: 27.4 PG (ref 27–33)
MCHC RBC AUTO-ENTMCNC: 31.9 G/DL (ref 32–35)
MCV RBC AUTO: 85.9 FL (ref 83–97)
MONOCYTES # BLD AUTO: 0.43 10*3/MM3 (ref 0.25–0.8)
MONOCYTES NFR BLD AUTO: 8.4 % (ref 4–12)
NEUTROPHILS # BLD AUTO: 2.52 10*3/MM3 (ref 1.5–7)
NEUTROPHILS NFR BLD AUTO: 49.1 % (ref 39–75)
NRBC BLD MANUAL-RTO: 0 /100 WBC (ref 0–0)
PLATELET # BLD AUTO: 248 10*3/MM3 (ref 150–375)
PMV BLD AUTO: 9.4 FL (ref 8.9–12.1)
RBC # BLD AUTO: 4.75 10*6/MM3 (ref 3.9–5)
TIBC SERPL-MCNC: 270 MCG/DL (ref 249–505)
TRANSFERRIN SERPL-MCNC: 193 MG/DL (ref 200–360)
WBC NRBC COR # BLD: 5.13 10*3/MM3 (ref 4–10)

## 2018-01-08 PROCEDURE — 84466 ASSAY OF TRANSFERRIN: CPT | Performed by: INTERNAL MEDICINE

## 2018-01-08 PROCEDURE — 83540 ASSAY OF IRON: CPT | Performed by: INTERNAL MEDICINE

## 2018-01-08 PROCEDURE — 36415 COLL VENOUS BLD VENIPUNCTURE: CPT | Performed by: INTERNAL MEDICINE

## 2018-01-08 PROCEDURE — 85025 COMPLETE CBC W/AUTO DIFF WBC: CPT | Performed by: INTERNAL MEDICINE

## 2018-01-08 PROCEDURE — 82728 ASSAY OF FERRITIN: CPT | Performed by: INTERNAL MEDICINE

## 2018-01-22 ENCOUNTER — APPOINTMENT (OUTPATIENT)
Dept: LAB | Facility: HOSPITAL | Age: 33
End: 2018-01-22

## 2018-01-22 ENCOUNTER — OFFICE VISIT (OUTPATIENT)
Dept: ONCOLOGY | Facility: CLINIC | Age: 33
End: 2018-01-22

## 2018-01-22 VITALS
WEIGHT: 178.6 LBS | DIASTOLIC BLOOD PRESSURE: 74 MMHG | RESPIRATION RATE: 18 BRPM | HEIGHT: 65 IN | TEMPERATURE: 98 F | HEART RATE: 78 BPM | OXYGEN SATURATION: 99 % | SYSTOLIC BLOOD PRESSURE: 108 MMHG | BODY MASS INDEX: 29.76 KG/M2

## 2018-01-22 DIAGNOSIS — D50.0 IRON DEFICIENCY ANEMIA DUE TO CHRONIC BLOOD LOSS: Primary | ICD-10-CM

## 2018-01-22 PROCEDURE — 99213 OFFICE O/P EST LOW 20 MIN: CPT | Performed by: INTERNAL MEDICINE

## 2018-01-22 RX ORDER — LEVOTHYROXINE SODIUM 137 MCG
TABLET ORAL
Refills: 2 | COMMUNITY
Start: 2017-12-27 | End: 2018-11-30

## 2018-01-22 RX ORDER — TRANEXAMIC ACID 650 MG/1
TABLET ORAL
Refills: 11 | COMMUNITY
Start: 2017-12-13 | End: 2022-12-06 | Stop reason: SDUPTHER

## 2018-01-22 NOTE — PROGRESS NOTES
Subjective     REASON FOR FOLLOW-UP:  Iron deficiency anemia, intolerant of oral iron therapy; iron deficiency most likely secondary to menorrhagia, is post 2 doses of Feraheme January 2017 and again July/August 2017    History of Present Illness   She returned today doing well.  She has chronic fatigue but no significant shortness of breath, lightheadedness, chest pain.  She has been evaluated by gynecology and started on medication which has improved her menorrhagia.  She now has only light bleeding each month.    Past Medical History:   Diagnosis Date   • Allergic rhinitis    • Anemia    • Asthma    • GERD (gastroesophageal reflux disease)    • Hyperthyroidism    • Ovarian cyst    • Vitamin D deficiency         Past Surgical History:   Procedure Laterality Date   • APPENDECTOMY  2012   • MANDIBLE SURGERY  05/2016        Current Outpatient Prescriptions on File Prior to Visit   Medication Sig Dispense Refill   • albuterol (PROVENTIL HFA;VENTOLIN HFA) 108 (90 BASE) MCG/ACT inhaler Inhale 2 puffs Every 6 (Six) Hours.     • budesonide-formoterol (SYMBICORT) 160-4.5 MCG/ACT inhaler Symbicort 160-4.5 MCG/ACT Inhalation Aerosol; Patient Sig: Symbicort 160-4.5 MCG/ACT Inhalation Aerosol INHALE TWO PUFFS BY MOUTH TWICE A DAY; 10.2; 11; 05-Nov-2014; Active     • ciprofloxacin-dexamethasone (CIPRODEX) 0.3-0.1 % otic suspension Administer 4 drops to the right ear 2 (Two) Times a Day. 7.5 mL 0   • ergocalciferol (DRISDOL) 35141 UNITS capsule Take 1 cap 3 times weekly 36 capsule 1   • HYDROcodone-acetaminophen (NORCO) 5-325 MG per tablet Take 1 tablet by mouth Every 6 (Six) Hours As Needed for Severe Pain . 12 tablet 0   • levocetirizine (XYZAL) 5 MG tablet TAKE 1 TABLET BY MOUTH AT BEDTIME  3   • montelukast (SINGULAIR) 10 MG tablet TAKE 1 TABLET BY MOUTH AT BEDTIME  3   • predniSONE (DELTASONE) 20 MG tablet Take 3 tablets by mouth Daily. 15 tablet 0   • predniSONE (DELTASONE) 20 MG tablet Take 1 tablet by mouth 2 (Two)  Times a Day. 10 tablet 0   • raNITIdine (ZANTAC) 150 MG tablet Take 150 mg by mouth 2 (Two) Times a Day.  11   • SYNTHROID 150 MCG tablet Take 1 tablet by mouth Daily. 30 tablet 5   • traZODone (DESYREL) 50 MG tablet TAKE 1 TABLET BY MOUTH EVERY NIGHT. 90 tablet 1   • valACYclovir (VALTREX) 1000 MG tablet TAKE 1 TABLET BY MOUTH 3 TIMES A DAY FOR 10 DAYS 30 tablet 0   • XOLAIR 150 MG injection Take 150 mg by mouth Every 14 (Fourteen) Days.       No current facility-administered medications on file prior to visit.         ALLERGIES:    Allergies   Allergen Reactions   • Beta Adrenergic Blockers    • Metoprolol      Face swelling, itching     • Propranolol      Face swelling, itching          Social History     Social History   • Marital status: Single     Spouse name: N/A   • Number of children: 0   • Years of education: College     Occupational History   •  [employer]     Social History Main Topics   • Smoking status: Never Smoker   • Smokeless tobacco: Never Used   • Alcohol use Yes      Comment: Occasional   • Drug use: No   • Sexual activity: Yes     Partners: Male     Other Topics Concern   • Not on file     Social History Narrative   • No narrative on file        Family History   Problem Relation Age of Onset   • Hypertension Mother    • Diabetes Mother    • Hypertension Father    • Hyperlipidemia Father    • Diabetes Father    • Heart disease Father    • Alcohol abuse Other    • Diabetes Other    • Hypertension Other    • Diabetes Maternal Aunt    • Hypertension Maternal Uncle    • Diabetes Maternal Uncle         Review of Systems   Constitutional: Positive for fatigue. Negative for appetite change, fever and unexpected weight change.        Hyperthyroidism   HENT: Negative for congestion, hearing loss, mouth sores and nosebleeds.    Eyes: Negative for photophobia and redness.   Respiratory: Negative for choking, chest tightness and wheezing.    Cardiovascular: Negative for  chest pain, palpitations and leg swelling.   Gastrointestinal: Negative for abdominal distention, anal bleeding, blood in stool, nausea and vomiting.   Endocrine: Negative for polydipsia.   Genitourinary: Positive for menstrual problem. Negative for difficulty urinating, vaginal discharge and vaginal pain.   Musculoskeletal: Positive for arthralgias. Negative for gait problem and neck stiffness.   Skin: Negative for color change, pallor, rash and wound.   Neurological: Negative for weakness and numbness.   Hematological: Negative for adenopathy. Does not bruise/bleed easily.   Psychiatric/Behavioral: Negative for agitation.        Objective     There were no vitals filed for this visit.  Current Status 7/24/2017   ECOG score 0       Physical Exam    Gen: pleasant young black female nad  HEENT: no icterus or thrush,   NECK: no jvd  CV: RRR, S1S2  CHEST: CTA bilat  ABD: soft, ndnd, +bs, no edema  MS: no edema  NEURO: no focal deficits  SKIN: no rash, macules, papules, hives; negative Darier's sign    RECENT LABS:  Hematology WBC   Date Value Ref Range Status   01/08/2018 5.13 4.00 - 10.00 10*3/mm3 Final     RBC   Date Value Ref Range Status   01/08/2018 4.75 3.90 - 5.00 10*6/mm3 Final     Hemoglobin   Date Value Ref Range Status   01/08/2018 13.0 11.5 - 14.9 g/dL Final     Hematocrit   Date Value Ref Range Status   01/08/2018 40.8 34.0 - 45.0 % Final     Platelets   Date Value Ref Range Status   01/08/2018 248 150 - 375 10*3/mm3 Final        Lab Results   Component Value Date    GLUCOSE 108 (H) 01/09/2014    BUN 11 07/11/2017    CREATININE 1.00 07/11/2017    EGFRIFNONA 65 07/11/2017    EGFRIFAFRI 79 07/11/2017    BCR 11.0 07/11/2017    K 4.2 07/11/2017    CO2 25.6 07/11/2017    CALCIUM 9.2 07/11/2017    PROTENTOTREF 7.0 07/11/2017    ALBUMIN 3.90 07/11/2017    LABIL2 1.3 07/11/2017    AST 20 07/11/2017    ALT 17 07/11/2017     Ferritin 262, iron saturation 29%    Assessment/Plan     Recurrent iron deficiency anemia  secondary to chronic blood loss from menorrhagia:   She received Feraheme most recently in the summer of 2017.  The patient is now on medication to control her menorrhagia and she is having only a light menstrual cycle each month.  Her ferritin and iron saturation are both currently normal as is the hemoglobin at 13.0.    Hopefully recurrent iron deficiency will not be a major issue now that her menstrual cycle is controlled.  I will defer care to her primary care physician.  I recommended she have a repeat CBC, ferritin, iron profile in approximately 6 months.  If the ferritin is less than 50 /or iron saturation less than 20%, she can be referred back for further intravenous iron replacement.

## 2018-01-24 DIAGNOSIS — E55.9 VITAMIN D DEFICIENCY: ICD-10-CM

## 2018-01-24 DIAGNOSIS — E05.90 THYROTOXICOSIS WITHOUT THYROID STORM, UNSPECIFIED THYROTOXICOSIS TYPE: Primary | ICD-10-CM

## 2018-01-24 DIAGNOSIS — E05.00 GRAVES DISEASE: ICD-10-CM

## 2018-02-13 RX ORDER — TRAZODONE HYDROCHLORIDE 50 MG/1
TABLET ORAL
Qty: 90 TABLET | Refills: 1 | Status: SHIPPED | OUTPATIENT
Start: 2018-02-13 | End: 2019-10-22 | Stop reason: SDUPTHER

## 2018-02-15 RX ORDER — LEVOTHYROXINE SODIUM 150 MCG
TABLET ORAL
Qty: 30 TABLET | Refills: 2 | OUTPATIENT
Start: 2018-02-15

## 2018-02-24 ENCOUNTER — RESULTS ENCOUNTER (OUTPATIENT)
Dept: ENDOCRINOLOGY | Age: 33
End: 2018-02-24

## 2018-02-24 DIAGNOSIS — E55.9 VITAMIN D DEFICIENCY: ICD-10-CM

## 2018-02-24 DIAGNOSIS — E05.00 GRAVES DISEASE: ICD-10-CM

## 2018-02-24 DIAGNOSIS — E05.90 THYROTOXICOSIS WITHOUT THYROID STORM, UNSPECIFIED THYROTOXICOSIS TYPE: ICD-10-CM

## 2018-03-15 ENCOUNTER — TRANSCRIBE ORDERS (OUTPATIENT)
Dept: ADMINISTRATIVE | Facility: HOSPITAL | Age: 33
End: 2018-03-15

## 2018-03-15 DIAGNOSIS — N94.89 ADNEXAL MASS: Primary | ICD-10-CM

## 2018-03-21 ENCOUNTER — APPOINTMENT (OUTPATIENT)
Dept: CT IMAGING | Facility: HOSPITAL | Age: 33
End: 2018-03-21
Attending: OBSTETRICS & GYNECOLOGY

## 2018-03-22 ENCOUNTER — HOSPITAL ENCOUNTER (OUTPATIENT)
Dept: CT IMAGING | Facility: HOSPITAL | Age: 33
Discharge: HOME | End: 2018-03-22
Attending: OBSTETRICS & GYNECOLOGY | Admitting: OBSTETRICS & GYNECOLOGY

## 2018-03-22 DIAGNOSIS — N94.89 ADNEXAL MASS: ICD-10-CM

## 2018-03-22 PROCEDURE — 74177 CT ABD & PELVIS W/CONTRAST: CPT

## 2018-03-22 PROCEDURE — 25010000002 IOPAMIDOL 61 % SOLUTION: Performed by: OBSTETRICS & GYNECOLOGY

## 2018-03-22 RX ADMIN — IOPAMIDOL 85 ML: 612 INJECTION, SOLUTION INTRAVENOUS at 14:35

## 2018-04-16 RX ORDER — RANITIDINE 150 MG/1
150 TABLET ORAL NIGHTLY
Qty: 90 TABLET | Refills: 3 | Status: SHIPPED | OUTPATIENT
Start: 2018-04-16 | End: 2020-04-13

## 2018-05-02 ENCOUNTER — OFFICE VISIT (OUTPATIENT)
Dept: INTERNAL MEDICINE | Facility: CLINIC | Age: 33
End: 2018-05-02

## 2018-05-02 VITALS
HEART RATE: 92 BPM | DIASTOLIC BLOOD PRESSURE: 77 MMHG | HEIGHT: 65 IN | WEIGHT: 182 LBS | RESPIRATION RATE: 16 BRPM | BODY MASS INDEX: 30.32 KG/M2 | TEMPERATURE: 98.1 F | OXYGEN SATURATION: 98 % | SYSTOLIC BLOOD PRESSURE: 122 MMHG

## 2018-05-02 DIAGNOSIS — J45.20 MILD INTERMITTENT ASTHMA WITHOUT COMPLICATION: ICD-10-CM

## 2018-05-02 DIAGNOSIS — Z00.00 HEALTH CARE MAINTENANCE: Primary | ICD-10-CM

## 2018-05-02 DIAGNOSIS — J30.1 CHRONIC SEASONAL ALLERGIC RHINITIS DUE TO POLLEN: ICD-10-CM

## 2018-05-02 DIAGNOSIS — J06.9 UPPER RESPIRATORY TRACT INFECTION, UNSPECIFIED TYPE: ICD-10-CM

## 2018-05-02 DIAGNOSIS — B37.31 YEAST VAGINITIS: ICD-10-CM

## 2018-05-02 PROBLEM — N39.0 UTI (URINARY TRACT INFECTION): Status: ACTIVE | Noted: 2018-05-02

## 2018-05-02 PROCEDURE — 99214 OFFICE O/P EST MOD 30 MIN: CPT | Performed by: INTERNAL MEDICINE

## 2018-05-02 PROCEDURE — 99395 PREV VISIT EST AGE 18-39: CPT | Performed by: INTERNAL MEDICINE

## 2018-05-02 RX ORDER — FLUCONAZOLE 150 MG/1
150 TABLET ORAL DAILY
Qty: 7 TABLET | Refills: 0 | Status: ON HOLD | OUTPATIENT
Start: 2018-05-02 | End: 2018-08-01

## 2018-05-02 RX ORDER — AMOXICILLIN AND CLAVULANATE POTASSIUM 875; 125 MG/1; MG/1
1 TABLET, FILM COATED ORAL 2 TIMES DAILY
Qty: 20 TABLET | Refills: 0 | Status: ON HOLD | OUTPATIENT
Start: 2018-05-02 | End: 2018-08-01

## 2018-05-04 LAB
ALBUMIN SERPL-MCNC: 4.2 G/DL (ref 3.5–5.2)
ALBUMIN/GLOB SERPL: 1.5 G/DL
ALP SERPL-CCNC: 118 U/L (ref 39–117)
ALT SERPL-CCNC: 16 U/L (ref 1–33)
APPEARANCE UR: (no result)
AST SERPL-CCNC: 20 U/L (ref 1–32)
BACTERIA #/AREA URNS HPF: ABNORMAL /HPF
BAKER'S YEAST IGA QN: <20 UNITS (ref 0–24.9)
BAKER'S YEAST IGG QN: 42.3 UNITS (ref 0–24.9)
BASOPHILS # BLD AUTO: 0.02 10*3/MM3 (ref 0–0.2)
BASOPHILS NFR BLD AUTO: 0.3 % (ref 0–1.5)
BILIRUB SERPL-MCNC: 0.4 MG/DL (ref 0.1–1.2)
BILIRUB UR QL STRIP: NEGATIVE
BUN SERPL-MCNC: 9 MG/DL (ref 6–20)
BUN/CREAT SERPL: 10.7 (ref 7–25)
CALCIUM SERPL-MCNC: 9 MG/DL (ref 8.6–10.5)
CASTS URNS MICRO: ABNORMAL
CHLORIDE SERPL-SCNC: 101 MMOL/L (ref 98–107)
CHOLEST SERPL-MCNC: 166 MG/DL (ref 0–200)
CO2 SERPL-SCNC: 25.2 MMOL/L (ref 22–29)
COLOR UR: YELLOW
CREAT SERPL-MCNC: 0.84 MG/DL (ref 0.57–1)
EOSINOPHIL # BLD AUTO: 0.11 10*3/MM3 (ref 0–0.7)
EOSINOPHIL NFR BLD AUTO: 1.6 % (ref 0.3–6.2)
EPI CELLS #/AREA URNS HPF: ABNORMAL /HPF
ERYTHROCYTE [DISTWIDTH] IN BLOOD BY AUTOMATED COUNT: 14.7 % (ref 11.7–13)
GFR SERPLBLD CREATININE-BSD FMLA CKD-EPI: 79 ML/MIN/1.73
GFR SERPLBLD CREATININE-BSD FMLA CKD-EPI: 96 ML/MIN/1.73
GLOBULIN SER CALC-MCNC: 2.8 GM/DL
GLUCOSE SERPL-MCNC: 97 MG/DL (ref 65–99)
GLUCOSE UR QL: NEGATIVE
HCT VFR BLD AUTO: 39.9 % (ref 35.6–45.5)
HDLC SERPL-MCNC: 49 MG/DL (ref 40–60)
HGB BLD-MCNC: 12.5 G/DL (ref 11.9–15.5)
HGB UR QL STRIP: (no result)
IGA SERPL-MCNC: 158 MG/DL (ref 87–352)
IGG SERPL-MCNC: 1289 MG/DL (ref 700–1600)
IGM SERPL-MCNC: 103 MG/DL (ref 26–217)
IMM GRANULOCYTES # BLD: 0.01 10*3/MM3 (ref 0–0.03)
IMM GRANULOCYTES NFR BLD: 0.1 % (ref 0–0.5)
KETONES UR QL STRIP: NEGATIVE
LDLC SERPL CALC-MCNC: 101 MG/DL (ref 0–100)
LDLC/HDLC SERPL: 2.07 {RATIO}
LEUKOCYTE ESTERASE UR QL STRIP: (no result)
LYMPHOCYTES # BLD AUTO: 2.77 10*3/MM3 (ref 0.9–4.8)
LYMPHOCYTES NFR BLD AUTO: 39.3 % (ref 19.6–45.3)
MCH RBC QN AUTO: 27.7 PG (ref 26.9–32)
MCHC RBC AUTO-ENTMCNC: 31.3 G/DL (ref 32.4–36.3)
MCV RBC AUTO: 88.5 FL (ref 80.5–98.2)
MONOCYTES # BLD AUTO: 0.66 10*3/MM3 (ref 0.2–1.2)
MONOCYTES NFR BLD AUTO: 9.4 % (ref 5–12)
NEUTROPHILS # BLD AUTO: 3.48 10*3/MM3 (ref 1.9–8.1)
NEUTROPHILS NFR BLD AUTO: 49.4 % (ref 42.7–76)
NITRITE UR QL STRIP: NEGATIVE
P-ANCA ATYPICAL TITR SER IF: ABNORMAL TITER
PH UR STRIP: 6.5 [PH] (ref 5–8)
PLATELET # BLD AUTO: 236 10*3/MM3 (ref 140–500)
POTASSIUM SERPL-SCNC: 4.1 MMOL/L (ref 3.5–5.2)
PROT SERPL-MCNC: 7 G/DL (ref 6–8.5)
PROT UR QL STRIP: NEGATIVE
RBC # BLD AUTO: 4.51 10*6/MM3 (ref 3.9–5.2)
RBC #/AREA URNS HPF: ABNORMAL /HPF
SODIUM SERPL-SCNC: 140 MMOL/L (ref 136–145)
SP GR UR: 1.02 (ref 1–1.03)
T4 FREE SERPL-MCNC: 1.42 NG/DL (ref 0.93–1.7)
TRIGL SERPL-MCNC: 79 MG/DL (ref 0–150)
TSH SERPL DL<=0.005 MIU/L-ACNC: 4.56 MIU/ML (ref 0.27–4.2)
UROBILINOGEN UR STRIP-MCNC: (no result) MG/DL
VLDLC SERPL CALC-MCNC: 15.8 MG/DL (ref 5–40)
WBC # BLD AUTO: 7.04 10*3/MM3 (ref 4.5–10.7)
WBC #/AREA URNS HPF: ABNORMAL /HPF

## 2018-05-16 NOTE — PROGRESS NOTES
Subjective   Sundeep Espino is a 32 y.o. female.   She is here today for complete physical exam except for gynecological part along with complaints of yeast vaginitis asthma allergic rhinitis and upper respiratory tract infection today as well she is here today for complete physical exam except for gynecological part along with complaints of yeast vaginitis today along with asthma which is stable on current medication allergic rhinitis which is stable and she has no upper respiratory tract infection today with congestion and some cough and congestion is in her upper respiratory tract  History of Present Illness   She is here today for complete physical exam except gynecological part along with complaints of yeast vaginitis which is not stable and asthma which is stable on current inhalers allergic rhinitis which is stable on current medications and upper respiratory tract infection today with congestion in her nasal passages and some cough and our  main concern is for the upper respiratory tract infection not to spread into her lungs  The following portions of the patient's history were reviewed and updated as appropriate: allergies, current medications, past family history, past medical history, past social history, past surgical history and problem list.    Review of Systems   HENT: Positive for congestion.    Respiratory: Positive for cough.    Genitourinary: Positive for vaginal discharge (yeast vaginitis).   All other systems reviewed and are negative.      Objective   Physical Exam   Constitutional: She is oriented to person, place, and time. Vital signs are normal. She appears well-developed and well-nourished. She is active.   HENT:   Head: Normocephalic and atraumatic.   Right Ear: Hearing, tympanic membrane, external ear and ear canal normal.   Left Ear: Hearing, tympanic membrane, external ear and ear canal normal.   Nose: Mucosal edema (she has congested upper respiratory passages) and rhinorrhea  present.   Mouth/Throat: Uvula is midline, oropharynx is clear and moist and mucous membranes are normal.   Eyes: Conjunctivae, EOM and lids are normal. Pupils are equal, round, and reactive to light. Right eye exhibits no discharge. Left eye exhibits no discharge.   Neck: Trachea normal, normal range of motion, full passive range of motion without pain and phonation normal. Neck supple. Carotid bruit is not present. No edema present. No thyroid mass and no thyromegaly present.   Cardiovascular: Normal rate, regular rhythm, normal heart sounds, intact distal pulses and normal pulses.  Exam reveals no gallop and no friction rub.    No murmur heard.  Pulmonary/Chest: Effort normal and breath sounds normal. No respiratory distress. She has no wheezes. She has no rales.   Abdominal: Soft. Normal appearance, normal aorta and bowel sounds are normal. She exhibits no distension, no abdominal bruit and no mass. There is no hepatosplenomegaly. There is no tenderness. There is no rebound, no guarding and no CVA tenderness. No hernia. Hernia confirmed negative in the right inguinal area and confirmed negative in the left inguinal area.   Genitourinary: Vaginal discharge (she has a vaginal discharge which I have not seen and she has GYN doctor as well) found.   Musculoskeletal: Normal range of motion. She exhibits no edema or tenderness.     Vascular Status -  Her right foot exhibits normal foot vasculature  and no edema. Her left foot exhibits normal foot vasculature  and no edema.  Skin Integrity  -  Her right foot skin is intact.Her left foot skin is intact..  Lymphadenopathy:     She has no cervical adenopathy.     She has no axillary adenopathy.        Right: No inguinal and no supraclavicular adenopathy present.        Left: No inguinal and no supraclavicular adenopathy present.   Neurological: She is alert and oriented to person, place, and time. She has normal strength. No cranial nerve deficit or sensory deficit. She  exhibits normal muscle tone. She displays a negative Romberg sign. Coordination normal.   Skin: Skin is warm, dry and intact. No cyanosis. Nails show no clubbing.   Psychiatric: She has a normal mood and affect. Her speech is normal and behavior is normal. Judgment and thought content normal. Cognition and memory are normal.   Nursing note and vitals reviewed.      Assessment/Plan   Diagnoses and all orders for this visit:    Health care maintenance  -     Lipid Panel With LDL / HDL Ratio  -     CBC & Differential  -     Comprehensive Metabolic Panel  -     T4, Free  -     TSH  -     Urinalysis With Microscopic - Urine, Clean Catch  -     Inflammatory Bowel Disease Panel  -     IgG  -     IgM  -     IgA    Yeast vaginitis    Mild intermittent asthma without complication    Chronic seasonal allergic rhinitis due to pollen    Upper respiratory tract infection, unspecified type    Other orders  -     amoxicillin-clavulanate (AUGMENTIN) 875-125 MG per tablet; Take 1 tablet by mouth 2 (Two) Times a Day.  -     fluconazole (DIFLUCAN) 150 MG tablet; Take 1 tablet by mouth Daily.  -     Microscopic Examination      Healthcare maintenance fasting labs vaccines pelvic exams breast exams on a regular basis and she has a GYN doctor  Yeast vaginitis we will provide Diflucan today  Asthma stable on current inhalers  Allergic rhinitis stable on current medication including nasal sprays and over-the-counter meds  Upper respiratory tract infection we will utilize Augmentin today as well and this could make her vaginitis worse if we do not treat with Diflucan

## 2018-07-02 DIAGNOSIS — K50.919 CROHN'S DISEASE WITH COMPLICATION, UNSPECIFIED GASTROINTESTINAL TRACT LOCATION (HCC): Primary | ICD-10-CM

## 2018-07-23 ENCOUNTER — OFFICE VISIT (OUTPATIENT)
Dept: GASTROENTEROLOGY | Facility: CLINIC | Age: 33
End: 2018-07-23

## 2018-07-23 VITALS
WEIGHT: 187.6 LBS | BODY MASS INDEX: 31.25 KG/M2 | SYSTOLIC BLOOD PRESSURE: 128 MMHG | DIASTOLIC BLOOD PRESSURE: 72 MMHG | TEMPERATURE: 98.2 F | HEIGHT: 65 IN

## 2018-07-23 DIAGNOSIS — R14.0 BLOATING SYMPTOM: Primary | ICD-10-CM

## 2018-07-23 DIAGNOSIS — R19.7 DIARRHEA, UNSPECIFIED TYPE: ICD-10-CM

## 2018-07-23 PROCEDURE — 99204 OFFICE O/P NEW MOD 45 MIN: CPT | Performed by: INTERNAL MEDICINE

## 2018-07-23 RX ORDER — SODIUM CHLORIDE, SODIUM LACTATE, POTASSIUM CHLORIDE, CALCIUM CHLORIDE 600; 310; 30; 20 MG/100ML; MG/100ML; MG/100ML; MG/100ML
30 INJECTION, SOLUTION INTRAVENOUS CONTINUOUS
Status: CANCELLED | OUTPATIENT
Start: 2018-08-01 | End: 2019-08-01

## 2018-07-23 NOTE — PROGRESS NOTES
"Chief Complaint   Patient presents with   • Crohn's Disease     Subjective   HPI  Sundeep Espino is a 32 y.o. female who presents for evaluation of possible Crohn's disease.  Pt reports \"stomach\" issues over last year.  Bloating.  Bowels open more frequently - 2 times/day.  BM never firm.  No blood/mucous.  No family hx of IBD.  No extraintestinal manifestations.      Recently had IBD panel performed by PCP - elevated ASCA IgG, normal ASCA IgA and pANCA.    CT scan in April for ? adnexal mass negative.  No radiographic e/o mass.  No evidence of intestinal inflammation.  Nothing to suggest IBD.      Past Medical History:   Diagnosis Date   • Allergic rhinitis    • Anemia    • Asthma    • Crohn's disease (CMS/HCC)    • GERD (gastroesophageal reflux disease)    • Hyperthyroidism    • Ovarian cyst    • Vitamin D deficiency        Current Outpatient Prescriptions:   •  albuterol (PROVENTIL HFA;VENTOLIN HFA) 108 (90 BASE) MCG/ACT inhaler, Inhale 2 puffs Every 6 (Six) Hours., Disp: , Rfl:   •  budesonide-formoterol (SYMBICORT) 160-4.5 MCG/ACT inhaler, Symbicort 160-4.5 MCG/ACT Inhalation Aerosol; Patient Sig: Symbicort 160-4.5 MCG/ACT Inhalation Aerosol INHALE TWO PUFFS BY MOUTH TWICE A DAY; 10.2; 11; 05-Nov-2014; Active, Disp: , Rfl:   •  levocetirizine (XYZAL) 5 MG tablet, TAKE 1 TABLET BY MOUTH AT BEDTIME, Disp: , Rfl: 3  •  montelukast (SINGULAIR) 10 MG tablet, TAKE 1 TABLET BY MOUTH AT BEDTIME, Disp: , Rfl: 3  •  Multiple Vitamins-Minerals (MULTIVITAMIN ADULT PO), Take  by mouth., Disp: , Rfl:   •  raNITIdine (ZANTAC) 150 MG tablet, Take 1 tablet by mouth Every Night., Disp: 90 tablet, Rfl: 3  •  SYNTHROID 137 MCG tablet, TAKE 1 TABLET BY MOUTH DAILY, Disp: , Rfl: 2  •  Tranexamic Acid 650 MG tablet, TAKE 2 TABLET THREE TIMES DAILY FOR UP TO 5 DAYS OF CYCLE, Disp: , Rfl: 11  •  traZODone (DESYREL) 50 MG tablet, TAKE 1 TABLET BY MOUTH EVERY NIGHT., Disp: 90 tablet, Rfl: 1  •  amoxicillin-clavulanate (AUGMENTIN) " 875-125 MG per tablet, Take 1 tablet by mouth 2 (Two) Times a Day., Disp: 20 tablet, Rfl: 0  •  ergocalciferol (DRISDOL) 21993 UNITS capsule, Take 1 cap 3 times weekly, Disp: 36 capsule, Rfl: 1  •  fluconazole (DIFLUCAN) 150 MG tablet, Take 1 tablet by mouth Daily., Disp: 7 tablet, Rfl: 0     Allergies   Allergen Reactions   • Aspartame And Phenylalanine Other (See Comments)     headaches   • Beta Adrenergic Blockers    • Metoprolol      Face swelling, itching     • Propranolol      Face swelling, itching       Social History     Social History   • Marital status: Single     Spouse name: N/A   • Number of children: 0   • Years of education: College     Occupational History   •  [employer]     Social History Main Topics   • Smoking status: Never Smoker   • Smokeless tobacco: Never Used   • Alcohol use Yes      Comment: Occasional   • Drug use: No   • Sexual activity: Yes     Partners: Male     Other Topics Concern   • Not on file     Social History Narrative   • No narrative on file     Family History   Problem Relation Age of Onset   • Hypertension Mother    • Diabetes Mother    • Hypertension Father    • Hyperlipidemia Father    • Diabetes Father    • Heart disease Father    • Alcohol abuse Other    • Diabetes Other    • Hypertension Other    • Diabetes Maternal Aunt    • Hypertension Maternal Uncle    • Diabetes Maternal Uncle      Review of Systems   Gastrointestinal: Positive for abdominal distention and diarrhea.   All other systems reviewed and are negative.       Objective   Vitals:    07/23/18 1350   BP: 128/72   Temp: 98.2 °F (36.8 °C)     Physical Exam   Constitutional: She is oriented to person, place, and time. She appears well-developed and well-nourished.   HENT:   Head: Normocephalic and atraumatic.   Mouth/Throat: Oropharynx is clear and moist.   Eyes: EOM are normal. No scleral icterus.   Neck: Normal range of motion. Neck supple. No thyromegaly present.    Cardiovascular: Normal rate, regular rhythm and normal heart sounds.  Exam reveals no gallop and no friction rub.    No murmur heard.  Pulmonary/Chest: Effort normal and breath sounds normal. She has no wheezes. She has no rales. She exhibits no tenderness.   Abdominal: Soft. Bowel sounds are normal. She exhibits no distension. There is no tenderness. There is no rebound and no guarding. No hernia.   Musculoskeletal: Normal range of motion. She exhibits no edema.   Lymphadenopathy:     She has no cervical adenopathy.   Neurological: She is alert and oriented to person, place, and time.   Skin: Skin is warm and dry.   Psychiatric: She has a normal mood and affect. Judgment and thought content normal.   Vitals reviewed.       Assessment/Plan   Assessment:     1. Bloating symptom    2. Diarrhea, unspecified type      Plan:   This pleasant lady presents for evaluation of some long-standing intermittent vague abdominal symptoms.  She has a positive ASCA antibody but this in and of itself is nonspecific.  She has a CT scan performed recently which shows no obvious evidence of Crohn's disease.  I recommend we proceed with upper and lower endoscopic evaluation as next step.  We may consider [facility] IBD panel based on endoscopic results.          Alexander Marroquin M.D.  [facility]  [address] [place] [address]  Office: [phone]

## 2018-08-01 ENCOUNTER — HOSPITAL ENCOUNTER (OUTPATIENT)
Facility: HOSPITAL | Age: 33
Setting detail: HOSPITAL OUTPATIENT SURGERY
Discharge: HOME | End: 2018-08-01
Attending: INTERNAL MEDICINE | Admitting: INTERNAL MEDICINE

## 2018-08-01 ENCOUNTER — ANESTHESIA (OUTPATIENT)
Dept: GASTROENTEROLOGY | Facility: HOSPITAL | Age: 33
End: 2018-08-01

## 2018-08-01 ENCOUNTER — ANESTHESIA EVENT (OUTPATIENT)
Dept: GASTROENTEROLOGY | Facility: HOSPITAL | Age: 33
End: 2018-08-01

## 2018-08-01 VITALS
BODY MASS INDEX: 30.99 KG/M2 | WEIGHT: 186 LBS | OXYGEN SATURATION: 99 % | HEIGHT: 65 IN | DIASTOLIC BLOOD PRESSURE: 85 MMHG | HEART RATE: 80 BPM | TEMPERATURE: 98.1 F | SYSTOLIC BLOOD PRESSURE: 119 MMHG | RESPIRATION RATE: 16 BRPM

## 2018-08-01 DIAGNOSIS — R19.7 DIARRHEA, UNSPECIFIED TYPE: ICD-10-CM

## 2018-08-01 DIAGNOSIS — R14.0 BLOATING SYMPTOM: ICD-10-CM

## 2018-08-01 LAB
B-HCG UR QL: NEGATIVE
INTERNAL NEGATIVE CONTROL: NEGATIVE
INTERNAL POSITIVE CONTROL: POSITIVE
Lab: NORMAL

## 2018-08-01 PROCEDURE — 43239 EGD BIOPSY SINGLE/MULTIPLE: CPT | Performed by: INTERNAL MEDICINE

## 2018-08-01 PROCEDURE — 81025 URINE PREGNANCY TEST: CPT | Performed by: INTERNAL MEDICINE

## 2018-08-01 PROCEDURE — 88305 TISSUE EXAM BY PATHOLOGIST: CPT | Performed by: INTERNAL MEDICINE

## 2018-08-01 PROCEDURE — 45380 COLONOSCOPY AND BIOPSY: CPT | Performed by: INTERNAL MEDICINE

## 2018-08-01 PROCEDURE — 88312 SPECIAL STAINS GROUP 1: CPT | Performed by: INTERNAL MEDICINE

## 2018-08-01 PROCEDURE — 25010000002 PROPOFOL 10 MG/ML EMULSION: Performed by: ANESTHESIOLOGY

## 2018-08-01 RX ORDER — CETIRIZINE HYDROCHLORIDE 10 MG/1
10 TABLET ORAL DAILY
COMMUNITY

## 2018-08-01 RX ORDER — PROPOFOL 10 MG/ML
VIAL (ML) INTRAVENOUS CONTINUOUS PRN
Status: DISCONTINUED | OUTPATIENT
Start: 2018-08-01 | End: 2018-08-01 | Stop reason: SURG

## 2018-08-01 RX ORDER — PROPOFOL 10 MG/ML
VIAL (ML) INTRAVENOUS AS NEEDED
Status: DISCONTINUED | OUTPATIENT
Start: 2018-08-01 | End: 2018-08-01 | Stop reason: SURG

## 2018-08-01 RX ORDER — SODIUM CHLORIDE, SODIUM LACTATE, POTASSIUM CHLORIDE, CALCIUM CHLORIDE 600; 310; 30; 20 MG/100ML; MG/100ML; MG/100ML; MG/100ML
30 INJECTION, SOLUTION INTRAVENOUS CONTINUOUS
Status: DISCONTINUED | OUTPATIENT
Start: 2018-08-01 | End: 2018-08-01 | Stop reason: HOSPADM

## 2018-08-01 RX ORDER — LIDOCAINE HYDROCHLORIDE 20 MG/ML
INJECTION, SOLUTION INFILTRATION; PERINEURAL AS NEEDED
Status: DISCONTINUED | OUTPATIENT
Start: 2018-08-01 | End: 2018-08-01 | Stop reason: SURG

## 2018-08-01 RX ADMIN — PROPOFOL 50 MG: 10 INJECTION, EMULSION INTRAVENOUS at 14:05

## 2018-08-01 RX ADMIN — SODIUM CHLORIDE, POTASSIUM CHLORIDE, SODIUM LACTATE AND CALCIUM CHLORIDE 30 ML/HR: 600; 310; 30; 20 INJECTION, SOLUTION INTRAVENOUS at 14:01

## 2018-08-01 RX ADMIN — LIDOCAINE HYDROCHLORIDE 60 MG: 20 INJECTION, SOLUTION INFILTRATION; PERINEURAL at 14:05

## 2018-08-01 RX ADMIN — PROPOFOL 100 MCG/KG/MIN: 10 INJECTION, EMULSION INTRAVENOUS at 14:05

## 2018-08-01 RX ADMIN — SODIUM CHLORIDE, POTASSIUM CHLORIDE, SODIUM LACTATE AND CALCIUM CHLORIDE: 600; 310; 30; 20 INJECTION, SOLUTION INTRAVENOUS at 14:00

## 2018-08-01 NOTE — H&P (VIEW-ONLY)
"Chief Complaint   Patient presents with   • Crohn's Disease     Subjective   HPI  Sundeep Espino is a 32 y.o. female who presents for evaluation of possible Crohn's disease.  Pt reports \"stomach\" issues over last year.  Bloating.  Bowels open more frequently - 2 times/day.  BM never firm.  No blood/mucous.  No family hx of IBD.  No extraintestinal manifestations.      Recently had IBD panel performed by PCP - elevated ASCA IgG, normal ASCA IgA and pANCA.    CT scan in April for ? adnexal mass negative.  No radiographic e/o mass.  No evidence of intestinal inflammation.  Nothing to suggest IBD.      Past Medical History:   Diagnosis Date   • Allergic rhinitis    • Anemia    • Asthma    • Crohn's disease (CMS/HCC)    • GERD (gastroesophageal reflux disease)    • Hyperthyroidism    • Ovarian cyst    • Vitamin D deficiency        Current Outpatient Prescriptions:   •  albuterol (PROVENTIL HFA;VENTOLIN HFA) 108 (90 BASE) MCG/ACT inhaler, Inhale 2 puffs Every 6 (Six) Hours., Disp: , Rfl:   •  budesonide-formoterol (SYMBICORT) 160-4.5 MCG/ACT inhaler, Symbicort 160-4.5 MCG/ACT Inhalation Aerosol; Patient Sig: Symbicort 160-4.5 MCG/ACT Inhalation Aerosol INHALE TWO PUFFS BY MOUTH TWICE A DAY; 10.2; 11; 05-Nov-2014; Active, Disp: , Rfl:   •  levocetirizine (XYZAL) 5 MG tablet, TAKE 1 TABLET BY MOUTH AT BEDTIME, Disp: , Rfl: 3  •  montelukast (SINGULAIR) 10 MG tablet, TAKE 1 TABLET BY MOUTH AT BEDTIME, Disp: , Rfl: 3  •  Multiple Vitamins-Minerals (MULTIVITAMIN ADULT PO), Take  by mouth., Disp: , Rfl:   •  raNITIdine (ZANTAC) 150 MG tablet, Take 1 tablet by mouth Every Night., Disp: 90 tablet, Rfl: 3  •  SYNTHROID 137 MCG tablet, TAKE 1 TABLET BY MOUTH DAILY, Disp: , Rfl: 2  •  Tranexamic Acid 650 MG tablet, TAKE 2 TABLET THREE TIMES DAILY FOR UP TO 5 DAYS OF CYCLE, Disp: , Rfl: 11  •  traZODone (DESYREL) 50 MG tablet, TAKE 1 TABLET BY MOUTH EVERY NIGHT., Disp: 90 tablet, Rfl: 1  •  amoxicillin-clavulanate (AUGMENTIN) " 875-125 MG per tablet, Take 1 tablet by mouth 2 (Two) Times a Day., Disp: 20 tablet, Rfl: 0  •  ergocalciferol (DRISDOL) 45995 UNITS capsule, Take 1 cap 3 times weekly, Disp: 36 capsule, Rfl: 1  •  fluconazole (DIFLUCAN) 150 MG tablet, Take 1 tablet by mouth Daily., Disp: 7 tablet, Rfl: 0     Allergies   Allergen Reactions   • Aspartame And Phenylalanine Other (See Comments)     headaches   • Beta Adrenergic Blockers    • Metoprolol      Face swelling, itching     • Propranolol      Face swelling, itching       Social History     Social History   • Marital status: Single     Spouse name: N/A   • Number of children: 0   • Years of education: College     Occupational History   •  [employer]     Social History Main Topics   • Smoking status: Never Smoker   • Smokeless tobacco: Never Used   • Alcohol use Yes      Comment: Occasional   • Drug use: No   • Sexual activity: Yes     Partners: Male     Other Topics Concern   • Not on file     Social History Narrative   • No narrative on file     Family History   Problem Relation Age of Onset   • Hypertension Mother    • Diabetes Mother    • Hypertension Father    • Hyperlipidemia Father    • Diabetes Father    • Heart disease Father    • Alcohol abuse Other    • Diabetes Other    • Hypertension Other    • Diabetes Maternal Aunt    • Hypertension Maternal Uncle    • Diabetes Maternal Uncle      Review of Systems   Gastrointestinal: Positive for abdominal distention and diarrhea.   All other systems reviewed and are negative.       Objective   Vitals:    07/23/18 1350   BP: 128/72   Temp: 98.2 °F (36.8 °C)     Physical Exam   Constitutional: She is oriented to person, place, and time. She appears well-developed and well-nourished.   HENT:   Head: Normocephalic and atraumatic.   Mouth/Throat: Oropharynx is clear and moist.   Eyes: EOM are normal. No scleral icterus.   Neck: Normal range of motion. Neck supple. No thyromegaly present.      Cardiovascular: Normal rate, regular rhythm and normal heart sounds.  Exam reveals no gallop and no friction rub.    No murmur heard.  Pulmonary/Chest: Effort normal and breath sounds normal. She has no wheezes. She has no rales. She exhibits no tenderness.   Abdominal: Soft. Bowel sounds are normal. She exhibits no distension. There is no tenderness. There is no rebound and no guarding. No hernia.   Musculoskeletal: Normal range of motion. She exhibits no edema.   Lymphadenopathy:     She has no cervical adenopathy.   Neurological: She is alert and oriented to person, place, and time.   Skin: Skin is warm and dry.   Psychiatric: She has a normal mood and affect. Judgment and thought content normal.   Vitals reviewed.       Assessment/Plan   Assessment:     1. Bloating symptom    2. Diarrhea, unspecified type      Plan:   This pleasant lady presents for evaluation of some long-standing intermittent vague abdominal symptoms.  She has a positive ASCA antibody but this in and of itself is nonspecific.  She has a CT scan performed recently which shows no obvious evidence of Crohn's disease.  I recommend we proceed with upper and lower endoscopic evaluation as next step.  We may consider [facility] IBD panel based on endoscopic results.          Alexander Marroquin M.D.  [facility]  [address] [place] [address]  Office: [phone]

## 2018-08-01 NOTE — ANESTHESIA PREPROCEDURE EVALUATION
Anesthesia Evaluation     Patient summary reviewed and Nursing notes reviewed                Airway   Mallampati: I  TM distance: >3 FB  Neck ROM: full  No difficulty expected  Dental - normal exam     Pulmonary - normal exam   (+) asthma, recent URI,   Cardiovascular - negative cardio ROS and normal exam        Neuro/Psych  (+) headaches, numbness,     GI/Hepatic/Renal/Endo    (+)  GERD,  hyperthyroidism    Musculoskeletal     (+) neck pain,   Abdominal  - normal exam    Bowel sounds: normal.   Substance History - negative use     OB/GYN negative ob/gyn ROS         Other                        Anesthesia Plan    ASA 3     MAC     Anesthetic plan and risks discussed with patient.

## 2018-08-01 NOTE — ANESTHESIA POSTPROCEDURE EVALUATION
"Patient: Sundeep Espino    Procedure Summary     Date:  08/01/18 Room / Location:  [facility] ENDOSCOPY 10 /  [facility]    Anesthesia Start:  1405 Anesthesia Stop:  1434    Procedures:       ESOPHAGOGASTRODUODENOSCOPY WITH BIOPSIES (N/A Esophagus)      COLONOSCOPY INTO CECUM  AND TI WITH BIOPSIES (N/A ) Diagnosis:       Bloating symptom      Diarrhea, unspecified type      (Bloating symptom [R14.0])      (Diarrhea, unspecified type [R19.7])    Surgeon:  Alexander Marroquin MD Provider:  Xavier Scott MD    Anesthesia Type:  MAC ASA Status:  3          Anesthesia Type: MAC  Last vitals  BP   116/74 (08/01/18 1442)   Temp   36.7 °C (98.1 °F) (08/01/18 1442)   Pulse   87 (08/01/18 1442)   Resp   14 (08/01/18 1442)     SpO2   100 % (08/01/18 1442)     Post Anesthesia Care and Evaluation    Patient location during evaluation: PACU  Patient participation: complete - patient participated  Level of consciousness: awake  Pain score: 0  Pain management: adequate  Airway patency: patent  Anesthetic complications: No anesthetic complications  PONV Status: none  Cardiovascular status: acceptable  Respiratory status: acceptable  Hydration status: acceptable    Comments: /74 (BP Location: Left arm, Patient Position: Lying)   Pulse 87   Temp 36.7 °C (98.1 °F) (Oral)   Resp 14   Ht 163.8 cm (64.5\")   Wt 84.4 kg (186 lb)   SpO2 100%   BMI 31.43 kg/m²       "

## 2018-08-02 LAB
CYTO UR: NORMAL
LAB AP CASE REPORT: NORMAL
PATH REPORT.FINAL DX SPEC: NORMAL
PATH REPORT.GROSS SPEC: NORMAL

## 2018-08-09 NOTE — PROGRESS NOTES
Normal, no e/o crohns disease  [facility]  F/U 6 weeks

## 2018-08-22 ENCOUNTER — TELEPHONE (OUTPATIENT)
Dept: GASTROENTEROLOGY | Facility: CLINIC | Age: 33
End: 2018-08-22

## 2018-08-22 NOTE — TELEPHONE ENCOUNTER
Patient called back, advised as per Dr. Marroquin's note. She verb understanding and is agreeable to the plan. Patient requested the prescription for her Prom[facility] test be mailed to her house. Once script signed by Dr. Marroquin it will be mailed to the patient's house.

## 2018-08-22 NOTE — TELEPHONE ENCOUNTER
----- Message from Alexander Marroquin MD sent at 8/9/2018  8:11 AM EDT -----  Normal, no e/o crohns disease  [facility]  F/U 6 weeks

## 2018-11-02 ENCOUNTER — OFFICE VISIT (OUTPATIENT)
Dept: INTERNAL MEDICINE | Facility: CLINIC | Age: 33
End: 2018-11-02

## 2018-11-02 VITALS
WEIGHT: 190 LBS | DIASTOLIC BLOOD PRESSURE: 70 MMHG | BODY MASS INDEX: 31.65 KG/M2 | SYSTOLIC BLOOD PRESSURE: 103 MMHG | HEART RATE: 90 BPM | OXYGEN SATURATION: 100 % | TEMPERATURE: 98.5 F | HEIGHT: 65 IN

## 2018-11-02 DIAGNOSIS — D50.0 IRON DEFICIENCY ANEMIA DUE TO CHRONIC BLOOD LOSS: ICD-10-CM

## 2018-11-02 DIAGNOSIS — E55.9 HYPOVITAMINOSIS D: ICD-10-CM

## 2018-11-02 DIAGNOSIS — E05.00 GRAVES DISEASE: ICD-10-CM

## 2018-11-02 DIAGNOSIS — E66.09 EXOGENOUS OBESITY: ICD-10-CM

## 2018-11-02 DIAGNOSIS — J30.1 NON-SEASONAL ALLERGIC RHINITIS DUE TO POLLEN: ICD-10-CM

## 2018-11-02 DIAGNOSIS — J45.20 MILD INTERMITTENT ASTHMA WITHOUT COMPLICATION: Primary | ICD-10-CM

## 2018-11-02 DIAGNOSIS — N92.6 MENSTRUAL DISORDER: ICD-10-CM

## 2018-11-02 PROBLEM — Z79.899 CONTROLLED SUBSTANCE AGREEMENT SIGNED: Status: ACTIVE | Noted: 2018-11-02

## 2018-11-02 PROCEDURE — 90674 CCIIV4 VAC NO PRSV 0.5 ML IM: CPT | Performed by: INTERNAL MEDICINE

## 2018-11-02 PROCEDURE — 90471 IMMUNIZATION ADMIN: CPT | Performed by: INTERNAL MEDICINE

## 2018-11-02 PROCEDURE — 99214 OFFICE O/P EST MOD 30 MIN: CPT | Performed by: INTERNAL MEDICINE

## 2018-11-02 RX ORDER — DILTIAZEM HYDROCHLORIDE 60 MG/1
2 TABLET, FILM COATED ORAL 2 TIMES DAILY
Refills: 11 | COMMUNITY
Start: 2018-10-19 | End: 2022-09-16 | Stop reason: SDUPTHER

## 2018-11-02 RX ORDER — PHENTERMINE HYDROCHLORIDE 30 MG/1
30 CAPSULE ORAL EVERY MORNING
Qty: 30 CAPSULE | Refills: 0 | Status: SHIPPED | OUTPATIENT
Start: 2018-11-02 | End: 2018-11-30 | Stop reason: SDUPTHER

## 2018-11-02 RX ORDER — ALBUTEROL SULFATE 90 UG/1
2 POWDER, METERED RESPIRATORY (INHALATION) AS NEEDED
Refills: 3 | COMMUNITY
Start: 2018-09-23

## 2018-11-03 LAB
ALBUMIN SERPL-MCNC: 4.3 G/DL (ref 3.5–5.2)
ALBUMIN/GLOB SERPL: 1.4 G/DL
ALP SERPL-CCNC: 132 U/L (ref 39–117)
ALT SERPL-CCNC: 16 U/L (ref 1–33)
APPEARANCE UR: CLEAR
AST SERPL-CCNC: 19 U/L (ref 1–32)
BACTERIA #/AREA URNS HPF: ABNORMAL /HPF
BASOPHILS # BLD AUTO: 0.02 10*3/MM3 (ref 0–0.2)
BASOPHILS NFR BLD AUTO: 0.3 % (ref 0–1.5)
BILIRUB SERPL-MCNC: 0.4 MG/DL (ref 0.1–1.2)
BILIRUB UR QL STRIP: NEGATIVE
BUN SERPL-MCNC: 9 MG/DL (ref 6–20)
BUN/CREAT SERPL: 11.3 (ref 7–25)
CALCIUM SERPL-MCNC: 9.3 MG/DL (ref 8.6–10.5)
CASTS URNS MICRO: ABNORMAL
CHLORIDE SERPL-SCNC: 103 MMOL/L (ref 98–107)
CHOLEST SERPL-MCNC: 145 MG/DL (ref 0–200)
CO2 SERPL-SCNC: 24.5 MMOL/L (ref 22–29)
COLOR UR: YELLOW
CREAT SERPL-MCNC: 0.8 MG/DL (ref 0.57–1)
EOSINOPHIL # BLD AUTO: 0.2 10*3/MM3 (ref 0–0.7)
EOSINOPHIL NFR BLD AUTO: 2.8 % (ref 0.3–6.2)
EPI CELLS #/AREA URNS HPF: ABNORMAL /HPF
ERYTHROCYTE [DISTWIDTH] IN BLOOD BY AUTOMATED COUNT: 14 % (ref 11.7–13)
GLOBULIN SER CALC-MCNC: 3.1 GM/DL
GLUCOSE SERPL-MCNC: 109 MG/DL (ref 65–99)
GLUCOSE UR QL: NEGATIVE
HCT VFR BLD AUTO: 39.3 % (ref 35.6–45.5)
HDLC SERPL-MCNC: 48 MG/DL (ref 40–60)
HGB BLD-MCNC: 12.4 G/DL (ref 11.9–15.5)
HGB UR QL STRIP: NEGATIVE
IMM GRANULOCYTES # BLD: 0 10*3/MM3 (ref 0–0.03)
IMM GRANULOCYTES NFR BLD: 0 % (ref 0–0.5)
KETONES UR QL STRIP: (no result)
LDLC SERPL CALC-MCNC: 89 MG/DL (ref 0–100)
LDLC/HDLC SERPL: 1.86 {RATIO}
LEUKOCYTE ESTERASE UR QL STRIP: NEGATIVE
LYMPHOCYTES # BLD AUTO: 2.14 10*3/MM3 (ref 0.9–4.8)
LYMPHOCYTES NFR BLD AUTO: 29.5 % (ref 19.6–45.3)
MCH RBC QN AUTO: 26.1 PG (ref 26.9–32)
MCHC RBC AUTO-ENTMCNC: 31.6 G/DL (ref 32.4–36.3)
MCV RBC AUTO: 82.6 FL (ref 80.5–98.2)
MONOCYTES # BLD AUTO: 0.61 10*3/MM3 (ref 0.2–1.2)
MONOCYTES NFR BLD AUTO: 8.4 % (ref 5–12)
NEUTROPHILS # BLD AUTO: 4.29 10*3/MM3 (ref 1.9–8.1)
NEUTROPHILS NFR BLD AUTO: 59 % (ref 42.7–76)
NITRITE UR QL STRIP: NEGATIVE
PH UR STRIP: 7.5 [PH] (ref 5–8)
PLATELET # BLD AUTO: 260 10*3/MM3 (ref 140–500)
POTASSIUM SERPL-SCNC: 4.4 MMOL/L (ref 3.5–5.2)
PROT SERPL-MCNC: 7.4 G/DL (ref 6–8.5)
PROT UR QL STRIP: NEGATIVE
RBC # BLD AUTO: 4.76 10*6/MM3 (ref 3.9–5.2)
RBC #/AREA URNS HPF: ABNORMAL /HPF
SODIUM SERPL-SCNC: 140 MMOL/L (ref 136–145)
SP GR UR: 1.03 (ref 1–1.03)
T4 FREE SERPL-MCNC: 2.04 NG/DL (ref 0.93–1.7)
TRIGL SERPL-MCNC: 38 MG/DL (ref 0–150)
TSH SERPL DL<=0.005 MIU/L-ACNC: 0.1 MIU/ML (ref 0.27–4.2)
UROBILINOGEN UR STRIP-MCNC: (no result) MG/DL
VLDLC SERPL CALC-MCNC: 7.6 MG/DL (ref 5–40)
WBC # BLD AUTO: 7.26 10*3/MM3 (ref 4.5–10.7)
WBC #/AREA URNS HPF: ABNORMAL /HPF

## 2018-11-06 ENCOUNTER — OFFICE VISIT (OUTPATIENT)
Dept: GASTROENTEROLOGY | Facility: CLINIC | Age: 33
End: 2018-11-06

## 2018-11-06 VITALS
SYSTOLIC BLOOD PRESSURE: 128 MMHG | HEIGHT: 65 IN | WEIGHT: 189 LBS | TEMPERATURE: 99.4 F | DIASTOLIC BLOOD PRESSURE: 80 MMHG | BODY MASS INDEX: 31.49 KG/M2

## 2018-11-06 DIAGNOSIS — R14.0 BLOATING: Primary | ICD-10-CM

## 2018-11-06 PROCEDURE — 99213 OFFICE O/P EST LOW 20 MIN: CPT | Performed by: INTERNAL MEDICINE

## 2018-11-06 NOTE — PROGRESS NOTES
Chief Complaint   Patient presents with   • Bloated     Subjective     HPI     Sundeep Espino is a 33 y.o. female who presents today for f/u.  She recently underwent upper and lower endoscopy after being referred by her PCP for a positive ASCA antibody.  Both upper and lower endoscopy were essentially unremarkable as was the associated pathology.  Prior to her visit with me the patient had a CT scan of the abdomen and pelvis which was unremarkable and showed no evidence of intra-abdominal pathology.  Discussed with her today I had an extremely low suspicion that she has Crohn's disease.  I had previously thought about ordering a [facility] IBD panel but after discussion with the patient today even if this came back positive I would still not be convinced that she had Crohn's.  She is really not having much in the way of GI symptoms currently other than some occasional abdominal bloating.  She tends to think this is more related to her thyroid issues than anything.  She is regularly followed by endocrinology at [facility].  Her weight remains stable.    Past Medical History:   Diagnosis Date   • Allergic rhinitis    • Anemia    • Asthma    • Crohn's disease (CMS/HCC)    • GERD (gastroesophageal reflux disease)    • Hyperthyroidism    • Ovarian cyst    • Vitamin D deficiency        Social History     Social History   • Marital status: Single   • Number of children: 0   • Years of education: College     Occupational History   •  [employer]     Social History Main Topics   • Smoking status: Never Smoker   • Smokeless tobacco: Never Used   • Alcohol use Yes      Comment: Occasional   • Drug use: No   • Sexual activity: Yes     Partners: Male     Other Topics Concern   • Not on file         Current Outpatient Prescriptions:   •  cetirizine (zyrTEC) 10 MG tablet, Take 10 mg by mouth Daily., Disp: , Rfl:   •  levocetirizine (XYZAL) 5 MG tablet, TAKE 1 TABLET BY MOUTH AT BEDTIME,  Disp: , Rfl: 3  •  montelukast (SINGULAIR) 10 MG tablet, TAKE 1 TABLET BY MOUTH AT BEDTIME, Disp: , Rfl: 3  •  Multiple Vitamins-Minerals (MULTIVITAMIN ADULT PO), Take  by mouth., Disp: , Rfl:   •  phentermine 30 MG capsule, Take 1 capsule by mouth Every Morning., Disp: 30 capsule, Rfl: 0  •  PROAIR RESPICLICK 108 (90 Base) MCG/ACT inhaler, Inhale 2 puffs As Needed., Disp: , Rfl: 3  •  raNITIdine (ZANTAC) 150 MG tablet, Take 1 tablet by mouth Every Night., Disp: 90 tablet, Rfl: 3  •  SYMBICORT 80-4.5 MCG/ACT inhaler, Inhale 2 puffs 2 (Two) Times a Day., Disp: , Rfl: 11  •  SYNTHROID 137 MCG tablet, TAKE 1 TABLET BY MOUTH DAILY, Disp: , Rfl: 2  •  Tranexamic Acid 650 MG tablet, TAKE 2 TABLET THREE TIMES DAILY FOR UP TO 5 DAYS OF CYCLE, Disp: , Rfl: 11  •  traZODone (DESYREL) 50 MG tablet, TAKE 1 TABLET BY MOUTH EVERY NIGHT., Disp: 90 tablet, Rfl: 1  •  ergocalciferol (DRISDOL) 76207 UNITS capsule, Take 1 cap 3 times weekly, Disp: 36 capsule, Rfl: 1    Review of Systems   Constitutional: Negative.    Gastrointestinal:        Bloating       Objective   Vitals:    11/06/18 1344   BP: 128/80   Temp: 99.4 °F (37.4 °C)       Physical Exam   Constitutional: She is oriented to person, place, and time. She appears well-developed and well-nourished.   HENT:   Head: Normocephalic and atraumatic.   Abdominal: Soft. Bowel sounds are normal. She exhibits no distension and no mass. There is no tenderness. No hernia.   Neurological: She is alert and oriented to person, place, and time.   Skin: Skin is warm and dry.   Psychiatric: She has a normal mood and affect. Her behavior is normal. Judgment and thought content normal.   Vitals reviewed.      Assessment/Plan   Assessment:     1. Bloating      Plan:   Overall patient seems to be doing well from a GI standpoint.  We reviewed her recent EGD and colonoscopy which showed no echo or macroscopic evidence of anything suggestive of Crohn's disease or inflammatory bowel disease.  At this  point I would defer any further GI workup.  She is in agreement with this.  We'll continue with watchful waiting and she'll follow up with me in 3 months or sooner if necessary.        Alexander Marroquin M.D.  [facility]  [address] [place] [address]  Office: [phone]

## 2018-11-18 PROBLEM — E66.09 EXOGENOUS OBESITY: Status: ACTIVE | Noted: 2018-11-18

## 2018-11-18 NOTE — PROGRESS NOTES
Subjective   Sundeep Espino is a 33 y.o. female.   She is here today for asthma allergic rhinitis Graves' disease iron deficiency anemia menstrual disorder disorder hypovitaminosis D and exogenous obesity  History of Present Illness   She is here today for asthma stable on current inhalers along with allergic rhinitis which stable on current medication Graves' disease which stable on current medication I deficiency anemia which has been stable on supplementation menstrual disorder which is stable and hypovitaminosis D which has been stable supplementation and exogenous obesity which is getting worse not better  The following portions of the patient's history were reviewed and updated as appropriate: allergies, current medications, past family history, past medical history, past social history, past surgical history and problem list.    Review of Systems   Constitutional: Positive for unexpected weight change.   All other systems reviewed and are negative.      Objective   Physical Exam   Constitutional: She is oriented to person, place, and time. She appears well-developed and well-nourished. She is cooperative.   HENT:   Head: Normocephalic and atraumatic.   Right Ear: Hearing, tympanic membrane, external ear and ear canal normal.   Left Ear: Hearing, tympanic membrane, external ear and ear canal normal.   Nose: Nose normal.   Mouth/Throat: Uvula is midline, oropharynx is clear and moist and mucous membranes are normal.   Eyes: Conjunctivae, EOM and lids are normal. Pupils are equal, round, and reactive to light.   Neck: Phonation normal. Neck supple. Carotid bruit is not present.   Cardiovascular: Normal rate, regular rhythm and normal heart sounds. Exam reveals no gallop and no friction rub.   No murmur heard.  Pulmonary/Chest: Effort normal and breath sounds normal. No respiratory distress.   Abdominal: Soft. Bowel sounds are normal. She exhibits no distension and no mass. There is no hepatosplenomegaly. There  is no tenderness. There is no rebound and no guarding. No hernia.   Musculoskeletal: She exhibits no edema.   Neurological: She is alert and oriented to person, place, and time. Coordination and gait normal.   Skin: Skin is warm and dry.   Psychiatric: She has a normal mood and affect. Her speech is normal and behavior is normal. Judgment and thought content normal.   Nursing note and vitals reviewed.      Assessment/Plan   Diagnoses and all orders for this visit:    Mild intermittent asthma without complication  -     Lipid Panel With LDL / HDL Ratio  -     CBC & Differential  -     Comprehensive Metabolic Panel  -     T4, Free  -     TSH  -     Urinalysis With Microscopic - Urine, Clean Catch    Non-seasonal allergic rhinitis due to pollen    Graves disease    Iron deficiency anemia due to chronic blood loss    Menstrual disorder  -     Lipid Panel With LDL / HDL Ratio  -     CBC & Differential  -     Comprehensive Metabolic Panel  -     T4, Free  -     TSH  -     Urinalysis With Microscopic - Urine, Clean Catch    Hypovitaminosis D    Exogenous obesity    Other orders  -     Flucelvax Quad=>4Years (PFS)  -     phentermine 30 MG capsule; Take 1 capsule by mouth Every Morning.  -     Microscopic Examination      Asthma stable on current inhalers no changes  Allergic rhinitis stable on current medication  Graves' disease stable and followed by endocrinology  Iron deficiency anemia stable on current medications  Menstrual disorder stable on current medication we will check labs  Hypovitaminosis D has been stable on supplementation  Exogenous obesity getting worse not better but we will still try phentermine one more time

## 2018-11-27 ENCOUNTER — TELEPHONE (OUTPATIENT)
Dept: INTERNAL MEDICINE | Facility: CLINIC | Age: 33
End: 2018-11-27

## 2018-11-27 DIAGNOSIS — N92.6 MENSTRUAL DISORDER: Primary | ICD-10-CM

## 2018-11-27 NOTE — TELEPHONE ENCOUNTER
----- Message from Paras Hi MD sent at 11/18/2018  5:09 PM EST -----  Please call the patient regarding her abnormal result.  Notify patient thyroid numbers are off and have her come back in to check free T4 and TSH

## 2018-11-29 LAB
T4 FREE SERPL-MCNC: 2.15 NG/DL (ref 0.93–1.7)
TSH SERPL DL<=0.005 MIU/L-ACNC: 0.01 MIU/ML (ref 0.27–4.2)

## 2018-11-30 ENCOUNTER — OFFICE VISIT (OUTPATIENT)
Dept: INTERNAL MEDICINE | Facility: CLINIC | Age: 33
End: 2018-11-30

## 2018-11-30 VITALS
RESPIRATION RATE: 16 BRPM | HEIGHT: 65 IN | SYSTOLIC BLOOD PRESSURE: 109 MMHG | DIASTOLIC BLOOD PRESSURE: 74 MMHG | TEMPERATURE: 98.4 F | BODY MASS INDEX: 30.49 KG/M2 | HEART RATE: 91 BPM | OXYGEN SATURATION: 96 % | WEIGHT: 183 LBS

## 2018-11-30 DIAGNOSIS — E66.09 EXOGENOUS OBESITY: ICD-10-CM

## 2018-11-30 DIAGNOSIS — K21.9 GASTROESOPHAGEAL REFLUX DISEASE WITHOUT ESOPHAGITIS: Primary | ICD-10-CM

## 2018-11-30 PROCEDURE — 99212 OFFICE O/P EST SF 10 MIN: CPT | Performed by: INTERNAL MEDICINE

## 2018-11-30 RX ORDER — PHENTERMINE HYDROCHLORIDE 30 MG/1
30 CAPSULE ORAL EVERY MORNING
Qty: 30 CAPSULE | Refills: 0 | Status: SHIPPED | OUTPATIENT
Start: 2018-11-30 | End: 2018-12-28

## 2018-11-30 RX ORDER — LEVOTHYROXINE SODIUM 112 UG/1
112 TABLET ORAL DAILY
Qty: 90 TABLET | Refills: 3 | Status: SHIPPED | OUTPATIENT
Start: 2018-11-30 | End: 2018-12-04 | Stop reason: SDUPTHER

## 2018-12-04 RX ORDER — LEVOTHYROXINE SODIUM 112 MCG
112 TABLET ORAL DAILY
Qty: 90 TABLET | Refills: 2 | Status: SHIPPED | OUTPATIENT
Start: 2018-12-04 | End: 2019-02-20

## 2018-12-07 ENCOUNTER — TELEPHONE (OUTPATIENT)
Dept: INTERNAL MEDICINE | Facility: CLINIC | Age: 33
End: 2018-12-07

## 2018-12-07 DIAGNOSIS — R79.89 ELEVATED TSH: Primary | ICD-10-CM

## 2018-12-07 NOTE — TELEPHONE ENCOUNTER
----- Message from Paras Hi MD sent at 12/4/2018  1:26 PM EST -----  Please call the patient regarding her abnormal result.  Notify patient if she has no history of thyroid cancer which I don't think she does she is too high on Synthroid and drop her down to Synthroid 50 µg daily and we will recheck TSH and free T4 in 1 month

## 2018-12-16 NOTE — PROGRESS NOTES
Subjective   Sundeep Espino is a 33 y.o. female.   She is here today for GERD and obesity  History of Present Illness   She is here today for GERD which is getting better with weight loss and obesity which is getting less as well on phentermine  The following portions of the patient's history were reviewed and updated as appropriate: allergies, current medications, past family history, past medical history, past social history, past surgical history and problem list.    Review of Systems   All other systems reviewed and are negative.      Objective   Physical Exam   Constitutional: She is oriented to person, place, and time. She appears well-developed and well-nourished. She is cooperative.   HENT:   Head: Normocephalic and atraumatic.   Right Ear: Hearing, tympanic membrane, external ear and ear canal normal.   Left Ear: Hearing, tympanic membrane, external ear and ear canal normal.   Nose: Nose normal.   Mouth/Throat: Uvula is midline, oropharynx is clear and moist and mucous membranes are normal.   Eyes: Conjunctivae, EOM and lids are normal. Pupils are equal, round, and reactive to light.   Neck: Phonation normal. Neck supple. Carotid bruit is not present.   Cardiovascular: Normal rate, regular rhythm and normal heart sounds. Exam reveals no gallop and no friction rub.   No murmur heard.  Pulmonary/Chest: Effort normal and breath sounds normal. No respiratory distress.   Abdominal: Soft. Bowel sounds are normal. She exhibits no distension and no mass. There is no hepatosplenomegaly. There is no tenderness. There is no rebound and no guarding. No hernia.   Musculoskeletal: She exhibits no edema.   Neurological: She is alert and oriented to person, place, and time. Coordination and gait normal.   Skin: Skin is warm and dry.   Psychiatric: She has a normal mood and affect. Her speech is normal and behavior is normal. Judgment and thought content normal.   Nursing note and vitals reviewed.      Assessment/Plan    Diagnoses and all orders for this visit:    Gastroesophageal reflux disease without esophagitis    Exogenous obesity    Other orders  -     Discontinue: levothyroxine (SYNTHROID) 112 MCG tablet; Take 1 tablet by mouth Daily.  -     phentermine 30 MG capsule; Take 1 capsule by mouth Every Morning.      GERD getting better with weight loss we will continue current medications  Exogenous obesity getting better with weight loss from phentermine and we will continue

## 2018-12-28 ENCOUNTER — CLINICAL SUPPORT (OUTPATIENT)
Dept: INTERNAL MEDICINE | Facility: CLINIC | Age: 33
End: 2018-12-28

## 2018-12-28 VITALS
SYSTOLIC BLOOD PRESSURE: 117 MMHG | BODY MASS INDEX: 30.9 KG/M2 | WEIGHT: 181 LBS | DIASTOLIC BLOOD PRESSURE: 76 MMHG | HEART RATE: 89 BPM | HEIGHT: 64 IN

## 2018-12-28 DIAGNOSIS — E66.09 EXOGENOUS OBESITY: Primary | ICD-10-CM

## 2018-12-28 RX ORDER — PHENTERMINE HYDROCHLORIDE 37.5 MG/1
37.5 CAPSULE ORAL EVERY MORNING
Qty: 30 CAPSULE | Refills: 0 | Status: SHIPPED | OUTPATIENT
Start: 2018-12-28 | End: 2019-01-31 | Stop reason: SDUPTHER

## 2019-01-31 ENCOUNTER — CLINICAL SUPPORT (OUTPATIENT)
Dept: INTERNAL MEDICINE | Facility: CLINIC | Age: 34
End: 2019-01-31

## 2019-01-31 VITALS
HEART RATE: 85 BPM | WEIGHT: 177 LBS | HEIGHT: 65 IN | DIASTOLIC BLOOD PRESSURE: 80 MMHG | SYSTOLIC BLOOD PRESSURE: 116 MMHG | BODY MASS INDEX: 29.49 KG/M2

## 2019-01-31 DIAGNOSIS — E66.09 EXOGENOUS OBESITY: Primary | ICD-10-CM

## 2019-01-31 RX ORDER — PHENTERMINE HYDROCHLORIDE 37.5 MG/1
37.5 CAPSULE ORAL EVERY MORNING
Qty: 30 CAPSULE | Refills: 0 | Status: SHIPPED | OUTPATIENT
Start: 2019-01-31 | End: 2019-02-20 | Stop reason: SDUPTHER

## 2019-02-01 LAB
T4 FREE SERPL-MCNC: 1.05 NG/DL (ref 0.93–1.7)
TSH SERPL DL<=0.005 MIU/L-ACNC: 51.96 MIU/ML (ref 0.27–4.2)

## 2019-02-20 ENCOUNTER — OFFICE VISIT (OUTPATIENT)
Dept: INTERNAL MEDICINE | Facility: CLINIC | Age: 34
End: 2019-02-20

## 2019-02-20 VITALS
RESPIRATION RATE: 16 BRPM | HEIGHT: 64 IN | DIASTOLIC BLOOD PRESSURE: 77 MMHG | SYSTOLIC BLOOD PRESSURE: 113 MMHG | WEIGHT: 175 LBS | HEART RATE: 90 BPM | BODY MASS INDEX: 29.88 KG/M2 | OXYGEN SATURATION: 100 %

## 2019-02-20 DIAGNOSIS — E66.09 EXOGENOUS OBESITY: Primary | ICD-10-CM

## 2019-02-20 PROCEDURE — 99212 OFFICE O/P EST SF 10 MIN: CPT | Performed by: INTERNAL MEDICINE

## 2019-02-20 RX ORDER — LEVOTHYROXINE SODIUM 150 MCG
150 TABLET ORAL DAILY
Refills: 3 | COMMUNITY
Start: 2019-02-11 | End: 2020-01-13 | Stop reason: ALTCHOICE

## 2019-02-20 RX ORDER — PHENTERMINE HYDROCHLORIDE 37.5 MG/1
37.5 CAPSULE ORAL EVERY MORNING
Qty: 30 CAPSULE | Refills: 0 | Status: SHIPPED | OUTPATIENT
Start: 2019-02-20 | End: 2019-03-20 | Stop reason: SDUPTHER

## 2019-03-07 NOTE — PROGRESS NOTES
Subjective   Sundeep Espino is a 33 y.o. female.   She is here to follow-up for exogenous obesity and she is gradually losing weight  History of Present Illness   She is here today for follow-up for exogenous obesity which is improving on phentermine we will continue  The following portions of the patient's history were reviewed and updated as appropriate: allergies, current medications, past family history, past medical history, past social history, past surgical history and problem list.    Review of Systems   Cardiovascular: Negative for chest pain and palpitations.   All other systems reviewed and are negative.      Objective   Physical Exam   Constitutional: She appears well-developed and well-nourished.   HENT:   Head: Normocephalic and atraumatic.   Pulmonary/Chest: Effort normal.   Psychiatric: She has a normal mood and affect. Her behavior is normal. Judgment and thought content normal.       Assessment/Plan   Diagnoses and all orders for this visit:    Exogenous obesity    Other orders  -     phentermine 37.5 MG capsule; Take 1 capsule by mouth Every Morning.        Exogenous obesity gradually getting better we will continue with phentermine for now

## 2019-03-20 ENCOUNTER — CLINICAL SUPPORT (OUTPATIENT)
Dept: INTERNAL MEDICINE | Facility: CLINIC | Age: 34
End: 2019-03-20

## 2019-03-20 VITALS
HEIGHT: 64 IN | WEIGHT: 170 LBS | BODY MASS INDEX: 29.02 KG/M2 | OXYGEN SATURATION: 100 % | SYSTOLIC BLOOD PRESSURE: 120 MMHG | RESPIRATION RATE: 16 BRPM | HEART RATE: 96 BPM | DIASTOLIC BLOOD PRESSURE: 77 MMHG

## 2019-03-20 DIAGNOSIS — E66.09 EXOGENOUS OBESITY: ICD-10-CM

## 2019-03-20 DIAGNOSIS — E05.00 GRAVES DISEASE: Primary | ICD-10-CM

## 2019-03-20 RX ORDER — PHENTERMINE HYDROCHLORIDE 37.5 MG/1
37.5 CAPSULE ORAL EVERY MORNING
Qty: 30 CAPSULE | Refills: 0 | Status: SHIPPED | OUTPATIENT
Start: 2019-03-20 | End: 2019-04-18 | Stop reason: SDUPTHER

## 2019-03-21 LAB
T4 FREE SERPL-MCNC: 1.84 NG/DL (ref 0.93–1.7)
TSH SERPL DL<=0.005 MIU/L-ACNC: 0.17 MIU/ML (ref 0.27–4.2)

## 2019-03-27 ENCOUNTER — TELEPHONE (OUTPATIENT)
Dept: INTERNAL MEDICINE | Facility: CLINIC | Age: 34
End: 2019-03-27

## 2019-03-27 NOTE — TELEPHONE ENCOUNTER
----- Message from Paras Hi MD sent at 3/22/2019  3:38 PM EDT -----  Please call the patient regarding her abnormal result.  Notify patient she is overtreated now on her Synthroid 150 and drop her down to Synthroid 137

## 2019-04-18 ENCOUNTER — CLINICAL SUPPORT (OUTPATIENT)
Dept: INTERNAL MEDICINE | Facility: CLINIC | Age: 34
End: 2019-04-18

## 2019-04-18 VITALS
BODY MASS INDEX: 28.85 KG/M2 | RESPIRATION RATE: 16 BRPM | SYSTOLIC BLOOD PRESSURE: 103 MMHG | WEIGHT: 169 LBS | HEIGHT: 64 IN | OXYGEN SATURATION: 100 % | DIASTOLIC BLOOD PRESSURE: 66 MMHG | HEART RATE: 85 BPM

## 2019-04-18 DIAGNOSIS — K21.9 GASTROESOPHAGEAL REFLUX DISEASE WITHOUT ESOPHAGITIS: Primary | ICD-10-CM

## 2019-04-18 DIAGNOSIS — E66.09 EXOGENOUS OBESITY: ICD-10-CM

## 2019-04-18 RX ORDER — PHENTERMINE HYDROCHLORIDE 37.5 MG/1
37.5 CAPSULE ORAL EVERY MORNING
Qty: 30 CAPSULE | Refills: 0 | Status: SHIPPED | OUTPATIENT
Start: 2019-04-18 | End: 2019-10-22 | Stop reason: SDUPTHER

## 2019-05-28 ENCOUNTER — TELEPHONE (OUTPATIENT)
Dept: INTERNAL MEDICINE | Facility: CLINIC | Age: 34
End: 2019-05-28

## 2019-05-28 RX ORDER — OSELTAMIVIR PHOSPHATE 75 MG/1
75 CAPSULE ORAL 2 TIMES DAILY
Qty: 20 CAPSULE | Refills: 0 | Status: SHIPPED | OUTPATIENT
Start: 2019-05-28 | End: 2019-06-07

## 2019-10-22 ENCOUNTER — OFFICE VISIT (OUTPATIENT)
Dept: FAMILY MEDICINE CLINIC | Facility: CLINIC | Age: 34
End: 2019-10-22

## 2019-10-22 VITALS
SYSTOLIC BLOOD PRESSURE: 110 MMHG | WEIGHT: 180.8 LBS | HEART RATE: 82 BPM | OXYGEN SATURATION: 99 % | DIASTOLIC BLOOD PRESSURE: 72 MMHG | BODY MASS INDEX: 30.87 KG/M2 | RESPIRATION RATE: 16 BRPM | HEIGHT: 64 IN | TEMPERATURE: 98 F

## 2019-10-22 DIAGNOSIS — J45.20 MILD INTERMITTENT ASTHMA WITHOUT COMPLICATION: Primary | ICD-10-CM

## 2019-10-22 DIAGNOSIS — Z83.3 FAMILY HISTORY OF DIABETES MELLITUS IN MOTHER: ICD-10-CM

## 2019-10-22 DIAGNOSIS — E66.09 EXOGENOUS OBESITY: ICD-10-CM

## 2019-10-22 DIAGNOSIS — Z92.3 H/O RADIOACTIVE IODINE THYROID ABLATION: ICD-10-CM

## 2019-10-22 DIAGNOSIS — E89.0 POSTABLATIVE HYPOTHYROIDISM: ICD-10-CM

## 2019-10-22 DIAGNOSIS — Z23 IMMUNIZATION DUE: ICD-10-CM

## 2019-10-22 DIAGNOSIS — D50.0 IRON DEFICIENCY ANEMIA DUE TO CHRONIC BLOOD LOSS: ICD-10-CM

## 2019-10-22 DIAGNOSIS — G47.00 INSOMNIA, UNSPECIFIED TYPE: ICD-10-CM

## 2019-10-22 DIAGNOSIS — G44.329 CHRONIC POST-TRAUMATIC HEADACHE, NOT INTRACTABLE: ICD-10-CM

## 2019-10-22 DIAGNOSIS — R73.01 FASTING HYPERGLYCEMIA: ICD-10-CM

## 2019-10-22 DIAGNOSIS — E55.9 HYPOVITAMINOSIS D: ICD-10-CM

## 2019-10-22 DIAGNOSIS — J30.1 NON-SEASONAL ALLERGIC RHINITIS DUE TO POLLEN: ICD-10-CM

## 2019-10-22 DIAGNOSIS — Z00.00 HEALTH CARE MAINTENANCE: ICD-10-CM

## 2019-10-22 DIAGNOSIS — Z79.899 HIGH RISK MEDICATION USE: ICD-10-CM

## 2019-10-22 DIAGNOSIS — Z83.3 FAMILY HISTORY OF DIABETES MELLITUS IN FATHER: ICD-10-CM

## 2019-10-22 DIAGNOSIS — M54.17 LUMBOSACRAL RADICULOPATHY: ICD-10-CM

## 2019-10-22 PROCEDURE — 99203 OFFICE O/P NEW LOW 30 MIN: CPT | Performed by: FAMILY MEDICINE

## 2019-10-22 RX ORDER — PHENTERMINE HYDROCHLORIDE 37.5 MG/1
37.5 CAPSULE ORAL EVERY MORNING
Qty: 30 CAPSULE | Refills: 0 | Status: SHIPPED | OUTPATIENT
Start: 2019-10-22 | End: 2019-10-22 | Stop reason: SDUPTHER

## 2019-10-22 RX ORDER — LEVOTHYROXINE SODIUM 88 MCG
88 TABLET ORAL DAILY
Refills: 1 | COMMUNITY
Start: 2019-09-15 | End: 2020-01-13 | Stop reason: ALTCHOICE

## 2019-10-22 RX ORDER — MOMETASONE FUROATE 50 UG/1
2 SPRAY, METERED NASAL DAILY
Refills: 11 | COMMUNITY
Start: 2019-09-16 | End: 2022-09-16

## 2019-10-22 RX ORDER — PHENTERMINE HYDROCHLORIDE 37.5 MG/1
37.5 CAPSULE ORAL EVERY MORNING
Qty: 30 CAPSULE | Refills: 5 | Status: SHIPPED | OUTPATIENT
Start: 2019-10-22 | End: 2019-11-06 | Stop reason: SDUPTHER

## 2019-10-22 RX ORDER — TRAZODONE HYDROCHLORIDE 50 MG/1
50 TABLET ORAL NIGHTLY PRN
Qty: 90 TABLET | Refills: 1
Start: 2019-10-22 | End: 2022-09-16

## 2019-10-22 RX ORDER — LIOTHYRONINE SODIUM 5 UG/1
2.5 TABLET ORAL 2 TIMES DAILY
Refills: 4 | COMMUNITY
Start: 2019-09-09 | End: 2020-01-13

## 2019-10-22 NOTE — PROGRESS NOTES
HPI  Sundeep Espino is a 34 y.o. female who is here to establish a new primary care physician.  Multiple medical issues noted followed by multiple specialist.  This includes thyroid specialist at the [facility].  Apparently had radioactive iodine ablation because of Graves' disease.  Also followed by allergist because of chronic hives which seem to be under fair control with multiple medications.  Injections were attempted but not helpful.  Also seen by hematologist for iron deficiency and has required intravenous iron therapy in the past.  Reports needs routine lab work etc. because of her health insurance policy.  Patient does report regular menstruation heavy at times.  Again is on several thyroid replacement medications and medications reviewed.  This includes trazodone which she apparently takes only on an as-needed basis.  Also has been on appetite suppressant in the past but ran out several months ago and has had some weight gain.  All of this was discussed and again extensive complicated history reviewed most in epic and also on health history form patient completed.  This includes general surgery with improvement of her headaches and also episode of shingles apparently affecting the face.  Also of note both parents have diabetes and most recent lab work did show some elevated glucose though patient thinks she was probably not fasting.      Review of Systems   Constitutional: Positive for appetite change and unexpected weight change.   HENT: Positive for rhinorrhea, sinus pressure and sneezing.    Cardiovascular: Positive for palpitations and leg swelling.   Gastrointestinal: Positive for nausea.   Endocrine: Positive for polyuria.   Genitourinary: Positive for menstrual problem.   Musculoskeletal: Positive for arthralgias, back pain, joint swelling and myalgias.   Neurological: Positive for weakness, numbness and headaches.   All other systems reviewed and are negative.        Past Medical History:    Diagnosis Date   • Allergic rhinitis    • Anemia    • Arthritis    • Asthma    • Crohn's disease (CMS/HCC)    • GERD (gastroesophageal reflux disease)    • Heart palpitations    • Hyperthyroidism    • Menstrual problem    • Ovarian cyst    • Thyroid disease    • Vitamin D deficiency        Past Surgical History:   Procedure Laterality Date   • APPENDECTOMY  2012   • BREAST LUMPECTOMY      benign   • COLONOSCOPY  approx 2009    normal per patient   • COLONOSCOPY W/ POLYPECTOMY N/A 8/1/2018    Normal   • EAR TUBES     • ENDOSCOPY N/A 8/1/2018    [facility]   • MANDIBLE SURGERY  05/2016       Family History   Problem Relation Age of Onset   • Hypertension Mother    • Diabetes Mother    • Hypertension Father    • Hyperlipidemia Father    • Diabetes Father    • Heart disease Father    • Alcohol abuse Other    • Diabetes Other    • Hypertension Other    • Diabetes Maternal Aunt    • Hypertension Maternal Uncle    • Diabetes Maternal Uncle        Social History     Socioeconomic History   • Marital status: Single     Spouse name: Not on file   • Number of children: 0   • Years of education: College   • Highest education level: Not on file   Occupational History   • Occupation:      Employer: [employer]   Tobacco Use   • Smoking status: Never Smoker   • Smokeless tobacco: Never Used   Substance and Sexual Activity   • Alcohol use: Yes     Comment: Occasional   • Drug use: No   • Sexual activity: Yes     Partners: Male         Physical Exam   Constitutional: She is oriented to person, place, and time. She appears well-developed and well-nourished. No distress.   HENT:   Head: Normocephalic.   Nose: Nose normal.   Mouth/Throat: Oropharyngeal exudate present.       Eyes: Conjunctivae and EOM are normal. Pupils are equal, round, and reactive to light.   Neck: Normal range of motion. Neck supple. No thyromegaly present.   Cardiovascular: Normal rate, regular rhythm and normal heart sounds.    Pulmonary/Chest: Effort normal. No respiratory distress. She has no wheezes.   Abdominal: Soft. She exhibits no distension. There is no tenderness.   Musculoskeletal: Normal range of motion. She exhibits no edema or deformity.   Lymphadenopathy:     She has no cervical adenopathy.   Neurological: She is alert and oriented to person, place, and time. No cranial nerve deficit. She exhibits normal muscle tone. Coordination normal.   Skin: Skin is warm and dry.   Psychiatric: She has a normal mood and affect. Her behavior is normal. Judgment and thought content normal.   Nursing note and vitals reviewed.        Assessment/Plan    Sundeep was seen today for thyroid problem, anemia, arthritis, asthma, heartburn, knee pain and ankle pain.    Diagnoses and all orders for this visit:    Mild intermittent asthma without complication    Non-seasonal allergic rhinitis due to pollen    Exogenous obesity    Chronic post-traumatic headache, not intractable    Lumbosacral radiculopathy    High risk medication use  -     CBC & Differential  -     Comprehensive Metabolic Panel    Iron deficiency anemia due to chronic blood loss  -     Ferritin  -     Iron Profile    H/O radioactive iodine thyroid ablation  -     TSH+Free T4    Health care maintenance  -     Lipid Panel    Fasting hyperglycemia  -     Hemoglobin A1c    Family history of diabetes mellitus in father  -     Hemoglobin A1c    Family history of diabetes mellitus in mother  -     Hemoglobin A1c    Hypovitaminosis D  -     Vitamin D 1,25 Dihydroxy    Postablative hypothyroidism        Patient is here to establish a new primary care physician.  Previous physician left town.  Multiple complicated medical issues noted and addressed.  Led to a very long visit to organize all this information spending greater than 1 hour.  This includes reviewing old records as well as health history form.  General physical examination unremarkable and is followed by other specialists as discussed  above.  Specifically endocrinologist because of previous radioactive ablation of thyroid gland apparently for Graves' disease.  Multiple other issues noted including surgeries.  Needs routine lab work apparently for health insurance purposes as well as annual physical.  [facility].  Is on phentermine especially for right reduction and ran out several months ago and some weight gain noted.  All of this was also discussed in a new controlled substances contract completed and reviewed.  There is no evidence of abuse nor misuse of medication which will be continued with close monitoring.  In view of follow-up by other specialist will plan on routine follow-up in 6 months or as needed.  Again in view of extremely complicated patient spent greater than 1 hour organizing patient's records and discussing findings.    This note includes information entered using a voice recognition dictation system.  Though reviewed, some nonsensible errors may remain.

## 2019-10-24 LAB
1,25(OH)2D3 SERPL-MCNC: 69.4 PG/ML (ref 19.9–79.3)
ALBUMIN SERPL-MCNC: 4.3 G/DL (ref 3.5–5.2)
ALBUMIN/GLOB SERPL: 1.5 G/DL
ALP SERPL-CCNC: 101 U/L (ref 39–117)
ALT SERPL-CCNC: 12 U/L (ref 1–33)
AST SERPL-CCNC: 20 U/L (ref 1–32)
BASOPHILS # BLD AUTO: 0.06 10*3/MM3 (ref 0–0.2)
BASOPHILS NFR BLD AUTO: 1 % (ref 0–1.5)
BILIRUB SERPL-MCNC: 0.3 MG/DL (ref 0.2–1.2)
BUN SERPL-MCNC: 5 MG/DL (ref 6–20)
BUN/CREAT SERPL: 6.2 (ref 7–25)
CALCIUM SERPL-MCNC: 9.2 MG/DL (ref 8.6–10.5)
CHLORIDE SERPL-SCNC: 104 MMOL/L (ref 98–107)
CHOLEST SERPL-MCNC: 174 MG/DL (ref 0–200)
CO2 SERPL-SCNC: 24.1 MMOL/L (ref 22–29)
CREAT SERPL-MCNC: 0.81 MG/DL (ref 0.57–1)
EOSINOPHIL # BLD AUTO: 0.16 10*3/MM3 (ref 0–0.4)
EOSINOPHIL NFR BLD AUTO: 2.6 % (ref 0.3–6.2)
ERYTHROCYTE [DISTWIDTH] IN BLOOD BY AUTOMATED COUNT: 14.4 % (ref 12.3–15.4)
FERRITIN SERPL-MCNC: 41.8 NG/ML (ref 13–150)
GLOBULIN SER CALC-MCNC: 2.8 GM/DL
GLUCOSE SERPL-MCNC: 110 MG/DL (ref 65–99)
HBA1C MFR BLD: 5.6 % (ref 4.8–5.6)
HCT VFR BLD AUTO: 39.1 % (ref 34–46.6)
HDLC SERPL-MCNC: 62 MG/DL (ref 40–60)
HGB BLD-MCNC: 12.2 G/DL (ref 12–15.9)
IMM GRANULOCYTES # BLD AUTO: 0.02 10*3/MM3 (ref 0–0.05)
IMM GRANULOCYTES NFR BLD AUTO: 0.3 % (ref 0–0.5)
IRON SATN MFR SERPL: 12 % (ref 20–50)
IRON SERPL-MCNC: 44 MCG/DL (ref 37–145)
LDLC SERPL CALC-MCNC: 101 MG/DL (ref 0–100)
LYMPHOCYTES # BLD AUTO: 2.5 10*3/MM3 (ref 0.7–3.1)
LYMPHOCYTES NFR BLD AUTO: 40.7 % (ref 19.6–45.3)
MCH RBC QN AUTO: 26.4 PG (ref 26.6–33)
MCHC RBC AUTO-ENTMCNC: 31.2 G/DL (ref 31.5–35.7)
MCV RBC AUTO: 84.6 FL (ref 79–97)
MONOCYTES # BLD AUTO: 0.42 10*3/MM3 (ref 0.1–0.9)
MONOCYTES NFR BLD AUTO: 6.8 % (ref 5–12)
NEUTROPHILS # BLD AUTO: 2.98 10*3/MM3 (ref 1.7–7)
NEUTROPHILS NFR BLD AUTO: 48.6 % (ref 42.7–76)
NRBC BLD AUTO-RTO: 0 /100 WBC (ref 0–0.2)
PLATELET # BLD AUTO: 248 10*3/MM3 (ref 140–450)
POTASSIUM SERPL-SCNC: 4.7 MMOL/L (ref 3.5–5.2)
PROT SERPL-MCNC: 7.1 G/DL (ref 6–8.5)
RBC # BLD AUTO: 4.62 10*6/MM3 (ref 3.77–5.28)
SODIUM SERPL-SCNC: 142 MMOL/L (ref 136–145)
T4 FREE SERPL-MCNC: 0.47 NG/DL (ref 0.93–1.7)
TIBC SERPL-MCNC: 370 MCG/DL
TRIGL SERPL-MCNC: 53 MG/DL (ref 0–150)
TSH SERPL DL<=0.005 MIU/L-ACNC: 79.2 UIU/ML (ref 0.27–4.2)
UIBC SERPL-MCNC: 326 MCG/DL (ref 112–346)
VLDLC SERPL CALC-MCNC: 10.6 MG/DL
WBC # BLD AUTO: 6.14 10*3/MM3 (ref 3.4–10.8)

## 2019-10-29 DIAGNOSIS — E66.09 EXOGENOUS OBESITY: ICD-10-CM

## 2019-11-06 DIAGNOSIS — E66.09 EXOGENOUS OBESITY: ICD-10-CM

## 2019-11-06 RX ORDER — PHENTERMINE HYDROCHLORIDE 37.5 MG/1
37.5 CAPSULE ORAL EVERY MORNING
Qty: 30 CAPSULE | Refills: 5 | Status: SHIPPED | OUTPATIENT
Start: 2019-11-06 | End: 2020-12-28 | Stop reason: SDUPTHER

## 2020-01-06 ENCOUNTER — TELEMEDICINE (OUTPATIENT)
Dept: FAMILY MEDICINE CLINIC | Facility: TELEHEALTH | Age: 35
End: 2020-01-06

## 2020-01-06 DIAGNOSIS — J45.41 MODERATE PERSISTENT ASTHMA WITH EXACERBATION: ICD-10-CM

## 2020-01-06 DIAGNOSIS — J40 BRONCHITIS: Primary | ICD-10-CM

## 2020-01-06 PROCEDURE — VIDEOVISIT: Performed by: NURSE PRACTITIONER

## 2020-01-06 RX ORDER — BENZONATATE 200 MG/1
200 CAPSULE ORAL 3 TIMES DAILY PRN
Qty: 21 CAPSULE | Refills: 0 | Status: SHIPPED | OUTPATIENT
Start: 2020-01-06 | End: 2020-01-13

## 2020-01-06 RX ORDER — DOXYCYCLINE HYCLATE 100 MG/1
100 CAPSULE ORAL 2 TIMES DAILY
Qty: 20 CAPSULE | Refills: 0 | Status: SHIPPED | OUTPATIENT
Start: 2020-01-06 | End: 2020-04-13

## 2020-01-06 RX ORDER — METHYLPREDNISOLONE 4 MG/1
TABLET ORAL
Qty: 21 TABLET | Refills: 0 | Status: SHIPPED | OUTPATIENT
Start: 2020-01-06 | End: 2020-01-13

## 2020-01-06 NOTE — PROGRESS NOTES
CHIEF COMPLAINT  Chief Complaint   Patient presents with   • Cough       HPI  Sundeep Espino is a 34 y.o. female  presents with complaint of persistent cough with yellow mucous, headache, nasal congestion, both ears are itchy and feels drainage, hoarseness, had lost voice for a few days    No fever    Uzma-seltzer cold meds    Review of Systems   Constitutional: Positive for appetite change and fatigue. Negative for activity change, chills and fever.   HENT: Positive for congestion and postnasal drip. Negative for sinus pressure, sinus pain and sore throat.    Respiratory: Positive for cough, chest tightness and shortness of breath. Negative for wheezing.    Gastrointestinal: Negative for diarrhea, nausea and vomiting.   Neurological: Positive for headaches. Negative for dizziness.       Past Medical History:   Diagnosis Date   • Allergic rhinitis    • Anemia    • Arthritis    • Asthma    • Crohn's disease (CMS/HCC)    • GERD (gastroesophageal reflux disease)    • Heart palpitations    • Hyperthyroidism    • Menstrual problem    • Ovarian cyst    • Thyroid disease    • Vitamin D deficiency        Family History   Problem Relation Age of Onset   • Hypertension Mother    • Diabetes Mother    • Hypertension Father    • Hyperlipidemia Father    • Diabetes Father    • Heart disease Father    • Alcohol abuse Other    • Diabetes Other    • Hypertension Other    • Diabetes Maternal Aunt    • Hypertension Maternal Uncle    • Diabetes Maternal Uncle        Social History     Socioeconomic History   • Marital status: Single     Spouse name: Not on file   • Number of children: 0   • Years of education: College   • Highest education level: Not on file   Occupational History   • Occupation:      Employer: [employer] [place] [place]   Tobacco Use   • Smoking status: Never Smoker   • Smokeless tobacco: Never Used   Substance and Sexual Activity   • Alcohol use: Yes     Comment: Occasional   • Drug use: No   •  Sexual activity: Yes     Partners: Male         There were no vitals taken for this visit.    PHYSICAL EXAM  Physical Exam   Constitutional: She is oriented to person, place, and time. She appears well-developed and well-nourished. She is cooperative.   HENT:   Head: Normocephalic and atraumatic.   Right Ear: Hearing and external ear normal.   Left Ear: Hearing and external ear normal.   Nose: Right sinus exhibits no maxillary sinus tenderness and no frontal sinus tenderness. Left sinus exhibits no maxillary sinus tenderness and no frontal sinus tenderness.   Pulmonary/Chest: Effort normal.   Persistent dry cough noted throughout visit; no audible wheezing   Lymphadenopathy:     She has no cervical adenopathy.   Neurological: She is alert and oriented to person, place, and time.   Skin: Skin is warm, dry and intact.         Sundeep was seen today for cough.    Diagnoses and all orders for this visit:    Bronchitis  -     doxycycline (VIBRAMYCIN) 100 MG capsule; Take 1 capsule by mouth 2 (Two) Times a Day.  -      methylPREDNISolone (MEDROL, NUSRAT,) 4 MG tablet; Take as directed on package instructions.  -    benzonatate (TESSALON) 200 MG capsule; Take 1 capsule by mouth 3 (Three) Times a Day As Needed for Cough.    Moderate persistent asthma with exacerbation  -     methylPREDNISolone (MEDROL, NUSRAT,) 4 MG tablet; Take as directed on package instructions.        FOLLOW-UP  As discussed with Dr. Casey if no improvement or worsening of symptoms    Patient verbalizes understanding of medication dosage, comfort measures, instructions for treatment and follow-up.    DIPIKA German  01/06/2020  4:38 PM    This visit was performed via Telehealth.  This patient has been instructed to follow-up with their primary care provider if their symptoms worsen or the treatment provided does not resolve their illness.

## 2020-01-13 ENCOUNTER — OFFICE VISIT (OUTPATIENT)
Dept: FAMILY MEDICINE CLINIC | Facility: CLINIC | Age: 35
End: 2020-01-13

## 2020-01-13 VITALS
HEART RATE: 90 BPM | TEMPERATURE: 97.8 F | SYSTOLIC BLOOD PRESSURE: 110 MMHG | WEIGHT: 173 LBS | HEIGHT: 64 IN | OXYGEN SATURATION: 100 % | BODY MASS INDEX: 29.53 KG/M2 | DIASTOLIC BLOOD PRESSURE: 82 MMHG

## 2020-01-13 DIAGNOSIS — Z92.3 H/O RADIOACTIVE IODINE THYROID ABLATION: ICD-10-CM

## 2020-01-13 DIAGNOSIS — J30.1 NON-SEASONAL ALLERGIC RHINITIS DUE TO POLLEN: ICD-10-CM

## 2020-01-13 DIAGNOSIS — D50.0 IRON DEFICIENCY ANEMIA DUE TO CHRONIC BLOOD LOSS: ICD-10-CM

## 2020-01-13 DIAGNOSIS — E89.0 POSTABLATIVE HYPOTHYROIDISM: Primary | ICD-10-CM

## 2020-01-13 PROCEDURE — 99213 OFFICE O/P EST LOW 20 MIN: CPT | Performed by: FAMILY MEDICINE

## 2020-01-13 PROCEDURE — 90471 IMMUNIZATION ADMIN: CPT | Performed by: FAMILY MEDICINE

## 2020-01-13 PROCEDURE — 90670 PCV13 VACCINE IM: CPT | Performed by: FAMILY MEDICINE

## 2020-01-13 RX ORDER — LEVOTHYROXINE SODIUM 112 UG/1
112 TABLET ORAL DAILY
Qty: 90 TABLET | Refills: 3
Start: 2020-01-13 | End: 2021-03-01

## 2020-01-13 NOTE — PROGRESS NOTES
HPI  Sundeep Espino is a 34 y.o. female who is here for follow up of post ablative hypothyroidism.  Requesting routine lab for thyroid and also follow-up of iron deficiency.  Reports immune deficiency and is requesting immunization updates.  Specifically is due for Tdap and also Prevnar.  However as I recall these are not supposed to be given together since both are conjugated.  Especially concerned about pneumonia because of immune compromise.  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      Review of Systems   Constitutional: Positive for fatigue. Negative for chills and fever.   HENT: Positive for congestion, ear discharge, postnasal drip, rhinorrhea, sneezing and voice change. Negative for ear pain and sore throat.    Eyes: Positive for itching.   Respiratory: Positive for cough, shortness of breath and wheezing.    Cardiovascular: Negative for chest pain.   Musculoskeletal: Negative for myalgias.   Skin: Negative for rash.   Allergic/Immunologic: Positive for environmental allergies and immunocompromised state.   Neurological: Negative for headaches.         Past Medical History:   Diagnosis Date   • Allergic rhinitis    • Anemia    • Arthritis    • Asthma    • Crohn's disease (CMS/HCC)    • GERD (gastroesophageal reflux disease)    • Heart palpitations    • Hyperthyroidism    • Menstrual problem    • Ovarian cyst    • Thyroid disease    • Vitamin D deficiency        Past Surgical History:   Procedure Laterality Date   • APPENDECTOMY  2012   • BREAST LUMPECTOMY      benign   • COLONOSCOPY  approx 2009    normal per patient   • COLONOSCOPY W/ POLYPECTOMY N/A 8/1/2018    Normal   • EAR TUBES     • ENDOSCOPY N/A 8/1/2018    [facility]   • MANDIBLE SURGERY  05/2016       Family History   Problem Relation Age of Onset   • Hypertension Mother    • Diabetes Mother    • Hypertension Father    • Hyperlipidemia Father    • Diabetes Father    • Heart disease Father    • Alcohol abuse Other     • Diabetes Other    • Hypertension Other    • Diabetes Maternal Aunt    • Hypertension Maternal Uncle    • Diabetes Maternal Uncle        Social History     Socioeconomic History   • Marital status: Single     Spouse name: Not on file   • Number of children: 0   • Years of education: College   • Highest education level: Not on file   Occupational History   • Occupation:      Employer: [email or web address]   Tobacco Use   • Smoking status: Never Smoker   • Smokeless tobacco: Never Used   Substance and Sexual Activity   • Alcohol use: Yes     Comment: Occasional   • Drug use: No   • Sexual activity: Yes     Partners: Male         Physical Exam   Constitutional: She is oriented to person, place, and time. She appears well-developed and well-nourished. No distress.   HENT:   Head: Normocephalic.   Nose: Nose normal.   Mouth/Throat: Oropharynx is clear and moist. No oropharyngeal exudate.   Eyes: Pupils are equal, round, and reactive to light.   Neck: Normal range of motion.   Cardiovascular: Normal rate.   Pulmonary/Chest: Effort normal. No respiratory distress. She has no wheezes.   Musculoskeletal: Normal range of motion.   Neurological: She is alert and oriented to person, place, and time.   Skin: Skin is warm and dry.   Psychiatric: She has a normal mood and affect. Her behavior is normal. Judgment and thought content normal.   Nursing note and vitals reviewed.        Assessment/Plan    Diagnoses and all orders for this visit:    Postablative hypothyroidism  -     TSH+Free T4  -     levothyroxine (SYNTHROID) 112 MCG tablet; Take 1 tablet by mouth Daily.    Iron deficiency anemia due to chronic blood loss  -     CBC & Differential  -     Ferritin  -     Pneumococcal Conjugate Vaccine 13-Valent All (PCV13)    H/O radioactive iodine thyroid ablation    Non-seasonal allergic rhinitis due to pollen        Patient with multiple ongoing medical issues some of which are noted above.  Overall seems  to be doing very well on current regimen which will be continued including follow-up in 3 months.  Will get routine lab and send copy of thyroid function to endocrinologist.  Currently reports being on 112 mcg of levothyroxine daily.  Also discussed vaccination updates as noted above.    This note includes information entered using a voice recognition dictation system.  Though reviewed, some nonsensible errors may remain.      Answers for HPI/ROS submitted by the patient on 1/13/2020   Cough  Chronicity: new  Onset: 1 to 4 weeks ago  Progression since onset: gradually improving  Frequency: every few minutes  Cough characteristics: non-productive  ear congestion: No  heartburn: No  hemoptysis: No  nasal congestion: Yes  sweats: No  weight loss: No  Aggravated by: cold air, dust, fumes, lying down, pollens  Risk factors for lung disease: smoking/tobacco exposure

## 2020-01-14 LAB
BASOPHILS # BLD AUTO: 0 X10E3/UL (ref 0–0.2)
BASOPHILS NFR BLD AUTO: 0 %
EOSINOPHIL # BLD AUTO: 0.1 X10E3/UL (ref 0–0.4)
EOSINOPHIL NFR BLD AUTO: 1 %
ERYTHROCYTE [DISTWIDTH] IN BLOOD BY AUTOMATED COUNT: 13.7 % (ref 11.7–15.4)
FERRITIN SERPL-MCNC: 34 NG/ML (ref 15–150)
HCT VFR BLD AUTO: 38.1 % (ref 34–46.6)
HGB BLD-MCNC: 12 G/DL (ref 11.1–15.9)
IMM GRANULOCYTES # BLD AUTO: 0 X10E3/UL (ref 0–0.1)
IMM GRANULOCYTES NFR BLD AUTO: 0 %
LYMPHOCYTES # BLD AUTO: 2.2 X10E3/UL (ref 0.7–3.1)
LYMPHOCYTES NFR BLD AUTO: 32 %
MCH RBC QN AUTO: 26.5 PG (ref 26.6–33)
MCHC RBC AUTO-ENTMCNC: 31.5 G/DL (ref 31.5–35.7)
MCV RBC AUTO: 84 FL (ref 79–97)
MONOCYTES # BLD AUTO: 0.6 X10E3/UL (ref 0.1–0.9)
MONOCYTES NFR BLD AUTO: 8 %
NEUTROPHILS # BLD AUTO: 3.9 X10E3/UL (ref 1.4–7)
NEUTROPHILS NFR BLD AUTO: 59 %
PLATELET # BLD AUTO: 312 X10E3/UL (ref 150–450)
RBC # BLD AUTO: 4.53 X10E6/UL (ref 3.77–5.28)
T4 FREE SERPL-MCNC: 1.43 NG/DL (ref 0.82–1.77)
TSH SERPL DL<=0.005 MIU/L-ACNC: 2.79 UIU/ML (ref 0.45–4.5)
WBC # BLD AUTO: 6.8 X10E3/UL (ref 3.4–10.8)

## 2020-01-20 NOTE — PATIENT INSTRUCTIONS
Asthma, Adult    Asthma is a long-term (chronic) condition that causes recurrent episodes in which the airways become tight and narrow. The airways are the passages that lead from the nose and mouth down into the lungs. Asthma episodes, also called asthma attacks, can cause coughing, wheezing, shortness of breath, and chest pain. The airways can also fill with mucus. During an attack, it can be difficult to breathe. Asthma attacks can range from minor to life threatening.  Asthma cannot be cured, but medicines and lifestyle changes can help control it and treat acute attacks.  What are the causes?  This condition is believed to be caused by inherited (genetic) and environmental factors, but its exact cause is not known.  There are many things that can bring on an asthma attack or make asthma symptoms worse (triggers). Asthma triggers are different for each person. Common triggers include:  · Mold.  · Dust.  · Cigarette smoke.  · Cockroaches.  · Things that can cause allergy symptoms (allergens), such as animal dander or pollen from trees or grass.  · Air pollutants such as household , wood smoke, smog, or chemical odors.  · Cold air, weather changes, and winds (which increase molds and pollen in the air).  · Strong emotional expressions such as crying or laughing hard.  · Stress.  · Certain medicines (such as aspirin) or types of medicines (such as beta-blockers).  · Sulfites in foods and drinks. Foods and drinks that may contain sulfites include dried fruit, potato chips, and sparkling grape juice.  · Infections or inflammatory conditions such as the flu, a cold, or inflammation of the nasal membranes (rhinitis).  · Gastroesophageal reflux disease (GERD).  · Exercise or strenuous activity.  What are the signs or symptoms?  Symptoms of this condition may occur right after asthma is triggered or many hours later. Symptoms include:  · Wheezing. This can sound like whistling when you breathe.  · Excessive  nighttime or early morning coughing.  · Frequent or severe coughing with a common cold.  · Chest tightness.  · Shortness of breath.  · Tiredness (fatigue) with minimal activity.  How is this diagnosed?  This condition is diagnosed based on:  · Your medical history.  · A physical exam.  · Tests, which may include:  ? Lung function studies and pulmonary studies (spirometry). These tests can evaluate the flow of air in your lungs.  ? Allergy tests.  ? Imaging tests, such as X-rays.  How is this treated?  There is no cure for this condition, but treatment can help control your symptoms. Treatment for asthma usually involves:  · Identifying and avoiding your asthma triggers.  · Using medicines to control your symptoms. Generally, two types of medicines are used to treat asthma:  ? Controller medicines. These help prevent asthma symptoms from occurring. They are usually taken every day.  ? Fast-acting reliever or rescue medicines. These quickly relieve asthma symptoms by widening the narrow and tight airways. They are used as needed and provide short-term relief.  · Using supplemental oxygen. This may be needed during a severe episode.  · Using other medicines, such as:  ? Allergy medicines, such as antihistamines, if your asthma attacks are triggered by allergens.  ? Immune medicines (immunomodulators). These are medicines that help control the immune system.  · Creating an asthma action plan. An asthma action plan is a written plan for managing and treating your asthma attacks. This plan includes:  ? A list of your asthma triggers and how to avoid them.  ? Information about when medicines should be taken and when their dosage should be changed.  ? Instructions about using a device called a peak flow meter. A peak flow meter measures how well the lungs are working and the severity of your asthma. It helps you monitor your condition.  Follow these instructions at home:  Controlling your home environment  Control your home  environment in the following ways to help avoid triggers and prevent asthma attacks:  · Change your heating and air conditioning filter regularly.  · Limit your use of fireplaces and wood stoves.  · Get rid of pests (such as roaches and mice) and their droppings.  · Throw away plants if you see mold on them.  · Clean floors and dust surfaces regularly. Use unscented cleaning products.  · Try to have someone else vacuum for you regularly. Stay out of rooms while they are being vacuumed and for a short while afterward. If you vacuum, use a dust mask from a hardware store, a double-layered or microfilter vacuum  bag, or a vacuum  with a HEPA filter.  · Replace carpet with wood, tile, or vinyl ajit. Carpet can trap dander and dust.  · Use allergy-proof pillows, mattress covers, and box spring covers.  · Keep your bedroom a trigger-free room.  · Avoid pets and keep windows closed when allergens are in the air.  · Wash beddings every week in hot water and dry them in a dryer.  · Use blankets that are made of polyester or cotton.  · Clean bathrooms and khoa with bleach. If possible, have someone repaint the walls in these rooms with mold-resistant paint. Stay out of the rooms that are being cleaned and painted.  · Wash your hands often with soap and water. If soap and water are not available, use hand .  · Do not allow anyone to smoke in your home.  General instructions  · Take over-the-counter and prescription medicines only as told by your health care provider.  ? Speak with your health care provider if you have questions about how or when to take the medicines.  ? Make note if you are requiring more frequent dosages.  · Do not use any products that contain nicotine or tobacco, such as cigarettes and e-cigarettes. If you need help quitting, ask your health care provider. Also, avoid being exposed to secondhand smoke.  · Use a peak flow meter as told by your health care provider. Record and  keep track of the readings.  · Understand and use the asthma action plan to help minimize, or stop an asthma attack, without needing to seek medical care.  · Make sure you stay up to date on your yearly vaccinations as told by your health care provider. This may include vaccines for the flu and pneumonia.  · Avoid outdoor activities when allergen counts are high and when air quality is low.  · Wear a ski mask that covers your nose and mouth during outdoor winter activities. Exercise indoors on cold days if you can.  · Warm up before exercising, and take time for a cool-down period after exercise.  · Keep all follow-up visits as told by your health care provider. This is important.  Where to find more information  · For information about asthma, turn to the Centers for Disease Control and Prevention at www.cdc.gov/asthma/faqs.htm  · For air quality information, turn to AirNow at https://airnow.gov/  Contact a health care provider if:  · You have wheezing, shortness of breath, or a cough even while you are taking medicine to prevent attacks.  · The mucus you cough up (sputum) is thicker than usual.  · Your sputum changes from clear or white to yellow, green, gray, or bloody.  · Your medicines are causing side effects, such as a rash, itching, swelling, or trouble breathing.  · You need to use a reliever medicine more than 2-3 times a week.  · Your peak flow reading is still at 50-79% of your personal best after following your action plan for 1 hour.  · You have a fever.  Get help right away if:  · You are getting worse and do not respond to treatment during an asthma attack.  · You are short of breath when at rest or when doing very little physical activity.  · You have difficulty eating, drinking, or talking.  · You have chest pain or tightness.  · You develop a fast heartbeat or palpitations.  · You have a bluish color to your lips or fingernails.  · You are light-headed or dizzy, or you faint.  · Your peak flow  reading is less than 50% of your personal best.  · You feel too tired to breathe normally.  Summary  · Asthma is a long-term (chronic) condition that causes recurrent episodes in which the airways become tight and narrow. These episodes can cause coughing, wheezing, shortness of breath, and chest pain.  · Asthma cannot be cured, but medicines and lifestyle changes can help control it and treat acute attacks.  · Make sure you understand how to avoid triggers and how and when to use your medicines.  · Asthma attacks can range from minor to life threatening. Get help right away if you have an asthma attack and do not respond to treatment with your usual rescue medicines.  This information is not intended to replace advice given to you by your health care provider. Make sure you discuss any questions you have with your health care provider.  Document Released: 12/18/2006 Document Revised: 01/22/2018 Document Reviewed: 01/22/2018  [employer] Interactive Patient Education © 2019 [employer]    Cough, Adult    Coughing is a reflex that clears your throat and your airways. Coughing helps to heal and protect your lungs. It is normal to cough occasionally, but a cough that happens with other symptoms or lasts a long time may be a sign of a condition that needs treatment. A cough may last only 2-3 weeks (acute), or it may last longer than 8 weeks (chronic).  What are the causes?  Coughing is commonly caused by:  · Breathing in substances that irritate your lungs.  · A viral or bacterial respiratory infection.  · Allergies.  · Asthma.  · Postnasal drip.  · Smoking.  · Acid backing up from the stomach into the esophagus (gastroesophageal reflux).  · Certain medicines.  · Chronic lung problems, including COPD (or rarely, lung cancer).  · Other medical conditions such as heart failure.  Follow these instructions at home:  Pay attention to any changes in your symptoms. Take these actions to help with your discomfort:  · Take  medicines only as told by your health care provider.  ? If you were prescribed an antibiotic medicine, take it as told by your health care provider. Do not stop taking the antibiotic even if you start to feel better.  ? Talk with your health care provider before you take a cough suppressant medicine.  · Drink enough fluid to keep your urine clear or pale yellow.  · If the air is dry, use a cold steam vaporizer or humidifier in your bedroom or your home to help loosen secretions.  · Avoid anything that causes you to cough at work or at home.  · If your cough is worse at night, try sleeping in a semi-upright position.  · Avoid cigarette smoke. If you smoke, quit smoking. If you need help quitting, ask your health care provider.  · Avoid caffeine.  · Avoid alcohol.  · Rest as needed.  Contact a health care provider if:  · You have new symptoms.  · You cough up pus.  · Your cough does not get better after 2-3 weeks, or your cough gets worse.  · You cannot control your cough with suppressant medicines and you are losing sleep.  · You develop pain that is getting worse or pain that is not controlled with pain medicines.  · You have a fever.  · You have unexplained weight loss.  · You have night sweats.  Get help right away if:  · You cough up blood.  · You have difficulty breathing.  · Your heartbeat is very fast.  This information is not intended to replace advice given to you by your health care provider. Make sure you discuss any questions you have with your health care provider.  Document Released: 06/15/2012 Document Revised: 05/25/2017 Document Reviewed: 02/24/2016  [employer] Interactive Patient Education © 2019 [employer]    Upper Respiratory Infection, Adult  An upper respiratory infection (URI) is a common viral infection of the nose, throat, and upper air passages that lead to the lungs. The most common type of URI is the common cold. URIs usually get better on their own, without medical treatment.  What are  the causes?  A URI is caused by a virus. You may catch a virus by:  · Breathing in droplets from an infected person's cough or sneeze.  · Touching something that has been exposed to the virus (contaminated) and then touching your mouth, nose, or eyes.  What increases the risk?  You are more likely to get a URI if:  · You are very young or very old.  · It is marisol or winter.  · You have close contact with others, such as at a , school, or health care facility.  · You smoke.  · You have long-term (chronic) heart or lung disease.  · You have a weakened disease-fighting (immune) system.  · You have nasal allergies or asthma.  · You are experiencing a lot of stress.  · You work in an area that has poor air circulation.  · You have poor nutrition.  What are the signs or symptoms?  A URI usually involves some of the following symptoms:  · Runny or stuffy (congested) nose.  · Sneezing.  · Cough.  · Sore throat.  · Headache.  · Fatigue.  · Fever.  · Loss of appetite.  · Pain in your forehead, behind your eyes, and over your cheekbones (sinus pain).  · Muscle aches.  · Redness or irritation of the eyes.  · Pressure in the ears or face.  How is this diagnosed?  This condition may be diagnosed based on your medical history and symptoms, and a physical exam. Your health care provider may use a cotton swab to take a mucus sample from your nose (nasal swab). This sample can be tested to determine what virus is causing the illness.  How is this treated?  URIs usually get better on their own within 7-10 days. You can take steps at home to relieve your symptoms. Medicines cannot cure URIs, but your health care provider may recommend certain medicines to help relieve symptoms, such as:  · Over-the-counter cold medicines.  · Cough suppressants. Coughing is a type of defense against infection that helps to clear the respiratory system, so take these medicines only as recommended by your health care provider.  · Fever-reducing  medicines.  Follow these instructions at home:  Activity  · Rest as needed.  · If you have a fever, stay home from work or school until your fever is gone or until your health care provider says you are no longer contagious. Your health care provider may have you wear a face mask to prevent your infection from spreading.  Relieving symptoms  · Gargle with a salt-water mixture 3-4 times a day or as needed. To make a salt-water mixture, completely dissolve ½-1 tsp of salt in 1 cup of warm water.  · Use a cool-mist humidifier to add moisture to the air. This can help you breathe more easily.  Eating and drinking    · Drink enough fluid to keep your urine pale yellow.  · Eat soups and other clear broths.  General instructions    · Take over-the-counter and prescription medicines only as told by your health care provider. These include cold medicines, fever reducers, and cough suppressants.  · Do not use any products that contain nicotine or tobacco, such as cigarettes and e-cigarettes. If you need help quitting, ask your health care provider.  · Stay away from secondhand smoke.  · Stay up to date on all immunizations, including the yearly (annual) flu vaccine.  · Keep all follow-up visits as told by your health care provider. This is important.  How to prevent the spread of infection to others    · URIs can be passed from person to person (are contagious). To prevent the infection from spreading:  ? Wash your hands often with soap and water. If soap and water are not available, use hand .  ? Avoid touching your mouth, face, eyes, or nose.  ? Cough or sneeze into a tissue or your sleeve or elbow instead of into your hand or into the air.  Contact a health care provider if:  · You are getting worse instead of better.  · You have a fever or chills.  · Your mucus is brown or red.  · You have yellow or brown discharge coming from your nose.  · You have pain in your face, especially when you bend forward.  · You have  swollen neck glands.  · You have pain while swallowing.  · You have white areas in the back of your throat.  Get help right away if:  · You have shortness of breath that gets worse.  · You have severe or persistent:  ? Headache.  ? Ear pain.  ? Sinus pain.  ? Chest pain.  · You have chronic lung disease along with any of the following:  ? Wheezing.  ? Prolonged cough.  ? Coughing up blood.  ? A change in your usual mucus.  · You have a stiff neck.  · You have changes in your:  ? Vision.  ? Hearing.  ? Thinking.  ? Mood.  Summary  · An upper respiratory infection (URI) is a common infection of the nose, throat, and upper air passages that lead to the lungs.  · A URI is caused by a virus.  · URIs usually get better on their own within 7-10 days.  · Medicines cannot cure URIs, but your health care provider may recommend certain medicines to help relieve symptoms.  This information is not intended to replace advice given to you by your health care provider. Make sure you discuss any questions you have with your health care provider.  Document Released: 06/13/2002 Document Revised: 08/03/2018 Document Reviewed: 08/03/2018  [employer] Interactive Patient Education © 2019 [employer]

## 2020-01-27 RX ORDER — FLUCONAZOLE 150 MG/1
150 TABLET ORAL ONCE
Qty: 1 TABLET | Refills: 0 | Status: SHIPPED | OUTPATIENT
Start: 2020-01-27 | End: 2020-01-27

## 2020-04-16 ENCOUNTER — TELEPHONE (OUTPATIENT)
Dept: FAMILY MEDICINE CLINIC | Facility: CLINIC | Age: 35
End: 2020-04-16

## 2020-08-16 DIAGNOSIS — E66.09 EXOGENOUS OBESITY: ICD-10-CM

## 2020-08-17 RX ORDER — PHENTERMINE HYDROCHLORIDE 37.5 MG/1
37.5 CAPSULE ORAL EVERY MORNING
Qty: 30 CAPSULE | Refills: 5 | OUTPATIENT
Start: 2020-08-17

## 2020-12-01 ENCOUNTER — TELEPHONE (OUTPATIENT)
Dept: FAMILY MEDICINE CLINIC | Facility: CLINIC | Age: 35
End: 2020-12-01

## 2020-12-01 NOTE — TELEPHONE ENCOUNTER
Caller: Sundeep Espino    Relationship to patient: Self    Best call back number: [phone]*    Concerns or Questions if Applicable: PATIENT CALLED TO RESCHEDULE APPT THAT WAS FOR TODAY (12/1), DUE TO COVID EXPOSURE ON 11/26. PATIENT STATES THAT SHE HAS NO SYMPTOMS.    Travel screen questions: PATIENT EXPOSED ON 11/26, AND CURRENTLY QUARANTINED. PATIENT DENIES FEVER/CHILLS, COUGH, SOA, AND LOSS OF TASTE/SMELL.

## 2020-12-16 ENCOUNTER — TELEPHONE (OUTPATIENT)
Dept: FAMILY MEDICINE CLINIC | Facility: CLINIC | Age: 35
End: 2020-12-16

## 2020-12-16 NOTE — TELEPHONE ENCOUNTER
Called patient to inform her that Dr. Casey is out of the office on 12/23, and that per provider patient will be able to get both the Physical and Pelvic exam. Patient had appointment scheduled for 12/23 and 12/28. 12/23 appointment cancelled.

## 2020-12-28 ENCOUNTER — OFFICE VISIT (OUTPATIENT)
Dept: FAMILY MEDICINE CLINIC | Facility: CLINIC | Age: 35
End: 2020-12-28

## 2020-12-28 VITALS
HEIGHT: 65 IN | SYSTOLIC BLOOD PRESSURE: 128 MMHG | WEIGHT: 162.6 LBS | BODY MASS INDEX: 27.09 KG/M2 | DIASTOLIC BLOOD PRESSURE: 70 MMHG | TEMPERATURE: 97.8 F | OXYGEN SATURATION: 98 % | HEART RATE: 85 BPM

## 2020-12-28 DIAGNOSIS — Z11.59 ENCOUNTER FOR HEPATITIS C SCREENING TEST FOR LOW RISK PATIENT: Primary | ICD-10-CM

## 2020-12-28 DIAGNOSIS — Z01.419 ENCNTR FOR GYN EXAM (GENERAL) (ROUTINE) W/O ABN FINDINGS: ICD-10-CM

## 2020-12-28 DIAGNOSIS — E89.0 POSTABLATIVE HYPOTHYROIDISM: Primary | ICD-10-CM

## 2020-12-28 DIAGNOSIS — Z79.899 HIGH RISK MEDICATION USE: ICD-10-CM

## 2020-12-28 DIAGNOSIS — N92.6 MENSTRUAL DISORDER: ICD-10-CM

## 2020-12-28 DIAGNOSIS — Z92.3 H/O RADIOACTIVE IODINE THYROID ABLATION: ICD-10-CM

## 2020-12-28 DIAGNOSIS — E66.09 EXOGENOUS OBESITY: ICD-10-CM

## 2020-12-28 PROCEDURE — 99214 OFFICE O/P EST MOD 30 MIN: CPT | Performed by: FAMILY MEDICINE

## 2020-12-28 RX ORDER — LEVOTHYROXINE SODIUM 125 MCG
TABLET ORAL
COMMUNITY
Start: 2020-12-08 | End: 2021-12-29

## 2020-12-28 RX ORDER — PHENTERMINE HYDROCHLORIDE 37.5 MG/1
37.5 CAPSULE ORAL EVERY MORNING
Qty: 30 CAPSULE | Refills: 5 | Status: SHIPPED | OUTPATIENT
Start: 2020-12-28 | End: 2020-12-29 | Stop reason: SDUPTHER

## 2020-12-28 NOTE — PROGRESS NOTES
HPI  Sundeep Espino is a 35 y.o. female who is here for follow up of multiple medical problems as well as requesting routine pelvic exam and Pap smear.  Patient testing menstruation starting.  Actually during exam large amount of old brown blood noted in vagina making Pap smear difficult but was completed somewhat blindly.  Patient does report sore tender right breast and has history of fibrocystic breast disease with previous biopsies.  No definitive mass felt at this time.  Axilla negative.  Also requesting refill of diet medication with energy loss.  All of this was discussed.  History reviewed including apparent ablation of thyroid in the past.  Continues with regular menstruation monthly.      Review of Systems   Constitutional: Negative for chills and fever.   HENT: Negative for sore throat.    Gastrointestinal: Negative for abdominal pain, constipation, diarrhea, nausea and vomiting.   Genitourinary: Positive for menstrual problem and vaginal discharge. Negative for dysuria, flank pain, frequency, hematuria and urgency.   Musculoskeletal: Negative for back pain.   Skin: Negative for rash.   Neurological: Negative for headaches.         Past Medical History:   Diagnosis Date   • Allergic rhinitis    • Anemia    • Arthritis    • Asthma    • Crohn's disease (CMS/HCC)    • GERD (gastroesophageal reflux disease)    • Heart palpitations    • Hyperthyroidism    • Menstrual problem    • Ovarian cyst    • Thyroid disease    • Vitamin D deficiency        Past Surgical History:   Procedure Laterality Date   • APPENDECTOMY  2012   • BREAST LUMPECTOMY      benign   • COLONOSCOPY  approx 2009    normal per patient   • COLONOSCOPY W/ POLYPECTOMY N/A 8/1/2018    Normal   • EAR TUBES     • ENDOSCOPY N/A 8/1/2018    [facility]   • MANDIBLE SURGERY  05/2016       Family History   Problem Relation Age of Onset   • Hypertension Mother    • Diabetes Mother    • Hypertension Father    • Hyperlipidemia Father    • Diabetes Father    •  Heart disease Father    • Alcohol abuse Other    • Diabetes Other    • Hypertension Other    • Diabetes Maternal Aunt    • Hypertension Maternal Uncle    • Diabetes Maternal Uncle        Social History     Socioeconomic History   • Marital status: Single     Spouse name: Not on file   • Number of children: 0   • Years of education: College   • Highest education level: Not on file   Occupational History   • Occupation:      Employer: [employer]   Tobacco Use   • Smoking status: Never Smoker   • Smokeless tobacco: Never Used   Substance and Sexual Activity   • Alcohol use: Yes     Comment: Occasional   • Drug use: No   • Sexual activity: Yes     Partners: Male       Vitals:    12/28/20 1417   BP: 128/70   Pulse: 85   Temp: 97.8 °F (36.6 °C)   SpO2: 98%        Body mass index is 27.06 kg/m².      Physical Exam  Vitals signs and nursing note reviewed. Exam conducted with a chaperone present.   Constitutional:       General: She is not in acute distress.     Appearance: She is well-developed.   HENT:      Head: Normocephalic and atraumatic.   Eyes:      Conjunctiva/sclera: Conjunctivae normal.      Pupils: Pupils are equal, round, and reactive to light.   Neck:      Musculoskeletal: Normal range of motion.      Thyroid: No thyromegaly.   Cardiovascular:      Rate and Rhythm: Normal rate and regular rhythm.      Heart sounds: Normal heart sounds.   Pulmonary:      Effort: Pulmonary effort is normal. No respiratory distress.      Breath sounds: Normal breath sounds.   Abdominal:      General: There is no distension.      Palpations: Abdomen is soft. There is no mass.      Tenderness: There is no abdominal tenderness.      Hernia: No hernia is present.   Genitourinary:     General: Normal vulva.      Exam position: Supine.      Urethra: No prolapse, urethral pain, urethral swelling or urethral lesion.      Comments: Heavy menstruation has discussed above.  Brownish copious bloody vaginal  discharge occluding cervix exam.  Musculoskeletal: Normal range of motion.         General: No tenderness or deformity.   Lymphadenopathy:      Cervical: No cervical adenopathy.      Lower Body: No right inguinal adenopathy. No left inguinal adenopathy.   Skin:     General: Skin is warm and dry.      Coloration: Skin is not pale.      Findings: No rash.   Neurological:      Mental Status: She is alert and oriented to person, place, and time.      Motor: No abnormal muscle tone.      Coordination: Coordination normal.   Psychiatric:         Behavior: Behavior normal.         Thought Content: Thought content normal.         Judgment: Judgment normal.           Assessment/Plan    Diagnoses and all orders for this visit:    1. Postablative hypothyroidism (Primary)  -     TSH+Free T4    2. Menstrual disorder    3. H/O radioactive iodine thyroid ablation  -     TSH+Free T4    4. High risk medication use  -     CBC & Differential  -     Comprehensive Metabolic Panel    5. Encntr for gyn exam (general) (routine) w/o abn findings  -     Pap IG, Ct-Ng TV Rfx HPV All; Future    6. Exogenous obesity  -     phentermine 37.5 MG capsule; Take 1 capsule by mouth Every Morning.  Dispense: 30 capsule; Refill: 5      Patient here for routine follow-up of above-noted medical problems as well as pelvic exam.  Fairly heavy menstruation noted.  Otherwise exam unremarkable.  Will resume medication for weight excess and monitor closely including follow-up visit in 3 months.  Also routine lab especially thyroid panel is obtained.  In view of heavy menstruation may have to repeat Pap smear at next visit?    This note includes information entered using a voice recognition dictation system.  Though reviewed, some nonsensible errors may remain.        Answers for HPI/ROS submitted by the patient on 12/28/2020   Female genitourinary complaint  What is the primary reason for your visit?: Vaginal Discharge/Irritation  genital odor: Yes  Chronicity:  new  Onset: more than 1 month ago  Frequency: constantly  Progression since onset: gradually worsening  Severity of pain: mild  Affected side: both  Pregnant now?: No  anorexia: No  discolored urine: Yes  joint pain: No  joint swelling: No  painful intercourse: No  Aggravated by: nothing  Sexual activity: multiple partners  Partner with STD symptoms: no  Birth control: nothing  Menstrual history: regular  Discharge characteristics: brown, malodorous, milky, mucoid  Passing clots?: No  Passing tissue?: No

## 2020-12-29 ENCOUNTER — TELEPHONE (OUTPATIENT)
Dept: FAMILY MEDICINE CLINIC | Facility: CLINIC | Age: 35
End: 2020-12-29

## 2020-12-29 DIAGNOSIS — E66.09 EXOGENOUS OBESITY: ICD-10-CM

## 2020-12-29 LAB
ALBUMIN SERPL-MCNC: 3.8 G/DL (ref 3.8–4.8)
ALBUMIN/GLOB SERPL: 1.4 {RATIO} (ref 1.2–2.2)
ALP SERPL-CCNC: 119 IU/L (ref 39–117)
ALT SERPL-CCNC: 12 IU/L (ref 0–32)
AST SERPL-CCNC: 15 IU/L (ref 0–40)
BASOPHILS # BLD AUTO: 0.1 X10E3/UL (ref 0–0.2)
BASOPHILS NFR BLD AUTO: 1 %
BILIRUB SERPL-MCNC: 0.7 MG/DL (ref 0–1.2)
BUN SERPL-MCNC: 6 MG/DL (ref 6–20)
BUN/CREAT SERPL: 8 (ref 9–23)
CALCIUM SERPL-MCNC: 9 MG/DL (ref 8.7–10.2)
CHLORIDE SERPL-SCNC: 106 MMOL/L (ref 96–106)
CO2 SERPL-SCNC: 22 MMOL/L (ref 20–29)
CREAT SERPL-MCNC: 0.79 MG/DL (ref 0.57–1)
CYTOLOGIST CVX/VAG CYTO: NORMAL
CYTOLOGY CVX/VAG DOC CYTO: NORMAL
DX ICD CODE: NORMAL
EOSINOPHIL # BLD AUTO: 0.1 X10E3/UL (ref 0–0.4)
EOSINOPHIL NFR BLD AUTO: 2 %
ERYTHROCYTE [DISTWIDTH] IN BLOOD BY AUTOMATED COUNT: 12.8 % (ref 11.7–15.4)
GLOBULIN SER CALC-MCNC: 2.7 G/DL (ref 1.5–4.5)
GLUCOSE SERPL-MCNC: 99 MG/DL (ref 65–99)
HCT VFR BLD AUTO: 36.7 % (ref 34–46.6)
HCV AB S/CO SERPL IA: <0.1 S/CO RATIO (ref 0–0.9)
HGB BLD-MCNC: 11.7 G/DL (ref 11.1–15.9)
IMM GRANULOCYTES # BLD AUTO: 0 X10E3/UL (ref 0–0.1)
IMM GRANULOCYTES NFR BLD AUTO: 0 %
LYMPHOCYTES # BLD AUTO: 1.7 X10E3/UL (ref 0.7–3.1)
LYMPHOCYTES NFR BLD AUTO: 37 %
MCH RBC QN AUTO: 25.8 PG (ref 26.6–33)
MCHC RBC AUTO-ENTMCNC: 31.9 G/DL (ref 31.5–35.7)
MCV RBC AUTO: 81 FL (ref 79–97)
MONOCYTES # BLD AUTO: 0.4 X10E3/UL (ref 0.1–0.9)
MONOCYTES NFR BLD AUTO: 8 %
NEUTROPHILS # BLD AUTO: 2.3 X10E3/UL (ref 1.4–7)
NEUTROPHILS NFR BLD AUTO: 52 %
OTHER STN SPEC: NORMAL
PLATELET # BLD AUTO: 275 X10E3/UL (ref 150–450)
POTASSIUM SERPL-SCNC: 4.2 MMOL/L (ref 3.5–5.2)
PROT SERPL-MCNC: 6.5 G/DL (ref 6–8.5)
RBC # BLD AUTO: 4.54 X10E6/UL (ref 3.77–5.28)
SODIUM SERPL-SCNC: 141 MMOL/L (ref 134–144)
STAT OF ADQ CVX/VAG CYTO-IMP: NORMAL
T4 FREE SERPL-MCNC: 1.39 NG/DL (ref 0.82–1.77)
TSH SERPL DL<=0.005 MIU/L-ACNC: 1.09 UIU/ML (ref 0.45–4.5)
WBC # BLD AUTO: 4.5 X10E3/UL (ref 3.4–10.8)

## 2020-12-29 RX ORDER — PHENTERMINE HYDROCHLORIDE 37.5 MG/1
37.5 CAPSULE ORAL EVERY MORNING
Qty: 30 CAPSULE | Refills: 5 | Status: SHIPPED | OUTPATIENT
Start: 2020-12-29 | End: 2022-06-24 | Stop reason: SDUPTHER

## 2020-12-29 NOTE — TELEPHONE ENCOUNTER
Ying with lab fel[facility] calling concerning pap smear. Unable to process due to specimen being  2020 was listed as the expiration date. Please call [phone] ext [phone].

## 2021-01-11 ENCOUNTER — OFFICE VISIT (OUTPATIENT)
Dept: FAMILY MEDICINE CLINIC | Facility: CLINIC | Age: 36
End: 2021-01-11

## 2021-01-11 VITALS
OXYGEN SATURATION: 98 % | RESPIRATION RATE: 20 BRPM | DIASTOLIC BLOOD PRESSURE: 70 MMHG | BODY MASS INDEX: 26.73 KG/M2 | HEART RATE: 98 BPM | SYSTOLIC BLOOD PRESSURE: 110 MMHG | TEMPERATURE: 96.5 F | HEIGHT: 65 IN | WEIGHT: 160.4 LBS

## 2021-01-11 DIAGNOSIS — Z01.419 ENCNTR FOR GYN EXAM (GENERAL) (ROUTINE) W/O ABN FINDINGS: Primary | ICD-10-CM

## 2021-01-11 DIAGNOSIS — Z12.4 PAP SMEAR FOR CERVICAL CANCER SCREENING: Primary | ICD-10-CM

## 2021-01-11 DIAGNOSIS — Z12.4 PAP SMEAR FOR CERVICAL CANCER SCREENING: ICD-10-CM

## 2021-01-11 NOTE — PROGRESS NOTES
HPI  Sundeep Espino is a 35 y.o. female who is here for repeat Pap smear.  Previous Pap smear unfortunately obtained  sample bottle.  Also at that time patient was having rather heavy menstruation.  Menstrual cycle has completed.      Review of Systems   Constitutional: Negative for chills and fever.   HENT: Negative for sore throat.    Gastrointestinal: Positive for diarrhea. Negative for abdominal pain, constipation, nausea and vomiting.   Genitourinary: Positive for vaginal discharge. Negative for dysuria, flank pain, frequency, hematuria and urgency.   Musculoskeletal: Negative for back pain.   Skin: Negative for rash.   Neurological: Negative for headaches.   All other systems reviewed and are negative.        Past Medical History:   Diagnosis Date   • Allergic rhinitis    • Anemia    • Arthritis    • Asthma    • Crohn's disease (CMS/HCC)    • GERD (gastroesophageal reflux disease)    • Heart palpitations    • Hyperthyroidism    • Menstrual problem    • Ovarian cyst    • Thyroid disease    • Vitamin D deficiency        Past Surgical History:   Procedure Laterality Date   • APPENDECTOMY     • BREAST LUMPECTOMY      benign   • COLONOSCOPY  approx     normal per patient   • COLONOSCOPY W/ POLYPECTOMY N/A 2018    Normal   • EAR TUBES     • ENDOSCOPY N/A 2018    [facility]   • MANDIBLE SURGERY  2016       Family History   Problem Relation Age of Onset   • Hypertension Mother    • Diabetes Mother    • Hypertension Father    • Hyperlipidemia Father    • Diabetes Father    • Heart disease Father    • Alcohol abuse Other    • Diabetes Other    • Hypertension Other    • Diabetes Maternal Aunt    • Hypertension Maternal Uncle    • Diabetes Maternal Uncle        Social History     Socioeconomic History   • Marital status: Single     Spouse name: Not on file   • Number of children: 0   • Years of education: College   • Highest education level: Not on file   Occupational History   • Occupation:       Employer: "[employer]   Tobacco Use   • Smoking status: Never Smoker   • Smokeless tobacco: Never Used   Substance and Sexual Activity   • Alcohol use: Yes     Comment: Occasional   • Drug use: No   • Sexual activity: Yes     Partners: Male       Vitals:    01/11/21 1101   BP: 110/70   Pulse: 98   Resp: 20   Temp: 96.5 °F (35.8 °C)   SpO2: 98%        Body mass index is 26.69 kg/m².      Physical Exam  Exam conducted with a chaperone present.   Constitutional:       Appearance: Normal appearance. She is obese.   Genitourinary:     Exam position: Supine.      Pubic Area: No rash.       Vagina: Normal.      Cervix: Friability present.      Uterus: Normal.       Adnexa: Right adnexa normal and left adnexa normal.   Neurological:      Mental Status: She is alert.           Assessment/Plan    Diagnoses and all orders for this visit:    1. Encntr for gyn exam (general) (routine) w/o abn findings (Primary)        Patient here for routine GYN exam.  Previous Pap smear had to be repeated because of lab error.  Cervix somewhat friable but otherwise GYN exam unremarkable.    This note includes information entered using a voice recognition dictation system.  Though reviewed, some nonsensible errors may remain.      Answers for HPI/ROS submitted by the patient on 1/11/2021   Female genitourinary complaint  What is the primary reason for your visit?: Vaginal Discharge/Irritation  genital odor: Yes  Chronicity: chronic  Onset: more than 1 month ago  Frequency: daily  Progression since onset: gradually worsening  Severity of pain: no pain  Affected side: both  Pregnant now?: No  anorexia: Yes  discolored urine: Yes  joint pain: No  joint swelling: No  painful intercourse: No  Aggravated by: nothing  Sexual activity: multiple partners  Partner with STD symptoms: no  Birth control: condoms  Menstrual history: regular  Discharge characteristics: copious, milky, mucoid  Passing clots?: No  Passing  tissue?: No

## 2021-01-18 DIAGNOSIS — N76.1 SUBACUTE VAGINITIS: Primary | ICD-10-CM

## 2021-01-18 RX ORDER — METRONIDAZOLE 500 MG/1
500 TABLET ORAL 3 TIMES DAILY
Qty: 21 TABLET | Refills: 0 | Status: SHIPPED | OUTPATIENT
Start: 2021-01-18 | End: 2021-03-01

## 2021-02-01 ENCOUNTER — TELEPHONE (OUTPATIENT)
Dept: FAMILY MEDICINE CLINIC | Facility: CLINIC | Age: 36
End: 2021-02-01

## 2021-03-01 ENCOUNTER — OFFICE VISIT (OUTPATIENT)
Dept: FAMILY MEDICINE CLINIC | Facility: CLINIC | Age: 36
End: 2021-03-01

## 2021-03-01 VITALS
TEMPERATURE: 97.5 F | SYSTOLIC BLOOD PRESSURE: 116 MMHG | HEART RATE: 85 BPM | BODY MASS INDEX: 26.29 KG/M2 | OXYGEN SATURATION: 95 % | DIASTOLIC BLOOD PRESSURE: 78 MMHG | WEIGHT: 158 LBS

## 2021-03-01 DIAGNOSIS — J45.20 MILD INTERMITTENT ASTHMA WITHOUT COMPLICATION: ICD-10-CM

## 2021-03-01 DIAGNOSIS — Z92.3 H/O RADIOACTIVE IODINE THYROID ABLATION: ICD-10-CM

## 2021-03-01 DIAGNOSIS — J30.1 NON-SEASONAL ALLERGIC RHINITIS DUE TO POLLEN: Primary | ICD-10-CM

## 2021-03-01 DIAGNOSIS — E66.09 EXOGENOUS OBESITY: ICD-10-CM

## 2021-03-01 PROCEDURE — 99213 OFFICE O/P EST LOW 20 MIN: CPT | Performed by: FAMILY MEDICINE

## 2021-03-01 NOTE — PROGRESS NOTES
HPI  Sundeep Espino is a 35 y.o. female who is here for follow up of hypothyroidism related to previous radiation therapy.  Other medical issues as noted below.  Patient does report some blood in her stools and possible black bowel movements.  Did have colonoscopy and EGD in 2018 all of which were unremarkable.  Questionable history of Crohn's disease in the past followed by gastroenterologist.  Reported history of polypectomy but reviewing colonoscopy do not really note history of any problems?  Did have random biopsy.  Otherwise other health issues noted.  Current medications reviewed.  Asthma apparently under adequate control with current regimen.      Review of Systems   Gastrointestinal: Positive for blood in stool.   All other systems reviewed and are negative.        Past Medical History:   Diagnosis Date   • Allergic rhinitis    • Anemia    • Arthritis    • Asthma    • Crohn's disease (CMS/HCC)    • GERD (gastroesophageal reflux disease)    • Heart palpitations    • Hyperthyroidism    • Menstrual problem    • Ovarian cyst    • Thyroid disease    • Vitamin D deficiency        Past Surgical History:   Procedure Laterality Date   • APPENDECTOMY  2012   • BREAST LUMPECTOMY      benign   • COLONOSCOPY  approx 2009    normal per patient   • COLONOSCOPY W/ POLYPECTOMY N/A 8/1/2018    Normal   • EAR TUBES     • ENDOSCOPY N/A 8/1/2018    [facility]   • MANDIBLE SURGERY  05/2016       Family History   Problem Relation Age of Onset   • Hypertension Mother    • Diabetes Mother    • Hypertension Father    • Hyperlipidemia Father    • Diabetes Father    • Heart disease Father    • Alcohol abuse Other    • Diabetes Other    • Hypertension Other    • Diabetes Maternal Aunt    • Hypertension Maternal Uncle    • Diabetes Maternal Uncle        Social History     Socioeconomic History   • Marital status: Single     Spouse name: Not on file   • Number of children: 0   • Years of education: College   • Highest education level: Not on  file   Occupational History   • Occupation:      Employer: [employer]   Tobacco Use   • Smoking status: Never Smoker   • Smokeless tobacco: Never Used   Substance and Sexual Activity   • Alcohol use: Yes     Comment: Occasional   • Drug use: No   • Sexual activity: Yes     Partners: Male       Vitals:    03/01/21 0813   BP: 116/78   Pulse: 85   Temp: 97.5 °F (36.4 °C)   SpO2: 95%        Body mass index is 26.29 kg/m².      Physical Exam  Vitals signs and nursing note reviewed.   Constitutional:       General: She is not in acute distress.     Appearance: She is well-developed.   HENT:      Head: Normocephalic and atraumatic.   Eyes:      Conjunctiva/sclera: Conjunctivae normal.      Pupils: Pupils are equal, round, and reactive to light.   Neck:      Musculoskeletal: Normal range of motion.      Thyroid: No thyromegaly.   Cardiovascular:      Rate and Rhythm: Normal rate and regular rhythm.      Heart sounds: Normal heart sounds.   Pulmonary:      Effort: Pulmonary effort is normal. No respiratory distress.      Breath sounds: Normal breath sounds.   Abdominal:      General: There is no distension.      Palpations: Abdomen is soft. There is no mass.      Tenderness: There is no abdominal tenderness.      Hernia: No hernia is present.   Musculoskeletal: Normal range of motion.         General: No tenderness or deformity.   Lymphadenopathy:      Cervical: No cervical adenopathy.   Skin:     General: Skin is warm and dry.      Coloration: Skin is not pale.      Findings: No rash.   Neurological:      General: No focal deficit present.      Mental Status: She is alert and oriented to person, place, and time.      Motor: No abnormal muscle tone.      Coordination: Coordination normal.   Psychiatric:         Mood and Affect: Mood normal.         Behavior: Behavior normal.         Thought Content: Thought content normal.         Judgment: Judgment normal.            Assessment/Plan    Diagnoses and all orders for this visit:    1. Non-seasonal allergic rhinitis due to pollen (Primary)    2. H/O radioactive iodine thyroid ablation    3. Mild intermittent asthma without complication    4. Exogenous obesity        Patient here for follow-up of above-noted medical problems.  Overall stable on current regimen.  Noted small amount of blood in stool a couple of weeks ago.  Reviewed previous evaluations including EGD and colonoscopy.  Also reviewed recent lab work all of which was unremarkable.  This includes thyroid panel that was normal.  Recommend continue current therapy and keep follow-up appointment in June at which time thyroid panel and other labs will be rechecked.    This note includes information entered using a voice recognition dictation system.  Though reviewed, some nonsensible errors may remain.      Answers for HPI/ROS submitted by the patient on 3/1/2021   What is the primary reason for your visit?: Other  Please describe your symptoms.: Follow-Up  Have you had these symptoms before?: No  How long have you been having these symptoms?: Greater than 2 weeks  Please describe any probable cause for these symptoms. : Not sure

## 2021-04-16 ENCOUNTER — BULK ORDERING (OUTPATIENT)
Dept: CASE MANAGEMENT | Facility: OTHER | Age: 36
End: 2021-04-16

## 2021-04-16 DIAGNOSIS — Z23 IMMUNIZATION DUE: ICD-10-CM

## 2021-06-28 ENCOUNTER — OFFICE VISIT (OUTPATIENT)
Dept: FAMILY MEDICINE CLINIC | Facility: CLINIC | Age: 36
End: 2021-06-28

## 2021-06-28 VITALS
DIASTOLIC BLOOD PRESSURE: 62 MMHG | RESPIRATION RATE: 20 BRPM | BODY MASS INDEX: 26.56 KG/M2 | TEMPERATURE: 97.5 F | SYSTOLIC BLOOD PRESSURE: 100 MMHG | WEIGHT: 159.4 LBS | HEART RATE: 79 BPM | HEIGHT: 65 IN | OXYGEN SATURATION: 97 %

## 2021-06-28 DIAGNOSIS — J45.20 MILD INTERMITTENT ASTHMA WITHOUT COMPLICATION: ICD-10-CM

## 2021-06-28 DIAGNOSIS — Z92.3 H/O RADIOACTIVE IODINE THYROID ABLATION: Primary | ICD-10-CM

## 2021-06-28 DIAGNOSIS — E89.0 POSTABLATIVE HYPOTHYROIDISM: ICD-10-CM

## 2021-06-28 DIAGNOSIS — J30.1 NON-SEASONAL ALLERGIC RHINITIS DUE TO POLLEN: ICD-10-CM

## 2021-06-28 PROBLEM — J06.9 UPPER RESPIRATORY INFECTION: Status: RESOLVED | Noted: 2018-05-02 | Resolved: 2021-06-28

## 2021-06-28 PROBLEM — N39.0 UTI (URINARY TRACT INFECTION): Status: RESOLVED | Noted: 2018-05-02 | Resolved: 2021-06-28

## 2021-06-28 PROBLEM — M25.372 ANKLE INSTABILITY, LEFT: Status: ACTIVE | Noted: 2019-10-29

## 2021-06-28 PROBLEM — B37.31 YEAST VAGINITIS: Status: RESOLVED | Noted: 2018-05-02 | Resolved: 2021-06-28

## 2021-06-28 PROCEDURE — 99213 OFFICE O/P EST LOW 20 MIN: CPT | Performed by: FAMILY MEDICINE

## 2021-06-28 NOTE — PROGRESS NOTES
HPI  Sundeep Espino is a 35 y.o. female who is here for follow up of hypothyroidism and other medical issues.  Overall patient denies any new complaints had recent lab work all of which was unremarkable.  Mostly brought in lipid panel but able to locate thyroid panel in the computer.  Apparently done at [facility].  TSH level decreased but free T4 normal.  Patient upset because pharmacy would not give shingles vaccines.  This was discussed and explained mostly insurance coverage issue depending on age.  Patient apparently had shingles in the past and is concerned about recurrence.  Apparently pulmonary status is stable.  Thyroid does seem to be slightly enlarged but without nodules      Review of Systems   Constitutional: Negative for unexpected weight change.   All other systems reviewed and are negative.        Past Medical History:   Diagnosis Date   • Allergic rhinitis    • Anemia    • Arthritis    • Asthma    • Crohn's disease (CMS/HCC)    • GERD (gastroesophageal reflux disease)    • H/O radioactive iodine thyroid ablation 3/8/2017   • Heart palpitations    • Hyperthyroidism    • Menstrual problem    • Ovarian cyst    • Thyroid disease    • Upper respiratory infection 5/2/2018   • UTI (urinary tract infection) 5/2/2018   • Vitamin D deficiency        Past Surgical History:   Procedure Laterality Date   • APPENDECTOMY  2012   • BREAST LUMPECTOMY      benign   • COLONOSCOPY  approx 2009    normal per patient   • COLONOSCOPY W/ POLYPECTOMY N/A 8/1/2018    Normal   • EAR TUBES     • ENDOSCOPY N/A 8/1/2018    [facility]   • MANDIBLE SURGERY  05/2016       Family History   Problem Relation Age of Onset   • Hypertension Mother    • Diabetes Mother    • Hypertension Father    • Hyperlipidemia Father    • Diabetes Father    • Heart disease Father    • Alcohol abuse Other    • Diabetes Other    • Hypertension Other    • Diabetes Maternal Aunt    • Hypertension Maternal Uncle    • Diabetes Maternal Uncle        Social History      Socioeconomic History   • Marital status: Single     Spouse name: Not on file   • Number of children: 0   • Years of education: College   • Highest education level: Not on file   Tobacco Use   • Smoking status: Never Smoker   • Smokeless tobacco: Never Used   Substance and Sexual Activity   • Alcohol use: Yes     Comment: Occasional   • Drug use: No   • Sexual activity: Yes     Partners: Male       Vitals:    06/28/21 1114   BP: 100/62   Pulse: 79   Resp: 20   Temp: 97.5 °F (36.4 °C)   SpO2: 97%        Body mass index is 26.53 kg/m².      Physical Exam  Vitals and nursing note reviewed.   Constitutional:       General: She is not in acute distress.     Appearance: She is well-developed.   HENT:      Head: Normocephalic and atraumatic.      Nose:      Comments: Patient with mask.  Provider with mask and shield  Eyes:      Extraocular Movements: Extraocular movements intact.      Conjunctiva/sclera: Conjunctivae normal.      Pupils: Pupils are equal, round, and reactive to light.   Neck:      Thyroid: Thyromegaly present.   Cardiovascular:      Rate and Rhythm: Normal rate and regular rhythm.      Heart sounds: Normal heart sounds.   Pulmonary:      Effort: Pulmonary effort is normal. No respiratory distress.      Breath sounds: Normal breath sounds. No stridor. No wheezing, rhonchi or rales.   Abdominal:      General: There is no distension.      Palpations: There is no mass.      Tenderness: There is no abdominal tenderness.      Hernia: No hernia is present.   Musculoskeletal:         General: No tenderness or deformity. Normal range of motion.      Cervical back: Normal range of motion.   Lymphadenopathy:      Cervical: No cervical adenopathy.   Skin:     General: Skin is warm and dry.      Coloration: Skin is not pale.      Findings: No rash.   Neurological:      General: No focal deficit present.      Mental Status: She is alert and oriented to person, place, and time.      Motor: No abnormal muscle tone.       Coordination: Coordination normal.   Psychiatric:         Mood and Affect: Mood normal.         Behavior: Behavior normal.         Thought Content: Thought content normal.         Judgment: Judgment normal.           Assessment/Plan    Diagnoses and all orders for this visit:    1. H/O radioactive iodine thyroid ablation (Primary)    2. Mild intermittent asthma without complication    3. Non-seasonal allergic rhinitis due to pollen    4. Postablative hypothyroidism        Patient here for routine follow-up visit.  Denies any new complaints and seems to be stable on current regimen.  Attempted to review old records and says up-to-date on pelvic exam Pap smear etc.  However appears Pap smear exam not done because of  sample?  Will reevaluate at next visit in 6 months?  Reviewed recent lab work done at another facility.  TSH level very low but free T4 normal and seems to be stable on current regimen.  Apparently did have previous thyroid ablation for Graves' disease?  Previous endocrinologist no longer available.    This note includes information entered using a voice recognition dictation system.  Though reviewed, some nonsensible errors may remain.      Answers for HPI/ROS submitted by the patient on 2021  Please describe your symptoms.: No symptoms  Have you had these symptoms before?: No  How long have you been having these symptoms?: 1-4 days  Please list any medications you are currently taking for this condition.: All are the same as last visit  Please describe any probable cause for these symptoms. : I have no symptoms  What is the primary reason for your visit?: Other

## 2021-12-29 ENCOUNTER — OFFICE VISIT (OUTPATIENT)
Dept: FAMILY MEDICINE CLINIC | Facility: CLINIC | Age: 36
End: 2021-12-29

## 2021-12-29 VITALS
RESPIRATION RATE: 16 BRPM | WEIGHT: 159.6 LBS | BODY MASS INDEX: 26.59 KG/M2 | HEART RATE: 81 BPM | SYSTOLIC BLOOD PRESSURE: 110 MMHG | OXYGEN SATURATION: 99 % | DIASTOLIC BLOOD PRESSURE: 80 MMHG | HEIGHT: 65 IN

## 2021-12-29 DIAGNOSIS — D50.0 IRON DEFICIENCY ANEMIA DUE TO CHRONIC BLOOD LOSS: ICD-10-CM

## 2021-12-29 DIAGNOSIS — Z79.899 HIGH RISK MEDICATION USE: ICD-10-CM

## 2021-12-29 DIAGNOSIS — Z23 NEED FOR INFLUENZA VACCINATION: ICD-10-CM

## 2021-12-29 DIAGNOSIS — J30.1 NON-SEASONAL ALLERGIC RHINITIS DUE TO POLLEN: Primary | ICD-10-CM

## 2021-12-29 DIAGNOSIS — Z83.3 FAMILY HISTORY OF DIABETES MELLITUS IN MOTHER: ICD-10-CM

## 2021-12-29 DIAGNOSIS — Z83.3 FAMILY HISTORY OF DIABETES MELLITUS IN FATHER: ICD-10-CM

## 2021-12-29 DIAGNOSIS — Z82.49 FAMILY HISTORY OF ESSENTIAL HYPERTENSION: ICD-10-CM

## 2021-12-29 DIAGNOSIS — E78.5 HYPERLIPIDEMIA, UNSPECIFIED HYPERLIPIDEMIA TYPE: ICD-10-CM

## 2021-12-29 DIAGNOSIS — E89.0 POSTABLATIVE HYPOTHYROIDISM: ICD-10-CM

## 2021-12-29 PROCEDURE — 90686 IIV4 VACC NO PRSV 0.5 ML IM: CPT | Performed by: FAMILY MEDICINE

## 2021-12-29 PROCEDURE — 99213 OFFICE O/P EST LOW 20 MIN: CPT | Performed by: FAMILY MEDICINE

## 2021-12-29 PROCEDURE — 90471 IMMUNIZATION ADMIN: CPT | Performed by: FAMILY MEDICINE

## 2021-12-29 RX ORDER — LEVOTHYROXINE SODIUM 112 UG/1
112 TABLET ORAL DAILY
COMMUNITY
Start: 2021-11-08 | End: 2022-09-16 | Stop reason: DRUGHIGH

## 2021-12-29 NOTE — PROGRESS NOTES
HPI  Sundeep Espino is a 36 y.o. female who is here for follow up of multiple ongoing medical issues.  Significant allergies taking several antihistamines etc.  Is followed by allergist.  Also previous Graves' disease and thyroid issues and thyroid supplement recently adjusted by endocrinologist.  Both parents have diabetes and also hypertension.  Is fasting this morning for routine lab work.  Reports some weight gain and is considering resuming phentermine and this was discussed especially risks versus benefits.      Review of Systems   Endocrine: Positive for cold intolerance.   All other systems reviewed and are negative.        Past Medical History:   Diagnosis Date   • Allergic rhinitis    • Anemia    • Arthritis    • Asthma    • Crohn's disease (HCC)    • GERD (gastroesophageal reflux disease)    • H/O radioactive iodine thyroid ablation 3/8/2017   • Heart palpitations    • Hyperthyroidism    • Menstrual problem    • Ovarian cyst    • Thyroid disease    • Upper respiratory infection 5/2/2018   • UTI (urinary tract infection) 5/2/2018   • Vitamin D deficiency        Past Surgical History:   Procedure Laterality Date   • APPENDECTOMY  2012   • BREAST LUMPECTOMY      benign   • COLONOSCOPY  approx 2009    normal per patient   • COLONOSCOPY W/ POLYPECTOMY N/A 8/1/2018    Normal   • EAR TUBES     • ENDOSCOPY N/A 8/1/2018    [facility]   • MANDIBLE SURGERY  05/2016       Family History   Problem Relation Age of Onset   • Hypertension Mother    • Diabetes Mother    • Hypertension Father    • Hyperlipidemia Father    • Diabetes Father    • Heart disease Father    • Alcohol abuse Other    • Diabetes Other    • Hypertension Other    • Diabetes Maternal Aunt    • Hypertension Maternal Uncle    • Diabetes Maternal Uncle        Social History     Socioeconomic History   • Marital status: Single   • Number of children: 0   • Years of education: College   Tobacco Use   • Smoking status: Never Smoker   • Smokeless tobacco: Never  Used   Substance and Sexual Activity   • Alcohol use: Yes     Comment: Occasional   • Drug use: No   • Sexual activity: Yes     Partners: Male       Vitals:    12/29/21 0807   BP: 110/80   Pulse: 81   Resp: 16   SpO2: 99%        Body mass index is 26.56 kg/m².      Physical Exam  Vitals and nursing note reviewed.   Constitutional:       General: She is not in acute distress.     Appearance: She is well-developed.   HENT:      Head: Normocephalic and atraumatic.      Nose:      Comments: Patient with mask.  Provider with mask and shield  Eyes:      Extraocular Movements: Extraocular movements intact.      Conjunctiva/sclera: Conjunctivae normal.      Pupils: Pupils are equal, round, and reactive to light.   Neck:      Thyroid: No thyromegaly.   Cardiovascular:      Rate and Rhythm: Normal rate and regular rhythm.      Heart sounds: Normal heart sounds.   Pulmonary:      Effort: Pulmonary effort is normal. No respiratory distress.      Breath sounds: Normal breath sounds.   Abdominal:      General: There is no distension.      Palpations: Abdomen is soft. There is no mass.      Tenderness: There is no abdominal tenderness.      Hernia: No hernia is present.   Musculoskeletal:         General: No tenderness or deformity. Normal range of motion.      Cervical back: Normal range of motion.   Lymphadenopathy:      Cervical: No cervical adenopathy.   Skin:     General: Skin is warm and dry.      Coloration: Skin is not pale.      Findings: No rash.   Neurological:      General: No focal deficit present.      Mental Status: She is alert and oriented to person, place, and time.      Motor: No abnormal muscle tone.      Coordination: Coordination normal.   Psychiatric:         Mood and Affect: Mood normal.         Behavior: Behavior normal.         Thought Content: Thought content normal.         Judgment: Judgment normal.           Assessment/Plan    Diagnoses and all orders for this visit:    1. Non-seasonal allergic  rhinitis due to pollen (Primary)    2. Postablative hypothyroidism  -     TSH+Free T4    3. Iron deficiency anemia due to chronic blood loss  -     CBC & Differential  -     Ferritin    4. High risk medication use  -     CBC & Differential  -     Comprehensive Metabolic Panel    5. Hyperlipidemia, unspecified hyperlipidemia type  -     Lipid Panel    6. Family history of diabetes mellitus in father  -     Comprehensive Metabolic Panel  -     Hemoglobin A1c    7. Family history of diabetes mellitus in mother  -     Comprehensive Metabolic Panel  -     Hemoglobin A1c    8. Family history of essential hypertension  -     Comprehensive Metabolic Panel      Patient here for routine follow-up of above-noted medical issues as well as routine lab work.  Overall seems to be doing well on current regimen including multiple medications for allergies.  Will get routine lab as noted above with probable follow-up in 6 months.      Answers for HPI/ROS submitted by the patient on 12/29/2021  Please describe your symptoms.: Mood swings and depression  Have you had these symptoms before?: Yes  How long have you been having these symptoms?: Greater than 2 weeks  Please list any medications you are currently taking for this condition.: None  Please describe any probable cause for these symptoms. : I am not sure but it can be a lot of recent events  What is the primary reason for your visit?: Other

## 2021-12-30 LAB
ALBUMIN SERPL-MCNC: 4 G/DL (ref 3.8–4.8)
ALBUMIN/GLOB SERPL: 1.4 {RATIO} (ref 1.2–2.2)
ALP SERPL-CCNC: 116 IU/L (ref 44–121)
ALT SERPL-CCNC: 8 IU/L (ref 0–32)
AST SERPL-CCNC: 14 IU/L (ref 0–40)
BASOPHILS # BLD AUTO: 0.1 X10E3/UL (ref 0–0.2)
BASOPHILS NFR BLD AUTO: 1 %
BILIRUB SERPL-MCNC: 0.5 MG/DL (ref 0–1.2)
BUN SERPL-MCNC: 6 MG/DL (ref 6–20)
BUN/CREAT SERPL: 7 (ref 9–23)
CALCIUM SERPL-MCNC: 9.1 MG/DL (ref 8.7–10.2)
CHLORIDE SERPL-SCNC: 105 MMOL/L (ref 96–106)
CHOLEST SERPL-MCNC: 160 MG/DL (ref 100–199)
CO2 SERPL-SCNC: 24 MMOL/L (ref 20–29)
CREAT SERPL-MCNC: 0.84 MG/DL (ref 0.57–1)
EOSINOPHIL # BLD AUTO: 0.1 X10E3/UL (ref 0–0.4)
EOSINOPHIL NFR BLD AUTO: 2 %
ERYTHROCYTE [DISTWIDTH] IN BLOOD BY AUTOMATED COUNT: 12.9 % (ref 11.7–15.4)
FERRITIN SERPL-MCNC: 17 NG/ML (ref 15–150)
GLOBULIN SER CALC-MCNC: 2.8 G/DL (ref 1.5–4.5)
GLUCOSE SERPL-MCNC: 97 MG/DL (ref 65–99)
HBA1C MFR BLD: 5.6 % (ref 4.8–5.6)
HCT VFR BLD AUTO: 34.9 % (ref 34–46.6)
HDLC SERPL-MCNC: 45 MG/DL
HGB BLD-MCNC: 11.1 G/DL (ref 11.1–15.9)
IMM GRANULOCYTES # BLD AUTO: 0 X10E3/UL (ref 0–0.1)
IMM GRANULOCYTES NFR BLD AUTO: 0 %
LDLC SERPL CALC-MCNC: 103 MG/DL (ref 0–99)
LYMPHOCYTES # BLD AUTO: 2.8 X10E3/UL (ref 0.7–3.1)
LYMPHOCYTES NFR BLD AUTO: 44 %
MCH RBC QN AUTO: 25.9 PG (ref 26.6–33)
MCHC RBC AUTO-ENTMCNC: 31.8 G/DL (ref 31.5–35.7)
MCV RBC AUTO: 81 FL (ref 79–97)
MONOCYTES # BLD AUTO: 0.6 X10E3/UL (ref 0.1–0.9)
MONOCYTES NFR BLD AUTO: 9 %
NEUTROPHILS # BLD AUTO: 2.9 X10E3/UL (ref 1.4–7)
NEUTROPHILS NFR BLD AUTO: 44 %
PLATELET # BLD AUTO: 296 X10E3/UL (ref 150–450)
POTASSIUM SERPL-SCNC: 4.4 MMOL/L (ref 3.5–5.2)
PROT SERPL-MCNC: 6.8 G/DL (ref 6–8.5)
RBC # BLD AUTO: 4.29 X10E6/UL (ref 3.77–5.28)
SODIUM SERPL-SCNC: 143 MMOL/L (ref 134–144)
T4 FREE SERPL-MCNC: 1.84 NG/DL (ref 0.82–1.77)
TRIGL SERPL-MCNC: 58 MG/DL (ref 0–149)
TSH SERPL DL<=0.005 MIU/L-ACNC: 0.4 UIU/ML (ref 0.45–4.5)
VLDLC SERPL CALC-MCNC: 12 MG/DL (ref 5–40)
WBC # BLD AUTO: 6.4 X10E3/UL (ref 3.4–10.8)

## 2022-06-24 ENCOUNTER — OFFICE VISIT (OUTPATIENT)
Dept: FAMILY MEDICINE CLINIC | Facility: CLINIC | Age: 37
End: 2022-06-24

## 2022-06-24 VITALS
RESPIRATION RATE: 20 BRPM | OXYGEN SATURATION: 95 % | WEIGHT: 163.8 LBS | TEMPERATURE: 96.8 F | HEART RATE: 92 BPM | DIASTOLIC BLOOD PRESSURE: 78 MMHG | BODY MASS INDEX: 27.29 KG/M2 | HEIGHT: 65 IN | SYSTOLIC BLOOD PRESSURE: 100 MMHG

## 2022-06-24 DIAGNOSIS — R63.5 ABNORMAL WEIGHT GAIN: Primary | ICD-10-CM

## 2022-06-24 DIAGNOSIS — N92.0 MENORRHAGIA WITH REGULAR CYCLE: ICD-10-CM

## 2022-06-24 DIAGNOSIS — R63.5 EXCESSIVE BODY WEIGHT GAIN: ICD-10-CM

## 2022-06-24 DIAGNOSIS — E89.0 POSTABLATIVE HYPOTHYROIDISM: ICD-10-CM

## 2022-06-24 DIAGNOSIS — Z79.899 HIGH RISK MEDICATION USE: ICD-10-CM

## 2022-06-24 PROCEDURE — 99213 OFFICE O/P EST LOW 20 MIN: CPT | Performed by: FAMILY MEDICINE

## 2022-06-24 RX ORDER — PHENTERMINE HYDROCHLORIDE 37.5 MG/1
37.5 CAPSULE ORAL EVERY MORNING
Qty: 30 CAPSULE | Refills: 5 | Status: SHIPPED | OUTPATIENT
Start: 2022-06-24 | End: 2022-09-16

## 2022-06-24 NOTE — PROGRESS NOTES
HPI  Sundeep Espino is a 36 y.o. female who is here for follow up and refill on medications.  Apparently is seen by endocrinologist and recently had thyroid medication adjusted up to 125 mcg.  Requested recheck of thyroid panel and will send copy.  Complains of weight gain and apparently continues with heavy menstruation.  Previous iron levels were slightly low and will be rechecked.  Concerned about weight gain and will refill phentermine but again informed patient will need to find new primary care provider for future refills etc.  Also is awaiting follow-up visit with allergist whose office apparently was temporarily closed down.      Review of Systems   Constitutional: Positive for unexpected weight change.   Genitourinary: Positive for menstrual problem.   Allergic/Immunologic: Positive for environmental allergies.   All other systems reviewed and are negative.        Past Medical History:   Diagnosis Date   • Allergic rhinitis    • Anemia    • Arthritis    • Asthma    • Crohn's disease (HCC)    • GERD (gastroesophageal reflux disease)    • H/O radioactive iodine thyroid ablation 3/8/2017   • Heart palpitations    • Hyperthyroidism    • Menstrual problem    • Ovarian cyst    • Thyroid disease    • Upper respiratory infection 5/2/2018   • UTI (urinary tract infection) 5/2/2018   • Vitamin D deficiency        Past Surgical History:   Procedure Laterality Date   • APPENDECTOMY  2012   • BREAST LUMPECTOMY      benign   • COLONOSCOPY  approx 2009    normal per patient   • COLONOSCOPY W/ POLYPECTOMY N/A 8/1/2018    Normal   • EAR TUBES     • ENDOSCOPY N/A 8/1/2018    HH   • MANDIBLE SURGERY  05/2016       Family History   Problem Relation Age of Onset   • Hypertension Mother    • Diabetes Mother    • Hypertension Father    • Hyperlipidemia Father    • Diabetes Father    • Heart disease Father    • Alcohol abuse Other    • Diabetes Other    • Hypertension Other    • Diabetes Maternal Aunt    • Hypertension Maternal  Uncle    • Diabetes Maternal Uncle        Social History     Socioeconomic History   • Marital status: Single   • Number of children: 0   • Years of education: College   Tobacco Use   • Smoking status: Never Smoker   • Smokeless tobacco: Never Used   Substance and Sexual Activity   • Alcohol use: Yes     Comment: Occasional   • Drug use: No   • Sexual activity: Yes     Partners: Male       Vitals:    06/24/22 1302   BP: 100/78   Pulse: 92   Resp: 20   Temp: 96.8 °F (36 °C)   SpO2: 95%        Body mass index is 27.26 kg/m².      Physical Exam  Vitals and nursing note reviewed.   Constitutional:       General: She is not in acute distress.     Appearance: She is well-developed.   HENT:      Head: Normocephalic and atraumatic.      Nose:      Comments: Patient with mask.  Provider with mask and shield  Eyes:      Conjunctiva/sclera: Conjunctivae normal.      Pupils: Pupils are equal, round, and reactive to light.   Neck:      Thyroid: No thyromegaly.   Cardiovascular:      Rate and Rhythm: Normal rate and regular rhythm.      Heart sounds: Normal heart sounds.   Pulmonary:      Effort: Pulmonary effort is normal. No respiratory distress.      Breath sounds: Normal breath sounds.   Abdominal:      General: There is no distension.      Palpations: Abdomen is soft. There is no mass.      Tenderness: There is no abdominal tenderness.      Hernia: No hernia is present.   Musculoskeletal:         General: No tenderness or deformity. Normal range of motion.      Cervical back: Normal range of motion.   Lymphadenopathy:      Cervical: No cervical adenopathy.   Skin:     General: Skin is warm and dry.      Coloration: Skin is not pale.      Findings: No rash.   Neurological:      Mental Status: She is alert and oriented to person, place, and time.      Motor: No abnormal muscle tone.      Coordination: Coordination normal.   Psychiatric:         Mood and Affect: Mood normal.         Behavior: Behavior normal.         Thought  Content: Thought content normal.         Judgment: Judgment normal.           Assessment/Plan    Diagnoses and all orders for this visit:    1. Abnormal weight gain (Primary)    2. Postablative hypothyroidism  -     TSH+Free T4    3. High risk medication use  -     CBC & Differential  -     Comprehensive Metabolic Panel    4. Menorrhagia with regular cycle  -     CBC & Differential  -     Ferritin    5. Exogenous obesity  -     phentermine 37.5 MG capsule; Take 1 capsule by mouth Every Morning.  Dispense: 30 capsule; Refill: 5      Patient here for routine follow-up of above-noted medical issues.  Will recheck blood work as above and again discussed finding new primary care provider.

## 2022-06-25 LAB
ALBUMIN SERPL-MCNC: 3.7 G/DL (ref 3.8–4.8)
ALBUMIN/GLOB SERPL: 1.2 {RATIO} (ref 1.2–2.2)
ALP SERPL-CCNC: 108 IU/L (ref 44–121)
ALT SERPL-CCNC: 10 IU/L (ref 0–32)
AST SERPL-CCNC: 18 IU/L (ref 0–40)
BASOPHILS # BLD AUTO: 0.1 X10E3/UL (ref 0–0.2)
BASOPHILS NFR BLD AUTO: 1 %
BILIRUB SERPL-MCNC: 0.5 MG/DL (ref 0–1.2)
BUN SERPL-MCNC: 9 MG/DL (ref 6–20)
BUN/CREAT SERPL: 11 (ref 9–23)
CALCIUM SERPL-MCNC: 9 MG/DL (ref 8.7–10.2)
CHLORIDE SERPL-SCNC: 109 MMOL/L (ref 96–106)
CO2 SERPL-SCNC: 20 MMOL/L (ref 20–29)
CREAT SERPL-MCNC: 0.81 MG/DL (ref 0.57–1)
EGFRCR SERPLBLD CKD-EPI 2021: 97 ML/MIN/1.73
EOSINOPHIL # BLD AUTO: 0.1 X10E3/UL (ref 0–0.4)
EOSINOPHIL NFR BLD AUTO: 2 %
ERYTHROCYTE [DISTWIDTH] IN BLOOD BY AUTOMATED COUNT: 13.3 % (ref 11.7–15.4)
FERRITIN SERPL-MCNC: 15 NG/ML (ref 15–150)
GLOBULIN SER CALC-MCNC: 3 G/DL (ref 1.5–4.5)
GLUCOSE SERPL-MCNC: 103 MG/DL (ref 65–99)
HCT VFR BLD AUTO: 36.8 % (ref 34–46.6)
HGB BLD-MCNC: 11.3 G/DL (ref 11.1–15.9)
IMM GRANULOCYTES # BLD AUTO: 0 X10E3/UL (ref 0–0.1)
IMM GRANULOCYTES NFR BLD AUTO: 0 %
LYMPHOCYTES # BLD AUTO: 2.1 X10E3/UL (ref 0.7–3.1)
LYMPHOCYTES NFR BLD AUTO: 45 %
MCH RBC QN AUTO: 25.2 PG (ref 26.6–33)
MCHC RBC AUTO-ENTMCNC: 30.7 G/DL (ref 31.5–35.7)
MCV RBC AUTO: 82 FL (ref 79–97)
MONOCYTES # BLD AUTO: 0.3 X10E3/UL (ref 0.1–0.9)
MONOCYTES NFR BLD AUTO: 7 %
NEUTROPHILS # BLD AUTO: 2.1 X10E3/UL (ref 1.4–7)
NEUTROPHILS NFR BLD AUTO: 45 %
PLATELET # BLD AUTO: 270 X10E3/UL (ref 150–450)
POTASSIUM SERPL-SCNC: 4.7 MMOL/L (ref 3.5–5.2)
PROT SERPL-MCNC: 6.7 G/DL (ref 6–8.5)
RBC # BLD AUTO: 4.48 X10E6/UL (ref 3.77–5.28)
SODIUM SERPL-SCNC: 143 MMOL/L (ref 134–144)
T4 FREE SERPL-MCNC: 0.91 NG/DL (ref 0.82–1.77)
TSH SERPL DL<=0.005 MIU/L-ACNC: 1.91 UIU/ML (ref 0.45–4.5)
WBC # BLD AUTO: 4.6 X10E3/UL (ref 3.4–10.8)

## 2022-07-07 ENCOUNTER — TELEPHONE (OUTPATIENT)
Dept: FAMILY MEDICINE CLINIC | Facility: CLINIC | Age: 37
End: 2022-07-07

## 2022-07-07 NOTE — TELEPHONE ENCOUNTER
Caller: Sundeep Espino    Relationship: Self    Best call back number: [phone]    What test was performed: LABS    When was the test performed: 06.24.22     Additional notes: PATIENT STATES SHE WOULD LIKE HER LAB RESULTS SENT OVER TO TAMIKO GARNER AT [facility]    FAX # [phone]

## 2022-08-19 ENCOUNTER — APPOINTMENT (OUTPATIENT)
Dept: GENERAL RADIOLOGY | Facility: HOSPITAL | Age: 37
End: 2022-08-19

## 2022-08-19 ENCOUNTER — HOSPITAL ENCOUNTER (EMERGENCY)
Facility: HOSPITAL | Age: 37
Discharge: HOME | End: 2022-08-19
Attending: EMERGENCY MEDICINE | Admitting: EMERGENCY MEDICINE

## 2022-08-19 VITALS
TEMPERATURE: 98.4 F | DIASTOLIC BLOOD PRESSURE: 90 MMHG | OXYGEN SATURATION: 97 % | BODY MASS INDEX: 27 KG/M2 | HEART RATE: 99 BPM | RESPIRATION RATE: 16 BRPM | SYSTOLIC BLOOD PRESSURE: 136 MMHG | HEIGHT: 66 IN | WEIGHT: 168 LBS
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
## 2022-08-19 DIAGNOSIS — S52.92XA RADIUS/ULNA FRACTURE, LEFT, CLOSED, INITIAL ENCOUNTER: Primary | ICD-10-CM

## 2022-08-19 DIAGNOSIS — S52.202A RADIUS/ULNA FRACTURE, LEFT, CLOSED, INITIAL ENCOUNTER: Primary | ICD-10-CM

## 2022-08-19 LAB
ALBUMIN SERPL-MCNC: 3.8 G/DL (ref 3.5–5.2)
ALBUMIN/GLOB SERPL: 1.3 G/DL
ALP SERPL-CCNC: 103 U/L (ref 39–117)
ALT SERPL W P-5'-P-CCNC: 12 U/L (ref 1–33)
ANION GAP SERPL CALCULATED.3IONS-SCNC: 8.4 MMOL/L (ref 5–15)
APTT PPP: 26.3 SECONDS (ref 22.7–35.4)
AST SERPL-CCNC: 15 U/L (ref 1–32)
BASOPHILS # BLD AUTO: 0.06 10*3/MM3 (ref 0–0.2)
BASOPHILS NFR BLD AUTO: 0.7 % (ref 0–1.5)
BILIRUB SERPL-MCNC: 0.7 MG/DL (ref 0–1.2)
BUN SERPL-MCNC: 6 MG/DL (ref 6–20)
BUN/CREAT SERPL: 8.7 (ref 7–25)
CALCIUM SPEC-SCNC: 8.6 MG/DL (ref 8.6–10.5)
CHLORIDE SERPL-SCNC: 107 MMOL/L (ref 98–107)
CO2 SERPL-SCNC: 24.6 MMOL/L (ref 22–29)
CREAT SERPL-MCNC: 0.69 MG/DL (ref 0.57–1)
DEPRECATED RDW RBC AUTO: 43.4 FL (ref 37–54)
EGFRCR SERPLBLD CKD-EPI 2021: 116.2 ML/MIN/1.73
EOSINOPHIL # BLD AUTO: 0.05 10*3/MM3 (ref 0–0.4)
EOSINOPHIL NFR BLD AUTO: 0.6 % (ref 0.3–6.2)
ERYTHROCYTE [DISTWIDTH] IN BLOOD BY AUTOMATED COUNT: 14.5 % (ref 12.3–15.4)
GLOBULIN UR ELPH-MCNC: 2.9 GM/DL
GLUCOSE SERPL-MCNC: 98 MG/DL (ref 65–99)
HCT VFR BLD AUTO: 34.2 % (ref 34–46.6)
HGB BLD-MCNC: 10.4 G/DL (ref 12–15.9)
IMM GRANULOCYTES # BLD AUTO: 0.02 10*3/MM3 (ref 0–0.05)
IMM GRANULOCYTES NFR BLD AUTO: 0.2 % (ref 0–0.5)
INR PPP: 0.97 (ref 0.9–1.1)
LYMPHOCYTES # BLD AUTO: 1.6 10*3/MM3 (ref 0.7–3.1)
LYMPHOCYTES NFR BLD AUTO: 17.7 % (ref 19.6–45.3)
MCH RBC QN AUTO: 25.3 PG (ref 26.6–33)
MCHC RBC AUTO-ENTMCNC: 30.4 G/DL (ref 31.5–35.7)
MCV RBC AUTO: 83.2 FL (ref 79–97)
MONOCYTES # BLD AUTO: 0.9 10*3/MM3 (ref 0.1–0.9)
MONOCYTES NFR BLD AUTO: 9.9 % (ref 5–12)
NEUTROPHILS NFR BLD AUTO: 6.42 10*3/MM3 (ref 1.7–7)
NEUTROPHILS NFR BLD AUTO: 70.9 % (ref 42.7–76)
NRBC BLD AUTO-RTO: 0 /100 WBC (ref 0–0.2)
PLATELET # BLD AUTO: 229 10*3/MM3 (ref 140–450)
PMV BLD AUTO: 10.2 FL (ref 6–12)
POTASSIUM SERPL-SCNC: 3.5 MMOL/L (ref 3.5–5.2)
PROT SERPL-MCNC: 6.7 G/DL (ref 6–8.5)
PROTHROMBIN TIME: 12.8 SECONDS (ref 11.7–14.2)
RBC # BLD AUTO: 4.11 10*6/MM3 (ref 3.77–5.28)
SARS-COV-2 RNA RESP QL NAA+PROBE: NOT DETECTED
SODIUM SERPL-SCNC: 140 MMOL/L (ref 136–145)
WBC NRBC COR # BLD: 9.05 10*3/MM3 (ref 3.4–10.8)

## 2022-08-19 PROCEDURE — 73090 X-RAY EXAM OF FOREARM: CPT

## 2022-08-19 PROCEDURE — 36415 COLL VENOUS BLD VENIPUNCTURE: CPT

## 2022-08-19 PROCEDURE — 85730 THROMBOPLASTIN TIME PARTIAL: CPT | Performed by: EMERGENCY MEDICINE

## 2022-08-19 PROCEDURE — 85025 COMPLETE CBC W/AUTO DIFF WBC: CPT | Performed by: EMERGENCY MEDICINE

## 2022-08-19 PROCEDURE — 85610 PROTHROMBIN TIME: CPT | Performed by: EMERGENCY MEDICINE

## 2022-08-19 PROCEDURE — 96376 TX/PRO/DX INJ SAME DRUG ADON: CPT

## 2022-08-19 PROCEDURE — U0003 INFECTIOUS AGENT DETECTION BY NUCLEIC ACID (DNA OR RNA); SEVERE ACUTE RESPIRATORY SYNDROME CORONAVIRUS 2 (SARS-COV-2) (CORONAVIRUS DISEASE [COVID-19]), AMPLIFIED PROBE TECHNIQUE, MAKING USE OF HIGH THROUGHPUT TECHNOLOGIES AS DESCRIBED BY CMS-2020-01-R: HCPCS | Performed by: EMERGENCY MEDICINE

## 2022-08-19 PROCEDURE — 99284 EMERGENCY DEPT VISIT MOD MDM: CPT

## 2022-08-19 PROCEDURE — 96375 TX/PRO/DX INJ NEW DRUG ADDON: CPT

## 2022-08-19 PROCEDURE — 25010000002 HYDROMORPHONE PER 4 MG: Performed by: EMERGENCY MEDICINE

## 2022-08-19 PROCEDURE — 80053 COMPREHEN METABOLIC PANEL: CPT | Performed by: EMERGENCY MEDICINE

## 2022-08-19 PROCEDURE — 73110 X-RAY EXAM OF WRIST: CPT

## 2022-08-19 PROCEDURE — 96374 THER/PROPH/DIAG INJ IV PUSH: CPT

## 2022-08-19 RX ORDER — OXYCODONE HYDROCHLORIDE AND ACETAMINOPHEN 5; 325 MG/1; MG/1
TABLET ORAL
Qty: 30 TABLET | Refills: 0 | Status: SHIPPED | OUTPATIENT
Start: 2022-08-19 | End: 2022-09-16

## 2022-08-19 RX ORDER — HYDROMORPHONE HYDROCHLORIDE 1 MG/ML
0.5 INJECTION, SOLUTION INTRAMUSCULAR; INTRAVENOUS; SUBCUTANEOUS ONCE
Status: COMPLETED | OUTPATIENT
Start: 2022-08-19 | End: 2022-08-19

## 2022-08-19 RX ORDER — OXYCODONE HYDROCHLORIDE AND ACETAMINOPHEN 5; 325 MG/1; MG/1
2 TABLET ORAL ONCE
Status: COMPLETED | OUTPATIENT
Start: 2022-08-19 | End: 2022-08-19

## 2022-08-19 RX ORDER — IBUPROFEN 600 MG/1
600 TABLET ORAL 3 TIMES DAILY
Qty: 30 TABLET | Refills: 0 | Status: SHIPPED | OUTPATIENT
Start: 2022-08-19 | End: 2022-09-16

## 2022-08-19 RX ORDER — SODIUM CHLORIDE 0.9 % (FLUSH) 0.9 %
10 SYRINGE (ML) INJECTION AS NEEDED
Status: DISCONTINUED | OUTPATIENT
Start: 2022-08-19 | End: 2022-08-19 | Stop reason: HOSPADM

## 2022-08-19 RX ORDER — ONDANSETRON 4 MG/1
TABLET, ORALLY DISINTEGRATING ORAL
Qty: 20 TABLET | Refills: 0 | Status: SHIPPED | OUTPATIENT
Start: 2022-08-19

## 2022-08-19 RX ADMIN — HYDROMORPHONE HYDROCHLORIDE 0.5 MG: 1 INJECTION, SOLUTION INTRAMUSCULAR; INTRAVENOUS; SUBCUTANEOUS at 18:55

## 2022-08-19 RX ADMIN — SODIUM CHLORIDE 500 ML: 9 INJECTION, SOLUTION INTRAVENOUS at 18:58

## 2022-08-19 RX ADMIN — OXYCODONE AND ACETAMINOPHEN 2 TABLET: 5; 325 TABLET ORAL at 21:21

## 2022-08-19 RX ADMIN — HYDROMORPHONE HYDROCHLORIDE 0.5 MG: 1 INJECTION, SOLUTION INTRAMUSCULAR; INTRAVENOUS; SUBCUTANEOUS at 19:33
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
## 2022-08-21 ENCOUNTER — APPOINTMENT (OUTPATIENT)
Dept: CT IMAGING | Facility: HOSPITAL | Age: 37
End: 2022-08-21

## 2022-08-21 ENCOUNTER — HOSPITAL ENCOUNTER (EMERGENCY)
Facility: HOSPITAL | Age: 37
Discharge: HOME | End: 2022-08-21
Attending: EMERGENCY MEDICINE | Admitting: EMERGENCY MEDICINE

## 2022-08-21 VITALS
TEMPERATURE: 98.8 F | WEIGHT: 168 LBS | RESPIRATION RATE: 18 BRPM | BODY MASS INDEX: 27.99 KG/M2 | HEIGHT: 65 IN | HEART RATE: 91 BPM | DIASTOLIC BLOOD PRESSURE: 79 MMHG | SYSTOLIC BLOOD PRESSURE: 120 MMHG | OXYGEN SATURATION: 100 %
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
## 2022-08-21 DIAGNOSIS — N83.201 RIGHT OVARIAN CYST: ICD-10-CM

## 2022-08-21 DIAGNOSIS — S30.1XXA CONTUSION OF ABDOMINAL WALL, INITIAL ENCOUNTER: ICD-10-CM

## 2022-08-21 DIAGNOSIS — S62.102A CLOSED FRACTURE OF LEFT WRIST, INITIAL ENCOUNTER: ICD-10-CM

## 2022-08-21 LAB
ALBUMIN SERPL-MCNC: 3.5 G/DL (ref 3.5–5.2)
ALBUMIN/GLOB SERPL: 1.5 G/DL
ALP SERPL-CCNC: 90 U/L (ref 39–117)
ALT SERPL W P-5'-P-CCNC: 12 U/L (ref 1–33)
ANION GAP SERPL CALCULATED.3IONS-SCNC: 7 MMOL/L (ref 5–15)
AST SERPL-CCNC: 18 U/L (ref 1–32)
BASOPHILS # BLD AUTO: 0.06 10*3/MM3 (ref 0–0.2)
BASOPHILS NFR BLD AUTO: 1 % (ref 0–1.5)
BILIRUB SERPL-MCNC: 0.7 MG/DL (ref 0–1.2)
BUN SERPL-MCNC: 9 MG/DL (ref 6–20)
BUN/CREAT SERPL: 11.8 (ref 7–25)
CALCIUM SPEC-SCNC: 8.4 MG/DL (ref 8.6–10.5)
CHLORIDE SERPL-SCNC: 107 MMOL/L (ref 98–107)
CO2 SERPL-SCNC: 27 MMOL/L (ref 22–29)
CREAT SERPL-MCNC: 0.76 MG/DL (ref 0.57–1)
DEPRECATED RDW RBC AUTO: 40.8 FL (ref 37–54)
EGFRCR SERPLBLD CKD-EPI 2021: 104.9 ML/MIN/1.73
EOSINOPHIL # BLD AUTO: 0.1 10*3/MM3 (ref 0–0.4)
EOSINOPHIL NFR BLD AUTO: 1.7 % (ref 0.3–6.2)
ERYTHROCYTE [DISTWIDTH] IN BLOOD BY AUTOMATED COUNT: 14 % (ref 12.3–15.4)
GLOBULIN UR ELPH-MCNC: 2.4 GM/DL
GLUCOSE SERPL-MCNC: 89 MG/DL (ref 65–99)
HCG SERPL QL: NEGATIVE
HCT VFR BLD AUTO: 29.3 % (ref 34–46.6)
HGB BLD-MCNC: 9.3 G/DL (ref 12–15.9)
IMM GRANULOCYTES # BLD AUTO: 0.02 10*3/MM3 (ref 0–0.05)
IMM GRANULOCYTES NFR BLD AUTO: 0.3 % (ref 0–0.5)
LYMPHOCYTES # BLD AUTO: 1.85 10*3/MM3 (ref 0.7–3.1)
LYMPHOCYTES NFR BLD AUTO: 31.8 % (ref 19.6–45.3)
MCH RBC QN AUTO: 25.7 PG (ref 26.6–33)
MCHC RBC AUTO-ENTMCNC: 31.7 G/DL (ref 31.5–35.7)
MCV RBC AUTO: 80.9 FL (ref 79–97)
MONOCYTES # BLD AUTO: 0.54 10*3/MM3 (ref 0.1–0.9)
MONOCYTES NFR BLD AUTO: 9.3 % (ref 5–12)
NEUTROPHILS NFR BLD AUTO: 3.25 10*3/MM3 (ref 1.7–7)
NEUTROPHILS NFR BLD AUTO: 55.9 % (ref 42.7–76)
NRBC BLD AUTO-RTO: 0 /100 WBC (ref 0–0.2)
PLATELET # BLD AUTO: 217 10*3/MM3 (ref 140–450)
PMV BLD AUTO: 10.1 FL (ref 6–12)
POTASSIUM SERPL-SCNC: 3.8 MMOL/L (ref 3.5–5.2)
PROT SERPL-MCNC: 5.9 G/DL (ref 6–8.5)
RBC # BLD AUTO: 3.62 10*6/MM3 (ref 3.77–5.28)
SODIUM SERPL-SCNC: 141 MMOL/L (ref 136–145)
WBC NRBC COR # BLD: 5.82 10*3/MM3 (ref 3.4–10.8)

## 2022-08-21 PROCEDURE — 25010000002 ONDANSETRON PER 1 MG: Performed by: EMERGENCY MEDICINE

## 2022-08-21 PROCEDURE — 25010000002 MORPHINE PER 10 MG: Performed by: EMERGENCY MEDICINE

## 2022-08-21 PROCEDURE — 99283 EMERGENCY DEPT VISIT LOW MDM: CPT

## 2022-08-21 PROCEDURE — 25010000002 IOPAMIDOL 61 % SOLUTION: Performed by: EMERGENCY MEDICINE

## 2022-08-21 PROCEDURE — 96375 TX/PRO/DX INJ NEW DRUG ADDON: CPT

## 2022-08-21 PROCEDURE — 96374 THER/PROPH/DIAG INJ IV PUSH: CPT

## 2022-08-21 PROCEDURE — 85025 COMPLETE CBC W/AUTO DIFF WBC: CPT | Performed by: PHYSICIAN ASSISTANT

## 2022-08-21 PROCEDURE — 84703 CHORIONIC GONADOTROPIN ASSAY: CPT | Performed by: PHYSICIAN ASSISTANT

## 2022-08-21 PROCEDURE — 74177 CT ABD & PELVIS W/CONTRAST: CPT

## 2022-08-21 PROCEDURE — 80053 COMPREHEN METABOLIC PANEL: CPT | Performed by: PHYSICIAN ASSISTANT

## 2022-08-21 RX ORDER — ONDANSETRON 2 MG/ML
4 INJECTION INTRAMUSCULAR; INTRAVENOUS ONCE
Status: COMPLETED | OUTPATIENT
Start: 2022-08-21 | End: 2022-08-21

## 2022-08-21 RX ORDER — MORPHINE SULFATE 2 MG/ML
4 INJECTION, SOLUTION INTRAMUSCULAR; INTRAVENOUS ONCE
Status: COMPLETED | OUTPATIENT
Start: 2022-08-21 | End: 2022-08-21

## 2022-08-21 RX ORDER — SODIUM CHLORIDE 0.9 % (FLUSH) 0.9 %
10 SYRINGE (ML) INJECTION AS NEEDED
Status: DISCONTINUED | OUTPATIENT
Start: 2022-08-21 | End: 2022-08-21 | Stop reason: HOSPADM

## 2022-08-21 RX ADMIN — ONDANSETRON 4 MG: 2 INJECTION INTRAMUSCULAR; INTRAVENOUS at 19:56

## 2022-08-21 RX ADMIN — IOPAMIDOL 85 ML: 612 INJECTION, SOLUTION INTRAVENOUS at 19:36

## 2022-08-21 RX ADMIN — MORPHINE SULFATE 4 MG: 2 INJECTION, SOLUTION INTRAMUSCULAR; INTRAVENOUS at 19:56
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
## 2022-09-16 ENCOUNTER — OFFICE VISIT (OUTPATIENT)
Dept: INTERNAL MEDICINE | Facility: CLINIC | Age: 37
End: 2022-09-16

## 2022-09-16 VITALS
OXYGEN SATURATION: 100 % | HEIGHT: 65 IN | HEART RATE: 85 BPM | SYSTOLIC BLOOD PRESSURE: 100 MMHG | BODY MASS INDEX: 27.16 KG/M2 | DIASTOLIC BLOOD PRESSURE: 70 MMHG | WEIGHT: 163 LBS | TEMPERATURE: 97 F

## 2022-09-16 DIAGNOSIS — E66.3 OVERWEIGHT: ICD-10-CM

## 2022-09-16 DIAGNOSIS — R52 ACUTE PAIN: ICD-10-CM

## 2022-09-16 DIAGNOSIS — D64.9 NORMOCYTIC ANEMIA: Primary | ICD-10-CM

## 2022-09-16 DIAGNOSIS — S62.102A CLOSED FRACTURE OF LEFT WRIST, INITIAL ENCOUNTER: ICD-10-CM

## 2022-09-16 DIAGNOSIS — J45.909 ASTHMA, UNSPECIFIED ASTHMA SEVERITY, UNSPECIFIED WHETHER COMPLICATED, UNSPECIFIED WHETHER PERSISTENT: ICD-10-CM

## 2022-09-16 DIAGNOSIS — N92.0 MENORRHAGIA WITH REGULAR CYCLE: ICD-10-CM

## 2022-09-16 PROCEDURE — 99215 OFFICE O/P EST HI 40 MIN: CPT | Performed by: STUDENT IN AN ORGANIZED HEALTH CARE EDUCATION/TRAINING PROGRAM

## 2022-09-16 RX ORDER — DILTIAZEM HYDROCHLORIDE 60 MG/1
2 TABLET, FILM COATED ORAL 2 TIMES DAILY
Qty: 10.2 G | Refills: 1 | Status: SHIPPED | OUTPATIENT
Start: 2022-09-16

## 2022-09-16 RX ORDER — CELECOXIB 200 MG/1
200 CAPSULE ORAL 2 TIMES DAILY PRN
Qty: 40 CAPSULE | Refills: 0 | Status: SHIPPED | OUTPATIENT
Start: 2022-09-16 | End: 2022-10-06

## 2022-09-16 RX ORDER — HYDROCODONE BITARTRATE AND ACETAMINOPHEN 5; 325 MG/1; MG/1
1 TABLET ORAL EVERY 6 HOURS PRN
COMMUNITY
Start: 2022-09-15 | End: 2022-10-28

## 2022-09-16 RX ORDER — LEVOTHYROXINE SODIUM 0.12 MG/1
125 TABLET ORAL DAILY
COMMUNITY
Start: 2022-07-22

## 2022-09-16 RX ORDER — TIZANIDINE HYDROCHLORIDE 2 MG/1
2 CAPSULE, GELATIN COATED ORAL NIGHTLY PRN
Qty: 14 CAPSULE | Refills: 0 | Status: SHIPPED | OUTPATIENT
Start: 2022-09-16 | End: 2022-09-30

## 2022-09-17 LAB
BASOPHILS # BLD AUTO: 0.06 10*3/MM3 (ref 0–0.2)
BASOPHILS NFR BLD AUTO: 1 % (ref 0–1.5)
EOSINOPHIL # BLD AUTO: 0.14 10*3/MM3 (ref 0–0.4)
EOSINOPHIL NFR BLD AUTO: 2.3 % (ref 0.3–6.2)
ERYTHROCYTE [DISTWIDTH] IN BLOOD BY AUTOMATED COUNT: 14.5 % (ref 12.3–15.4)
FERRITIN SERPL-MCNC: 38.7 NG/ML (ref 13–150)
FOLATE SERPL-MCNC: 14.2 NG/ML (ref 4.78–24.2)
HCT VFR BLD AUTO: 35.3 % (ref 34–46.6)
HGB BLD-MCNC: 10.6 G/DL (ref 12–15.9)
IMM GRANULOCYTES # BLD AUTO: 0.02 10*3/MM3 (ref 0–0.05)
IMM GRANULOCYTES NFR BLD AUTO: 0.3 % (ref 0–0.5)
IRON SATN MFR SERPL: 5 % (ref 20–50)
IRON SERPL-MCNC: 23 MCG/DL (ref 37–145)
LYMPHOCYTES # BLD AUTO: 2.36 10*3/MM3 (ref 0.7–3.1)
LYMPHOCYTES NFR BLD AUTO: 38.6 % (ref 19.6–45.3)
MCH RBC QN AUTO: 25.2 PG (ref 26.6–33)
MCHC RBC AUTO-ENTMCNC: 30 G/DL (ref 31.5–35.7)
MCV RBC AUTO: 84 FL (ref 79–97)
MONOCYTES # BLD AUTO: 0.53 10*3/MM3 (ref 0.1–0.9)
MONOCYTES NFR BLD AUTO: 8.7 % (ref 5–12)
NEUTROPHILS # BLD AUTO: 3.01 10*3/MM3 (ref 1.7–7)
NEUTROPHILS NFR BLD AUTO: 49.1 % (ref 42.7–76)
NRBC BLD AUTO-RTO: 0 /100 WBC (ref 0–0.2)
PLATELET # BLD AUTO: 239 10*3/MM3 (ref 140–450)
RBC # BLD AUTO: 4.2 10*6/MM3 (ref 3.77–5.28)
RETICS/RBC NFR AUTO: 1.42 % (ref 0.7–1.9)
TIBC SERPL-MCNC: 420 MCG/DL
UIBC SERPL-MCNC: 397 MCG/DL (ref 112–346)
VIT B12 SERPL-MCNC: 389 PG/ML (ref 211–946)
WBC # BLD AUTO: 6.12 10*3/MM3 (ref 3.4–10.8)

## 2022-09-18 RX ORDER — FERROUS SULFATE 325(65) MG
325 TABLET ORAL EVERY OTHER DAY
Qty: 45 TABLET | Refills: 0 | Status: SHIPPED | OUTPATIENT
Start: 2022-09-18 | End: 2022-10-28

## 2022-10-28 ENCOUNTER — OFFICE VISIT (OUTPATIENT)
Dept: INTERNAL MEDICINE | Facility: CLINIC | Age: 37
End: 2022-10-28

## 2022-10-28 VITALS
WEIGHT: 158 LBS | OXYGEN SATURATION: 99 % | TEMPERATURE: 98 F | SYSTOLIC BLOOD PRESSURE: 100 MMHG | HEART RATE: 101 BPM | DIASTOLIC BLOOD PRESSURE: 68 MMHG | BODY MASS INDEX: 26.33 KG/M2 | HEIGHT: 65 IN

## 2022-10-28 DIAGNOSIS — Z23 NEED FOR IMMUNIZATION AGAINST INFLUENZA: ICD-10-CM

## 2022-10-28 DIAGNOSIS — F41.9 ANXIETY AND DEPRESSION: ICD-10-CM

## 2022-10-28 DIAGNOSIS — Z00.00 ANNUAL PHYSICAL EXAM: Primary | ICD-10-CM

## 2022-10-28 DIAGNOSIS — F32.A ANXIETY AND DEPRESSION: ICD-10-CM

## 2022-10-28 DIAGNOSIS — K21.9 GASTROESOPHAGEAL REFLUX DISEASE, UNSPECIFIED WHETHER ESOPHAGITIS PRESENT: ICD-10-CM

## 2022-10-28 DIAGNOSIS — Z23 NEED FOR PNEUMOCOCCAL VACCINE: ICD-10-CM

## 2022-10-28 DIAGNOSIS — D50.9 IRON DEFICIENCY ANEMIA, UNSPECIFIED IRON DEFICIENCY ANEMIA TYPE: ICD-10-CM

## 2022-10-28 DIAGNOSIS — B37.31 VAGINAL YEAST INFECTION: ICD-10-CM

## 2022-10-28 PROCEDURE — 90471 IMMUNIZATION ADMIN: CPT | Performed by: STUDENT IN AN ORGANIZED HEALTH CARE EDUCATION/TRAINING PROGRAM

## 2022-10-28 PROCEDURE — 99395 PREV VISIT EST AGE 18-39: CPT | Performed by: STUDENT IN AN ORGANIZED HEALTH CARE EDUCATION/TRAINING PROGRAM

## 2022-10-28 PROCEDURE — 90677 PCV20 VACCINE IM: CPT | Performed by: STUDENT IN AN ORGANIZED HEALTH CARE EDUCATION/TRAINING PROGRAM

## 2022-10-28 PROCEDURE — 99214 OFFICE O/P EST MOD 30 MIN: CPT | Performed by: STUDENT IN AN ORGANIZED HEALTH CARE EDUCATION/TRAINING PROGRAM

## 2022-10-28 PROCEDURE — 90472 IMMUNIZATION ADMIN EACH ADD: CPT | Performed by: STUDENT IN AN ORGANIZED HEALTH CARE EDUCATION/TRAINING PROGRAM

## 2022-10-28 PROCEDURE — 90686 IIV4 VACC NO PRSV 0.5 ML IM: CPT | Performed by: STUDENT IN AN ORGANIZED HEALTH CARE EDUCATION/TRAINING PROGRAM

## 2022-10-28 RX ORDER — ESCITALOPRAM OXALATE 10 MG/1
10 TABLET ORAL DAILY
Qty: 30 TABLET | Refills: 1 | Status: SHIPPED | OUTPATIENT
Start: 2022-10-28 | End: 2022-11-30 | Stop reason: SDUPTHER

## 2022-10-28 RX ORDER — FLUCONAZOLE 150 MG/1
150 TABLET ORAL ONCE
Qty: 1 TABLET | Refills: 0 | Status: SHIPPED | OUTPATIENT
Start: 2022-10-28 | End: 2022-10-28

## 2022-10-28 NOTE — PROGRESS NOTES
"  Adam Monahan D.O.  Internal Medicine  [facility]  [address], [address]  [place] [address]  [phone]    Chief Complaint  Annual checkup    HISTORY    Sundeep Espino is a 37 y.o. female who presents to the office today as a  an established patient for their annual preventative exam.     No hospitalization(s) within the last year.     Current exercise regimen: not regularly     Status of chronic medical conditions:    Allergies: takes cetirizine and states she used to take Xyzal as well for hives      Asthma: taking symbicort 80-4.5 2 puffs twice daily      Post-ablative hypothyroidism secondary to I-131 TURPIN for Graves disease: takes levothyroxine 125 mcg daily, [facility] not requiring medication    GERD: previously took ranitidine which had improvement but no longer taking because of recall       Iron deficiency nemia: previously took tranzenamic acid for heavy menstural bleeding; she doesn't have GYN. She states her menstrual periods were still heavy even while taking tranzenamic acid. Now taking ferrous sulfate 325 mg every other day.    Anxiety, Depression, ADHD: diagnosed years and years ago. States she worries about a lot of different things. Remembers being on a Straterra before but \"it made me too relaxed\". Previously on effexor.      Patient's overall comments about their health or any other specific health concerns they report:     *States she is dealing with vaginal itching and improving irritation and this started after recent antibiotic therapy. She has clear discharge. No burning with urination.     First day of last menstrual period if pre-menopausal: 10/3/22    Patient's contraception status (if applicable): not currently sexually active       Health Maintenance Summary          Overdue - TDAP/TD VACCINES (2 - Tdap) Overdue since 8/5/2009 08/05/1999  Imm Admin: Td          Overdue - PAP SMEAR (Every 3 Years) " Overdue since 12/28/2018 12/28/2015  HM Pap smear component of HM PAP SMEAR          Overdue - Pneumococcal Vaccine 0-64 (2 - PPSV23 if available, else PCV20) Overdue since 1/13/2021 01/13/2020  Imm Admin: Pneumococcal Conjugate 13-Valent (PCV13)          Overdue - COVID-19 Vaccine (4 - Booster for Pfizer series) Overdue since 3/27/2022    01/30/2022  Imm Admin: Covid-19 (Pfizer) Gray Cap    05/10/2021  Imm Admin: COVID-19 (PFIZER) PURPLE CAP    04/19/2021  Imm Admin: COVID-19 (PFIZER) PURPLE CAP          Overdue - INFLUENZA VACCINE (Yearly - August to March) Overdue since 8/1/2022 12/29/2021  Imm Admin: FluLaval/Fluzone >6mos    12/28/2021  SCANNED - INFLUENZA    12/08/2020  Imm Admin: Flu Vaccine Intradermal Quad 18-64YR    10/22/2019  Done    10/22/2019  Imm Admin: flucelvax quad pfs =>4 YRS    Only the first 5 history entries have been loaded, but more history exists.          LIPID PANEL (Yearly) Next due on 12/29/2022 12/29/2021  Lipid Panel    10/22/2019  Lipid Panel    11/02/2018  Lipid Panel With LDL / HDL Ratio    05/02/2018  Lipid Panel With LDL / HDL Ratio    07/11/2017  Lipid Panel    Only the first 5 history entries have been loaded, but more history exists.          ANNUAL PHYSICAL (Yearly) Next due on 10/29/2023    10/28/2022  Done    10/22/2019  Done    05/02/2018  Registry Metric: Last Annual Physical          HEPATITIS C SCREENING  Completed    12/28/2020  Hep C Virus Ab component of Hepatitis C Antibody                 Allergies   Allergen Reactions   • Aspartame And Phenylalanine Other (See Comments)     headaches   • Banana Nausea And Vomiting   • Beta Adrenergic Blockers Itching, Swelling and Hives   • Metoprolol Other (See Comments)     Face swelling, itching     • Propranolol Other (See Comments)     Face swelling, itching          Outpatient Medications Marked as Taking for the 10/28/22 encounter (Office Visit) with Adam Monahan, DO   Medication Sig Dispense Refill   • cetirizine  (zyrTEC) 10 MG tablet Take 10 mg by mouth Daily.     • levothyroxine (SYNTHROID, LEVOTHROID) 125 MCG tablet Take 125 mcg by mouth Daily.     • Multiple Vitamins-Minerals (MULTIVITAMIN ADULT PO) Take  by mouth.     • PROAIR RESPICLICK 108 (90 Base) MCG/ACT inhaler Inhale 2 puffs As Needed.  3   • Symbicort 80-4.5 MCG/ACT inhaler Inhale 2 puffs 2 (Two) Times a Day. 10.2 g 1   • Tranexamic Acid 650 MG tablet TAKE 2 TABLET THREE TIMES DAILY FOR UP TO 5 DAYS OF CYCLE  11        Past Medical History:   Diagnosis Date   • Allergic rhinitis    • Arthritis    • Asthma    • GERD (gastroesophageal reflux disease)    • H/O radioactive iodine thyroid ablation 03/08/2017   • Heart palpitations    • Hyperthyroidism    • Iron deficiency anemia    • Menstrual problem    • Ovarian cyst    • Thyroid disease    • Upper respiratory infection 05/02/2018   • UTI (urinary tract infection) 05/02/2018   • Vitamin D deficiency      Past Surgical History:   Procedure Laterality Date   • APPENDECTOMY  2012   • BREAST LUMPECTOMY      benign   • COLONOSCOPY  approx 2009    normal per patient   • COLONOSCOPY W/ POLYPECTOMY N/A 08/01/2018    Normal   • EAR TUBES     • ENDOSCOPY N/A 08/01/2018    [facility]   • KELOID EXCISION      right ear   • MANDIBLE SURGERY  05/2016   • ORIF ULNA/RADIUS FRACTURES      Distal radius ORIF with [facility] on 8/25/22     Family History   Problem Relation Age of Onset   • Hypertension Mother    • Diabetes Mother    • Hypertension Father    • Hyperlipidemia Father    • Diabetes Father    • Heart disease Father         pacemaker   • Coronary artery disease Father    • Diabetes Maternal Aunt    • Hypertension Maternal Uncle    • Diabetes Maternal Uncle    • Alcohol abuse Other    • Diabetes Other    • Hypertension Other    • Hypertension Half-Brother    • No Known Problems Half-Sister     reports that she has never smoked. She has never used smokeless tobacco. She reports that she does not currently use alcohol. She reports that she  "does not use drugs.    Immunization History   Administered Date(s) Administered   • COVID-19 (PFIZER) PURPLE CAP 04/19/2021, 05/10/2021   • Covid-19 (Pfizer) Gray Cap 01/30/2022   • Flu Vaccine Intradermal Quad 18-64YR 12/08/2020   • FluLaval/Fluzone >6mos 12/29/2021   • Hep B, Adolescent or Pediatric 08/05/1999   • Pneumococcal Conjugate 13-Valent (PCV13) 01/13/2020   • Td 08/05/1999   • flucelvax quad pfs =>4 YRS 11/02/2018, 10/22/2019        OBJECTIVE    Vital Signs:   /68   Pulse 101   Temp 98 °F (36.7 °C) (Infrared)   Ht 165.1 cm (65\")   Wt 71.7 kg (158 lb)   SpO2 99%   BMI 26.29 kg/m²     Physical Exam  Vitals reviewed.   Constitutional:       General: She is not in acute distress.     Appearance: Normal appearance. She is not ill-appearing.   HENT:      Head: Normocephalic and atraumatic.      Right Ear: Tympanic membrane, ear canal and external ear normal. There is no impacted cerumen.      Left Ear: Tympanic membrane, ear canal and external ear normal. There is no impacted cerumen.      Mouth/Throat:      Mouth: Mucous membranes are moist.      Pharynx: No oropharyngeal exudate or posterior oropharyngeal erythema.      Comments: Geographic tongue   Eyes:      General: No scleral icterus.     Extraocular Movements: Extraocular movements intact.      Conjunctiva/sclera: Conjunctivae normal.      Pupils: Pupils are equal, round, and reactive to light.   Cardiovascular:      Rate and Rhythm: Normal rate and regular rhythm.      Heart sounds: Normal heart sounds. No murmur heard.  Pulmonary:      Effort: Pulmonary effort is normal. No respiratory distress.      Breath sounds: Normal breath sounds. No wheezing.   Abdominal:      General: Bowel sounds are normal. There is no distension.      Palpations: Abdomen is soft.      Tenderness: There is no abdominal tenderness. There is no guarding.   Musculoskeletal:      Cervical back: Neck supple. No tenderness.      Right lower leg: No edema.      Left " lower leg: No edema.   Lymphadenopathy:      Cervical: No cervical adenopathy.   Skin:     General: Skin is warm and dry.      Coloration: Skin is not jaundiced.   Neurological:      General: No focal deficit present.      Mental Status: She is alert and oriented to person, place, and time.      Cranial Nerves: No cranial nerve deficit.      Motor: No weakness.   Psychiatric:         Mood and Affect: Mood normal.         Behavior: Behavior normal.         Thought Content: Thought content normal.                         ASSESSMENT & PLAN     #Annual Preventative Health Examination   -Age and sex appropriate physical exam performed and documented. Updated past medical, family, social and surgical histories as well as allergies and care team list. Addressed care gaps listed in the medical record.  -Encouraged seat belt use for every car ride for patient and all occupants.  Encouraged sunscreen use to reduce risk of skin cancer for any days with sun exposure over 20 minutes. Discussed the importance of smoke and carbon monoxide detectors in the home.   -Encouraged annual dental and vision exams as part of their overall health.  -Encouraged minimum of 30 minutes or more of exercise at a brisk walk or higher 5 days per week combined with a well-balanced diet.  -Immunizations reviewed and updated in EMR.  -Advised that all women who are planning or capable of pregnancy take a daily supplement containing 0.4 to 0.8 mg (400 to 800 ?g) of folic acid.  -Lipid screening:   Lipid Panel    Lipid Panel 12/29/21   Total Cholesterol 160   Triglycerides 58   HDL Cholesterol 45   VLDL Cholesterol 12   LDL Cholesterol  103 (A)   (A) Abnormal value           Patient is less than age 40 and has had a screening lipid panel in the last 4 years that was abnormal. Recommend a diet that is rich in vegetables, fruits, whole grains, legumes, healthy protein sources (low-fat dairy products, low-fat poultry, fish/seafood, and nuts), and  non-tropical vegetable oils (olive and sunflower oil for example); and limit intake of sweets, sugar-sweetened beverages, and red meats. Exercise at least 30 minutes 5 days per week at moderate intensity or more.    -Aspirin for primary or secondary prevention: Not applicable, patient is less than age 50.  -Depression and Anxiety screening: positive anxiety screening  -Diabetes screening:  Patient is 35-70 years of age and overweight, screening for diabetes is indicated every 3 years. Screening is up to date.    -Tobacco use screening: Patient denies cigarette use. Tobacco counseling was was not indicated.  -Alcohol use screening: Patient reports drinking 0-1 drinks per week.. Alcohol abuse counseling was was not indicated.  -Illicit drug screening: Patient does not use illicit drugs.  -Hypertension screening: Patient screened negative for HTN today.  -HIV screening: Discussed with patient the importance of identifying asymptomatic HIV infection for adults in their age group with a one time screening test .Patient declined screening.   -Syphilis screening: Syphilis screening not indicated.  -Hepatitis B virus screening: Screening not indicated, not in a high-risk group.  -Hepatitis C virus screening:  Patient has already completed Hepatitis C screening. Negative screening on file.   -Colon cancer screening: Patient is less than age 45 and colon cancer screening is not indicated.  -Lung cancer screening: Patient is less than age 55, screening not indicated.  -Cervical cancer screening:  Informed patient that the USPSTF recommends screening for cervical cancer every 3 years with cervical cytology alone in women aged 21 to 29 years. For women aged 30 to 65 years, the USPSTF recommends screening every 3 years with cervical cytology alone, every 5 years with high-risk human papillomavirus (hrHPV) testing alone, or every 5 years with HPV testing in combination with cytology (cotesting). Lasp pap: was normal with cotesting  on 1/11/2021  -Breast cancer screening:  Breast cancer screening is not applicable at this time.  -BRCA related cancer screening: Informed patient that the USPSTF recommends that primary care clinicians assess women with a personal or family history of breast, ovarian, tubal, or peritoneal cancer or who have an ancestry associated with breast cancer susceptibility 1 and 2 (BRCA1/2) gene mutations with an appropriate brief familial risk assessment tool and referred to genetic counseling if risk assessment is positive. Patient does not have a known personal or family history.   -Osteoporosis screening: Informed patient that the USPSTF recommends screening for osteoporosis with bone measurement testing to prevent osteoporotic fractures in women 65 years and older. Screening is not applicable at this time.    Follow up in 1 year for annual physical exam.    A problem-based visit was also conducted on the same day, see below for assessment and plan    Diagnoses and all orders for this visit:    1. Iron deficiency anemia, unspecified iron deficiency anemia type   Lab Results   Component Value Date    WBC 6.12 09/16/2022    HGB 10.6 (L) 09/16/2022    HCT 35.3 09/16/2022    MCV 84.0 09/16/2022     09/16/2022   -last CBC as above, also demonstrated iron deficiency anemia with iron saturation of 5%.   -she has seen [facility] in 2018 and received IV iron around that time for iron deficiency that was felt to be related to heavy menstrual bleeding        -she has also had an EGD and colonoscopy with [facility] in 2018 with negative eval and negative biopsies   -she does have eval scheduled with GYN in December, which is good  -she reports being unable to tolerate oral iron therapy so I will refer her back to hematology for consideration of transfusions     2. Anxiety and depression  -diagnosed years and years ago. States she worries about a lot of different things. Remembers being on a Straterra before for ADHD  "but \"it made me too relaxed\". Previously on effexor as well but not for many years.   -GAD7 is 18 in office today, PHQ9 is 0... Seems that her symptoms are entirely anxiety at this point.  -Discussed mental health counseling and provided her a list of in person and online providers  -Discussed medication therapy and she is interested in treatment. Introduced escitalopram. Discussed common side effects including nausea/vomiting/diarrhea/constipation, decreased libido, change in sleep , change in weight, abnormal dreams. She was agreeable to initiate therapy, so will start escitalopram 10 mg daily and have her return in 6 weeks for follow up.  -     escitalopram (Lexapro) 10 MG tablet; Take 1 tablet by mouth Daily.  Dispense: 30 tablet; Refill: 1    3. Vaginal yeast infection  -States she is dealing with vaginal itching and improving irritation and this started after recent antibiotic therapy. She has clear discharge. No burning with urination.   -not currently sexually active  -will treat empirically with fluconazole , if no improvement return for pap smear and testing   -     fluconazole (Diflucan) 150 MG tablet; Take 1 tablet by mouth 1 (One) Time for 1 dose.  Dispense: 1 tablet; Refill: 0    4. Gastroesophageal reflux disease, unspecified whether esophagitis present  -previously took ranitidine which had improvement but no longer taking because of recall   -recommended she try OTC famotidine           The following social determinates of health impact the patient's medical decision making: No social determinates of health were factored in to today's visit.     Follow Up  Return in about 6 weeks (around 12/9/2022) for Recheck.      Patient/family had no further questions at this time and verbalized understanding of the plan discussed today.   "

## 2022-11-30 DIAGNOSIS — F41.9 ANXIETY AND DEPRESSION: ICD-10-CM

## 2022-11-30 DIAGNOSIS — F32.A ANXIETY AND DEPRESSION: ICD-10-CM

## 2022-11-30 RX ORDER — ESCITALOPRAM OXALATE 10 MG/1
10 TABLET ORAL DAILY
Qty: 90 TABLET | Refills: 0 | Status: SHIPPED | OUTPATIENT
Start: 2022-11-30 | End: 2022-12-14

## 2022-12-01 DIAGNOSIS — F32.A ANXIETY AND DEPRESSION: ICD-10-CM

## 2022-12-01 DIAGNOSIS — F41.9 ANXIETY AND DEPRESSION: ICD-10-CM

## 2022-12-06 ENCOUNTER — OFFICE VISIT (OUTPATIENT)
Dept: OBSTETRICS AND GYNECOLOGY | Age: 37
End: 2022-12-06

## 2022-12-06 VITALS
DIASTOLIC BLOOD PRESSURE: 68 MMHG | SYSTOLIC BLOOD PRESSURE: 110 MMHG | BODY MASS INDEX: 25.83 KG/M2 | HEIGHT: 65 IN | WEIGHT: 155 LBS

## 2022-12-06 DIAGNOSIS — Z87.42 HISTORY OF OVARIAN CYST: Primary | ICD-10-CM

## 2022-12-06 DIAGNOSIS — Z11.51 SCREENING FOR HUMAN PAPILLOMAVIRUS: ICD-10-CM

## 2022-12-06 DIAGNOSIS — N94.6 DYSMENORRHEA: ICD-10-CM

## 2022-12-06 DIAGNOSIS — Z00.00 ENCOUNTER FOR ANNUAL PHYSICAL EXAM: ICD-10-CM

## 2022-12-06 PROCEDURE — 99212 OFFICE O/P EST SF 10 MIN: CPT | Performed by: OBSTETRICS & GYNECOLOGY

## 2022-12-06 PROCEDURE — 99385 PREV VISIT NEW AGE 18-39: CPT | Performed by: OBSTETRICS & GYNECOLOGY

## 2022-12-06 RX ORDER — TRANEXAMIC ACID 650 MG/1
1 TABLET ORAL 2 TIMES DAILY PRN
Qty: 30 TABLET | Refills: 11 | Status: SHIPPED | OUTPATIENT
Start: 2022-12-06 | End: 2022-12-10

## 2022-12-06 RX ORDER — SODIUM FLUORIDE 5 MG/G
GEL, DENTIFRICE DENTAL
COMMUNITY
Start: 2022-08-20

## 2022-12-06 RX ORDER — LEVOCETIRIZINE DIHYDROCHLORIDE 5 MG/1
1 TABLET, FILM COATED ORAL DAILY
COMMUNITY

## 2022-12-06 NOTE — PROGRESS NOTES
Subjective     Chief Complaint   Patient presents with   • Gynecologic Exam     New pt, last AE several yrs ago    • Establish Care         History of Present Illness    Wellness exam  Sundeep Espino is a very pleasant  37 y.o. female who presents for annual exam.  Mammo Exam none, Contraception not sexually active, Exercise occasional    Patient states she is here for annual exam as she is overdue but also has several problems she would like to discuss    She is not currently sexually active, has a history of thyroid dysfunction and states she has had a history of a right ovarian cyst in the past, she is completely asymptomatic from that..    Additionally she has intermittent dysmenorrhea and would like us to refill her medications      Obstetric History:  OB History    No obstetric history on file.        Menstrual History:     Patient's last menstrual period was 11/26/2022 (exact date).       Sexual History:       Past Medical History:   Diagnosis Date   • ADHD    • Allergic rhinitis    • Anxiety and depression    • Arthritis    • Asthma    • Chondromalacia patellae    • GERD (gastroesophageal reflux disease)    • H/O radioactive iodine thyroid ablation 03/08/2017   • Heart palpitations    • Hyperthyroidism    • Iron deficiency anemia    • Menstrual problem    • Ovarian cyst    • Thyroid disease    • Upper respiratory infection 05/02/2018   • UTI (urinary tract infection) 05/02/2018   • Vitamin D deficiency      Past Surgical History:   Procedure Laterality Date   • APPENDECTOMY  2012   • BREAST LUMPECTOMY      benign   • COLONOSCOPY  approx 2009    normal per patient   • COLONOSCOPY W/ POLYPECTOMY N/A 08/01/2018    Normal   • EAR TUBES     • ENDOSCOPY N/A 08/01/2018    [facility]   • KELOID EXCISION      right ear   • MANDIBLE SURGERY  05/2016   • ORIF ULNA/RADIUS FRACTURES      Distal radius ORIF with [facility] on 8/25/22       SOCIAL Hx:      The following portions of the patient's history were reviewed and updated as  "appropriate: allergies, current medications, past family history, past medical history, past social history, past surgical history and problem list.    Review of Systems        Except as outlined in history of physical illness, patient denies any changes in her GYN, , GI systems.  All other systems reviewed were negative.         Current Outpatient Medications:   •  cetirizine (zyrTEC) 10 MG tablet, Take 10 mg by mouth Daily., Disp: , Rfl:   •  levothyroxine (SYNTHROID, LEVOTHROID) 125 MCG tablet, Take 125 mcg by mouth Daily., Disp: , Rfl:   •  Multiple Vitamins-Minerals (MULTIVITAMIN ADULT PO), Take  by mouth., Disp: , Rfl:   •  PROAIR RESPICLICK 108 (90 Base) MCG/ACT inhaler, Inhale 2 puffs As Needed., Disp: , Rfl: 3  •  Sodium Fluoride 1.1 % gel, BRUSH WITH EACH NIGHT. EXPECTORATE DON RINSE, Disp: , Rfl:   •  Symbicort 80-4.5 MCG/ACT inhaler, Inhale 2 puffs 2 (Two) Times a Day., Disp: 10.2 g, Rfl: 1  •  Tranexamic Acid 650 MG tablet, Take 1 tablet by mouth 2 (Two) Times a Day As Needed (Dysmenorrhea) for up to 4 days., Disp: 30 tablet, Rfl: 11  •  escitalopram (Lexapro) 10 MG tablet, Take 1 tablet by mouth Daily., Disp: 90 tablet, Rfl: 0  •  levocetirizine (XYZAL) 5 MG tablet, Take 1 tablet by mouth Daily., Disp: , Rfl:   •  ondansetron ODT (Zofran ODT) 4 MG disintegrating tablet, Take one tablet by mouth every 6 hours as needed for nausea and vomiting, Disp: 20 tablet, Rfl: 0   Objective   Physical Exam    /68   Ht 165.1 cm (65\")   Wt 70.3 kg (155 lb)   LMP 11/26/2022 (Exact Date)   BMI 25.79 kg/m²     General: Patient is alert and oriented and appears overall healthy  Neck: Is supple without thyromegaly, no carotid bruits and no lymphadenopathy  Lungs: Clear bilaterally, no wheezing, rhonchi, or rales.  Respiratory rate is normal  Breast: Even, symmetrical, no lymphadenopathy, no retraction, no masses or cysts  Heart: Regular rate and rhythm are appreciated, no murmurs or rubs are heard  Abdomen: Is " soft, without organomegaly, bowel sounds are positive, there is no rebound or guarding and palpation does not produce any discomfort  Back: Nontender without CVA tenderness  Pelvic: External genitalia appear normal and consistent with mature female.  BUS normal                            Vagina is clean dry without discharge and appears adequately estrogenized, no lesions or masses are present                         Cervix is noninflamed without discharge or lesions.  There is no cervical motion tenderness.                Uterus is nonenlarged, without tenderness, and no masses or abnormalities are  present               Adnexa are non-enlarged, non tender, I was unable to palpate the cyst               Rectal exam reveals adequate sphincter tone and no masses or lesions are appreciated on digital rectal examination.      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 History of ovarian cyst (Primary)  Nonpalpable today, patient is asymptomatic, will check an ultrasound to confirm status  2. Encounter for annual physical exam  -     IGP,CtNgTv,Apt HPV,rfx 16 / 18,45    3. Screening for human papillomavirus  -     IGP,CtNgTv,Apt HPV,rfx 16 / 18,45    4. Dysmenorrhea  Medicine as prescribed below  Other orders  -     Tranexamic Acid 650 MG tablet; Take 1 tablet by mouth 2 (Two) Times a Day As Needed (Dysmenorrhea) for up to 4 days.  Dispense: 30 tablet; Refill: 11      Discussed today's findings and concerns with patient.  Continue to recommend regular exercise including cardiovascular and resistance training as well as  breast self-exam. Wellness lab, mammography, & pap smear, in accordance with age guidelines.    I have encouraged her to call for today's test results if she has not received them within 10 days.  Patient is advised to call with any change in her condition or with any other questions, otherwise return  for annual examination.

## 2022-12-09 ENCOUNTER — CONSULT (OUTPATIENT)
Dept: ONCOLOGY | Facility: CLINIC | Age: 37
End: 2022-12-09

## 2022-12-09 ENCOUNTER — TELEPHONE (OUTPATIENT)
Dept: ONCOLOGY | Facility: CLINIC | Age: 37
End: 2022-12-09

## 2022-12-09 ENCOUNTER — LAB (OUTPATIENT)
Dept: LAB | Facility: HOSPITAL | Age: 37
End: 2022-12-09

## 2022-12-09 VITALS
HEART RATE: 89 BPM | WEIGHT: 155.5 LBS | RESPIRATION RATE: 16 BRPM | SYSTOLIC BLOOD PRESSURE: 104 MMHG | OXYGEN SATURATION: 100 % | BODY MASS INDEX: 25.91 KG/M2 | HEIGHT: 65 IN | DIASTOLIC BLOOD PRESSURE: 70 MMHG | TEMPERATURE: 97 F

## 2022-12-09 DIAGNOSIS — D50.9 IRON DEFICIENCY ANEMIA, UNSPECIFIED IRON DEFICIENCY ANEMIA TYPE: Primary | ICD-10-CM

## 2022-12-09 DIAGNOSIS — D50.0 IRON DEFICIENCY ANEMIA DUE TO CHRONIC BLOOD LOSS: Primary | ICD-10-CM

## 2022-12-09 DIAGNOSIS — E05.00 GRAVES DISEASE: ICD-10-CM

## 2022-12-09 LAB
BASOPHILS # BLD AUTO: 0.08 10*3/MM3 (ref 0–0.2)
BASOPHILS NFR BLD AUTO: 0.9 % (ref 0–1.5)
DEPRECATED RDW RBC AUTO: 40 FL (ref 37–54)
EOSINOPHIL # BLD AUTO: 0.13 10*3/MM3 (ref 0–0.4)
EOSINOPHIL NFR BLD AUTO: 1.5 % (ref 0.3–6.2)
ERYTHROCYTE [DISTWIDTH] IN BLOOD BY AUTOMATED COUNT: 14.1 % (ref 12.3–15.4)
FERRITIN SERPL-MCNC: 17.8 NG/ML (ref 11–207)
HCT VFR BLD AUTO: 36.5 % (ref 34–46.6)
HGB BLD-MCNC: 11.2 G/DL (ref 12–15.9)
IMM GRANULOCYTES # BLD AUTO: 0.09 10*3/MM3 (ref 0–0.05)
IMM GRANULOCYTES NFR BLD AUTO: 1.1 % (ref 0–0.5)
IRON 24H UR-MRATE: 32 MCG/DL (ref 37–145)
IRON SATN MFR SERPL: 8 % (ref 14–48)
LYMPHOCYTES # BLD AUTO: 3.44 10*3/MM3 (ref 0.7–3.1)
LYMPHOCYTES NFR BLD AUTO: 40.4 % (ref 19.6–45.3)
MCH RBC QN AUTO: 24.2 PG (ref 26.6–33)
MCHC RBC AUTO-ENTMCNC: 30.7 G/DL (ref 31.5–35.7)
MCV RBC AUTO: 78.8 FL (ref 79–97)
MONOCYTES # BLD AUTO: 0.58 10*3/MM3 (ref 0.1–0.9)
MONOCYTES NFR BLD AUTO: 6.8 % (ref 5–12)
NEUTROPHILS NFR BLD AUTO: 4.19 10*3/MM3 (ref 1.7–7)
NEUTROPHILS NFR BLD AUTO: 49.3 % (ref 42.7–76)
NRBC BLD AUTO-RTO: 0 /100 WBC (ref 0–0.2)
PLATELET # BLD AUTO: 183 10*3/MM3 (ref 140–450)
PMV BLD AUTO: 11.1 FL (ref 6–12)
RBC # BLD AUTO: 4.63 10*6/MM3 (ref 3.77–5.28)
T4 FREE SERPL-MCNC: 1.49 NG/DL (ref 0.93–1.7)
TIBC SERPL-MCNC: 414 MCG/DL (ref 249–505)
TRANSFERRIN SERPL-MCNC: 296 MG/DL (ref 200–360)
TSH SERPL DL<=0.05 MIU/L-ACNC: 0.05 UIU/ML (ref 0.27–4.2)
WBC NRBC COR # BLD: 8.51 10*3/MM3 (ref 3.4–10.8)

## 2022-12-09 PROCEDURE — 84443 ASSAY THYROID STIM HORMONE: CPT | Performed by: INTERNAL MEDICINE

## 2022-12-09 PROCEDURE — 82728 ASSAY OF FERRITIN: CPT | Performed by: INTERNAL MEDICINE

## 2022-12-09 PROCEDURE — 36415 COLL VENOUS BLD VENIPUNCTURE: CPT

## 2022-12-09 PROCEDURE — 84466 ASSAY OF TRANSFERRIN: CPT | Performed by: INTERNAL MEDICINE

## 2022-12-09 PROCEDURE — 99204 OFFICE O/P NEW MOD 45 MIN: CPT | Performed by: INTERNAL MEDICINE

## 2022-12-09 PROCEDURE — 83540 ASSAY OF IRON: CPT | Performed by: INTERNAL MEDICINE

## 2022-12-09 PROCEDURE — 85025 COMPLETE CBC W/AUTO DIFF WBC: CPT

## 2022-12-09 PROCEDURE — 84439 ASSAY OF FREE THYROXINE: CPT | Performed by: INTERNAL MEDICINE
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
## 2022-12-09 NOTE — TELEPHONE ENCOUNTER
----- Message from Javon Schaeffer MD sent at 12/9/2022 11:13 AM EST -----  Please fax thyroid labs to her endo

## 2022-12-12 LAB
C TRACH RRNA CVX QL NAA+PROBE: NEGATIVE
CYTOLOGIST CVX/VAG CYTO: NORMAL
CYTOLOGY CVX/VAG DOC CYTO: NORMAL
CYTOLOGY CVX/VAG DOC THIN PREP: NORMAL
DX ICD CODE: NORMAL
HIV 1 & 2 AB SER-IMP: NORMAL
HPV GENOTYPE REFLEX: NORMAL
HPV I/H RISK 4 DNA CVX QL PROBE+SIG AMP: NEGATIVE
N GONORRHOEA RRNA CVX QL NAA+PROBE: NEGATIVE
OTHER STN SPEC: NORMAL
STAT OF ADQ CVX/VAG CYTO-IMP: NORMAL
T VAGINALIS RRNA SPEC QL NAA+PROBE: NEGATIVE

## 2022-12-14 ENCOUNTER — OFFICE VISIT (OUTPATIENT)
Dept: INTERNAL MEDICINE | Facility: CLINIC | Age: 37
End: 2022-12-14

## 2022-12-14 VITALS
HEIGHT: 65 IN | DIASTOLIC BLOOD PRESSURE: 60 MMHG | BODY MASS INDEX: 25.99 KG/M2 | HEART RATE: 97 BPM | TEMPERATURE: 96.6 F | OXYGEN SATURATION: 97 % | WEIGHT: 156 LBS | SYSTOLIC BLOOD PRESSURE: 100 MMHG

## 2022-12-14 DIAGNOSIS — F41.9 ANXIETY AND DEPRESSION: Primary | ICD-10-CM

## 2022-12-14 DIAGNOSIS — F32.A ANXIETY AND DEPRESSION: Primary | ICD-10-CM

## 2022-12-14 PROCEDURE — 99214 OFFICE O/P EST MOD 30 MIN: CPT | Performed by: STUDENT IN AN ORGANIZED HEALTH CARE EDUCATION/TRAINING PROGRAM

## 2022-12-14 NOTE — PROGRESS NOTES
Adam Monahan D.O.  Internal Medicine  [facility]  [address], [address]  [place] [address]  [phone]      Chief Complaint  Anxiety    SUBJECTIVE    History of Present Illness    Sundeep Espino is a 37 y.o. female who presents to the office today as an established patient that last saw me on 10/28/2022.     Anxiety and Depression: at last visit started escitalopram 10 mg daily but she was unable to tolerate it after a week and a half to two weeks due to side effects. Was not able to sleep, had diarrhea, and no appetite. Has not made any appointments for mental health counseling.     Allergies   Allergen Reactions   • Aspartame And Phenylalanine Other (See Comments)     headaches   • Banana Nausea And Vomiting   • Beta Adrenergic Blockers Itching, Swelling and Hives   • Metoprolol Other (See Comments)     Face swelling, itching     • Propranolol Other (See Comments)     Face swelling, itching          Outpatient Medications Marked as Taking for the 12/14/22 encounter (Office Visit) with Adam Monahan, DO   Medication Sig Dispense Refill   • cetirizine (zyrTEC) 10 MG tablet Take 10 mg by mouth Daily.     • escitalopram (Lexapro) 10 MG tablet Take 1 tablet by mouth Daily. 90 tablet 0   • levocetirizine (XYZAL) 5 MG tablet Take 1 tablet by mouth Daily.     • levothyroxine (SYNTHROID, LEVOTHROID) 125 MCG tablet Take 125 mcg by mouth Daily.     • Multiple Vitamins-Minerals (MULTIVITAMIN ADULT PO) Take  by mouth.     • ondansetron ODT (Zofran ODT) 4 MG disintegrating tablet Take one tablet by mouth every 6 hours as needed for nausea and vomiting 20 tablet 0   • PROAIR RESPICLICK 108 (90 Base) MCG/ACT inhaler Inhale 2 puffs As Needed.  3   • Sodium Fluoride 1.1 % gel BRUSH WITH EACH NIGHT. EXPECTORATE DON RINSE     • Symbicort 80-4.5 MCG/ACT inhaler Inhale 2 puffs 2 (Two) Times a Day. 10.2 g 1        Past Medical History:   Diagnosis Date   • ADHD    • Allergic rhinitis    • Anxiety and  "depression    • Arthritis    • Asthma    • Chondromalacia patellae    • GERD (gastroesophageal reflux disease)    • H/O radioactive iodine thyroid ablation 03/08/2017   • Heart palpitations    • Hyperthyroidism    • Iron deficiency anemia    • Menstrual problem    • Ovarian cyst    • Thyroid disease    • Upper respiratory infection 05/02/2018   • UTI (urinary tract infection) 05/02/2018   • Vitamin D deficiency        OBJECTIVE    Vital Signs:   /60   Pulse 97   Temp 96.6 °F (35.9 °C) (Infrared)   Ht 165.1 cm (65\")   Wt 70.8 kg (156 lb)   SpO2 97%   BMI 25.96 kg/m²     Physical Exam  Vitals reviewed.   Constitutional:       General: She is not in acute distress.     Appearance: Normal appearance. She is not ill-appearing.   HENT:      Head: Atraumatic.   Eyes:      General: No scleral icterus.  Pulmonary:      Effort: Pulmonary effort is normal. No respiratory distress.   Neurological:      Mental Status: She is alert.   Psychiatric:         Mood and Affect: Mood normal.         Behavior: Behavior normal.         Thought Content: Thought content normal.                             ASSESSMENT & PLAN     Diagnoses and all orders for this visit:    1. Anxiety and depression (Primary)  -at last visit started escitalopram 10 mg daily but she was unable to tolerate it after a week and a half to two weeks due to side effects. Was not able to sleep, had diarrhea, and no appetite. Has not made any appointments for mental health counseling.   -at this point will formally discontinue the medication; advised her that other options exist for treatment of anxiety and depression but at this point she wants to wait until she gets other health care issues sorted out such as her IV iron infusions before trying anything else; she is hoping having her Iron replaced will improve some of her symptoms  -again I recommended she reach out to mental health counseling as well because medication is only part of the treatment " regimen for anxiety and depression   -made her aware that I remain available in the interim should she be interested in further pharmacologic therapy         The following social determinates of health impact the patient's medical decision making: No social determinates of health were factored in to today's visit.     Follow Up  Return in about 6 months (around 6/14/2023) for Recheck.    Patient/family had no further questions at this time and verbalized understanding of the plan discussed today.

## 2022-12-16 ENCOUNTER — INFUSION (OUTPATIENT)
Dept: ONCOLOGY | Facility: HOSPITAL | Age: 37
End: 2022-12-16

## 2022-12-16 VITALS
RESPIRATION RATE: 16 BRPM | BODY MASS INDEX: 25.99 KG/M2 | HEART RATE: 87 BPM | WEIGHT: 156.2 LBS | SYSTOLIC BLOOD PRESSURE: 114 MMHG | TEMPERATURE: 97.8 F | DIASTOLIC BLOOD PRESSURE: 67 MMHG | OXYGEN SATURATION: 97 %

## 2022-12-16 DIAGNOSIS — D50.9 IRON DEFICIENCY ANEMIA, UNSPECIFIED IRON DEFICIENCY ANEMIA TYPE: Primary | ICD-10-CM

## 2022-12-16 PROCEDURE — 25010000002 IRON SUCROSE PER 1 MG: Performed by: INTERNAL MEDICINE

## 2022-12-16 PROCEDURE — 96365 THER/PROPH/DIAG IV INF INIT: CPT

## 2022-12-16 PROCEDURE — 63710000001 DIPHENHYDRAMINE PER 50 MG: Performed by: INTERNAL MEDICINE

## 2022-12-16 RX ORDER — DIPHENHYDRAMINE HCL 25 MG
25 CAPSULE ORAL ONCE
Status: COMPLETED | OUTPATIENT
Start: 2022-12-16 | End: 2022-12-16

## 2022-12-16 RX ORDER — ACETAMINOPHEN 325 MG/1
650 TABLET ORAL ONCE
Status: COMPLETED | OUTPATIENT
Start: 2022-12-16 | End: 2022-12-16

## 2022-12-16 RX ORDER — SODIUM CHLORIDE 9 MG/ML
250 INJECTION, SOLUTION INTRAVENOUS ONCE
Status: COMPLETED | OUTPATIENT
Start: 2022-12-16 | End: 2022-12-16

## 2022-12-16 RX ADMIN — IRON SUCROSE 300 MG: 20 INJECTION, SOLUTION INTRAVENOUS at 08:33

## 2022-12-16 RX ADMIN — ACETAMINOPHEN 650 MG: 325 TABLET, FILM COATED ORAL at 08:08

## 2022-12-16 RX ADMIN — DIPHENHYDRAMINE HYDROCHLORIDE 25 MG: 25 CAPSULE ORAL at 08:07

## 2022-12-16 RX ADMIN — SODIUM CHLORIDE 250 ML: 9 INJECTION, SOLUTION INTRAVENOUS at 08:07

## 2022-12-23 ENCOUNTER — INFUSION (OUTPATIENT)
Dept: ONCOLOGY | Facility: HOSPITAL | Age: 37
End: 2022-12-23

## 2022-12-23 VITALS
DIASTOLIC BLOOD PRESSURE: 61 MMHG | OXYGEN SATURATION: 99 % | SYSTOLIC BLOOD PRESSURE: 94 MMHG | HEART RATE: 71 BPM | TEMPERATURE: 97.8 F | RESPIRATION RATE: 16 BRPM | BODY MASS INDEX: 25.93 KG/M2 | WEIGHT: 155.8 LBS

## 2022-12-23 DIAGNOSIS — D50.9 IRON DEFICIENCY ANEMIA, UNSPECIFIED IRON DEFICIENCY ANEMIA TYPE: Primary | ICD-10-CM

## 2022-12-23 PROCEDURE — 96365 THER/PROPH/DIAG IV INF INIT: CPT

## 2022-12-23 PROCEDURE — 25010000002 IRON SUCROSE PER 1 MG: Performed by: INTERNAL MEDICINE

## 2022-12-23 PROCEDURE — 63710000001 DIPHENHYDRAMINE PER 50 MG: Performed by: INTERNAL MEDICINE

## 2022-12-23 RX ORDER — SODIUM CHLORIDE 9 MG/ML
250 INJECTION, SOLUTION INTRAVENOUS ONCE
Status: COMPLETED | OUTPATIENT
Start: 2022-12-23 | End: 2022-12-23

## 2022-12-23 RX ORDER — DIPHENHYDRAMINE HCL 25 MG
25 CAPSULE ORAL ONCE
Status: COMPLETED | OUTPATIENT
Start: 2022-12-23 | End: 2022-12-23

## 2022-12-23 RX ORDER — ACETAMINOPHEN 325 MG/1
650 TABLET ORAL ONCE
Status: COMPLETED | OUTPATIENT
Start: 2022-12-23 | End: 2022-12-23

## 2022-12-23 RX ADMIN — DIPHENHYDRAMINE HYDROCHLORIDE 25 MG: 25 CAPSULE ORAL at 12:38

## 2022-12-23 RX ADMIN — ACETAMINOPHEN 650 MG: 325 TABLET, FILM COATED ORAL at 12:38

## 2022-12-23 RX ADMIN — IRON SUCROSE 300 MG: 20 INJECTION, SOLUTION INTRAVENOUS at 13:08

## 2022-12-23 RX ADMIN — SODIUM CHLORIDE 250 ML: 9 INJECTION, SOLUTION INTRAVENOUS at 13:08

## 2023-01-13 ENCOUNTER — INFUSION (OUTPATIENT)
Dept: ONCOLOGY | Facility: HOSPITAL | Age: 38
End: 2023-01-13
Payer: COMMERCIAL

## 2023-01-13 VITALS
BODY MASS INDEX: 25.83 KG/M2 | RESPIRATION RATE: 20 BRPM | TEMPERATURE: 98.2 F | DIASTOLIC BLOOD PRESSURE: 66 MMHG | OXYGEN SATURATION: 100 % | SYSTOLIC BLOOD PRESSURE: 105 MMHG | HEART RATE: 69 BPM | WEIGHT: 155.2 LBS

## 2023-01-13 DIAGNOSIS — D50.9 IRON DEFICIENCY ANEMIA, UNSPECIFIED IRON DEFICIENCY ANEMIA TYPE: Primary | ICD-10-CM

## 2023-01-13 PROCEDURE — 25010000002 IRON SUCROSE PER 1 MG: Performed by: INTERNAL MEDICINE

## 2023-01-13 PROCEDURE — 25010000002 HYDROCORTISONE SOD SUC (PF) 100 MG RECONSTITUTED SOLUTION: Performed by: NURSE PRACTITIONER

## 2023-01-13 PROCEDURE — 96375 TX/PRO/DX INJ NEW DRUG ADDON: CPT

## 2023-01-13 PROCEDURE — 63710000001 DIPHENHYDRAMINE PER 50 MG: Performed by: INTERNAL MEDICINE

## 2023-01-13 PROCEDURE — 96365 THER/PROPH/DIAG IV INF INIT: CPT

## 2023-01-13 RX ORDER — DIPHENHYDRAMINE HCL 25 MG
25 CAPSULE ORAL ONCE
Status: COMPLETED | OUTPATIENT
Start: 2023-01-13 | End: 2023-01-13

## 2023-01-13 RX ORDER — SODIUM CHLORIDE 9 MG/ML
250 INJECTION, SOLUTION INTRAVENOUS ONCE
Status: COMPLETED | OUTPATIENT
Start: 2023-01-13 | End: 2023-01-13

## 2023-01-13 RX ORDER — ACETAMINOPHEN 325 MG/1
650 TABLET ORAL ONCE
Status: COMPLETED | OUTPATIENT
Start: 2023-01-13 | End: 2023-01-13

## 2023-01-13 RX ORDER — FAMOTIDINE 10 MG/ML
20 INJECTION, SOLUTION INTRAVENOUS 2 TIMES DAILY
Status: DISCONTINUED | OUTPATIENT
Start: 2023-01-13 | End: 2023-01-13

## 2023-01-13 RX ORDER — FAMOTIDINE 10 MG/ML
20 INJECTION, SOLUTION INTRAVENOUS ONCE
Status: COMPLETED | OUTPATIENT
Start: 2023-01-13 | End: 2023-01-13

## 2023-01-13 RX ADMIN — HYDROCORTISONE SODIUM SUCCINATE 100 MG: 100 INJECTION, POWDER, FOR SOLUTION INTRAMUSCULAR; INTRAVENOUS at 16:35

## 2023-01-13 RX ADMIN — FAMOTIDINE 20 MG: 10 INJECTION, SOLUTION INTRAVENOUS at 16:40

## 2023-01-13 RX ADMIN — ACETAMINOPHEN 650 MG: 325 TABLET, FILM COATED ORAL at 12:35

## 2023-01-13 RX ADMIN — SODIUM CHLORIDE 250 ML: 9 INJECTION, SOLUTION INTRAVENOUS at 12:35

## 2023-01-13 RX ADMIN — DIPHENHYDRAMINE HYDROCHLORIDE 25 MG: 25 CAPSULE ORAL at 12:36

## 2023-01-13 RX ADMIN — IRON SUCROSE 300 MG: 20 INJECTION, SOLUTION INTRAVENOUS at 13:00

## 2023-01-13 NOTE — NURSING NOTE
Pt reports back from ortho appointment with swelling in right hand and right foot with tightness when she takes a deep breath after her iron infusion.  oxygen 100 on room air Kay BAR came to chair side order to give 100 mg solu-cortef. Per pt tightness still in hand per APRN given 20 mg of pepcid. Would like pt to take a benadryl when she gets home to help. If any other side effects occur to call the office. Pt mike. In-basket message sent to Eric ANN to add 100 mg solu-cortef to next iron as premedication.

## 2023-01-20 ENCOUNTER — INFUSION (OUTPATIENT)
Dept: ONCOLOGY | Facility: HOSPITAL | Age: 38
End: 2023-01-20
Payer: COMMERCIAL

## 2023-01-20 VITALS
BODY MASS INDEX: 25.66 KG/M2 | WEIGHT: 154.2 LBS | OXYGEN SATURATION: 99 % | DIASTOLIC BLOOD PRESSURE: 75 MMHG | TEMPERATURE: 97.5 F | SYSTOLIC BLOOD PRESSURE: 122 MMHG | RESPIRATION RATE: 16 BRPM | HEART RATE: 86 BPM

## 2023-01-20 DIAGNOSIS — D50.9 IRON DEFICIENCY ANEMIA, UNSPECIFIED IRON DEFICIENCY ANEMIA TYPE: Primary | ICD-10-CM

## 2023-01-20 PROCEDURE — 96365 THER/PROPH/DIAG IV INF INIT: CPT

## 2023-01-20 PROCEDURE — 25010000002 HYDROCORTISONE SOD SUC (PF) 100 MG RECONSTITUTED SOLUTION: Performed by: INTERNAL MEDICINE

## 2023-01-20 PROCEDURE — 96366 THER/PROPH/DIAG IV INF ADDON: CPT

## 2023-01-20 PROCEDURE — 25010000002 IRON SUCROSE PER 1 MG: Performed by: INTERNAL MEDICINE

## 2023-01-20 PROCEDURE — 96375 TX/PRO/DX INJ NEW DRUG ADDON: CPT

## 2023-01-20 PROCEDURE — 63710000001 DIPHENHYDRAMINE PER 50 MG: Performed by: INTERNAL MEDICINE

## 2023-01-20 PROCEDURE — 96376 TX/PRO/DX INJ SAME DRUG ADON: CPT

## 2023-01-20 PROCEDURE — 25010000002 HYDROCORTISONE SOD SUC (PF) 100 MG RECONSTITUTED SOLUTION: Performed by: NURSE PRACTITIONER

## 2023-01-20 RX ORDER — ACETAMINOPHEN 325 MG/1
650 TABLET ORAL ONCE
Status: COMPLETED | OUTPATIENT
Start: 2023-01-20 | End: 2023-01-20

## 2023-01-20 RX ORDER — SODIUM CHLORIDE 9 MG/ML
250 INJECTION, SOLUTION INTRAVENOUS ONCE
Status: COMPLETED | OUTPATIENT
Start: 2023-01-20 | End: 2023-01-20

## 2023-01-20 RX ORDER — FAMOTIDINE 10 MG/ML
20 INJECTION, SOLUTION INTRAVENOUS ONCE
Status: COMPLETED | OUTPATIENT
Start: 2023-01-20 | End: 2023-01-20

## 2023-01-20 RX ORDER — DIPHENHYDRAMINE HCL 25 MG
25 CAPSULE ORAL ONCE
Status: COMPLETED | OUTPATIENT
Start: 2023-01-20 | End: 2023-01-20

## 2023-01-20 RX ADMIN — HYDROCORTISONE SODIUM SUCCINATE 100 MG: 100 INJECTION, POWDER, FOR SOLUTION INTRAMUSCULAR; INTRAVENOUS at 13:43

## 2023-01-20 RX ADMIN — IRON SUCROSE 300 MG: 20 INJECTION, SOLUTION INTRAVENOUS at 14:17

## 2023-01-20 RX ADMIN — ACETAMINOPHEN 650 MG: 325 TABLET, FILM COATED ORAL at 13:43

## 2023-01-20 RX ADMIN — SODIUM CHLORIDE 250 ML: 9 INJECTION, SOLUTION INTRAVENOUS at 13:43

## 2023-01-20 RX ADMIN — HYDROCORTISONE SODIUM SUCCINATE 100 MG: 100 INJECTION, POWDER, FOR SOLUTION INTRAMUSCULAR; INTRAVENOUS at 16:28

## 2023-01-20 RX ADMIN — DIPHENHYDRAMINE HYDROCHLORIDE 25 MG: 25 CAPSULE ORAL at 13:43

## 2023-01-20 RX ADMIN — FAMOTIDINE 20 MG: 10 INJECTION INTRAVENOUS at 13:43

## 2023-01-20 NOTE — PROGRESS NOTES
Pt reports having a reaction to each tx of venofer. Pt requesting higher benadryl dose to premeds. D/w Janet BAR. Pt does not have a ride home. V/o for 20 mg IV pepcid.    1625 pt c/o chest tightness. Janet BAR notified and at chairside.  V/o 100 mg solu cortef.   /70 HR 83.   1628 solu cortef given.  Will continue to monitor closely.  At 1654 pt reports chest tightness resolved. Ok to discharge per Janet ANN.

## 2023-04-14 ENCOUNTER — OFFICE VISIT (OUTPATIENT)
Dept: ONCOLOGY | Facility: CLINIC | Age: 38
End: 2023-04-14
Payer: COMMERCIAL

## 2023-04-14 ENCOUNTER — LAB (OUTPATIENT)
Dept: LAB | Facility: HOSPITAL | Age: 38
End: 2023-04-14
Payer: COMMERCIAL

## 2023-04-14 VITALS
BODY MASS INDEX: 26.06 KG/M2 | SYSTOLIC BLOOD PRESSURE: 103 MMHG | WEIGHT: 156.6 LBS | HEART RATE: 94 BPM | OXYGEN SATURATION: 97 % | TEMPERATURE: 98.2 F | DIASTOLIC BLOOD PRESSURE: 69 MMHG

## 2023-04-14 DIAGNOSIS — D50.0 IRON DEFICIENCY ANEMIA DUE TO CHRONIC BLOOD LOSS: ICD-10-CM

## 2023-04-14 DIAGNOSIS — E05.00 GRAVES DISEASE: ICD-10-CM

## 2023-04-14 DIAGNOSIS — D50.0 IRON DEFICIENCY ANEMIA DUE TO CHRONIC BLOOD LOSS: Primary | ICD-10-CM

## 2023-04-14 LAB
BASOPHILS # BLD AUTO: 0.06 10*3/MM3 (ref 0–0.2)
BASOPHILS NFR BLD AUTO: 0.9 % (ref 0–1.5)
DEPRECATED RDW RBC AUTO: 41.2 FL (ref 37–54)
EOSINOPHIL # BLD AUTO: 0.16 10*3/MM3 (ref 0–0.4)
EOSINOPHIL NFR BLD AUTO: 2.3 % (ref 0.3–6.2)
ERYTHROCYTE [DISTWIDTH] IN BLOOD BY AUTOMATED COUNT: 13.3 % (ref 12.3–15.4)
FERRITIN SERPL-MCNC: 256.1 NG/ML (ref 11–207)
HCT VFR BLD AUTO: 39.5 % (ref 34–46.6)
HGB BLD-MCNC: 12.3 G/DL (ref 12–15.9)
IMM GRANULOCYTES # BLD AUTO: 0.01 10*3/MM3 (ref 0–0.05)
IMM GRANULOCYTES NFR BLD AUTO: 0.1 % (ref 0–0.5)
IRON 24H UR-MRATE: 46 MCG/DL (ref 37–145)
IRON SATN MFR SERPL: 16 % (ref 14–48)
LYMPHOCYTES # BLD AUTO: 3.12 10*3/MM3 (ref 0.7–3.1)
LYMPHOCYTES NFR BLD AUTO: 44.6 % (ref 19.6–45.3)
MCH RBC QN AUTO: 26.7 PG (ref 26.6–33)
MCHC RBC AUTO-ENTMCNC: 31.1 G/DL (ref 31.5–35.7)
MCV RBC AUTO: 85.7 FL (ref 79–97)
MONOCYTES # BLD AUTO: 0.5 10*3/MM3 (ref 0.1–0.9)
MONOCYTES NFR BLD AUTO: 7.1 % (ref 5–12)
NEUTROPHILS NFR BLD AUTO: 3.15 10*3/MM3 (ref 1.7–7)
NEUTROPHILS NFR BLD AUTO: 45 % (ref 42.7–76)
NRBC BLD AUTO-RTO: 0 /100 WBC (ref 0–0.2)
PLATELET # BLD AUTO: 218 10*3/MM3 (ref 140–450)
PMV BLD AUTO: 9.6 FL (ref 6–12)
RBC # BLD AUTO: 4.61 10*6/MM3 (ref 3.77–5.28)
TIBC SERPL-MCNC: 284 MCG/DL (ref 249–505)
TRANSFERRIN SERPL-MCNC: 203 MG/DL (ref 200–360)
WBC NRBC COR # BLD: 7 10*3/MM3 (ref 3.4–10.8)

## 2023-04-14 PROCEDURE — 83540 ASSAY OF IRON: CPT

## 2023-04-14 PROCEDURE — 82728 ASSAY OF FERRITIN: CPT

## 2023-04-14 PROCEDURE — 84466 ASSAY OF TRANSFERRIN: CPT

## 2023-04-14 PROCEDURE — 85025 COMPLETE CBC W/AUTO DIFF WBC: CPT

## 2023-04-14 PROCEDURE — 36415 COLL VENOUS BLD VENIPUNCTURE: CPT
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
## 2023-04-17 ENCOUNTER — TELEPHONE (OUTPATIENT)
Dept: ONCOLOGY | Facility: CLINIC | Age: 38
End: 2023-04-17
Payer: COMMERCIAL

## 2023-04-17 NOTE — TELEPHONE ENCOUNTER
----- Message from DIPIKA Koehler sent at 4/14/2023  4:43 PM EDT -----  No need for iron currently, recheck in 3 months as planned

## 2023-05-10 ENCOUNTER — OFFICE VISIT (OUTPATIENT)
Dept: OBSTETRICS AND GYNECOLOGY | Age: 38
End: 2023-05-10
Payer: COMMERCIAL

## 2023-05-10 VITALS
WEIGHT: 155.6 LBS | DIASTOLIC BLOOD PRESSURE: 74 MMHG | HEIGHT: 65 IN | BODY MASS INDEX: 25.92 KG/M2 | SYSTOLIC BLOOD PRESSURE: 118 MMHG

## 2023-05-10 DIAGNOSIS — N94.6 DYSMENORRHEA: ICD-10-CM

## 2023-05-10 DIAGNOSIS — N83.8 PARATUBAL CYST: ICD-10-CM

## 2023-05-10 DIAGNOSIS — Z87.42 HISTORY OF OVARIAN CYST: ICD-10-CM

## 2023-05-10 DIAGNOSIS — N92.0 MENORRHAGIA WITH REGULAR CYCLE: ICD-10-CM

## 2023-05-10 DIAGNOSIS — D50.0 IRON DEFICIENCY ANEMIA DUE TO CHRONIC BLOOD LOSS: Primary | ICD-10-CM

## 2023-05-10 RX ORDER — LEVOTHYROXINE SODIUM 112 MCG
1 TABLET ORAL DAILY
COMMUNITY
Start: 2023-04-12

## 2023-05-10 RX ORDER — ESCITALOPRAM OXALATE 10 MG/1
TABLET ORAL
COMMUNITY
Start: 2022-12-26

## 2023-05-10 RX ORDER — PREGABALIN 75 MG/1
1 CAPSULE ORAL EVERY 12 HOURS SCHEDULED
COMMUNITY
Start: 2023-04-02

## 2023-05-10 RX ORDER — TRANEXAMIC ACID 650 MG/1
1 TABLET ORAL ONCE AS NEEDED
Qty: 30 TABLET | Refills: 1 | Status: SHIPPED | OUTPATIENT
Start: 2023-05-10

## 2023-05-10 NOTE — PROGRESS NOTES
Subjective       History of Present Illness  Sundeep Espino is a 37 y.o. female is being seen today for follow-up of his long history of having a right ovarian cyst.  Patient was previously seen and had a normal pelvic exam.  Wanted to come in for ultrasound to reassess.  Fortunately ultrasound shows the cyst has gone down in size its about 2 cm.  It is simple possible follicular.  Additionally it looks like she might have a paratubal cyst which was not previously noted and likely was confused with the adjacent simple cyst.    Overall patient is doing well she is going to see her endocrinologist here in the near future.  Might consider having children in the near future prenatal vitamins recommended.  Chief Complaint   Patient presents with   • Follow-up     GYN F/U for Dysmenorrhea, Pelvic U/S today, Pt has no complaints today    .        The following portions of the patient's history were reviewed and updated as appropriate: allergies, current medications, past family history, past medical history, past social history, past surgical history and problem list.    PAST MEDICAL HISTORY  Past Medical History:   Diagnosis Date   • ADHD    • Allergic rhinitis    • Anxiety and depression    • Arthritis    • Asthma    • Chondromalacia patellae    • GERD (gastroesophageal reflux disease)    • H/O radioactive iodine thyroid ablation 03/08/2017   • Heart palpitations    • Hyperthyroidism    • Iron deficiency anemia    • Menstrual problem    • Ovarian cyst    • Paratubal cyst 5/10/2023   • Thyroid disease    • Upper respiratory infection 05/02/2018   • UTI (urinary tract infection) 05/02/2018   • Vitamin D deficiency      OB History   No obstetric history on file.     Past Surgical History:   Procedure Laterality Date   • APPENDECTOMY  2012   • BREAST LUMPECTOMY      benign   • COLONOSCOPY  approx 2009    normal per patient   • COLONOSCOPY W/ POLYPECTOMY N/A 08/01/2018    Normal   • EAR TUBES     • ENDOSCOPY N/A 08/01/2018     HH   • KELOID EXCISION      right ear   • MANDIBLE SURGERY  05/2016   • ORIF ULNA/RADIUS FRACTURES      Distal radius ORIF with [facility] on 8/25/22     Family History   Problem Relation Age of Onset   • Hypertension Mother    • Diabetes Mother    • Hypertension Father    • Hyperlipidemia Father    • Diabetes Father    • Heart disease Father         pacemaker   • Coronary artery disease Father    • Diabetes Maternal Aunt    • Hypertension Maternal Uncle    • Diabetes Maternal Uncle    • Alcohol abuse Other    • Diabetes Other    • Hypertension Other    • Hypertension Half-Brother    • No Known Problems Half-Sister      Social History     Tobacco Use   Smoking Status Never   Smokeless Tobacco Never       Current Outpatient Medications:   •  cetirizine (zyrTEC) 10 MG tablet, Take 1 tablet by mouth Daily., Disp: , Rfl:   •  escitalopram (LEXAPRO) 10 MG tablet, , Disp: , Rfl:   •  levocetirizine (XYZAL) 5 MG tablet, Take 1 tablet by mouth Daily., Disp: , Rfl:   •  levothyroxine (SYNTHROID, LEVOTHROID) 125 MCG tablet, Take 1 tablet by mouth Daily., Disp: , Rfl:   •  Multiple Vitamins-Minerals (MULTIVITAMIN ADULT PO), Take  by mouth., Disp: , Rfl:   •  ondansetron ODT (Zofran ODT) 4 MG disintegrating tablet, Take one tablet by mouth every 6 hours as needed for nausea and vomiting, Disp: 20 tablet, Rfl: 0  •  pregabalin (LYRICA) 75 MG capsule, Take 1 capsule by mouth Every 12 (Twelve) Hours., Disp: , Rfl:   •  PROAIR RESPICLICK 108 (90 Base) MCG/ACT inhaler, Inhale 2 puffs As Needed., Disp: , Rfl: 3  •  Sodium Fluoride 1.1 % gel, BRUSH WITH EACH NIGHT. EXPECTORATE DON RINSE, Disp: , Rfl:   •  Symbicort 80-4.5 MCG/ACT inhaler, Inhale 2 puffs 2 (Two) Times a Day., Disp: 10.2 g, Rfl: 1  •  Synthroid 112 MCG tablet, Take 1 tablet by mouth Daily., Disp: , Rfl:   •  Tranexamic Acid (Lysteda) 650 MG tablet, Take 1 tablet by mouth 1 (One) Time As Needed (On first 3 days of menstrual cycle) for up to 60 doses., Disp: 30 tablet, Rfl:  1  Immunization History   Administered Date(s) Administered   • COVID-19 (PFIZER) Purple Cap Monovalent 04/19/2021, 05/10/2021   • Flu Vaccine Intradermal Quad 18-64YR 12/08/2020   • FluLaval/Fluzone >6mos 12/29/2021, 10/28/2022   • Hep B, Adolescent or Pediatric 08/05/1999   • Pneumococcal Conjugate 13-Valent (PCV13) 01/13/2020   • Pneumococcal Conjugate 20-Valent (PCV20) 10/28/2022   • Td 08/05/1999   • flucelvax quad pfs =>4 YRS 11/02/2018, 10/22/2019       Review of Systems       Except as outlined in history of physical illness, patient denies any changes in her GYN, , GI systems. All other systems reviewed are negative.    Objective   Physical Exam   Alert and oriented, respirations unlabored, heart regular rate and rhythm   Pelvic see ultrasound report      Assessment & Plan   Diagnoses and all orders for this visit:    1. Iron deficiency anemia due to chronic blood loss (Primary)    2. Dysmenorrhea  -     US Non-ob Transvaginal    3. History of ovarian cyst  -     US Non-ob Transvaginal    4. Menorrhagia with regular cycle    5. Paratubal cyst    Other orders  -     Tranexamic Acid (Lysteda) 650 MG tablet; Take 1 tablet by mouth 1 (One) Time As Needed (On first 3 days of menstrual cycle) for up to 60 doses.  Dispense: 30 tablet; Refill: 1    History as outlined above right ovarian cyst decreased in size, small simple paratubal cyst likely.  Prenatal vitamins with folic acid.  Follow-up with endocrinologist  Continue Lysteda.  Call with any problems questions concerns or change in condition.             No orders of the defined types were placed in this encounter.          EMR Dragon/ Transcription disclaimer:  Much of the encounter note is an electronic transcription/translation of spoken language to printed text. The electronic translation of spoken language may permit erroneous, or at times, nonessential words or phrases to be inadvertently transcribes; Although i have reviewed the note for such errors,  some may still exist.

## 2023-05-19 ENCOUNTER — OFFICE VISIT (OUTPATIENT)
Dept: INTERNAL MEDICINE | Facility: CLINIC | Age: 38
End: 2023-05-19
Payer: COMMERCIAL

## 2023-05-19 VITALS
HEIGHT: 65 IN | HEART RATE: 83 BPM | DIASTOLIC BLOOD PRESSURE: 66 MMHG | OXYGEN SATURATION: 99 % | WEIGHT: 159 LBS | BODY MASS INDEX: 26.49 KG/M2 | SYSTOLIC BLOOD PRESSURE: 100 MMHG

## 2023-05-19 DIAGNOSIS — G89.29 CHRONIC MIDLINE LOW BACK PAIN WITH BILATERAL SCIATICA: ICD-10-CM

## 2023-05-19 DIAGNOSIS — M54.42 CHRONIC MIDLINE LOW BACK PAIN WITH BILATERAL SCIATICA: ICD-10-CM

## 2023-05-19 DIAGNOSIS — M54.41 CHRONIC MIDLINE LOW BACK PAIN WITH BILATERAL SCIATICA: ICD-10-CM

## 2023-05-19 DIAGNOSIS — M54.2 NECK PAIN: Primary | ICD-10-CM

## 2023-05-19 PROCEDURE — 99214 OFFICE O/P EST MOD 30 MIN: CPT | Performed by: STUDENT IN AN ORGANIZED HEALTH CARE EDUCATION/TRAINING PROGRAM

## 2023-05-19 RX ORDER — CYCLOBENZAPRINE HCL 10 MG
10 TABLET ORAL 3 TIMES DAILY PRN
Qty: 60 TABLET | Refills: 0 | Status: SHIPPED | OUTPATIENT
Start: 2023-05-19

## 2023-05-19 RX ORDER — MELOXICAM 15 MG/1
15 TABLET ORAL DAILY
Qty: 30 TABLET | Refills: 1 | Status: SHIPPED | OUTPATIENT
Start: 2023-05-19

## 2023-05-19 NOTE — PROGRESS NOTES
Adam Monahan D.O.  Internal Medicine  [facility]  [address], [address]  [place] [address]  [phone]      Chief Complaint  Back Pain and Neck Pain    SUBJECTIVE    History of Present Illness    Sundeep Espino is a 37 y.o. female who presents to the office today as an established patient that last saw me on 12/14/2022.   Here today for acute care visit.     Neck pain: started around August 2022 . She has gone to chiropractor who did xrays with no improvement. Feels that her neck is still and has to turn whole body to turn the body either way. The pain in the neck is in the center at the bottom of the neck and does not radiate. It is achy and like she has to lay her head down. Hurts to sit up and type. The pain is constant, more noticeable after stopping pregabalin. Average pain level 7, but can get up to 9/10. Not using anything over the counter for pain.     Low back pain: center of the spine, pain with movement and worse with bending forward and laying in the bed. Started around August 2022 as well. If she took two Lyrica 75 mg that was prescribed for her separate forearm injury it would help the pain but not resolve it. She has associated numbness and tingling of both feet , feels like she has to stomp them in order to get improvement. Average pain level in the back is 7. Urinating normally. No fever or chills. Denies saddle anesthesia. Not using anything over the counter for pain.     Allergies   Allergen Reactions   • Aspartame And Phenylalanine Other (See Comments)     headaches   • Banana Nausea And Vomiting   • Beta Adrenergic Blockers Hives, Itching and Swelling   • Metoprolol Other (See Comments)     Face swelling, itching     • Propranolol Other (See Comments)     Face swelling, itching          Outpatient Medications Marked as Taking for the 5/19/23 encounter (Office Visit) with Adam Monahan, DO   Medication Sig Dispense Refill   • cetirizine (zyrTEC) 10 MG tablet Take  "1 tablet by mouth Daily.     • escitalopram (LEXAPRO) 10 MG tablet      • levocetirizine (XYZAL) 5 MG tablet Take 1 tablet by mouth Daily.     • Multiple Vitamins-Minerals (MULTIVITAMIN ADULT PO) Take  by mouth.     • ondansetron ODT (Zofran ODT) 4 MG disintegrating tablet Take one tablet by mouth every 6 hours as needed for nausea and vomiting 20 tablet 0   • PROAIR RESPICLICK 108 (90 Base) MCG/ACT inhaler Inhale 2 puffs As Needed.  3   • Sodium Fluoride 1.1 % gel BRUSH WITH EACH NIGHT. EXPECTORATE DON RINSE     • Symbicort 80-4.5 MCG/ACT inhaler Inhale 2 puffs 2 (Two) Times a Day. 10.2 g 1   • Synthroid 112 MCG tablet Take 1 tablet by mouth Daily.     • Tranexamic Acid (Lysteda) 650 MG tablet Take 1 tablet by mouth 1 (One) Time As Needed (On first 3 days of menstrual cycle) for up to 60 doses. 30 tablet 1        Past Medical History:   Diagnosis Date   • ADHD    • Allergic rhinitis    • Anxiety and depression    • Arthritis    • Asthma    • Chondromalacia patellae    • GERD (gastroesophageal reflux disease)    • H/O radioactive iodine thyroid ablation 03/08/2017   • Heart palpitations    • Hyperthyroidism    • Iron deficiency anemia    • Menstrual problem    • Ovarian cyst    • Paratubal cyst 5/10/2023   • Thyroid disease    • Upper respiratory infection 05/02/2018   • UTI (urinary tract infection) 05/02/2018   • Vitamin D deficiency        OBJECTIVE    Vital Signs:   /66   Pulse 83   Ht 165.1 cm (65\")   Wt 72.1 kg (159 lb)   SpO2 99%   BMI 26.46 kg/m²     Physical Exam  Vitals reviewed.   Constitutional:       General: She is not in acute distress.     Appearance: Normal appearance. She is not ill-appearing.   HENT:      Head: Atraumatic.   Eyes:      General: No scleral icterus.  Pulmonary:      Effort: Pulmonary effort is normal. No respiratory distress.   Musculoskeletal:        Back:    Skin:     Coloration: Skin is not jaundiced.   Neurological:      Mental Status: She is alert.   Psychiatric:    "      Mood and Affect: Mood normal.         Behavior: Behavior normal.         Thought Content: Thought content normal.                             ASSESSMENT & PLAN     Diagnoses and all orders for this visit:    1. Neck pain (Primary)  -started around August 2022 . She has gone to chiropractor who did xrays with no improvement. Feels that her neck is still and has to turn whole body to turn the body either way. The pain in the neck is in the center at the bottom of the neck and does not radiate. It is achy and like she has to lay her head down. Hurts to sit up and type. The pain is constant, more noticeable after stopping pregabalin. Average pain level 7, but can get up to 9/10. Not using anything over the counter for pain.   -physical exam as documented above  -will obtain c-spine xray today  -refer to pain management per her preference   -consider referral to physical therapy based off xray results   -will begin treatment with meloxicam 15 mg daily (denies pregnancy) and cyclobenzaprine 10 mg tid as needed(cautioned against sedating effects)       --further plan based on results, she may need advanced imaging     2. Chronic midline low back pain with bilateral sciatica  -center of the spine, pain with movement and worse with bending forward and laying in the bed. Started around August 2022 as well. If she took two Lyrica 75 mg that was prescribed for her separate forearm injury it would help the pain but not resolve it. She has associated numbness and tingling of both feet , feels like she has to stomp them in order to get improvement. Average pain level in the back is 7. Urinating normally. No fever or chills. Denies saddle anesthesia. Not using anything over the counter for pain.   -physical exam as documented above  -refer to pain management per her preference   -will obtain lumbar spine xray today  -consider referral to physical therapy based off xray results   -will begin treatment with meloxicam 15 mg daily  (denies pregnancy) and cyclobenzaprine 10 mg tid as needed(cautioned against sedating effects)  -further plan based on results, she may need advanced imaging             The following social determinates of health impact the patient's medical decision making: No social determinates of health were factored in to today's visit.     Follow Up  No follow-ups on file.    Patient/family had no further questions at this time and verbalized understanding of the plan discussed today.

## 2023-06-08 ENCOUNTER — TREATMENT (OUTPATIENT)
Dept: PHYSICAL THERAPY | Facility: CLINIC | Age: 38
End: 2023-06-08
Payer: COMMERCIAL

## 2023-06-08 DIAGNOSIS — Z74.09 DECREASED STRENGTH, ENDURANCE, AND MOBILITY: ICD-10-CM

## 2023-06-08 DIAGNOSIS — R53.1 DECREASED STRENGTH, ENDURANCE, AND MOBILITY: ICD-10-CM

## 2023-06-08 DIAGNOSIS — M54.2 PAIN, NECK: Primary | ICD-10-CM

## 2023-06-08 DIAGNOSIS — G89.29 CHRONIC MIDLINE LOW BACK PAIN WITH SCIATICA, SCIATICA LATERALITY UNSPECIFIED: ICD-10-CM

## 2023-06-08 DIAGNOSIS — M53.82 DECREASED RANGE OF MOTION OF INTERVERTEBRAL DISCS OF CERVICAL SPINE: ICD-10-CM

## 2023-06-08 DIAGNOSIS — R68.89 DECREASED STRENGTH, ENDURANCE, AND MOBILITY: ICD-10-CM

## 2023-06-08 DIAGNOSIS — M54.40 CHRONIC MIDLINE LOW BACK PAIN WITH SCIATICA, SCIATICA LATERALITY UNSPECIFIED: ICD-10-CM
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
## 2023-06-30 ENCOUNTER — TELEPHONE (OUTPATIENT)
Dept: ONCOLOGY | Facility: CLINIC | Age: 38
End: 2023-06-30

## 2023-06-30 NOTE — TELEPHONE ENCOUNTER
"  Caller: uSndeep Espino \"Jonna\"    Relationship to patient: Self    Best call back number: [phone]    Chief complaint: PATIENT CALLED TO SEE IF SHE MOVE THE TIME    Type of visit: LAB AND FOLLOW UP    Requested date: 7-7-23 AROUND 2PM     If rescheduling, when is the original appointment: 7-7-23     Additional notes:PLEASE CALL PATIENT, HUB UNABLE TO RESCHEDULE FOR FOLLOW UP 1        "

## 2023-08-09 ENCOUNTER — TREATMENT (OUTPATIENT)
Dept: PHYSICAL THERAPY | Facility: CLINIC | Age: 38
End: 2023-08-09
Payer: COMMERCIAL

## 2023-08-09 DIAGNOSIS — G89.29 CHRONIC MIDLINE LOW BACK PAIN WITH SCIATICA, SCIATICA LATERALITY UNSPECIFIED: ICD-10-CM

## 2023-08-09 DIAGNOSIS — M53.82 DECREASED RANGE OF MOTION OF INTERVERTEBRAL DISCS OF CERVICAL SPINE: ICD-10-CM

## 2023-08-09 DIAGNOSIS — M54.2 PAIN, NECK: Primary | ICD-10-CM

## 2023-08-09 DIAGNOSIS — R68.89 DECREASED STRENGTH, ENDURANCE, AND MOBILITY: ICD-10-CM

## 2023-08-09 DIAGNOSIS — Z74.09 DECREASED STRENGTH, ENDURANCE, AND MOBILITY: ICD-10-CM

## 2023-08-09 DIAGNOSIS — M54.40 CHRONIC MIDLINE LOW BACK PAIN WITH SCIATICA, SCIATICA LATERALITY UNSPECIFIED: ICD-10-CM

## 2023-08-09 DIAGNOSIS — R53.1 DECREASED STRENGTH, ENDURANCE, AND MOBILITY: ICD-10-CM

## 2023-08-09 PROCEDURE — 97113 AQUATIC THERAPY/EXERCISES: CPT | Performed by: PHYSICAL THERAPIST
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
## 2023-08-11 ENCOUNTER — TREATMENT (OUTPATIENT)
Dept: PHYSICAL THERAPY | Facility: CLINIC | Age: 38
End: 2023-08-11
Payer: COMMERCIAL

## 2023-08-11 DIAGNOSIS — G89.29 CHRONIC MIDLINE LOW BACK PAIN WITH SCIATICA, SCIATICA LATERALITY UNSPECIFIED: ICD-10-CM

## 2023-08-11 DIAGNOSIS — M54.2 PAIN, NECK: Primary | ICD-10-CM

## 2023-08-11 DIAGNOSIS — R68.89 DECREASED STRENGTH, ENDURANCE, AND MOBILITY: ICD-10-CM

## 2023-08-11 DIAGNOSIS — M53.82 DECREASED RANGE OF MOTION OF INTERVERTEBRAL DISCS OF CERVICAL SPINE: ICD-10-CM

## 2023-08-11 DIAGNOSIS — M54.40 CHRONIC MIDLINE LOW BACK PAIN WITH SCIATICA, SCIATICA LATERALITY UNSPECIFIED: ICD-10-CM

## 2023-08-11 DIAGNOSIS — Z74.09 DECREASED STRENGTH, ENDURANCE, AND MOBILITY: ICD-10-CM

## 2023-08-11 DIAGNOSIS — R53.1 DECREASED STRENGTH, ENDURANCE, AND MOBILITY: ICD-10-CM

## 2023-08-11 PROCEDURE — 97113 AQUATIC THERAPY/EXERCISES: CPT | Performed by: PHYSICAL THERAPIST

## 2023-08-11 NOTE — PROGRESS NOTES
Physical Therapy Daily Treatment Note    [facility]  [address] [id number] [phone] (phone)  [phone] (fax)    Patient: Sundeep Espino   : 1985  Diagnosis/ICD-10 Code:  Pain, neck [M54.2]  Referring practitioner: Adam Monahan DO  Date of Initial Visit: Type: THERAPY  Noted: 2023  Today's Date: 2023  Patient seen for 5 sessions             Subjective   I was sore in my back and knees after the first water session.     Objective     AQUATIC  THERAPY EXERCISES:  Seated bicycling X 2 min.  Deep water Shoulder horiz Abd/Add X 10 AROM  Shoulder Flexion/Extension AROM X 10  Walking Fwds, Sidestepping and Backwards 2-3 laps each holding noodle  Hamstring Stretch 20 sec X 2 each   Piriformis Stretch 20 sec X 2 each  Wall Crawl Stretch (BKTC) w/ Noodle/Rail support 20 sec X 3  Abdominals - Lg Noodle Pushdowns X 10 shallow  Shoulder horiz Abd/Add X 10  UTR holding small noodle X 10  Hip Abduction X 10 each  Rows w/ closed paddles X 15  Paddle Stirs  (L5) 10/10  Cervical Spine Rotation AROM X 6 each side in Deep  Scapular Retraction in sitting 5 sec X 5  Chin Tucks *Next    Assessment/Plan  Reviewed previous exercise and progressed as noted above.  Jonna is able to perform more exercise in the water environment without significant exacerbation of her back and neck pain.          Timed:  Aquatic Therapy    38     mins 15392;    Casper Jacobson, PT  Physical Therapist    [place] License:  [id number]

## 2023-08-16 ENCOUNTER — TREATMENT (OUTPATIENT)
Dept: PHYSICAL THERAPY | Facility: CLINIC | Age: 38
End: 2023-08-16
Payer: COMMERCIAL

## 2023-08-16 DIAGNOSIS — M54.2 PAIN, NECK: Primary | ICD-10-CM

## 2023-08-16 DIAGNOSIS — R53.1 DECREASED STRENGTH, ENDURANCE, AND MOBILITY: ICD-10-CM

## 2023-08-16 DIAGNOSIS — M53.82 DECREASED RANGE OF MOTION OF INTERVERTEBRAL DISCS OF CERVICAL SPINE: ICD-10-CM

## 2023-08-16 DIAGNOSIS — G89.29 CHRONIC MIDLINE LOW BACK PAIN WITH SCIATICA, SCIATICA LATERALITY UNSPECIFIED: ICD-10-CM

## 2023-08-16 DIAGNOSIS — M54.40 CHRONIC MIDLINE LOW BACK PAIN WITH SCIATICA, SCIATICA LATERALITY UNSPECIFIED: ICD-10-CM

## 2023-08-16 DIAGNOSIS — R68.89 DECREASED STRENGTH, ENDURANCE, AND MOBILITY: ICD-10-CM

## 2023-08-16 DIAGNOSIS — Z74.09 DECREASED STRENGTH, ENDURANCE, AND MOBILITY: ICD-10-CM

## 2023-08-16 PROCEDURE — 97113 AQUATIC THERAPY/EXERCISES: CPT | Performed by: PHYSICAL THERAPIST

## 2023-08-16 NOTE — PROGRESS NOTES
Physical Therapy Daily Treatment Note    [facility]  [address] [id number] [phone] (phone)  [phone] (fax)    Patient: Sundeep Espino   : 1985  Diagnosis/ICD-10 Code:  Pain, neck [M54.2]  Referring practitioner: Adam Monahan DO  Date of Initial Visit: Type: THERAPY  Noted: 2023  Today's Date: 2023  Patient seen for 6 sessions             Subjective   I feel better when I'm in the water and for a short period after.    Objective     AQUATIC  THERAPY EXERCISES:  Seated bicycling X 5 min. And then suspended X 1 min.  Shoulder Flexion/Extension AROM X 10  Walking Fwds, Sidestepping and Backwards (w/o noodle support) 2 laps each  Hamstring Stretch 20 sec X 3 each, added Lg Noodle support on last rep  Piriformis Stretch 20 sec X 2 each  Wall Crawl Stretch (BKTC) w/ Noodle/Rail support 20 sec X 3  Abdominals - Lg Noodle Pushdowns X 10 shallow  Shoulder horiz Abd/Add w/ open paddles X 15  UTR holding small noodle X 10  Hip Abduction X 15 each  Leg Press w/ lg foam ring X 10  Rows w/ closed paddles X 15  Paddle Stirs  (L5) 10/10  Cervical Spine Rotation AROM X 5 each side   Scapular Retraction in sitting 5 sec X 5  Chin Tucks 5 sec X 5      Assessment/Plan  Progressed from seated bicycling to suspended bicycling on a noodle in deep water. Increased repetitions from 10 to 15 on some exercises. Discussed mindfulness meditation or non sleep deep rest that patient can do at home.          Timed:  Aquatic Therapy    38     mins 71305;    Casper Jacobson, PT  Physical Therapist    [place] License:  [id number]

## 2023-08-18 NOTE — PROGRESS NOTES
Subjective     REASON FOR CONSULTATION: Iron deficiency anemia  Provide an opinion on any further workup or treatment                             REQUESTING PHYSICIAN: Hero    RECORDS OBTAINED:  Records of the patients history including those obtained from the referring provider were reviewed and summarized in detail.    HISTORY OF PRESENT ILLNESS:  The patient is a 37 y.o. year old female who is here for an opinion about the above issue.    History of Present Illness   This is a pleasant 37-year-old lady returning today for follow-up of recurrent iron deficiency anemia secondary to menorrhagia.  Menstrual cycles are currently fairly well controlled using tranexamic acid.  She complains of fatigue but no lightheadedness shortness of breath or dizziness above her baseline.    Past Medical History:   Diagnosis Date    ADHD     Allergic rhinitis     Anxiety and depression     Arthritis     Asthma     Chondromalacia patellae     DDD (degenerative disc disease), cervical     GERD (gastroesophageal reflux disease)     H/O radioactive iodine thyroid ablation 03/08/2017    Heart palpitations     Hyperthyroidism     Iron deficiency anemia     Menstrual problem     Ovarian cyst     Paratubal cyst 05/10/2023    Thyroid disease     Upper respiratory infection 05/02/2018    UTI (urinary tract infection) 05/02/2018    Vitamin D deficiency         Past Surgical History:   Procedure Laterality Date    APPENDECTOMY  2012    BREAST LUMPECTOMY      benign    COLONOSCOPY  approx 2009    normal per patient    COLONOSCOPY W/ POLYPECTOMY N/A 08/01/2018    Normal    EAR TUBES      ENDOSCOPY N/A 08/01/2018    [facility]    KELOID EXCISION      right ear    MANDIBLE SURGERY  05/2016    ORIF ULNA/RADIUS FRACTURES      Distal radius ORIF with [facility] on 8/25/22        Current Outpatient Medications on File Prior to Visit   Medication Sig Dispense Refill    cetirizine (zyrTEC) 10 MG tablet Take 1 tablet by mouth Daily.      cyclobenzaprine (FLEXERIL)  10 MG tablet TAKE 1 TABLET BY MOUTH 3 TIMES A DAY AS NEEDED FOR MUSCLE SPASMS. 60 tablet 0    escitalopram (LEXAPRO) 10 MG tablet       levocetirizine (XYZAL) 5 MG tablet Take 1 tablet by mouth Daily.      meloxicam (Mobic) 15 MG tablet Take 1 tablet by mouth Daily. 30 tablet 1    Multiple Vitamins-Minerals (MULTIVITAMIN ADULT PO) Take  by mouth.      ondansetron ODT (Zofran ODT) 4 MG disintegrating tablet Take one tablet by mouth every 6 hours as needed for nausea and vomiting 20 tablet 0    PROAIR RESPICLICK 108 (90 Base) MCG/ACT inhaler Inhale 2 puffs As Needed.  3    Sodium Fluoride 1.1 % gel BRUSH WITH EACH NIGHT. EXPECTORATE DON RINSE      Symbicort 80-4.5 MCG/ACT inhaler Inhale 2 puffs 2 (Two) Times a Day. 10.2 g 1    Synthroid 112 MCG tablet Take 1 tablet by mouth Daily.      Tranexamic Acid (Lysteda) 650 MG tablet Take 1 tablet by mouth 1 (One) Time As Needed (On first 3 days of menstrual cycle) for up to 60 doses. 30 tablet 1     No current facility-administered medications on file prior to visit.        ALLERGIES:    Allergies   Allergen Reactions    Aspartame And Phenylalanine Other (See Comments)     headaches    Banana Nausea And Vomiting    Beta Adrenergic Blockers Hives, Itching and Swelling    Metoprolol Other (See Comments)     Face swelling, itching      Propranolol Other (See Comments)     Face swelling, itching          Social History     Socioeconomic History    Marital status: Single    Years of education: College   Tobacco Use    Smoking status: Never    Smokeless tobacco: Never   Substance and Sexual Activity    Alcohol use: Yes     Alcohol/week: 0.0 - 2.0 standard drinks    Drug use: Never    Sexual activity: Not Currently        Family History   Problem Relation Age of Onset    Hypertension Mother     Diabetes Mother     Hypertension Father     Hyperlipidemia Father     Diabetes Father     Heart disease Father         pacemaker    Coronary artery disease Father     Diabetes Maternal Aunt   "   Hypertension Maternal Uncle     Diabetes Maternal Uncle     Alcohol abuse Other     Diabetes Other     Hypertension Other     Hypertension Half-Brother     No Known Problems Half-Sister         Review of Systems   Constitutional:  Positive for fatigue. Negative for fever.   Respiratory:  Negative for shortness of breath.    Cardiovascular: Negative.    Gastrointestinal: Negative.    Genitourinary:  Positive for menstrual problem.   Skin: Negative.    Neurological:  Positive for headaches. Negative for speech difficulty.   Hematological: Negative.    Psychiatric/Behavioral: Negative.          Objective     Vitals:    08/24/23 0814   BP: 95/66   Pulse: 90   Resp: 17   Temp: 98.2 øF (36.8 øC)   TempSrc: Temporal   SpO2: 97%   Weight: 71.1 kg (156 lb 11.2 oz)   Height: 165.1 cm (65\")   PainSc: 0-No pain         8/24/2023     8:18 AM   Current Status   ECOG score 0       Physical Exam    CONSTITUTIONAL: pleasant well-developed adult woman  HEENT: no icterus, no thrush, moist membranes  LYMPH: no cervical or supraclavicular lad  CV: RRR, S1S2, no murmur  RESP: cta bilat, no wheezing, no rales  GI: soft, non-tender, no splenomegaly, +bs  MUSC: no edema, normal gait   NEURO: alert and oriented x3, normal strength  PSYCH: normal mood and affect    RECENT LABS:  Hematology WBC   Date Value Ref Range Status   08/24/2023 5.60 3.40 - 10.80 10*3/mm3 Final   09/16/2022 6.12 3.40 - 10.80 10*3/mm3 Final     RBC   Date Value Ref Range Status   08/24/2023 4.86 3.77 - 5.28 10*6/mm3 Final   09/16/2022 4.20 3.77 - 5.28 10*6/mm3 Final     Hemoglobin   Date Value Ref Range Status   08/24/2023 13.0 12.0 - 15.9 g/dL Final     Hematocrit   Date Value Ref Range Status   08/24/2023 41.9 34.0 - 46.6 % Final     Platelets   Date Value Ref Range Status   08/24/2023 217 140 - 450 10*3/mm3 Final        Lab Results   Component Value Date    GLUCOSE 89 08/21/2022    BUN 9 08/21/2022    CREATININE 0.76 08/21/2022    EGFRIFNONA 90 12/29/2021    " EGFRIFAFRI 104 12/29/2021    BCR 11.8 08/21/2022    K 3.8 08/21/2022    CO2 27.0 08/21/2022    CALCIUM 8.4 (L) 08/21/2022    PROTENTOTREF 6.7 06/24/2022    ALBUMIN 3.50 08/21/2022    LABIL2 1.2 06/24/2022    AST 18 08/21/2022    ALT 12 08/21/2022          Assessment & Plan   *Recurrent iron deficiency anemia secondary to menorrhagia requiring intermittent iron replacement IV (intolerant to oral iron secondary to GI upset  Hemoglobin good 13.0 today  She has had an EGD and colonoscopy in the past  IV iron (Venofer) reaction-Will need premedicated for future venofer with benadryl 25mg, tylenol 650mg, and solucortef 300mg     *Menorrhagia  She is going to resume tranexamic acid from her gynecologist which has previously improved menorrhagia    *Hypothyroidism status post treatment of Graves' disease  Patient had a recent thyroid medication adjustment and needs a repeat TSH/free T4 for her endocrinologist    Hematology plan/recommendations:  Recheck ferritin and iron profile today; we will call the patient with results and arrange follow-up accordingly  Check TSH/free T4 with results faxed to her endocrinologist  Will need premedicated for future venofer with benadryl 25mg, tylenol 650mg, and solucortef 300mg

## 2023-08-23 ENCOUNTER — TREATMENT (OUTPATIENT)
Dept: PHYSICAL THERAPY | Facility: CLINIC | Age: 38
End: 2023-08-23
Payer: COMMERCIAL

## 2023-08-23 DIAGNOSIS — M53.82 DECREASED RANGE OF MOTION OF INTERVERTEBRAL DISCS OF CERVICAL SPINE: ICD-10-CM

## 2023-08-23 DIAGNOSIS — G89.29 CHRONIC MIDLINE LOW BACK PAIN WITH SCIATICA, SCIATICA LATERALITY UNSPECIFIED: ICD-10-CM

## 2023-08-23 DIAGNOSIS — R53.1 DECREASED STRENGTH, ENDURANCE, AND MOBILITY: ICD-10-CM

## 2023-08-23 DIAGNOSIS — M54.40 CHRONIC MIDLINE LOW BACK PAIN WITH SCIATICA, SCIATICA LATERALITY UNSPECIFIED: ICD-10-CM

## 2023-08-23 DIAGNOSIS — Z74.09 DECREASED STRENGTH, ENDURANCE, AND MOBILITY: ICD-10-CM

## 2023-08-23 DIAGNOSIS — R68.89 DECREASED STRENGTH, ENDURANCE, AND MOBILITY: ICD-10-CM

## 2023-08-23 DIAGNOSIS — M54.2 PAIN, NECK: Primary | ICD-10-CM

## 2023-08-23 PROCEDURE — 97113 AQUATIC THERAPY/EXERCISES: CPT | Performed by: PHYSICAL THERAPIST

## 2023-08-23 NOTE — PROGRESS NOTES
Physical Therapy Daily Treatment Note    [facility]  [address], [place] [id number] [phone] (phone)  [phone] (fax)    Patient: Sundeep Espino   : 1985  Diagnosis/ICD-10 Code:  Pain, neck [M54.2]  Referring practitioner: Adam Monahan DO  Date of Initial Visit: Type: THERAPY  Noted: 2023  Today's Date: 2023  Patient seen for 7 sessions             Subjective   Tired, I have testing for my thyroid and blood work because I have been anemic in the past. My neck pops when I turn it.  Pain in neck and back rated 5 7/10.  Left ankle pain reported after resisted UE exercises      Objective     AQUATIC  THERAPY EXERCISES:  Suspended X 3 min. And seated   Shoulder Flexion/Extension AROM X 10  Walking Fwds, Sidestepping and Backwards 2-3 laps each  Hamstring Stretch 20 sec X 3 each, added Lg Noodle support on last rep  Piriformis Stretch 20 sec X 2 each  Wall Crawl Stretch (BKTC) w/ Noodle/Rail support 20 sec X 3  Abdominals - Lg Noodle Pushdowns X 15 (4 ft depth)  UTR holding small noodle X 10  Hip Abduction X 15 each  Leg Press w/ large Solid (white) Noodle X 15  Rows w/ closed paddles X 15  Paddle Stirs  (L5) 10/10  Shoulder horiz Abd/Add w/ open paddles X 15 (back to wall)  Cervical Spine Rotation AROM X 5 each side   Scapular Retraction in sitting 5 sec X 5  Chin Tucks 5 sec X 5       Assessment/Plan  Jonna is able to work on strengthening in a pool that is comfortable to her joints.   Discussed office ergonomics, sitting posture and body mechanics for lifting including use of visual aids. It sounds like her keyboard is too high, however with her current set up there does not seem to be a way to adjust for this.  She is trying to get an adjustable stand up desk.  Jonna is using a lumbar support as well as a seat cushion.        Timed:  Aquatic Therapy    41     mins 00579;    Casper Jacobson, PT  Physical Therapist    [place] License:  [id number]   DISPLAY PLAN FREE TEXT

## 2023-08-24 ENCOUNTER — TELEPHONE (OUTPATIENT)
Dept: ONCOLOGY | Facility: CLINIC | Age: 38
End: 2023-08-24
Payer: COMMERCIAL

## 2023-08-24 ENCOUNTER — OFFICE VISIT (OUTPATIENT)
Dept: ONCOLOGY | Facility: CLINIC | Age: 38
End: 2023-08-24
Payer: COMMERCIAL

## 2023-08-24 ENCOUNTER — LAB (OUTPATIENT)
Dept: LAB | Facility: HOSPITAL | Age: 38
End: 2023-08-24
Payer: COMMERCIAL

## 2023-08-24 VITALS
BODY MASS INDEX: 26.11 KG/M2 | HEART RATE: 90 BPM | TEMPERATURE: 98.2 F | RESPIRATION RATE: 17 BRPM | OXYGEN SATURATION: 97 % | HEIGHT: 65 IN | WEIGHT: 156.7 LBS | DIASTOLIC BLOOD PRESSURE: 66 MMHG | SYSTOLIC BLOOD PRESSURE: 95 MMHG

## 2023-08-24 DIAGNOSIS — D50.0 IRON DEFICIENCY ANEMIA DUE TO CHRONIC BLOOD LOSS: Primary | ICD-10-CM

## 2023-08-24 DIAGNOSIS — E05.00 GRAVES DISEASE: ICD-10-CM

## 2023-08-24 DIAGNOSIS — D50.0 IRON DEFICIENCY ANEMIA DUE TO CHRONIC BLOOD LOSS: ICD-10-CM

## 2023-08-24 DIAGNOSIS — D50.9 IRON DEFICIENCY ANEMIA, UNSPECIFIED IRON DEFICIENCY ANEMIA TYPE: ICD-10-CM

## 2023-08-24 LAB
BASOPHILS # BLD AUTO: 0.07 10*3/MM3 (ref 0–0.2)
BASOPHILS NFR BLD AUTO: 1.3 % (ref 0–1.5)
DEPRECATED RDW RBC AUTO: 39 FL (ref 37–54)
EOSINOPHIL # BLD AUTO: 0.17 10*3/MM3 (ref 0–0.4)
EOSINOPHIL NFR BLD AUTO: 3 % (ref 0.3–6.2)
ERYTHROCYTE [DISTWIDTH] IN BLOOD BY AUTOMATED COUNT: 12.4 % (ref 12.3–15.4)
FERRITIN SERPL-MCNC: 198 NG/ML (ref 13–150)
HCT VFR BLD AUTO: 41.9 % (ref 34–46.6)
HGB BLD-MCNC: 13 G/DL (ref 12–15.9)
IMM GRANULOCYTES # BLD AUTO: 0.01 10*3/MM3 (ref 0–0.05)
IMM GRANULOCYTES NFR BLD AUTO: 0.2 % (ref 0–0.5)
IRON 24H UR-MRATE: 61 MCG/DL (ref 37–145)
IRON SATN MFR SERPL: 21 % (ref 20–50)
LYMPHOCYTES # BLD AUTO: 2.14 10*3/MM3 (ref 0.7–3.1)
LYMPHOCYTES NFR BLD AUTO: 38.2 % (ref 19.6–45.3)
MCH RBC QN AUTO: 26.7 PG (ref 26.6–33)
MCHC RBC AUTO-ENTMCNC: 31 G/DL (ref 31.5–35.7)
MCV RBC AUTO: 86.2 FL (ref 79–97)
MONOCYTES # BLD AUTO: 0.38 10*3/MM3 (ref 0.1–0.9)
MONOCYTES NFR BLD AUTO: 6.8 % (ref 5–12)
NEUTROPHILS NFR BLD AUTO: 2.83 10*3/MM3 (ref 1.7–7)
NEUTROPHILS NFR BLD AUTO: 50.5 % (ref 42.7–76)
NRBC BLD AUTO-RTO: 0 /100 WBC (ref 0–0.2)
PLATELET # BLD AUTO: 217 10*3/MM3 (ref 140–450)
PMV BLD AUTO: 9.9 FL (ref 6–12)
RBC # BLD AUTO: 4.86 10*6/MM3 (ref 3.77–5.28)
T4 FREE SERPL-MCNC: 0.89 NG/DL (ref 0.93–1.7)
TIBC SERPL-MCNC: 286 MCG/DL (ref 298–536)
TRANSFERRIN SERPL-MCNC: 204 MG/DL (ref 200–360)
TSH SERPL DL<=0.05 MIU/L-ACNC: 9.68 UIU/ML (ref 0.27–4.2)
WBC NRBC COR # BLD: 5.6 10*3/MM3 (ref 3.4–10.8)

## 2023-08-24 PROCEDURE — 84466 ASSAY OF TRANSFERRIN: CPT

## 2023-08-24 PROCEDURE — 36415 COLL VENOUS BLD VENIPUNCTURE: CPT

## 2023-08-24 PROCEDURE — 83540 ASSAY OF IRON: CPT

## 2023-08-24 PROCEDURE — 84443 ASSAY THYROID STIM HORMONE: CPT | Performed by: INTERNAL MEDICINE

## 2023-08-24 PROCEDURE — 82728 ASSAY OF FERRITIN: CPT

## 2023-08-24 PROCEDURE — 84439 ASSAY OF FREE THYROXINE: CPT | Performed by: INTERNAL MEDICINE

## 2023-08-24 PROCEDURE — 85025 COMPLETE CBC W/AUTO DIFF WBC: CPT

## 2023-08-24 PROCEDURE — 99214 OFFICE O/P EST MOD 30 MIN: CPT | Performed by: INTERNAL MEDICINE

## 2023-08-24 NOTE — TELEPHONE ENCOUNTER
----- Message from Javon Schaeffer MD sent at 8/24/2023 11:49 AM EDT -----  PIP iron levels look good.  F/u 4 month cbc ferritin iron prof NP visit

## 2023-08-25 ENCOUNTER — TREATMENT (OUTPATIENT)
Dept: PHYSICAL THERAPY | Facility: CLINIC | Age: 38
End: 2023-08-25
Payer: COMMERCIAL

## 2023-08-25 DIAGNOSIS — G89.29 CHRONIC MIDLINE LOW BACK PAIN WITH SCIATICA, SCIATICA LATERALITY UNSPECIFIED: ICD-10-CM

## 2023-08-25 DIAGNOSIS — M53.82 DECREASED RANGE OF MOTION OF INTERVERTEBRAL DISCS OF CERVICAL SPINE: ICD-10-CM

## 2023-08-25 DIAGNOSIS — M54.2 PAIN, NECK: Primary | ICD-10-CM

## 2023-08-25 DIAGNOSIS — R68.89 DECREASED STRENGTH, ENDURANCE, AND MOBILITY: ICD-10-CM

## 2023-08-25 DIAGNOSIS — Z74.09 DECREASED STRENGTH, ENDURANCE, AND MOBILITY: ICD-10-CM

## 2023-08-25 DIAGNOSIS — R53.1 DECREASED STRENGTH, ENDURANCE, AND MOBILITY: ICD-10-CM

## 2023-08-25 DIAGNOSIS — M54.40 CHRONIC MIDLINE LOW BACK PAIN WITH SCIATICA, SCIATICA LATERALITY UNSPECIFIED: ICD-10-CM

## 2023-08-25 PROCEDURE — 97113 AQUATIC THERAPY/EXERCISES: CPT | Performed by: PHYSICAL THERAPIST

## 2023-08-25 NOTE — PROGRESS NOTES
Physical Therapy Daily Treatment Note    [facility]  [address] [place] [address]  [phone] (phone)  [phone] (fax)    Patient: Sundeep Espino   : 1985  Diagnosis/ICD-10 Code:  Pain, neck [M54.2]  Referring practitioner: Adam Monahan DO  Date of Initial Visit: Type: THERAPY  Noted: 2023  Today's Date: 2023  Patient seen for 8 sessions             Subjective     Objective     AQUATIC  THERAPY EXERCISES:    Lap Pool:   Fwds walk 25 yds  Backwards jog 25 yds X 2  Fwds Walk w/ foam dumbbell push 25 yds   Lateral Arm Press w/ small foam dumbbells X 20 Squats X 20  Mini Squats X 20   Supine Finning 25 yds X 4  Prone Finning 25 yds X 4    Therapy Pool:  Suspended Bicycling X 1 min.   Hamstring Stretch w/ foot on wall 20 sec X 2 each  Piriformis Stretch --  Wall Crawl Stretch (BKTC) w/ Noodle/Rail support --  Abdominals - Solid Lg Noodle Pushdowns X 20 (4 ft depth)  UTR holding small noodle X 10  Hip Abduction holding solid noodle X 15 each  Leg Press w/ large Solid (white) Noodle X 20  Rows w/ hydrotones X 15  Hydrotone  Stirs 10/10      Assessment/Plan  Jonna reports improved stability in the pool with the standing exercises. Increased endurance training in the cooler lap pool. Progressed from smaller paddles to larger hydrotones for core stability exercises.          Timed:  Aquatic Therapy    50     mins 54847;    Casper Jacobson, PT  Physical Therapist    [place] License:  [id number]

## 2023-08-30 ENCOUNTER — TREATMENT (OUTPATIENT)
Dept: PHYSICAL THERAPY | Facility: CLINIC | Age: 38
End: 2023-08-30
Payer: COMMERCIAL

## 2023-08-30 DIAGNOSIS — M53.82 DECREASED RANGE OF MOTION OF INTERVERTEBRAL DISCS OF CERVICAL SPINE: ICD-10-CM

## 2023-08-30 DIAGNOSIS — R53.1 DECREASED STRENGTH, ENDURANCE, AND MOBILITY: ICD-10-CM

## 2023-08-30 DIAGNOSIS — G89.29 CHRONIC MIDLINE LOW BACK PAIN WITH SCIATICA, SCIATICA LATERALITY UNSPECIFIED: ICD-10-CM

## 2023-08-30 DIAGNOSIS — R68.89 DECREASED STRENGTH, ENDURANCE, AND MOBILITY: ICD-10-CM

## 2023-08-30 DIAGNOSIS — M54.2 PAIN, NECK: Primary | ICD-10-CM

## 2023-08-30 DIAGNOSIS — M54.40 CHRONIC MIDLINE LOW BACK PAIN WITH SCIATICA, SCIATICA LATERALITY UNSPECIFIED: ICD-10-CM

## 2023-08-30 DIAGNOSIS — Z74.09 DECREASED STRENGTH, ENDURANCE, AND MOBILITY: ICD-10-CM

## 2023-08-30 PROCEDURE — 97113 AQUATIC THERAPY/EXERCISES: CPT | Performed by: PHYSICAL THERAPIST

## 2023-08-30 NOTE — PROGRESS NOTES
Physical Therapy Daily Treatment Note    [facility]  [address] [place] [address]  [phone] (phone)  [phone] (fax)    Patient: Sundeep Espino   : 1985  Diagnosis/ICD-10 Code:  Pain, neck [M54.2]  Referring practitioner: Adam Monahan DO  Date of Initial Visit: Type: THERAPY  Noted: 2023  Today's Date: 2023  Patient seen for 9 sessions             Subjective   Neck and back feeling better. Right wrist hurt over the weekend.    Objective     AQUATIC  THERAPY EXERCISES:     Lap Pool:   Fwds walk 25 yds X 2 w/ small aqualogix push/pull in reciprocal fashion to legs  Backwards light jog 25 yds X 2  Lateral Arm Press w/ medium foam dumbbells X 20   Abdominals - Medium (white) foam dumbbell Pushdowns to front X 20   Leg Press w/ lg foam ring X 20 each  Mini Squats X 20   Supine Finning (w/ kickboard) 25 yds X 4  Prone Finning (w/ kickboard) 25 yds X 4  Rows w/ hydrotones X 15  Hydrotone  Stirs 10/10  Hip Abduction  X 20 each  Mini Squats X 20    Therapy Pool:  Hamstring Stretch w/ lg noodle under ankle 20 sec X 2 each  Piriformis Stretch 20 sec X 2 each  Wall Crawl Stretch (BKTC) w/ Noodle/Rail support 20 sec X 2  UTR holding small noodle X 10  Decompression on solid noodle -independent at end of session      Assessment/Plan  Jonna reports feeling better since her last physical therapy treatment. Light cues for progression of exercises.          Timed:  Aquatic Therapy    45     mins 70146;    Casper Jacobson, PT  Physical Therapist    [place] License:  [id number]

## 2023-09-01 ENCOUNTER — TREATMENT (OUTPATIENT)
Dept: PHYSICAL THERAPY | Facility: CLINIC | Age: 38
End: 2023-09-01
Payer: COMMERCIAL

## 2023-09-01 DIAGNOSIS — M54.2 PAIN, NECK: Primary | ICD-10-CM

## 2023-09-01 DIAGNOSIS — G89.29 CHRONIC MIDLINE LOW BACK PAIN WITH SCIATICA, SCIATICA LATERALITY UNSPECIFIED: ICD-10-CM

## 2023-09-01 DIAGNOSIS — R68.89 DECREASED STRENGTH, ENDURANCE, AND MOBILITY: ICD-10-CM

## 2023-09-01 DIAGNOSIS — M54.40 CHRONIC MIDLINE LOW BACK PAIN WITH SCIATICA, SCIATICA LATERALITY UNSPECIFIED: ICD-10-CM

## 2023-09-01 DIAGNOSIS — R53.1 DECREASED STRENGTH, ENDURANCE, AND MOBILITY: ICD-10-CM

## 2023-09-01 DIAGNOSIS — M53.82 DECREASED RANGE OF MOTION OF INTERVERTEBRAL DISCS OF CERVICAL SPINE: ICD-10-CM

## 2023-09-01 DIAGNOSIS — Z74.09 DECREASED STRENGTH, ENDURANCE, AND MOBILITY: ICD-10-CM

## 2023-09-01 PROCEDURE — 97113 AQUATIC THERAPY/EXERCISES: CPT | Performed by: PHYSICAL THERAPIST

## 2023-09-01 NOTE — PROGRESS NOTES
Physical Therapy Daily Treatment Note    [facility]  [address] [place] [address]  [phone] (phone)  [phone] (fax)    Patient: Sundeep Espino   : 1985  Diagnosis/ICD-10 Code:  Pain, neck [M54.2]  Referring practitioner: Adam Monahan DO  Date of Initial Visit: Type: THERAPY  Noted: 2023  Today's Date: 2023  Patient seen for 10 sessions             Subjective   I needed this today, has been very busy at work.    Objective     AQUATIC  THERAPY EXERCISES:     Lap Pool:   Fwds walk 25 yds X 2 w/ small aqualogix push/pull   Backwards light jog 25 yds X 2  Lateral Arm Press w/ medium foam dumbbells X 20   Abdominals - Medium (white) foam dumbbell Pushdowns to front X 20   Leg Press w/ lg foam ring X 20 each, then w/ solid white Noodle X 20 ea  Mini Squats X 20   Supine Finning (w/ kickboard) 25 yds X 4  Prone Finning (w/ kickboard) 25 yds X 4  Rows w/ hydrotones X 20  Hydrotone  Stirs 10/10  Hip Abduction  X 20 each  Mini Squats X 20     Therapy Pool:  Hamstring Stretch w/ lg noodle under ankle 20 sec X 2 each  Piriformis Stretch 20 sec X 2 each  Wall Crawl Stretch (BKTC) w/ Noodle/Rail support 20 sec X 2  UTR holding small noodle X 10  Decompression on solid noodle -independent at end of session       Assessment/Plan  Progressed resistance on leg press from foam ring to larger solid foam noodle. Light verbal cues for prescribed exercises.           Timed:  Aquatic Therapy    40     mins 57919;    Casper Jacobson, PT  Physical Therapist    [place] License:  [id number]

## 2023-09-06 ENCOUNTER — TREATMENT (OUTPATIENT)
Dept: PHYSICAL THERAPY | Facility: CLINIC | Age: 38
End: 2023-09-06
Payer: COMMERCIAL

## 2023-09-06 DIAGNOSIS — G89.29 CHRONIC MIDLINE LOW BACK PAIN WITH SCIATICA, SCIATICA LATERALITY UNSPECIFIED: ICD-10-CM

## 2023-09-06 DIAGNOSIS — R53.1 DECREASED STRENGTH, ENDURANCE, AND MOBILITY: ICD-10-CM

## 2023-09-06 DIAGNOSIS — M54.2 PAIN, NECK: Primary | ICD-10-CM

## 2023-09-06 DIAGNOSIS — Z74.09 DECREASED STRENGTH, ENDURANCE, AND MOBILITY: ICD-10-CM

## 2023-09-06 DIAGNOSIS — R68.89 DECREASED STRENGTH, ENDURANCE, AND MOBILITY: ICD-10-CM

## 2023-09-06 DIAGNOSIS — M53.82 DECREASED RANGE OF MOTION OF INTERVERTEBRAL DISCS OF CERVICAL SPINE: ICD-10-CM

## 2023-09-06 DIAGNOSIS — M54.40 CHRONIC MIDLINE LOW BACK PAIN WITH SCIATICA, SCIATICA LATERALITY UNSPECIFIED: ICD-10-CM

## 2023-09-06 PROCEDURE — 97113 AQUATIC THERAPY/EXERCISES: CPT | Performed by: PHYSICAL THERAPIST

## 2023-09-08 ENCOUNTER — TREATMENT (OUTPATIENT)
Dept: PHYSICAL THERAPY | Facility: CLINIC | Age: 38
End: 2023-09-08
Payer: COMMERCIAL

## 2023-09-08 DIAGNOSIS — R68.89 DECREASED STRENGTH, ENDURANCE, AND MOBILITY: ICD-10-CM

## 2023-09-08 DIAGNOSIS — G89.29 CHRONIC MIDLINE LOW BACK PAIN WITH SCIATICA, SCIATICA LATERALITY UNSPECIFIED: ICD-10-CM

## 2023-09-08 DIAGNOSIS — Z74.09 DECREASED STRENGTH, ENDURANCE, AND MOBILITY: ICD-10-CM

## 2023-09-08 DIAGNOSIS — R53.1 DECREASED STRENGTH, ENDURANCE, AND MOBILITY: ICD-10-CM

## 2023-09-08 DIAGNOSIS — M53.82 DECREASED RANGE OF MOTION OF INTERVERTEBRAL DISCS OF CERVICAL SPINE: ICD-10-CM

## 2023-09-08 DIAGNOSIS — M54.40 CHRONIC MIDLINE LOW BACK PAIN WITH SCIATICA, SCIATICA LATERALITY UNSPECIFIED: ICD-10-CM

## 2023-09-08 DIAGNOSIS — M54.2 PAIN, NECK: Primary | ICD-10-CM

## 2023-09-08 PROCEDURE — 97113 AQUATIC THERAPY/EXERCISES: CPT | Performed by: PHYSICAL THERAPIST

## 2023-09-08 NOTE — PROGRESS NOTES
Physical Therapy 30-Day / 10-Visit Progress Note     [facility]  [address] [place] [address]  [phone] (phone)  [phone] (fax)    Patient: Sundeep Espino   : 1985  Diagnosis/ICD-10 Code:  No primary diagnosis found.  Referring practitioner: Adam Monahan DO  Date of Initial Visit: No linked episodes  Today's Date: 2023  Patient seen for Visit count could not be calculated. Make sure you are using a visit which is associated with an episode. sessions      Subjective:     Clinical Progress: {UNCHANGED, IMPROVED, WORSE:51759}  Home Program Compliance: {YES/NA/NO:80356}  Treatment has included:  {ptorthotreatments:23182}    Subjective Pain rating;  Objective       Functional outcome score: ***      Assessment/Plan     Goals: Short Term Goals:   1. Pt will be independent with initial HEP  2. Pt will report >/= 50% decrease in neck and low back pain at worst  3. Pt will demonstrate symmetrical cervical AROM WFL without reports of pain for improved ability to perform ADLs and drive  4. Pt will report ability to sit at her desk during work for 1 hour without increased neck or lower back pain     Long Term Goals: 12 weeks  1. Pt will be independent with progressed HEP  2. Pt will report >/= 90% decrease in neck and low back pain at worst  3. Pt will ambulate 300' with increased dunia and symmetrical gait mechanics without  reports of neck or low back pain for indication of improved ability to ambulate in the community and return to recreational exercise  4. Pt will demonstrate 5/5 BLE strength in all planes of motion for improved ability to perform all ADLs and recreational activities without pain   5. Pt will perform double-limb jumping up x 10 consecutive repetitions without reports of neck or low back pain for improved ability to jump rope recreationally      Recommendations: {Reassess plan:05438}  Timeframe: { timeframe:1074470000}  Prognosis to  achieve goals: {GOOD/FAIR/POOR:62249}    PT Signature: Casper Jacobson, ARTHUR  [place] License: [id number]      Based upon review of the patient's progress and continued therapy plan, it is my medical opinion that Sundeep Espino should continue physical therapy treatment at [facility]  [address] [place] [address] [address] SUBHASH [address]IVERA [address]  [phone].    Signature: __________________________________  Adam Monahan DO  NPI: [id number]                                          Timed:  Aquatic therapy  05908    ***   mins
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
## 2023-09-15 ENCOUNTER — TREATMENT (OUTPATIENT)
Dept: PHYSICAL THERAPY | Facility: CLINIC | Age: 38
End: 2023-09-15
Payer: COMMERCIAL

## 2023-09-15 DIAGNOSIS — Z74.09 DECREASED STRENGTH, ENDURANCE, AND MOBILITY: ICD-10-CM

## 2023-09-15 DIAGNOSIS — M54.2 PAIN, NECK: Primary | ICD-10-CM

## 2023-09-15 DIAGNOSIS — M53.82 DECREASED RANGE OF MOTION OF INTERVERTEBRAL DISCS OF CERVICAL SPINE: ICD-10-CM

## 2023-09-15 DIAGNOSIS — R53.1 DECREASED STRENGTH, ENDURANCE, AND MOBILITY: ICD-10-CM

## 2023-09-15 DIAGNOSIS — R68.89 DECREASED STRENGTH, ENDURANCE, AND MOBILITY: ICD-10-CM

## 2023-09-15 DIAGNOSIS — M54.40 CHRONIC MIDLINE LOW BACK PAIN WITH SCIATICA, SCIATICA LATERALITY UNSPECIFIED: ICD-10-CM

## 2023-09-15 DIAGNOSIS — G89.29 CHRONIC MIDLINE LOW BACK PAIN WITH SCIATICA, SCIATICA LATERALITY UNSPECIFIED: ICD-10-CM

## 2023-09-15 PROCEDURE — 97113 AQUATIC THERAPY/EXERCISES: CPT | Performed by: PHYSICAL THERAPIST

## 2023-09-15 NOTE — PROGRESS NOTES
Physical Therapy Daily Treatment Note    [facility]  [address] [place] [address]  [phone] (phone)  [phone] (fax)    Patient: Sundeep Espino   : 1985  Diagnosis/ICD-10 Code:  Pain, neck [M54.2]  Referring practitioner: Adam Monahan DO  Date of Initial Visit: Type: THERAPY  Noted: 2023  Today's Date: 9/15/2023  Patient seen for 13 sessions             Subjective Evaluation    History of Present Illness    Subjective comment: tossed and turned from one side to the other last night because my back was hurting.     Objective     QUATIC  THERAPY EXERCISES:     Fwds walk x 100 ft  Backwards light jog 25 yds x 2  Lateral Arm Press w/ medium foam dumbbells x 20   Abdominals - Solid (white) Noodle Pushdowns x 20   Leg Press  w/ solid white Noodle x 20 ea  Supine Finning (w/ kickboard) 2 x 75 ft  Prone Finning (w/ kickboard) 2 x 75 ft   Rows w/ closed paddles x 20  Hydrotone  Stirs 10/10  Hip Abduction  x 20 ea  Mini Squats  x 20  Hamstring Stretch w/ lg noodle under ankle 20 sec x 2 ea  Piriformis Stretch 20 sec x 2 ea  Wall Crawl Stretch (BKTC) w/ Noodle/Rail support 20-30 sec x 2  UTR holding small noodle x 10  Decompression on solid noodle -independent at end of session      Assessment & Plan       Assessment  Assessment details: Patient reports she had to switch sides frequently last night due to her back bothering her and inability to remain comfortable for very long in any position.  Continued with previous aquatic ex/activity for mobility, flexibility, and strength/stabilization.  She noted L LE feels weaker and tires more easily with ex/activity.  Instructed her to stand tall, engage postural stabilizers, and focus on perform ex with good form / quality and control to improve desired muscle facilitation, minimize compensation, and optimize benefit.  PT provided demonstration and cuing throughout session for optimal posture, core/glut/scapular  muscle activation, and correct form/technique with ex/activity.    Plan:  Continue with skilled therapy progressing aquatic ex/activity as appropriate/tolerated working toward transition to independent self care over the next few weeks.                  Timed:  Aquatic Therapy    42     mins 62932;    Ifeoma Seals, PT  Physical Therapist    [place] License: [id number]

## 2023-09-22 ENCOUNTER — TREATMENT (OUTPATIENT)
Dept: PHYSICAL THERAPY | Facility: CLINIC | Age: 38
End: 2023-09-22
Payer: COMMERCIAL

## 2023-09-22 DIAGNOSIS — M54.40 CHRONIC MIDLINE LOW BACK PAIN WITH SCIATICA, SCIATICA LATERALITY UNSPECIFIED: ICD-10-CM

## 2023-09-22 DIAGNOSIS — G89.29 CHRONIC MIDLINE LOW BACK PAIN WITH SCIATICA, SCIATICA LATERALITY UNSPECIFIED: ICD-10-CM

## 2023-09-22 DIAGNOSIS — M53.82 DECREASED RANGE OF MOTION OF INTERVERTEBRAL DISCS OF CERVICAL SPINE: ICD-10-CM

## 2023-09-22 DIAGNOSIS — Z74.09 DECREASED STRENGTH, ENDURANCE, AND MOBILITY: ICD-10-CM

## 2023-09-22 DIAGNOSIS — R68.89 DECREASED STRENGTH, ENDURANCE, AND MOBILITY: ICD-10-CM

## 2023-09-22 DIAGNOSIS — R53.1 DECREASED STRENGTH, ENDURANCE, AND MOBILITY: ICD-10-CM

## 2023-09-22 DIAGNOSIS — M54.2 PAIN, NECK: Primary | ICD-10-CM

## 2023-09-22 PROCEDURE — 97113 AQUATIC THERAPY/EXERCISES: CPT | Performed by: PHYSICAL THERAPIST

## 2023-09-22 NOTE — PROGRESS NOTES
Physical Therapy Daily Treatment Note    [facility]  [address] [place] [address]  [phone] (phone)  [phone] (fax)    Patient: Sundeep Espino   : 1985  Diagnosis/ICD-10 Code:  Pain, neck [M54.2]  Referring practitioner: Adam Monahan DO  Date of Initial Visit: Type: THERAPY  Noted: 2023  Today's Date: 2023  Patient seen for 14 sessions             Subjective   I took  a walk at the [facility] and felt off balance if I looked off  to the side.  Denies dizziness. My joints are sore, I think it's my arthritis.  .  Objective     AQUATIC  THERAPY EXERCISES:     Lap Pool:  Fwds walk 25 yards  Backwards light jog 25 yds x 2  Simulate Jump Rope in chest depth 2 X 10  Lateral Arm Press w/ medium foam dumbbells 3 X 10   Abdominals - Solid (white) Noodle Pushdowns x 20   Leg Press  w/ solid white Noodle x 20 ea  Supine Finning (w/ kickboard) 2 x 50 yds  Prone Finning (w/ kickboard) 2 x 50 yds   Rows w/ closed paddles 2 X 15  Hydrotone  Stirs 10/10  Hip Abduction  x 20 ea  Mini Squats holding 4 pound plyoball x 12  Plank w/ hands on pool bench 20 sec.X 3, Hip Extension 5 sec X 5  Plank w/ hands on lg noodle 20 sec, then Hip Extension X 5   Hamstring Stretch w/ lg noodle under ankle 20 sec x 2 ea  Piriformis Stretch 20 sec x 2 ea  Wall Crawl Stretch (BKTC) w/ Noodle/Rail support 20 sec x 2  UTR holding small noodle x 10       Assessment/Plan  Progressed to holding 4 pound plyoball with mini squats and also instructed in 2 versions of the plank exercise modified for the pool.  Sundeep also worked on jump rope simulation in deep water today without issue.          Timed:  Aquatic Therapy    54     mins [id number];    Casper Jacobson, PT  Physical Therapist    [place] License:  [id number]

## 2023-09-27 ENCOUNTER — TREATMENT (OUTPATIENT)
Dept: PHYSICAL THERAPY | Facility: CLINIC | Age: 38
End: 2023-09-27
Payer: COMMERCIAL

## 2023-09-27 DIAGNOSIS — M53.82 DECREASED RANGE OF MOTION OF INTERVERTEBRAL DISCS OF CERVICAL SPINE: ICD-10-CM

## 2023-09-27 DIAGNOSIS — R53.1 DECREASED STRENGTH, ENDURANCE, AND MOBILITY: ICD-10-CM

## 2023-09-27 DIAGNOSIS — G89.29 CHRONIC MIDLINE LOW BACK PAIN WITH SCIATICA, SCIATICA LATERALITY UNSPECIFIED: ICD-10-CM

## 2023-09-27 DIAGNOSIS — R68.89 DECREASED STRENGTH, ENDURANCE, AND MOBILITY: ICD-10-CM

## 2023-09-27 DIAGNOSIS — Z74.09 DECREASED STRENGTH, ENDURANCE, AND MOBILITY: ICD-10-CM

## 2023-09-27 DIAGNOSIS — M54.40 CHRONIC MIDLINE LOW BACK PAIN WITH SCIATICA, SCIATICA LATERALITY UNSPECIFIED: ICD-10-CM

## 2023-09-27 DIAGNOSIS — M54.2 PAIN, NECK: Primary | ICD-10-CM

## 2023-09-27 PROCEDURE — 97113 AQUATIC THERAPY/EXERCISES: CPT | Performed by: PHYSICAL THERAPIST

## 2023-09-27 NOTE — PROGRESS NOTES
Physical Therapy Daily Treatment Note    [facility]  [address] [place] [address]  [phone] (phone)  [phone] (fax)    Patient: Sundeep Espino   : 1985  Diagnosis/ICD-10 Code:  Pain, neck [M54.2]  Referring practitioner: Adam Monahan DO  Date of Initial Visit: Type: THERAPY  Noted: 2023  Today's Date: 2023  Patient seen for 15 sessions             Subjective   Getting eye glasses to help see when driving at night.    Objective     AQUATIC THERAPY:  Tandem Stand Eyes open  30 sec.,   Eyes Closed  6 sec  Tandem Walk 25 ft X 2  Lunge walk w/ foam dumbbell support 50 ft and w/o dumbbells 50 ft  6 inch Step Ups X 10 each  Plank w/ lg noodle support 20 sec., then Hip Extension X 5 each  Side Plank hand on bench 10 sec each and w/ Noodle 10 sec.  Abdominals -Large (yellow) foam dumbbell Pushdowns 2 X 10  Lateral Arm Press w/ yellow dumbbells 2 X 10  Prone and Supine Finning X 5 minutes w/ 2 breaks      Assessment/Plan  Rhomberg Test 30 seconds  Sharpened Rhomberg 6 sec., Tandem Stand Eyes open 30 sec. Progressed to 6 inch step ups and Side Plank which was challenging for Sundeep.  She is sleeping well on the days she comes to aquatic therapy.          Timed:  Aquatic Therapy    50     mins 63499;    Casper Jacobson, PT  Physical Therapist    [place] License:  [id number]

## 2023-09-29 ENCOUNTER — TREATMENT (OUTPATIENT)
Dept: PHYSICAL THERAPY | Facility: CLINIC | Age: 38
End: 2023-09-29
Payer: COMMERCIAL

## 2023-09-29 DIAGNOSIS — M53.82 DECREASED RANGE OF MOTION OF INTERVERTEBRAL DISCS OF CERVICAL SPINE: ICD-10-CM

## 2023-09-29 DIAGNOSIS — M54.2 PAIN, NECK: Primary | ICD-10-CM

## 2023-09-29 DIAGNOSIS — M54.40 CHRONIC MIDLINE LOW BACK PAIN WITH SCIATICA, SCIATICA LATERALITY UNSPECIFIED: ICD-10-CM

## 2023-09-29 DIAGNOSIS — R68.89 DECREASED STRENGTH, ENDURANCE, AND MOBILITY: ICD-10-CM

## 2023-09-29 DIAGNOSIS — G89.29 CHRONIC MIDLINE LOW BACK PAIN WITH SCIATICA, SCIATICA LATERALITY UNSPECIFIED: ICD-10-CM

## 2023-09-29 DIAGNOSIS — Z74.09 DECREASED STRENGTH, ENDURANCE, AND MOBILITY: ICD-10-CM

## 2023-09-29 DIAGNOSIS — R53.1 DECREASED STRENGTH, ENDURANCE, AND MOBILITY: ICD-10-CM

## 2023-09-29 PROCEDURE — 97113 AQUATIC THERAPY/EXERCISES: CPT | Performed by: PHYSICAL THERAPIST

## 2023-09-29 NOTE — PROGRESS NOTES
Physical Therapy Daily Treatment Note    [facility]  [address] [place] [address]  [phone] (phone)  [phone] (fax)    Patient: Sundeep Espino   : 1985  Diagnosis/ICD-10 Code:  Pain, neck [M54.2]  Referring practitioner: Adam Monahan DO  Date of Initial Visit: Type: THERAPY  Noted: 2023  Today's Date: 2023  Patient seen for 16 sessions             Subjective   Slept really good after the last treatment.    Objective     AQUATIC THERAPY:  Water walk Fwds/Backwards - on her own  Piriformis Stretch 20 sec X 2   Tandem Walk 25 ft X 2  6 inch Step Ups w/ Hip/Knee  X 10 each  Plank w/ lg noodle support 20 sec., then Hip Extension X 5 each  Side Plank hand on bench 10 sec each and w/ Noodle 10 sec.  Reverse plank  w/ leg lift X 3  Seated on noodle for balace/core control X 1 min  Abdominals -Large (yellow) foam dumbbell Pushdowns 2 X 10  Lateral Arm Press w/ yellow dumbbells 2 X 10  Prone and Supine Finning X 5 minutes w/ 2 breaks       Assessment/Plan  Progressing core strength and stability and adding balance training with tandem walk.          Timed:  Aquatic Therapy    38     mins [id number];    Casper Jacobson, PT  Physical Therapist    [place] License:  [id number]

## 2023-10-04 ENCOUNTER — TREATMENT (OUTPATIENT)
Dept: PHYSICAL THERAPY | Facility: CLINIC | Age: 38
End: 2023-10-04
Payer: COMMERCIAL

## 2023-10-04 DIAGNOSIS — Z74.09 DECREASED STRENGTH, ENDURANCE, AND MOBILITY: ICD-10-CM

## 2023-10-04 DIAGNOSIS — M53.82 DECREASED RANGE OF MOTION OF INTERVERTEBRAL DISCS OF CERVICAL SPINE: ICD-10-CM

## 2023-10-04 DIAGNOSIS — M54.2 PAIN, NECK: Primary | ICD-10-CM

## 2023-10-04 DIAGNOSIS — G89.29 CHRONIC MIDLINE LOW BACK PAIN WITH SCIATICA, SCIATICA LATERALITY UNSPECIFIED: ICD-10-CM

## 2023-10-04 DIAGNOSIS — M54.40 CHRONIC MIDLINE LOW BACK PAIN WITH SCIATICA, SCIATICA LATERALITY UNSPECIFIED: ICD-10-CM

## 2023-10-04 DIAGNOSIS — R53.1 DECREASED STRENGTH, ENDURANCE, AND MOBILITY: ICD-10-CM

## 2023-10-04 DIAGNOSIS — R68.89 DECREASED STRENGTH, ENDURANCE, AND MOBILITY: ICD-10-CM

## 2023-10-04 PROCEDURE — 97113 AQUATIC THERAPY/EXERCISES: CPT | Performed by: PHYSICAL THERAPIST

## 2023-10-04 NOTE — PROGRESS NOTES
Physical Therapy Daily Treatment Note    [facility]  [address] [place] [address]  [phone] (phone)  [phone] (fax)    Patient: Sundeep Espino   : 1985  Diagnosis/ICD-10 Code:  Pain, neck [M54.2]  Referring practitioner: Adam Monahan DO  Date of Initial Visit: Type: THERAPY  Noted: 2023  Today's Date: 10/4/2023  Patient seen for 17 sessions             Subjective   Made a big dinner over the weekend.  Slept well after last P.T. treatment.    Objective     AQUATIC THERAPY:  Water walk 2 laps and backwards light jog 2 laps  Piriformis Stretch 20 sec X 2   Tandem Walk 25 ft X 2  6 inch Step Ups w/ Hip/Knee  X 10 each  Plank w/ lg noodle support 20 sec., then Hip Extension X 5 each  Side Plank hand on bench 10 sec each and w/ Noodle 10 sec.  Reverse plank  w/ leg lift X 3 Deferred  Mini Squat holding 4# plyoball X 20  Ball toss/catch standing on one leg X 8 each  Shoulder Abd/Add w/ lg foam dumbbells X 20  Seated on noodle for balace/core control w/ LAQ motion X 5  Seated bicyling w/ breastroke arms X 50 ft  Abdominals -Large (yellow) foam dumbbell Pushdowns 2 X 10  Lateral Arm Press w/ yellow dumbbells 2 X 10      Assessment/Plan  Slight improvement in core stability with plank exercise in the pool, takes concentration.           Timed:  Aquatic Therapy    24     mins [id number];    Casper Jacobson, PT  Physical Therapist    [place] License:  [id number]

## 2023-10-06 ENCOUNTER — TREATMENT (OUTPATIENT)
Dept: PHYSICAL THERAPY | Facility: CLINIC | Age: 38
End: 2023-10-06
Payer: COMMERCIAL

## 2023-10-06 DIAGNOSIS — R53.1 DECREASED STRENGTH, ENDURANCE, AND MOBILITY: ICD-10-CM

## 2023-10-06 DIAGNOSIS — R68.89 DECREASED STRENGTH, ENDURANCE, AND MOBILITY: ICD-10-CM

## 2023-10-06 DIAGNOSIS — G89.29 CHRONIC MIDLINE LOW BACK PAIN WITH SCIATICA, SCIATICA LATERALITY UNSPECIFIED: ICD-10-CM

## 2023-10-06 DIAGNOSIS — Z74.09 DECREASED STRENGTH, ENDURANCE, AND MOBILITY: ICD-10-CM

## 2023-10-06 DIAGNOSIS — M54.40 CHRONIC MIDLINE LOW BACK PAIN WITH SCIATICA, SCIATICA LATERALITY UNSPECIFIED: ICD-10-CM

## 2023-10-06 DIAGNOSIS — M53.82 DECREASED RANGE OF MOTION OF INTERVERTEBRAL DISCS OF CERVICAL SPINE: ICD-10-CM

## 2023-10-06 DIAGNOSIS — M54.2 PAIN, NECK: Primary | ICD-10-CM

## 2023-10-06 PROCEDURE — 97113 AQUATIC THERAPY/EXERCISES: CPT | Performed by: PHYSICAL THERAPIST

## 2023-10-06 NOTE — PROGRESS NOTES
Physical Therapy Daily Treatment Note    [facility]  [address] [place], [place] [address] [phone] (phone)  [phone] (fax)    Patient: Sundeep Espino   : 1985  Diagnosis/ICD-10 Code:  Pain, neck [M54.2]  Referring practitioner: Adam Monahan DO  Date of Initial Visit: Type: THERAPY  Noted: 2023  Today's Date: 10/6/2023  Patient seen for 18 sessions             Subjective   My back is hurting I think from working around the house, -cleaning.    Objective     AQUATIC THERAPY:  Water walk 2 laps and backwards light jog 2 laps  Rows standing on one leg w/ closed paddles 10X each  Stir the Pot w/ one Hydrotone resistance 20X/20X  Shoulder Flex/Ext w/ open paddles X 12  Piriformis Stretch 20 sec X 2   Tandem Walk 25 ft X 2  8 inch Step Ups w/ Hip/Knee  X 10 each  Plank w/ solid white noodle support 20 sec., then Hip Extension X 5 each  Side Plank w/ solid Noodle 20 sec.each  Reverse plank  w/ leg lift X 3 Deferred  Mini Squat holding 4# plyoball X 20  Shoulder Abd/Add w/ lg foam dumbbells X 20  Abdominals -Large (yellow) foam dumbbell Pushdowns 2 X 10  Lateral Arm Press w/ yellow dumbbells 2 X 10  Trial of breaststroke and freestyle with mask on, patient able to perform comfortably for short distance    Education on body mechanics and back protection.    Assessment/Plan  Progressed from 6 inch to 8 inch step ups in shallow water without problem. Improved control with plank in pool using the solid noodle for support.          Timed:  Aquatic Therapy    24     mins 04462;    Casper Jacobson, PT  Physical Therapist    [place] License:  [id number]

## 2023-10-13 ENCOUNTER — TREATMENT (OUTPATIENT)
Dept: PHYSICAL THERAPY | Facility: CLINIC | Age: 38
End: 2023-10-13
Payer: COMMERCIAL

## 2023-10-13 DIAGNOSIS — M54.40 CHRONIC MIDLINE LOW BACK PAIN WITH SCIATICA, SCIATICA LATERALITY UNSPECIFIED: ICD-10-CM

## 2023-10-13 DIAGNOSIS — R53.1 DECREASED STRENGTH, ENDURANCE, AND MOBILITY: ICD-10-CM

## 2023-10-13 DIAGNOSIS — M53.82 DECREASED RANGE OF MOTION OF INTERVERTEBRAL DISCS OF CERVICAL SPINE: ICD-10-CM

## 2023-10-13 DIAGNOSIS — M54.2 PAIN, NECK: Primary | ICD-10-CM

## 2023-10-13 DIAGNOSIS — G89.29 CHRONIC MIDLINE LOW BACK PAIN WITH SCIATICA, SCIATICA LATERALITY UNSPECIFIED: ICD-10-CM

## 2023-10-13 DIAGNOSIS — R68.89 DECREASED STRENGTH, ENDURANCE, AND MOBILITY: ICD-10-CM

## 2023-10-13 DIAGNOSIS — Z74.09 DECREASED STRENGTH, ENDURANCE, AND MOBILITY: ICD-10-CM

## 2023-10-13 PROCEDURE — 97113 AQUATIC THERAPY/EXERCISES: CPT | Performed by: PHYSICAL THERAPIST
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
## 2023-10-30 ENCOUNTER — TREATMENT (OUTPATIENT)
Dept: PHYSICAL THERAPY | Facility: CLINIC | Age: 38
End: 2023-10-30
Payer: COMMERCIAL

## 2023-10-30 DIAGNOSIS — G89.29 CHRONIC MIDLINE LOW BACK PAIN WITH SCIATICA, SCIATICA LATERALITY UNSPECIFIED: ICD-10-CM

## 2023-10-30 DIAGNOSIS — M54.40 CHRONIC MIDLINE LOW BACK PAIN WITH SCIATICA, SCIATICA LATERALITY UNSPECIFIED: ICD-10-CM

## 2023-10-30 DIAGNOSIS — Z74.09 DECREASED STRENGTH, ENDURANCE, AND MOBILITY: ICD-10-CM

## 2023-10-30 DIAGNOSIS — R68.89 DECREASED STRENGTH, ENDURANCE, AND MOBILITY: ICD-10-CM

## 2023-10-30 DIAGNOSIS — M53.82 DECREASED RANGE OF MOTION OF INTERVERTEBRAL DISCS OF CERVICAL SPINE: ICD-10-CM

## 2023-10-30 DIAGNOSIS — R53.1 DECREASED STRENGTH, ENDURANCE, AND MOBILITY: ICD-10-CM

## 2023-10-30 DIAGNOSIS — M54.2 PAIN, NECK: Primary | ICD-10-CM

## 2023-10-30 PROCEDURE — 97113 AQUATIC THERAPY/EXERCISES: CPT | Performed by: PHYSICAL THERAPIST

## 2023-10-30 NOTE — PROGRESS NOTES
Physical Therapy Daily Treatment Note    [facility]  [address] [place] [address]  [phone] (phone)  [phone] (fax)    Patient: Sundeep Espino   : 1985  Diagnosis/ICD-10 Code:  Pain, neck [M54.2]  Referring practitioner: Adam Monahan DO  Date of Initial Visit: Type: THERAPY  Noted: 2023  Today's Date: 10/30/2023  Patient seen for 20 sessions             Subjective   I have been so stiff in my neck and back and hurting more.    Objective        AQUATIC THERAPY:  Water walk Fwds and  backwards light jog 2 laps each  Rows standing on one leg w/ closed paddles 10X each  Stir the Pot w/ one Hydrotone resistance 20X/20X  Shoulder Flex/Ext w/ open paddles X 12  Piriformis Stretch 20 sec X 2   Tandem Walk 25 ft X 2  8 inch Step Ups w/ Hip/Knee  X 10 each  Plank w/ solid white noodle support 20 sec., then Hip Extension X 5 each  Side Plank w/ solid Noodle 20 sec.each, then add starfish pose  Reverse plank  w/ leg lift ---  Mini Squat  X 20  Abdominals -Large (yellow) foam dumbbell Pushdowns 20X  Lateral Arm Press w/ yellow dumbbells 20X  Abs straddling noodle- w/ breast stroke arms. Legs extended out X 1.5 min.  Suspended Bicycling straddling small noodle 2.5 min.  Prone Flutter Kick holding rail w/ noodle support X 2 min.    Assessment/Plan  There has been a 17 day lapse in treatment.  Sundeep reports increased pain and stiffness in her neck and back.  She was able to perform full session of aquatic therapy for core and LE strengthening and LE stretching.          Timed:  Aquatic Therapy    39     mins [id number];    Casper Jacobson, PT  Physical Therapist    [place] License:  [id number]

## 2023-11-01 ENCOUNTER — TELEPHONE (OUTPATIENT)
Dept: PHYSICAL THERAPY | Facility: CLINIC | Age: 38
End: 2023-11-01

## 2023-11-01 NOTE — TELEPHONE ENCOUNTER
"  Caller: Sundeep Espino \"Jonna\"    Relationship: Self       What was the call regarding: NOT FEELING WELL      ”    "

## 2023-11-06 ENCOUNTER — TELEPHONE (OUTPATIENT)
Dept: PHYSICAL THERAPY | Facility: CLINIC | Age: 38
End: 2023-11-06

## 2023-11-06 NOTE — TELEPHONE ENCOUNTER
"  Caller: Sundeep Espino \"Jonna\"    Relationship: Self       What was the call regarding: STILL NOT FEELING WELL        "

## 2023-11-13 ENCOUNTER — TREATMENT (OUTPATIENT)
Dept: PHYSICAL THERAPY | Facility: CLINIC | Age: 38
End: 2023-11-13
Payer: COMMERCIAL

## 2023-11-13 DIAGNOSIS — Z74.09 DECREASED STRENGTH, ENDURANCE, AND MOBILITY: ICD-10-CM

## 2023-11-13 DIAGNOSIS — G89.29 CHRONIC MIDLINE LOW BACK PAIN WITH SCIATICA, SCIATICA LATERALITY UNSPECIFIED: ICD-10-CM

## 2023-11-13 DIAGNOSIS — M54.2 PAIN, NECK: Primary | ICD-10-CM

## 2023-11-13 DIAGNOSIS — M54.40 CHRONIC MIDLINE LOW BACK PAIN WITH SCIATICA, SCIATICA LATERALITY UNSPECIFIED: ICD-10-CM

## 2023-11-13 DIAGNOSIS — R53.1 DECREASED STRENGTH, ENDURANCE, AND MOBILITY: ICD-10-CM

## 2023-11-13 DIAGNOSIS — R68.89 DECREASED STRENGTH, ENDURANCE, AND MOBILITY: ICD-10-CM

## 2023-11-13 DIAGNOSIS — M53.82 DECREASED RANGE OF MOTION OF INTERVERTEBRAL DISCS OF CERVICAL SPINE: ICD-10-CM

## 2023-11-13 PROCEDURE — 97113 AQUATIC THERAPY/EXERCISES: CPT | Performed by: PHYSICAL THERAPIST
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
## 2023-11-20 ENCOUNTER — TREATMENT (OUTPATIENT)
Dept: PHYSICAL THERAPY | Facility: CLINIC | Age: 38
End: 2023-11-20
Payer: COMMERCIAL

## 2023-11-20 DIAGNOSIS — M54.2 PAIN, NECK: Primary | ICD-10-CM

## 2023-11-20 DIAGNOSIS — M54.40 CHRONIC MIDLINE LOW BACK PAIN WITH SCIATICA, SCIATICA LATERALITY UNSPECIFIED: ICD-10-CM

## 2023-11-20 DIAGNOSIS — G89.29 CHRONIC MIDLINE LOW BACK PAIN WITH SCIATICA, SCIATICA LATERALITY UNSPECIFIED: ICD-10-CM

## 2023-11-20 NOTE — PROGRESS NOTES
Discharge Summary  Discharge Summary from Physical Therapy Report      Dates  PT visit: 6/8/2023 - 11/13/2023  Number of Visits: 21     Discharge Status of Patient: See last Progress Note dated 11/13/2023    Goals: Partially Met    Discharge Plan:  Continue with independent aquatic exercise    Comments Sundeep Espino would benefit from a warm water pool membership to continue self management of her condition.    Date of Discharge: 11/20/2023        Casper Jacobson, PT  Physical Therapist

## 2023-12-13 ENCOUNTER — DOCUMENTATION (OUTPATIENT)
Dept: PHYSICAL THERAPY | Facility: CLINIC | Age: 38
End: 2023-12-13
Payer: COMMERCIAL

## 2023-12-15 ENCOUNTER — LAB (OUTPATIENT)
Dept: LAB | Facility: HOSPITAL | Age: 38
End: 2023-12-15
Payer: COMMERCIAL

## 2023-12-15 ENCOUNTER — OFFICE VISIT (OUTPATIENT)
Dept: ONCOLOGY | Facility: CLINIC | Age: 38
End: 2023-12-15
Payer: COMMERCIAL

## 2023-12-15 VITALS
HEART RATE: 86 BPM | WEIGHT: 160 LBS | BODY MASS INDEX: 26.66 KG/M2 | DIASTOLIC BLOOD PRESSURE: 75 MMHG | SYSTOLIC BLOOD PRESSURE: 114 MMHG | TEMPERATURE: 97.3 F | RESPIRATION RATE: 18 BRPM | OXYGEN SATURATION: 100 % | HEIGHT: 65 IN

## 2023-12-15 DIAGNOSIS — D50.0 IRON DEFICIENCY ANEMIA DUE TO CHRONIC BLOOD LOSS: ICD-10-CM

## 2023-12-15 DIAGNOSIS — D50.0 IRON DEFICIENCY ANEMIA DUE TO CHRONIC BLOOD LOSS: Primary | ICD-10-CM

## 2023-12-15 DIAGNOSIS — E03.9 HYPOTHYROIDISM, UNSPECIFIED TYPE: ICD-10-CM

## 2023-12-15 LAB
BASOPHILS # BLD AUTO: 0.06 10*3/MM3 (ref 0–0.2)
BASOPHILS NFR BLD AUTO: 1.1 % (ref 0–1.5)
DEPRECATED RDW RBC AUTO: 41.8 FL (ref 37–54)
EOSINOPHIL # BLD AUTO: 0.08 10*3/MM3 (ref 0–0.4)
EOSINOPHIL NFR BLD AUTO: 1.4 % (ref 0.3–6.2)
ERYTHROCYTE [DISTWIDTH] IN BLOOD BY AUTOMATED COUNT: 13.4 % (ref 12.3–15.4)
FERRITIN SERPL-MCNC: 106 NG/ML (ref 13–150)
HCT VFR BLD AUTO: 39 % (ref 34–46.6)
HGB BLD-MCNC: 12.1 G/DL (ref 12–15.9)
IMM GRANULOCYTES # BLD AUTO: 0.01 10*3/MM3 (ref 0–0.05)
IMM GRANULOCYTES NFR BLD AUTO: 0.2 % (ref 0–0.5)
IRON 24H UR-MRATE: 53 MCG/DL (ref 37–145)
IRON SATN MFR SERPL: 17 % (ref 20–50)
LYMPHOCYTES # BLD AUTO: 2.02 10*3/MM3 (ref 0.7–3.1)
LYMPHOCYTES NFR BLD AUTO: 35.9 % (ref 19.6–45.3)
MCH RBC QN AUTO: 26.6 PG (ref 26.6–33)
MCHC RBC AUTO-ENTMCNC: 31 G/DL (ref 31.5–35.7)
MCV RBC AUTO: 85.7 FL (ref 79–97)
MONOCYTES # BLD AUTO: 0.47 10*3/MM3 (ref 0.1–0.9)
MONOCYTES NFR BLD AUTO: 8.4 % (ref 5–12)
NEUTROPHILS NFR BLD AUTO: 2.98 10*3/MM3 (ref 1.7–7)
NEUTROPHILS NFR BLD AUTO: 53 % (ref 42.7–76)
NRBC BLD AUTO-RTO: 0 /100 WBC (ref 0–0.2)
PLATELET # BLD AUTO: 267 10*3/MM3 (ref 140–450)
PMV BLD AUTO: 9.5 FL (ref 6–12)
RBC # BLD AUTO: 4.55 10*6/MM3 (ref 3.77–5.28)
T4 FREE SERPL-MCNC: 1.71 NG/DL (ref 0.93–1.7)
TIBC SERPL-MCNC: 318 MCG/DL (ref 298–536)
TRANSFERRIN SERPL-MCNC: 227 MG/DL (ref 200–360)
TSH SERPL DL<=0.05 MIU/L-ACNC: 4.9 UIU/ML (ref 0.27–4.2)
WBC NRBC COR # BLD AUTO: 5.62 10*3/MM3 (ref 3.4–10.8)

## 2023-12-15 PROCEDURE — 84443 ASSAY THYROID STIM HORMONE: CPT | Performed by: NURSE PRACTITIONER

## 2023-12-15 PROCEDURE — 84466 ASSAY OF TRANSFERRIN: CPT

## 2023-12-15 PROCEDURE — 84439 ASSAY OF FREE THYROXINE: CPT | Performed by: NURSE PRACTITIONER

## 2023-12-15 PROCEDURE — 82728 ASSAY OF FERRITIN: CPT

## 2023-12-15 PROCEDURE — 36415 COLL VENOUS BLD VENIPUNCTURE: CPT

## 2023-12-15 PROCEDURE — 83540 ASSAY OF IRON: CPT

## 2023-12-15 PROCEDURE — 85025 COMPLETE CBC W/AUTO DIFF WBC: CPT

## 2023-12-15 NOTE — PROGRESS NOTES
Subjective     REASON FOR CONSULTATION: Iron deficiency anemia  Provide an opinion on any further workup or treatment                             REQUESTING PHYSICIAN: Hero    RECORDS OBTAINED:  Records of the patients history including those obtained from the referring provider were reviewed and summarized in detail.    HISTORY OF PRESENT ILLNESS:  The patient is a 38 y.o. year old female who is here for an opinion about the above issue.    History of Present Illness   Patient is a 38-year-old female with the above-mentioned history returns today for follow-up and lab review.  She states that she has had ongoing issues with feeling extremely fatigued, and some spells where she feels like she spaces out.  She does continue to take Transexamic acid for menorrhagia.  It does seem to be helping.    Past Medical History:   Diagnosis Date    ADHD     Allergic rhinitis     Anxiety and depression     Arthritis     Asthma     Chondromalacia patellae     DDD (degenerative disc disease), cervical     GERD (gastroesophageal reflux disease)     H/O radioactive iodine thyroid ablation 03/08/2017    Heart palpitations     Hyperthyroidism     Iron deficiency anemia     Menstrual problem     Ovarian cyst     Paratubal cyst 05/10/2023    Thyroid disease     Upper respiratory infection 05/02/2018    UTI (urinary tract infection) 05/02/2018    Vitamin D deficiency         Past Surgical History:   Procedure Laterality Date    APPENDECTOMY  2012    BREAST LUMPECTOMY      benign    COLONOSCOPY  approx 2009    normal per patient    COLONOSCOPY W/ POLYPECTOMY N/A 08/01/2018    Normal    EAR TUBES      ENDOSCOPY N/A 08/01/2018    [facility]    KELOID EXCISION      right ear    MANDIBLE SURGERY  05/2016    ORIF ULNA/RADIUS FRACTURES      Distal radius ORIF with [facility] on 8/25/22        Current Outpatient Medications on File Prior to Visit   Medication Sig Dispense Refill    cetirizine (zyrTEC) 10 MG tablet Take 1 tablet by mouth Daily.       cyclobenzaprine (FLEXERIL) 10 MG tablet TAKE 1 TABLET BY MOUTH 3 TIMES A DAY AS NEEDED FOR MUSCLE SPASMS. 60 tablet 0    escitalopram (LEXAPRO) 10 MG tablet       levocetirizine (XYZAL) 5 MG tablet Take 1 tablet by mouth Daily.      meloxicam (Mobic) 15 MG tablet Take 1 tablet by mouth Daily. 30 tablet 1    Multiple Vitamins-Minerals (MULTIVITAMIN ADULT PO) Take  by mouth.      ondansetron ODT (Zofran ODT) 4 MG disintegrating tablet Take one tablet by mouth every 6 hours as needed for nausea and vomiting 20 tablet 0    PROAIR RESPICLICK 108 (90 Base) MCG/ACT inhaler Inhale 2 puffs As Needed.  3    Sodium Fluoride 1.1 % gel BRUSH WITH EACH NIGHT. EXPECTORATE DON RINSE      Symbicort 80-4.5 MCG/ACT inhaler Inhale 2 puffs 2 (Two) Times a Day. 10.2 g 1    Synthroid 112 MCG tablet Take 1 tablet by mouth Daily.      Tranexamic Acid (Lysteda) 650 MG tablet Take 1 tablet by mouth 1 (One) Time As Needed (On first 3 days of menstrual cycle) for up to 60 doses. 30 tablet 1     No current facility-administered medications on file prior to visit.        ALLERGIES:    Allergies   Allergen Reactions    Aspartame And Phenylalanine Other (See Comments)     headaches    Banana Nausea And Vomiting    Beta Adrenergic Blockers Hives, Itching and Swelling    Metoprolol Other (See Comments)     Face swelling, itching      Propranolol Other (See Comments)     Face swelling, itching          Social History     Socioeconomic History    Marital status: Single    Years of education: College   Tobacco Use    Smoking status: Never    Smokeless tobacco: Never   Substance and Sexual Activity    Alcohol use: Yes     Alcohol/week: 0.0 - 2.0 standard drinks of alcohol    Drug use: Never    Sexual activity: Not Currently        Family History   Problem Relation Age of Onset    Hypertension Mother     Diabetes Mother     Hypertension Father     Hyperlipidemia Father     Diabetes Father     Heart disease Father         pacemaker    Coronary artery  "disease Father     Diabetes Maternal Aunt     Hypertension Maternal Uncle     Diabetes Maternal Uncle     Alcohol abuse Other     Diabetes Other     Hypertension Other     Hypertension Half-Brother     No Known Problems Half-Sister         Review of Systems   Constitutional:  Positive for fatigue. Negative for fever.   Respiratory:  Negative for shortness of breath.    Cardiovascular: Negative.    Gastrointestinal: Negative.    Genitourinary:  Positive for menstrual problem.   Skin: Negative.    Neurological:  Positive for headaches. Negative for speech difficulty.   Hematological: Negative.    Psychiatric/Behavioral: Negative.            Objective     Vitals:    12/15/23 1410   BP: 114/75   Pulse: 86   Resp: 18   Temp: 97.3 °F (36.3 °C)   TempSrc: Temporal   SpO2: 100%   Weight: 72.6 kg (160 lb)   Height: 165.1 cm (65\")   PainSc:   8   PainLoc: Back         12/15/2023     2:19 PM   Current Status   ECOG score 0       Physical Exam  Constitutional:       General: She is not in acute distress.     Appearance: She is well-developed.   Pulmonary:      Effort: Pulmonary effort is normal. No respiratory distress.   Skin:     General: Skin is warm and dry.   Neurological:      Mental Status: She is alert and oriented to person, place, and time.             RECENT LABS:  Hematology WBC   Date Value Ref Range Status   12/15/2023 5.62 3.40 - 10.80 10*3/mm3 Final   09/16/2022 6.12 3.40 - 10.80 10*3/mm3 Final     RBC   Date Value Ref Range Status   12/15/2023 4.55 3.77 - 5.28 10*6/mm3 Final   09/16/2022 4.20 3.77 - 5.28 10*6/mm3 Final     Hemoglobin   Date Value Ref Range Status   12/15/2023 12.1 12.0 - 15.9 g/dL Final     Hematocrit   Date Value Ref Range Status   12/15/2023 39.0 34.0 - 46.6 % Final     Platelets   Date Value Ref Range Status   12/15/2023 267 140 - 450 10*3/mm3 Final        Lab Results   Component Value Date    GLUCOSE 89 08/21/2022    BUN 9 08/21/2022    CREATININE 0.76 08/21/2022    EGFRIFNONA 90 12/29/2021 "    EGFRIFAFRI 104 12/29/2021    BCR 11.8 08/21/2022    K 3.8 08/21/2022    CO2 27.0 08/21/2022    CALCIUM 8.4 (L) 08/21/2022    PROTENTOTREF 6.7 06/24/2022    ALBUMIN 3.50 08/21/2022    LABIL2 1.2 06/24/2022    AST 18 08/21/2022    ALT 12 08/21/2022          Assessment & Plan   *Recurrent iron deficiency anemia secondary to menorrhagia requiring intermittent iron replacement IV (intolerant to oral iron secondary to GI upset  She has had an EGD and colonoscopy in the past  IV iron (Venofer) reaction-Will need premedicated for future venofer with benadryl 25mg, tylenol 650mg, and solucortef 300mg   12/15/2023 Hgb 12.1, ferritin, 106, iron sat 17%.  Will see about getting patient scheduled for IV venofer 300 mg x 3 doses, with the above permedications.     *Menorrhagia  She is going to resume tranexamic acid from her gynecologist which has previously improved menorrhagia    *Hypothyroidism status post treatment of Graves' disease  Patient had a recent thyroid medication adjustment and needs a repeat TSH/free T4 for her endocrinologist    Hematology plan/recommendations:  Schedule patient for IV venofer 300 mg x 3 with premedications of Benadryl 25 mg, Tylenol 650 mg, and Solu-Cortef 300 mg.  We will check TSH and free T4 with results will be faxed to her endocrinologist.  Follow-up in 4 months with Dr. Shcaeffer with repeat CBC, ferritin, iron profile.  Call/ return sooner should the patient develop any new concerns or problems.    Janet Pinedo, APRN  12/15/2023

## 2023-12-21 ENCOUNTER — INFUSION (OUTPATIENT)
Dept: ONCOLOGY | Facility: HOSPITAL | Age: 38
End: 2023-12-21
Payer: COMMERCIAL

## 2023-12-21 VITALS
SYSTOLIC BLOOD PRESSURE: 100 MMHG | OXYGEN SATURATION: 94 % | TEMPERATURE: 98.2 F | WEIGHT: 161.2 LBS | DIASTOLIC BLOOD PRESSURE: 69 MMHG | HEART RATE: 86 BPM | RESPIRATION RATE: 16 BRPM | BODY MASS INDEX: 26.83 KG/M2

## 2023-12-21 DIAGNOSIS — D50.9 IRON DEFICIENCY ANEMIA, UNSPECIFIED IRON DEFICIENCY ANEMIA TYPE: Primary | ICD-10-CM

## 2023-12-21 PROCEDURE — 96365 THER/PROPH/DIAG IV INF INIT: CPT

## 2023-12-21 PROCEDURE — 25810000003 SODIUM CHLORIDE 0.9 % SOLUTION: Performed by: INTERNAL MEDICINE

## 2023-12-21 PROCEDURE — 25010000002 HYDROCORTISONE SOD SUC (PF) 100 MG RECONSTITUTED SOLUTION: Performed by: INTERNAL MEDICINE

## 2023-12-21 PROCEDURE — 96375 TX/PRO/DX INJ NEW DRUG ADDON: CPT

## 2023-12-21 PROCEDURE — 25810000003 SODIUM CHLORIDE 0.9 % SOLUTION 250 ML FLEX CONT: Performed by: INTERNAL MEDICINE

## 2023-12-21 PROCEDURE — 25010000002 IRON SUCROSE PER 1 MG: Performed by: INTERNAL MEDICINE

## 2023-12-21 PROCEDURE — 63710000001 DIPHENHYDRAMINE PER 50 MG: Performed by: INTERNAL MEDICINE

## 2023-12-21 RX ORDER — SODIUM CHLORIDE 9 MG/ML
20 INJECTION, SOLUTION INTRAVENOUS ONCE
Status: COMPLETED | OUTPATIENT
Start: 2023-12-21 | End: 2023-12-21

## 2023-12-21 RX ORDER — DIPHENHYDRAMINE HCL 25 MG
25 CAPSULE ORAL ONCE
Status: COMPLETED | OUTPATIENT
Start: 2023-12-21 | End: 2023-12-21

## 2023-12-21 RX ORDER — ACETAMINOPHEN 325 MG/1
650 TABLET ORAL ONCE
Status: COMPLETED | OUTPATIENT
Start: 2023-12-21 | End: 2023-12-21

## 2023-12-21 RX ORDER — FAMOTIDINE 10 MG/ML
20 INJECTION, SOLUTION INTRAVENOUS ONCE
Status: COMPLETED | OUTPATIENT
Start: 2023-12-21 | End: 2023-12-21

## 2023-12-21 RX ADMIN — ACETAMINOPHEN 650 MG: 325 TABLET ORAL at 08:28

## 2023-12-21 RX ADMIN — DIPHENHYDRAMINE HYDROCHLORIDE 25 MG: 25 CAPSULE ORAL at 08:28

## 2023-12-21 RX ADMIN — FAMOTIDINE 20 MG: 10 INJECTION INTRAVENOUS at 08:35

## 2023-12-21 RX ADMIN — SODIUM CHLORIDE 300 MG: 900 INJECTION, SOLUTION INTRAVENOUS at 08:58

## 2023-12-21 RX ADMIN — HYDROCORTISONE SODIUM SUCCINATE 100 MG: 100 INJECTION, POWDER, FOR SOLUTION INTRAMUSCULAR; INTRAVENOUS at 08:36

## 2023-12-21 RX ADMIN — SODIUM CHLORIDE 20 ML/HR: 9 INJECTION, SOLUTION INTRAVENOUS at 08:39

## 2023-12-28 ENCOUNTER — INFUSION (OUTPATIENT)
Dept: ONCOLOGY | Facility: HOSPITAL | Age: 38
End: 2023-12-28
Payer: COMMERCIAL

## 2023-12-28 VITALS
RESPIRATION RATE: 16 BRPM | TEMPERATURE: 98 F | HEART RATE: 93 BPM | SYSTOLIC BLOOD PRESSURE: 119 MMHG | DIASTOLIC BLOOD PRESSURE: 75 MMHG | WEIGHT: 159.6 LBS | OXYGEN SATURATION: 97 % | BODY MASS INDEX: 26.56 KG/M2

## 2023-12-28 DIAGNOSIS — D50.9 IRON DEFICIENCY ANEMIA, UNSPECIFIED IRON DEFICIENCY ANEMIA TYPE: Primary | ICD-10-CM

## 2023-12-28 PROCEDURE — 63710000001 DIPHENHYDRAMINE PER 50 MG: Performed by: INTERNAL MEDICINE

## 2023-12-28 PROCEDURE — 25010000002 IRON SUCROSE PER 1 MG: Performed by: INTERNAL MEDICINE

## 2023-12-28 PROCEDURE — 96365 THER/PROPH/DIAG IV INF INIT: CPT

## 2023-12-28 PROCEDURE — 25010000002 HYDROCORTISONE SOD SUC (PF) 100 MG RECONSTITUTED SOLUTION: Performed by: INTERNAL MEDICINE

## 2023-12-28 PROCEDURE — 25810000003 SODIUM CHLORIDE 0.9 % SOLUTION: Performed by: INTERNAL MEDICINE

## 2023-12-28 PROCEDURE — 96375 TX/PRO/DX INJ NEW DRUG ADDON: CPT

## 2023-12-28 RX ORDER — ACETAMINOPHEN 325 MG/1
650 TABLET ORAL ONCE
Status: COMPLETED | OUTPATIENT
Start: 2023-12-28 | End: 2023-12-28

## 2023-12-28 RX ORDER — DIPHENHYDRAMINE HCL 25 MG
25 CAPSULE ORAL ONCE
Status: COMPLETED | OUTPATIENT
Start: 2023-12-28 | End: 2023-12-28

## 2023-12-28 RX ORDER — SODIUM CHLORIDE 9 MG/ML
20 INJECTION, SOLUTION INTRAVENOUS ONCE
Status: COMPLETED | OUTPATIENT
Start: 2023-12-28 | End: 2023-12-28

## 2023-12-28 RX ORDER — FAMOTIDINE 10 MG/ML
20 INJECTION, SOLUTION INTRAVENOUS ONCE
Status: COMPLETED | OUTPATIENT
Start: 2023-12-28 | End: 2023-12-28

## 2023-12-28 RX ADMIN — FAMOTIDINE 20 MG: 10 INJECTION INTRAVENOUS at 13:20

## 2023-12-28 RX ADMIN — SODIUM CHLORIDE 300 MG: 900 INJECTION, SOLUTION INTRAVENOUS at 13:55

## 2023-12-28 RX ADMIN — HYDROCORTISONE SODIUM SUCCINATE 100 MG: 100 INJECTION, POWDER, FOR SOLUTION INTRAMUSCULAR; INTRAVENOUS at 13:20

## 2023-12-28 RX ADMIN — ACETAMINOPHEN 650 MG: 325 TABLET ORAL at 13:20

## 2023-12-28 RX ADMIN — DIPHENHYDRAMINE HYDROCHLORIDE 25 MG: 25 CAPSULE ORAL at 13:20

## 2023-12-28 RX ADMIN — SODIUM CHLORIDE 20 ML/HR: 9 INJECTION, SOLUTION INTRAVENOUS at 13:20

## 2024-01-04 ENCOUNTER — INFUSION (OUTPATIENT)
Dept: ONCOLOGY | Facility: HOSPITAL | Age: 39
End: 2024-01-04
Payer: COMMERCIAL

## 2024-01-04 VITALS
BODY MASS INDEX: 26.33 KG/M2 | OXYGEN SATURATION: 99 % | WEIGHT: 158.2 LBS | DIASTOLIC BLOOD PRESSURE: 70 MMHG | TEMPERATURE: 98.4 F | RESPIRATION RATE: 16 BRPM | HEART RATE: 86 BPM | SYSTOLIC BLOOD PRESSURE: 112 MMHG

## 2024-01-04 DIAGNOSIS — D50.9 IRON DEFICIENCY ANEMIA, UNSPECIFIED IRON DEFICIENCY ANEMIA TYPE: Primary | ICD-10-CM

## 2024-01-04 PROCEDURE — 63710000001 DIPHENHYDRAMINE PER 50 MG: Performed by: INTERNAL MEDICINE

## 2024-01-04 PROCEDURE — 96365 THER/PROPH/DIAG IV INF INIT: CPT

## 2024-01-04 PROCEDURE — 25810000003 SODIUM CHLORIDE 0.9 % SOLUTION: Performed by: INTERNAL MEDICINE

## 2024-01-04 PROCEDURE — 96375 TX/PRO/DX INJ NEW DRUG ADDON: CPT

## 2024-01-04 PROCEDURE — 25010000002 IRON SUCROSE PER 1 MG: Performed by: INTERNAL MEDICINE

## 2024-01-04 PROCEDURE — 25010000002 HYDROCORTISONE SOD SUC (PF) 100 MG RECONSTITUTED SOLUTION: Performed by: INTERNAL MEDICINE

## 2024-01-04 RX ORDER — SODIUM CHLORIDE 9 MG/ML
20 INJECTION, SOLUTION INTRAVENOUS ONCE
Status: COMPLETED | OUTPATIENT
Start: 2024-01-04 | End: 2024-01-04

## 2024-01-04 RX ORDER — FAMOTIDINE 10 MG/ML
20 INJECTION, SOLUTION INTRAVENOUS ONCE
Status: COMPLETED | OUTPATIENT
Start: 2024-01-04 | End: 2024-01-04

## 2024-01-04 RX ORDER — DIPHENHYDRAMINE HCL 25 MG
25 CAPSULE ORAL ONCE
Status: COMPLETED | OUTPATIENT
Start: 2024-01-04 | End: 2024-01-04

## 2024-01-04 RX ORDER — ACETAMINOPHEN 325 MG/1
650 TABLET ORAL ONCE
Status: COMPLETED | OUTPATIENT
Start: 2024-01-04 | End: 2024-01-04

## 2024-01-04 RX ADMIN — DIPHENHYDRAMINE HYDROCHLORIDE 25 MG: 25 CAPSULE ORAL at 13:27

## 2024-01-04 RX ADMIN — SODIUM CHLORIDE 20 ML/HR: 9 INJECTION, SOLUTION INTRAVENOUS at 13:30

## 2024-01-04 RX ADMIN — ACETAMINOPHEN 650 MG: 325 TABLET ORAL at 13:23

## 2024-01-04 RX ADMIN — FAMOTIDINE 20 MG: 10 INJECTION INTRAVENOUS at 13:27

## 2024-01-04 RX ADMIN — HYDROCORTISONE SODIUM SUCCINATE 100 MG: 100 INJECTION, POWDER, FOR SOLUTION INTRAMUSCULAR; INTRAVENOUS at 13:30

## 2024-01-04 RX ADMIN — SODIUM CHLORIDE 300 MG: 900 INJECTION, SOLUTION INTRAVENOUS at 14:02
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
## 2024-02-26 RX ORDER — LEVOTHYROXINE SODIUM 112 MCG
112 TABLET ORAL DAILY
Qty: 30 TABLET | Refills: 0 | Status: SHIPPED | OUTPATIENT
Start: 2024-02-26 | End: 2024-02-26 | Stop reason: SDUPTHER

## 2024-02-26 RX ORDER — LEVOTHYROXINE SODIUM 112 MCG
112 TABLET ORAL DAILY
Qty: 30 TABLET | Refills: 0 | Status: SHIPPED | OUTPATIENT
Start: 2024-02-26

## 2024-03-20 ENCOUNTER — OFFICE VISIT (OUTPATIENT)
Dept: INTERNAL MEDICINE | Facility: CLINIC | Age: 39
End: 2024-03-20
Payer: COMMERCIAL

## 2024-03-20 VITALS
SYSTOLIC BLOOD PRESSURE: 110 MMHG | HEIGHT: 65 IN | HEART RATE: 82 BPM | OXYGEN SATURATION: 99 % | WEIGHT: 161 LBS | BODY MASS INDEX: 26.82 KG/M2 | DIASTOLIC BLOOD PRESSURE: 70 MMHG

## 2024-03-20 DIAGNOSIS — E89.0 POSTABLATIVE HYPOTHYROIDISM: ICD-10-CM

## 2024-03-20 DIAGNOSIS — E66.3 OVERWEIGHT: Primary | ICD-10-CM

## 2024-03-20 LAB
B-HCG UR QL: NEGATIVE
EXPIRATION DATE: NORMAL
INTERNAL NEGATIVE CONTROL: NORMAL
INTERNAL POSITIVE CONTROL: NORMAL
Lab: NORMAL

## 2024-03-20 RX ORDER — PHENTERMINE HYDROCHLORIDE 15 MG/1
15 CAPSULE ORAL EVERY MORNING
Qty: 30 CAPSULE | Refills: 0 | Status: SHIPPED | OUTPATIENT
Start: 2024-03-20

## 2024-03-20 NOTE — PROGRESS NOTES
Adam Monahan D.O.  Internal Medicine  [facility]  [address], [address]  [place] [address]  [phone]      Chief Complaint  Weight Gain and Hypothyroidism (medication)    SUBJECTIVE    History of Present Illness    Sundeep Espion is a 38 y.o. female who presents to the office today as an established patient that last saw me on 5/19/2023.     Post-ablative hypothyroidism secondary to I-131 TURPIN for Graves disease: takes levothyroxine 112 mcg daily for the last month approximately. State before that she was taking old dosage of 150 mcg pills that she had at home because her endocrinologist stopped responding to her refill requests.    Overweight: previously took phentermine from previous PCP. States she cannot afford the injectable weight loss medications. She is interested in starting phentermine again and knows it would be for a short time.   She reports being down to 140 lbs last year in the summer and then weight has crept up since then. Weight at home is around 160 lbs now. States she doesn't eat a lot of fast food. Overall doesn't feel that she eats a lot. States she eats a lot of vegetables. Doesn't in general eat pastas. For exercise she is not going to the gym and moves a lot at work. She sometimes counts calories on her yoana but doesn't have a set goal.    Allergies   Allergen Reactions    Aspartame And Phenylalanine Other (See Comments)     headaches    Banana Nausea And Vomiting    Beta Adrenergic Blockers Hives, Itching and Swelling    Metoprolol Other (See Comments)     Face swelling, itching      Propranolol Other (See Comments)     Face swelling, itching          Outpatient Medications Marked as Taking for the 3/20/24 encounter (Office Visit) with Adam Monahan, DO   Medication Sig Dispense Refill    cetirizine (zyrTEC) 10 MG tablet Take 1 tablet by mouth Daily.      levocetirizine (XYZAL) 5 MG tablet Take 1 tablet by mouth Daily.      Multiple Vitamins-Minerals  "(MULTIVITAMIN ADULT PO) Take  by mouth.      PROAIR RESPICLICK 108 (90 Base) MCG/ACT inhaler Inhale 2 puffs As Needed.  3    Symbicort 80-4.5 MCG/ACT inhaler Inhale 2 puffs 2 (Two) Times a Day. 10.2 g 1    Synthroid 112 MCG tablet Take 1 tablet by mouth Daily. Take at least 30 minutes before having breakfast with a sip of water. For low thyroid. Needs appointment for further refills. 30 tablet 0    Tranexamic Acid (Lysteda) 650 MG tablet Take 1 tablet by mouth 1 (One) Time As Needed (On first 3 days of menstrual cycle) for up to 60 doses. 30 tablet 1        Past Medical History:   Diagnosis Date    ADHD     Allergic rhinitis     Anxiety and depression     Arthritis     Asthma     Chondromalacia patellae     DDD (degenerative disc disease), cervical     GERD (gastroesophageal reflux disease)     H/O radioactive iodine thyroid ablation 03/08/2017    Heart palpitations     Hyperthyroidism     Iron deficiency anemia     Menstrual problem     Ovarian cyst     Paratubal cyst 05/10/2023    Thyroid disease     Upper respiratory infection 05/02/2018    UTI (urinary tract infection) 05/02/2018    Vitamin D deficiency        OBJECTIVE    Vital Signs:   /70   Pulse 82   Ht 165.1 cm (65\")   Wt 73 kg (161 lb)   SpO2 99%   BMI 26.79 kg/m²        Physical Exam  Vitals reviewed.   Constitutional:       General: She is not in acute distress.     Appearance: Normal appearance. She is not ill-appearing.   Eyes:      General: No scleral icterus.  Pulmonary:      Effort: Pulmonary effort is normal. No respiratory distress.   Skin:     Coloration: Skin is not jaundiced.   Neurological:      Mental Status: She is alert.   Psychiatric:         Mood and Affect: Mood normal.         Behavior: Behavior normal.         Thought Content: Thought content normal.                             ASSESSMENT & PLAN     Diagnoses and all orders for this visit:    1. Overweight (Primary)  -previously took phentermine from previous PCP. States she " cannot afford the injectable weight loss medications. She is interested in starting phentermine again and knows it would be for a short time.   She reports being down to 140 lbs last year in the summer and then weight has crept up since then. Weight at home is around 160 lbs now. States she doesn't eat a lot of fast food. Overall doesn't feel that she eats a lot. States she eats a lot of vegetables. Doesn't in general eat pastas. For exercise she is not going to the gym and moves a lot at work. She sometimes counts calories on her yoana but doesn't have a set goal.  -advised her that in order to have successful long term weight loss she really needs to exercise regularly and follow a low carb diet. She should consider joining a program like weightwatchers to truly monitor her food intake. I can treat her for phentermine again for up to 3 months max but if she wants to be on this type of therapy long term she should consider a medication like Qsymia.   -will check a urine pregnancy test before starting phentermine   -follow up in 3 months   -     TSH Rfx On Abnormal To Free T4    2. Postablative hypothyroidism  -secondary to I-131 TURPIN for Graves disease  -takes levothyroxine 112 mcg daily for the last month approximately. State before that she was taking old dosage of 150 mcg pills that she had at home because her endocrinologist stopped responding to her refill requests.  Lab Results   Component Value Date    TSH 4.900 (H) 12/15/2023     -recheck TSH, adjust regimen as needed          Follow Up  Return in about 3 months (around 6/20/2024) for Annual physical.    Patient/family had no further questions at this time and verbalized understanding of the plan discussed today.

## 2024-03-21 LAB
T4 FREE SERPL-MCNC: 2.01 NG/DL (ref 0.93–1.7)
TSH SERPL DL<=0.005 MIU/L-ACNC: 0.26 UIU/ML (ref 0.27–4.2)

## 2024-03-22 RX ORDER — LEVOTHYROXINE SODIUM 0.1 MG/1
100 TABLET ORAL DAILY
Qty: 90 TABLET | Refills: 0 | Status: SHIPPED | OUTPATIENT
Start: 2024-03-22

## 2024-03-27 DIAGNOSIS — E66.3 OVERWEIGHT: ICD-10-CM

## 2024-03-27 RX ORDER — PHENTERMINE HYDROCHLORIDE 15 MG/1
15 CAPSULE ORAL EVERY MORNING
Qty: 30 CAPSULE | Refills: 2 | Status: SHIPPED | OUTPATIENT
Start: 2024-03-27

## 2024-03-27 RX ORDER — LEVOTHYROXINE SODIUM 100 MCG
100 TABLET ORAL DAILY
Qty: 90 TABLET | Refills: 0 | Status: SHIPPED | OUTPATIENT
Start: 2024-03-27

## 2024-04-09 NOTE — PROGRESS NOTES
Subjective     REASON FOR CONSULTATION: Iron deficiency anemia  Provide an opinion on any further workup or treatment                             REQUESTING PHYSICIAN: Hero    RECORDS OBTAINED:  Records of the patients history including those obtained from the referring provider were reviewed and summarized in detail.    HISTORY OF PRESENT ILLNESS:  The patient is a 38 y.o. year old female who is here for an opinion about the above issue.    History of Present Illness   This is a pleasant 37-year-old lady returning today for follow-up of recurrent iron deficiency anemia secondary to menorrhagia.  The patient received IV iron most recently with 3 doses of Venofer 300 mg each completed 1/4/2024.  The patient complains of fatigue denies lightheadedness shortness of breath dizziness.  She request her thyroid level be checked for her upcoming endocrinology appointment.  She continues to have heavy menstrual cycles.    Past Medical History:   Diagnosis Date    ADHD     Allergic rhinitis     Anxiety and depression     Arthritis     Asthma     Chondromalacia patellae     DDD (degenerative disc disease), cervical     GERD (gastroesophageal reflux disease)     H/O radioactive iodine thyroid ablation 03/08/2017    Heart palpitations     Hyperthyroidism     Iron deficiency anemia     Menstrual problem     Ovarian cyst     Paratubal cyst 05/10/2023    Thyroid disease     Upper respiratory infection 05/02/2018    UTI (urinary tract infection) 05/02/2018    Vitamin D deficiency         Past Surgical History:   Procedure Laterality Date    APPENDECTOMY  2012    BREAST LUMPECTOMY      benign    COLONOSCOPY  approx 2009    normal per patient    COLONOSCOPY W/ POLYPECTOMY N/A 08/01/2018    Normal    EAR TUBES      ENDOSCOPY N/A 08/01/2018    [facility]    KELOID EXCISION      right ear    MANDIBLE SURGERY  05/2016    ORIF ULNA/RADIUS FRACTURES      Distal radius ORIF with [facility] on 8/25/22        Current Outpatient Medications on File Prior  to Visit   Medication Sig Dispense Refill    cetirizine (zyrTEC) 10 MG tablet Take 1 tablet by mouth Daily.      levocetirizine (XYZAL) 5 MG tablet Take 1 tablet by mouth Daily.      Multiple Vitamins-Minerals (MULTIVITAMIN ADULT PO) Take  by mouth.      phentermine 15 MG capsule Take 1 capsule by mouth Every Morning. Three month treatment maximum. 30 capsule 2    PROAIR RESPICLICK 108 (90 Base) MCG/ACT inhaler Inhale 2 puffs As Needed.  3    Symbicort 80-4.5 MCG/ACT inhaler Inhale 2 puffs 2 (Two) Times a Day. 10.2 g 1    Synthroid 100 MCG tablet Take 1 tablet by mouth Daily. Take at least 30 minutes before having breakfast with a sip of water. For low thyroid. 90 tablet 0    Tranexamic Acid (Lysteda) 650 MG tablet Take 1 tablet by mouth 1 (One) Time As Needed (On first 3 days of menstrual cycle) for up to 60 doses. 30 tablet 1    [DISCONTINUED] cyclobenzaprine (FLEXERIL) 10 MG tablet TAKE 1 TABLET BY MOUTH 3 TIMES A DAY AS NEEDED FOR MUSCLE SPASMS. (Patient not taking: Reported on 3/20/2024) 60 tablet 0    [DISCONTINUED] escitalopram (LEXAPRO) 10 MG tablet  (Patient not taking: Reported on 3/20/2024)      [DISCONTINUED] meloxicam (Mobic) 15 MG tablet Take 1 tablet by mouth Daily. (Patient not taking: Reported on 3/20/2024) 30 tablet 1    [DISCONTINUED] ondansetron ODT (Zofran ODT) 4 MG disintegrating tablet Take one tablet by mouth every 6 hours as needed for nausea and vomiting (Patient not taking: Reported on 3/20/2024) 20 tablet 0    [DISCONTINUED] Sodium Fluoride 1.1 % gel BRUSH WITH EACH NIGHT. EXPECTORATE DON RINSE (Patient not taking: Reported on 3/20/2024)       No current facility-administered medications on file prior to visit.        ALLERGIES:    Allergies   Allergen Reactions    Aspartame And Phenylalanine Other (See Comments)     headaches    Banana Nausea And Vomiting    Beta Adrenergic Blockers Hives, Itching and Swelling    Metoprolol Other (See Comments)     Face swelling, itching      Propranolol  "Other (See Comments)     Face swelling, itching          Social History     Socioeconomic History    Marital status: Single    Years of education: College   Tobacco Use    Smoking status: Never    Smokeless tobacco: Never   Substance and Sexual Activity    Alcohol use: Yes     Alcohol/week: 0.0 - 2.0 standard drinks of alcohol    Drug use: Never    Sexual activity: Not Currently        Family History   Problem Relation Age of Onset    Hypertension Mother     Diabetes Mother     Hypertension Father     Hyperlipidemia Father     Diabetes Father     Heart disease Father         pacemaker    Coronary artery disease Father     Diabetes Maternal Aunt     Hypertension Maternal Uncle     Diabetes Maternal Uncle     Alcohol abuse Other     Diabetes Other     Hypertension Other     Hypertension Half-Brother     No Known Problems Half-Sister         Review of Systems   Constitutional:  Positive for fatigue. Negative for fever.   Respiratory:  Negative for shortness of breath.    Cardiovascular: Negative.    Gastrointestinal: Negative.    Genitourinary:  Positive for menstrual problem.   Skin: Negative.    Neurological:  Negative for speech difficulty and headaches.   Hematological: Negative.    Psychiatric/Behavioral: Negative.            Objective     Vitals:    04/12/24 0814   BP: 111/74   Pulse: 72   Resp: 18   Temp: 98.2 °F (36.8 °C)   TempSrc: Temporal   SpO2: 100%   Weight: 73.8 kg (162 lb 9.6 oz)   Height: 165.1 cm (65\")   PainSc: 0-No pain           1/4/2024     1:19 PM   Current Status   ECOG score 0       Physical Exam    CONSTITUTIONAL: pleasant well-developed adult woman  HEENT: no icterus, no thrush, moist membranes  LYMPH: no cervical or supraclavicular lad  CV: RRR, S1S2, no murmur  RESP: cta bilat, no wheezing, no rales  GI: soft, non-tender, no splenomegaly, +bs  MUSC: no edema, normal gait   NEURO: alert and oriented x3, normal strength  PSYCH: normal mood and affect  Unchanged-4/12/2024  RECENT " LABS:  Hematology WBC   Date Value Ref Range Status   04/12/2024 7.23 3.40 - 10.80 10*3/mm3 Final   09/16/2022 6.12 3.40 - 10.80 10*3/mm3 Final     RBC   Date Value Ref Range Status   04/12/2024 4.57 3.77 - 5.28 10*6/mm3 Final   09/16/2022 4.20 3.77 - 5.28 10*6/mm3 Final     Hemoglobin   Date Value Ref Range Status   04/12/2024 12.4 12.0 - 15.9 g/dL Final     Hematocrit   Date Value Ref Range Status   04/12/2024 40.0 34.0 - 46.6 % Final     Platelets   Date Value Ref Range Status   04/12/2024 297 140 - 450 10*3/mm3 Final        Lab Results   Component Value Date    GLUCOSE 89 08/21/2022    BUN 9 08/21/2022    CREATININE 0.76 08/21/2022    EGFRIFNONA 90 12/29/2021    EGFRIFAFRI 104 12/29/2021    BCR 11.8 08/21/2022    K 3.8 08/21/2022    CO2 27.0 08/21/2022    CALCIUM 8.4 (L) 08/21/2022    PROTENTOTREF 6.7 06/24/2022    ALBUMIN 3.50 08/21/2022    LABIL2 1.2 06/24/2022    AST 18 08/21/2022    ALT 12 08/21/2022          Assessment & Plan   *Recurrent iron deficiency anemia secondary to menorrhagia requiring intermittent iron replacement IV (intolerant to oral iron secondary to GI upset  Hemoglobin 12.4 today  She has had an EGD and colonoscopy in the past  IV iron (Venofer) reaction-Will need premedicated for future venofer with benadryl 25mg, tylenol 650mg, and solucortef 300mg     *Menorrhagia  She is tranexamic acid from her gynecologist which improves menorrhagia    *Hypothyroidism status post treatment of Graves' disease  Currently on Synthroid replacement per endocrinology    Hematology plan/recommendations:  Recheck ferritin and iron profile today; we will call the patient with results and arrange follow-up accordingly  Check TSH/free T4 for upcoming endocrinology visit  Will need premedicated for future venofer with benadryl 25mg, tylenol 650mg, and solucortef 300mg

## 2024-04-12 ENCOUNTER — LAB (OUTPATIENT)
Dept: LAB | Facility: HOSPITAL | Age: 39
End: 2024-04-12
Payer: COMMERCIAL

## 2024-04-12 ENCOUNTER — TELEPHONE (OUTPATIENT)
Dept: ONCOLOGY | Facility: CLINIC | Age: 39
End: 2024-04-12
Payer: COMMERCIAL

## 2024-04-12 ENCOUNTER — OFFICE VISIT (OUTPATIENT)
Dept: ONCOLOGY | Facility: CLINIC | Age: 39
End: 2024-04-12
Payer: COMMERCIAL

## 2024-04-12 VITALS
HEART RATE: 72 BPM | BODY MASS INDEX: 27.09 KG/M2 | OXYGEN SATURATION: 100 % | WEIGHT: 162.6 LBS | SYSTOLIC BLOOD PRESSURE: 111 MMHG | DIASTOLIC BLOOD PRESSURE: 74 MMHG | RESPIRATION RATE: 18 BRPM | TEMPERATURE: 98.2 F | HEIGHT: 65 IN

## 2024-04-12 DIAGNOSIS — D50.9 IRON DEFICIENCY ANEMIA, UNSPECIFIED IRON DEFICIENCY ANEMIA TYPE: Primary | ICD-10-CM

## 2024-04-12 DIAGNOSIS — E05.90 THYROTOXICOSIS WITHOUT THYROID STORM, UNSPECIFIED THYROTOXICOSIS TYPE: ICD-10-CM

## 2024-04-12 DIAGNOSIS — D50.9 IRON DEFICIENCY ANEMIA, UNSPECIFIED IRON DEFICIENCY ANEMIA TYPE: ICD-10-CM

## 2024-04-12 LAB
BASOPHILS # BLD AUTO: 0.09 10*3/MM3 (ref 0–0.2)
BASOPHILS NFR BLD AUTO: 1.2 % (ref 0–1.5)
DEPRECATED RDW RBC AUTO: 40.5 FL (ref 37–54)
EOSINOPHIL # BLD AUTO: 0.19 10*3/MM3 (ref 0–0.4)
EOSINOPHIL NFR BLD AUTO: 2.6 % (ref 0.3–6.2)
ERYTHROCYTE [DISTWIDTH] IN BLOOD BY AUTOMATED COUNT: 12.8 % (ref 12.3–15.4)
FERRITIN SERPL-MCNC: 334 NG/ML (ref 13–150)
HCT VFR BLD AUTO: 40 % (ref 34–46.6)
HGB BLD-MCNC: 12.4 G/DL (ref 12–15.9)
IMM GRANULOCYTES # BLD AUTO: 0.02 10*3/MM3 (ref 0–0.05)
IMM GRANULOCYTES NFR BLD AUTO: 0.3 % (ref 0–0.5)
IRON 24H UR-MRATE: 35 MCG/DL (ref 37–145)
IRON SATN MFR SERPL: 12 % (ref 20–50)
LYMPHOCYTES # BLD AUTO: 2.17 10*3/MM3 (ref 0.7–3.1)
LYMPHOCYTES NFR BLD AUTO: 30 % (ref 19.6–45.3)
MCH RBC QN AUTO: 27.1 PG (ref 26.6–33)
MCHC RBC AUTO-ENTMCNC: 31 G/DL (ref 31.5–35.7)
MCV RBC AUTO: 87.5 FL (ref 79–97)
MONOCYTES # BLD AUTO: 0.43 10*3/MM3 (ref 0.1–0.9)
MONOCYTES NFR BLD AUTO: 5.9 % (ref 5–12)
NEUTROPHILS NFR BLD AUTO: 4.33 10*3/MM3 (ref 1.7–7)
NEUTROPHILS NFR BLD AUTO: 60 % (ref 42.7–76)
NRBC BLD AUTO-RTO: 0 /100 WBC (ref 0–0.2)
PLATELET # BLD AUTO: 297 10*3/MM3 (ref 140–450)
PMV BLD AUTO: 9.3 FL (ref 6–12)
RBC # BLD AUTO: 4.57 10*6/MM3 (ref 3.77–5.28)
T4 FREE SERPL-MCNC: 0.64 NG/DL (ref 0.93–1.7)
TIBC SERPL-MCNC: 295 MCG/DL (ref 298–536)
TRANSFERRIN SERPL-MCNC: 198 MG/DL (ref 200–360)
TSH SERPL DL<=0.05 MIU/L-ACNC: 45.1 UIU/ML (ref 0.27–4.2)
WBC NRBC COR # BLD AUTO: 7.23 10*3/MM3 (ref 3.4–10.8)

## 2024-04-12 PROCEDURE — 85025 COMPLETE CBC W/AUTO DIFF WBC: CPT

## 2024-04-12 PROCEDURE — 82728 ASSAY OF FERRITIN: CPT

## 2024-04-12 PROCEDURE — 84466 ASSAY OF TRANSFERRIN: CPT

## 2024-04-12 PROCEDURE — 84443 ASSAY THYROID STIM HORMONE: CPT | Performed by: INTERNAL MEDICINE

## 2024-04-12 PROCEDURE — 99214 OFFICE O/P EST MOD 30 MIN: CPT | Performed by: INTERNAL MEDICINE

## 2024-04-12 PROCEDURE — 36415 COLL VENOUS BLD VENIPUNCTURE: CPT

## 2024-04-12 PROCEDURE — 83540 ASSAY OF IRON: CPT

## 2024-04-12 PROCEDURE — 84439 ASSAY OF FREE THYROXINE: CPT | Performed by: INTERNAL MEDICINE

## 2024-04-12 NOTE — TELEPHONE ENCOUNTER
----- Message from Javon Schaeffer MD sent at 4/12/2024 10:23 AM EDT -----  PIP iron levels look okay but her thyroid level is very off-tsh 45.1 please fax to her endo.  F/u 4 month np cbc ferritin iron prof

## 2024-05-13 ENCOUNTER — OFFICE VISIT (OUTPATIENT)
Dept: OBSTETRICS AND GYNECOLOGY | Age: 39
End: 2024-05-13
Payer: COMMERCIAL

## 2024-05-13 VITALS
BODY MASS INDEX: 26.99 KG/M2 | SYSTOLIC BLOOD PRESSURE: 114 MMHG | DIASTOLIC BLOOD PRESSURE: 64 MMHG | WEIGHT: 162 LBS | HEIGHT: 65 IN

## 2024-05-13 DIAGNOSIS — E05.00 GRAVES DISEASE: Primary | ICD-10-CM

## 2024-05-13 DIAGNOSIS — N92.0 MENORRHAGIA WITH REGULAR CYCLE: ICD-10-CM

## 2024-05-13 DIAGNOSIS — Z12.4 SCREENING FOR CERVICAL CANCER: ICD-10-CM

## 2024-05-13 DIAGNOSIS — N83.8 PARATUBAL CYST: ICD-10-CM

## 2024-05-13 DIAGNOSIS — Z00.00 ENCOUNTER FOR ANNUAL PHYSICAL EXAM: ICD-10-CM

## 2024-05-13 PROCEDURE — 99395 PREV VISIT EST AGE 18-39: CPT | Performed by: OBSTETRICS & GYNECOLOGY

## 2024-05-13 RX ORDER — TRANEXAMIC ACID 650 MG/1
1 TABLET ORAL ONCE AS NEEDED
Qty: 30 TABLET | Refills: 4 | Status: SHIPPED | OUTPATIENT
Start: 2024-05-13

## 2024-05-13 NOTE — PROGRESS NOTES
Subjective     Chief Complaint   Patient presents with    Gynecologic Exam     Annual:Last pap 12/22, Hx: rt.ovarian cyst         History of Present Illness    Wellness exam  Sundeep Espino is a very pleasant  38 y.o. female who presents for annual exam.  Mammo Exam age 41, Contraception discussed, Exercise 3 times a week encouraged    Vonnie states her menorrhagia is much better as long as she is taking the Lysteda  Her right ovarian cyst had gotten smaller last time.  She was found to have what we thought was a benign paratubal cyst as well  She has no other GYN concerns or complaints.      Obstetric History:  OB History    No obstetric history on file.        Menstrual History:     Patient's last menstrual period was 04/29/2024.       Sexual History:       Past Medical History:   Diagnosis Date    ADHD     Allergic rhinitis     Anxiety and depression     Arthritis     Asthma     Chondromalacia patellae     DDD (degenerative disc disease), cervical     GERD (gastroesophageal reflux disease)     H/O radioactive iodine thyroid ablation 03/08/2017    Heart palpitations     Hyperthyroidism     Iron deficiency anemia     Menstrual problem     Ovarian cyst     Paratubal cyst 05/10/2023    Thyroid disease     Upper respiratory infection 05/02/2018    UTI (urinary tract infection) 05/02/2018    Vitamin D deficiency      Past Surgical History:   Procedure Laterality Date    APPENDECTOMY  2012    BREAST LUMPECTOMY      benign    COLONOSCOPY  approx 2009    normal per patient    COLONOSCOPY W/ POLYPECTOMY N/A 08/01/2018    Normal    EAR TUBES      ENDOSCOPY N/A 08/01/2018    [facility]    KELOID EXCISION      right ear    MANDIBLE SURGERY  05/2016    ORIF ULNA/RADIUS FRACTURES      Distal radius ORIF with [facility] on 8/25/22       SOCIAL Hx:      The following portions of the patient's history were reviewed and updated as appropriate: allergies, current medications, past family history, past medical history, past social history, past  "surgical history and problem list.    Review of Systems        Except as outlined in history of physical illness, patient denies any changes in her GYN, , GI systems.  All other systems reviewed were negative.         Current Outpatient Medications:     cetirizine (zyrTEC) 10 MG tablet, Take 1 tablet by mouth Daily., Disp: , Rfl:     Multiple Vitamins-Minerals (MULTIVITAMIN ADULT PO), Take  by mouth., Disp: , Rfl:     phentermine 15 MG capsule, Take 1 capsule by mouth Every Morning. Three month treatment maximum., Disp: 30 capsule, Rfl: 2    PROAIR RESPICLICK 108 (90 Base) MCG/ACT inhaler, Inhale 2 puffs As Needed., Disp: , Rfl: 3    Symbicort 80-4.5 MCG/ACT inhaler, Inhale 2 puffs 2 (Two) Times a Day., Disp: 10.2 g, Rfl: 1    Synthroid 100 MCG tablet, Take 1 tablet by mouth Daily. Take at least 30 minutes before having breakfast with a sip of water. For low thyroid., Disp: 90 tablet, Rfl: 0    Tranexamic Acid (Lysteda) 650 MG tablet, Take 1 tablet by mouth 1 (One) Time As Needed (On first 3 days of menstrual cycle) for up to 150 doses., Disp: 30 tablet, Rfl: 4   Objective   Physical Exam    /64   Ht 165.1 cm (65\")   Wt 73.5 kg (162 lb)   LMP 04/29/2024   BMI 26.96 kg/m²     General: Patient is alert and oriented and appears overall healthy  Neck: Is supple without thyromegaly, no carotid bruits and no lymphadenopathy  Lungs: Clear bilaterally, no wheezing, rhonchi, or rales.  Respiratory rate is normal  Breast: Even, symmetrical, no lymphadenopathy, no retraction, no discharge, no masses or cysts  Heart: Regular rate and rhythm are appreciated, no murmurs or rubs are heard  Abdomen: Is soft, without organomegaly, bowel sounds are positive, there is no rebound or guarding and palpation does not produce any discomfort  Back: Nontender without CVA tenderness  Pelvic: External genitalia appear normal and consistent with mature female.  BUS normal                            Vagina is clean dry without " discharge and appears adequately estrogenized, no lesions or masses are present                         Cervix is noninflamed without discharge or lesions.  There is no cervical motion tenderness.                Uterus is nonenlarged, without tenderness, and no masses or abnormalities are  present               Adnexa are non-enlarged, non tender               Rectal exam reveals adequate sphincter tone and no masses or lesions are appreciated on digital rectal examination.      Annual Well Woman Exam  Patient Active Problem List   Diagnosis    Magnetic resonance imaging of brain abnormal    Abnormal electrocardiogram    Abnormal magnetic resonance imaging study    Allergic rhinitis    Iron deficiency anemia due to chronic blood loss    Asthma    Chronic post-traumatic headache    Gastroesophageal reflux disease    Lumbosacral radiculopathy    Menorrhagia    Menstrual disorder    Neck pain    Paresthesia of lower extremity    Numbness and tingling sensation of skin    Muscle weakness (generalized)    Health care maintenance    Thyrotoxicosis without thyroid storm    Weight loss    Urticaria    Insomnia due to medical condition    Facial swelling    Abnormal weight gain    Graves disease    Other symptoms and signs involving the musculoskeletal system    Bloating symptom    Diarrhea    Hypovitaminosis D    Controlled substance agreement signed    Exogenous obesity    Postablative hypothyroidism    Ankle instability, left    Family history of diabetes mellitus in mother    Family history of essential hypertension    History of ovarian cyst    Iron deficiency anemia    Paratubal cyst                 Assessment & Plan   Diagnoses and all orders for this visit:    1. Graves disease (Primary)    2. Paratubal cyst  Today's physical exam was reassuring could not feel any enlargement on either adnexa, patient is requesting a follow-up ultrasound we will get that scheduled but physical exam is reassuring and previous  ultrasound had shown a diminution of previous physiologic right ovarian cyst  3. Screening for cervical cancer    4. Menorrhagia with regular cycle  Improved with Lysteda  5. Encounter for annual physical exam    Other orders  -     Tranexamic Acid (Lysteda) 650 MG tablet; Take 1 tablet by mouth 1 (One) Time As Needed (On first 3 days of menstrual cycle) for up to 150 doses.  Dispense: 30 tablet; Refill: 4      Discussed today's findings and concerns with patient.  Continue to recommend regular exercise including cardiovascular and resistance training as well as  breast self-exam. Wellness lab, mammography, & pap smear, in accordance with age guidelines.    I have encouraged her to call for today's test results if she has not received them within 10 days.  Patient is advised to call with any change in her condition or with any other questions, otherwise return  for annual examination.

## 2024-05-17 LAB
CYTOLOGIST CVX/VAG CYTO: NORMAL
CYTOLOGY CVX/VAG DOC CYTO: NORMAL
CYTOLOGY CVX/VAG DOC THIN PREP: NORMAL
DX ICD CODE: NORMAL
HPV I/H RISK 4 DNA CVX QL PROBE+SIG AMP: NEGATIVE
Lab: NORMAL
OTHER STN SPEC: NORMAL
STAT OF ADQ CVX/VAG CYTO-IMP: NORMAL

## 2024-05-20 ENCOUNTER — OFFICE VISIT (OUTPATIENT)
Dept: INTERNAL MEDICINE | Facility: CLINIC | Age: 39
End: 2024-05-20
Payer: COMMERCIAL

## 2024-05-20 VITALS
SYSTOLIC BLOOD PRESSURE: 100 MMHG | HEIGHT: 65 IN | HEART RATE: 98 BPM | DIASTOLIC BLOOD PRESSURE: 70 MMHG | OXYGEN SATURATION: 98 % | BODY MASS INDEX: 25.99 KG/M2 | WEIGHT: 156 LBS

## 2024-05-20 DIAGNOSIS — R11.0 NAUSEA: ICD-10-CM

## 2024-05-20 DIAGNOSIS — R26.89 IMBALANCE: ICD-10-CM

## 2024-05-20 DIAGNOSIS — R55 SYNCOPE, UNSPECIFIED SYNCOPE TYPE: Primary | ICD-10-CM

## 2024-05-20 PROCEDURE — 93000 ELECTROCARDIOGRAM COMPLETE: CPT | Performed by: STUDENT IN AN ORGANIZED HEALTH CARE EDUCATION/TRAINING PROGRAM

## 2024-05-20 PROCEDURE — 99214 OFFICE O/P EST MOD 30 MIN: CPT | Performed by: STUDENT IN AN ORGANIZED HEALTH CARE EDUCATION/TRAINING PROGRAM

## 2024-05-20 RX ORDER — ONDANSETRON 4 MG/1
4 TABLET, FILM COATED ORAL EVERY 8 HOURS PRN
Qty: 30 TABLET | Refills: 0 | Status: SHIPPED | OUTPATIENT
Start: 2024-05-20

## 2024-05-20 NOTE — PROGRESS NOTES
Adam Monahan D.O.  Internal Medicine  [facility]  [address], [address]  [place] [address]  [phone]      Chief Complaint  Dizziness (X 5 days when walking feels dizzy nausea.)    SUBJECTIVE    History of Present Illness    Sundeep Espino is a 38 y.o. female who presents to the office today as an established patient that last saw me on 3/20/2024.     Here today for acute care visit.   She has been feeling bad in general around 5 days .  States when she walks or while standing she feels nauseated, lightheaded like she is going to pass out, and feels that balance is off. Occurs sometimes while sitting upright watching tv but primarily is worse with standing walking. Resting doesn't make a difference in symptoms. She states it is impacting sleep because she reports having blacked out several times while sitting and lying down but never blacked out while walking or driving.  She states her heart rate has always been fast and  that is not new for her and she can see her HR go up to 120. No headache.  She has experienced difficulty concentrating and fading vision. Has also felt palpitations.   She reports having gone to the ER for these symptoms recently.     Allergies   Allergen Reactions    Aspartame And Phenylalanine Other (See Comments)     headaches    Banana Nausea And Vomiting    Beta Adrenergic Blockers Hives, Itching and Swelling    Metoprolol Other (See Comments)     Face swelling, itching      Propranolol Other (See Comments)     Face swelling, itching          Outpatient Medications Marked as Taking for the 5/20/24 encounter (Office Visit) with Adam Monahan, DO   Medication Sig Dispense Refill    cetirizine (zyrTEC) 10 MG tablet Take 1 tablet by mouth Daily.      Multiple Vitamins-Minerals (MULTIVITAMIN ADULT PO) Take  by mouth.      phentermine 15 MG capsule Take 1 capsule by mouth Every Morning. Three month treatment maximum. 30 capsule 2    PROAIR RESPICLICK 108 (90  "Base) MCG/ACT inhaler Inhale 2 puffs As Needed.  3    Symbicort 80-4.5 MCG/ACT inhaler Inhale 2 puffs 2 (Two) Times a Day. 10.2 g 1    Synthroid 100 MCG tablet Take 1 tablet by mouth Daily. Take at least 30 minutes before having breakfast with a sip of water. For low thyroid. 90 tablet 0    Tranexamic Acid (Lysteda) 650 MG tablet Take 1 tablet by mouth 1 (One) Time As Needed (On first 3 days of menstrual cycle) for up to 150 doses. 30 tablet 4        Past Medical History:   Diagnosis Date    ADHD     Allergic rhinitis     Anxiety and depression     Arthritis     Asthma     Chondromalacia patellae     DDD (degenerative disc disease), cervical     GERD (gastroesophageal reflux disease)     H/O radioactive iodine thyroid ablation 03/08/2017    Heart palpitations     Hyperthyroidism     Iron deficiency anemia     Menstrual problem     Ovarian cyst     Paratubal cyst 05/10/2023    Thyroid disease     Upper respiratory infection 05/02/2018    UTI (urinary tract infection) 05/02/2018    Vitamin D deficiency        OBJECTIVE    Vital Signs:   /70   Pulse 98   Ht 165.1 cm (65\")   Wt 70.8 kg (156 lb)   SpO2 98%   BMI 25.96 kg/m²        Physical Exam  Vitals reviewed.   Constitutional:       General: She is not in acute distress.     Appearance: Normal appearance. She is not ill-appearing.   Eyes:      General: No scleral icterus.  Pulmonary:      Effort: Pulmonary effort is normal. No respiratory distress.   Skin:     Coloration: Skin is not jaundiced.   Neurological:      Mental Status: She is alert.   Psychiatric:         Mood and Affect: Mood normal.         Behavior: Behavior normal.         Thought Content: Thought content normal.                       ECG 12 Lead    Date/Time: 5/20/2024 11:50 AM  Performed by: Adam Monahan DO    Authorized by: Adam Monahan DO  Previous ECG: no previous ECG available  Rhythm: sinus rhythm  Rate: normal  Rate comments: 83  Conduction: conduction normal  ST Segments: ST segments " "normal  T Waves: T waves normal  QRS axis: normal  Other: no other findings    Clinical impression: normal ECG             ASSESSMENT & PLAN     Diagnoses and all orders for this visit:    1. Syncope, unspecified syncope type (Primary)  2. Nausea  3. Imbalance  -Patient here for acute care visit as described above.  Overall with symptoms of nausea, generally feeling unwell, lightheadedness, presyncope and even some syncope it seems.  She has been seen by the emergency department at [place] prior to her encounter here.  -I reviewed 5/18/2024 [place] ER progress note states that patient reported general body weakness, palpitations and feeling off balance for 3 days as well as nausea.  I reviewed labs including TSH serum toxicology screen, serum hCG, troponin, magnesium, BNP, CMP, PT/INR, urine drug screen, CBC.  These were all unrevealing.  I reviewed chest x-ray report which was read as \"negative for acute abnormality\"  -EKG in office sinus rhythm and normal  -Her vitals today are as below  Seated BP 99/68 P92  Supine for 5 minutes p83 100/70  Standing for 1 minute p 103 100/68  Standing 3 minutes p106  Standing 5 minutes p 100  -Overall her blood pressure is not dropping with standing.  Her heart rate did increase to 106 after standing for 3 minutes.  Her symptoms certainly could be compatible with POTS and she has already had a referral to [place] cardiology and is establishing with them tomorrow.  I stressed that it is very important for her to attend this consultation and may should consider a tilt table test as well as echocardiogram and perhaps a Holter monitor.  In the meantime, I would like for her to begin wearing compression socks daily and increase her fluid intake.  I also advised that she should not drive until the cause of her syncope is fully evaluated.  -Further plan depending upon the evaluation of cardiology.  -I will prescribe Zofran for nausea.      Follow Up  No follow-ups on file.    Patient/family " had no further questions at this time and verbalized understanding of the plan discussed today.

## 2024-05-27 DIAGNOSIS — R55 SYNCOPE, UNSPECIFIED SYNCOPE TYPE: ICD-10-CM

## 2024-05-27 DIAGNOSIS — R26.89 IMBALANCE: ICD-10-CM

## 2024-05-27 DIAGNOSIS — R11.0 NAUSEA: ICD-10-CM

## 2024-05-29 RX ORDER — ONDANSETRON 4 MG/1
4 TABLET, FILM COATED ORAL EVERY 8 HOURS PRN
Qty: 12 TABLET | Refills: 0 | Status: SHIPPED | OUTPATIENT
Start: 2024-05-29

## 2024-05-29 NOTE — TELEPHONE ENCOUNTER
[facility] INSURANCE WILL NOT COVER FOR A QTY.30 ONLY A QTY. OF 12 WILL NEED A PA FOR MORE THEN 12 PLEASE CALL AND ADVISE     CALLBACK# [phone]

## 2024-05-31 NOTE — TELEPHONE ENCOUNTER
"  Caller: Sundeep Espino \"Jonna\"    Relationship: Self    Best call back number: [phone]     What is the best time to reach you: ANYTIME    Who are you requesting to speak with (clinical staff, provider,  specific staff member): CLINICAL OR PCP      What was the call regarding: PATIENT CALLED TODAY TO SEE IF A PA HAS BEEN STARTED FOR HER 30 DAY SUPPLY?  SHE ALSO STATED THAT SHE SEES THE HEART DOCTOR ON 6/4/2024 SO HOPEFULLY THEY CAN FIND SOMETHING OUT.  PLEASE CONTACT HER AND LET HER KNOW THE STATUS OF THE PA.  "

## 2024-06-11 ENCOUNTER — TELEPHONE (OUTPATIENT)
Dept: INTERNAL MEDICINE | Facility: CLINIC | Age: 39
End: 2024-06-11

## 2024-06-11 NOTE — TELEPHONE ENCOUNTER
"  Caller: Sundeep Espino \"Jonna\"    Relationship: Self    Best call back number: [phone]    What was the call regarding: PATIENT CALLING TO REQUEST A SOONER APPOINTMENT THAN 06/27/24.    SHE STATED SHE IS STILL HAVING NAUSEA, NOT EATING, AND CONSTIPATION.    SHE WOULD LIKE TO KNOW IF SHE COULD BE WORKED IN SOONER.    PLEASE CALL TO ADVISE.  "

## 2024-06-17 ENCOUNTER — OFFICE VISIT (OUTPATIENT)
Dept: INTERNAL MEDICINE | Facility: CLINIC | Age: 39
End: 2024-06-17
Payer: COMMERCIAL

## 2024-06-17 VITALS
WEIGHT: 152 LBS | HEIGHT: 65 IN | SYSTOLIC BLOOD PRESSURE: 110 MMHG | OXYGEN SATURATION: 94 % | HEART RATE: 90 BPM | BODY MASS INDEX: 25.33 KG/M2 | DIASTOLIC BLOOD PRESSURE: 70 MMHG

## 2024-06-17 DIAGNOSIS — K62.5 BRBPR (BRIGHT RED BLOOD PER RECTUM): ICD-10-CM

## 2024-06-17 DIAGNOSIS — E89.0 POSTABLATIVE HYPOTHYROIDISM: ICD-10-CM

## 2024-06-17 DIAGNOSIS — K59.00 CONSTIPATION, UNSPECIFIED CONSTIPATION TYPE: Primary | ICD-10-CM

## 2024-06-17 PROCEDURE — 99214 OFFICE O/P EST MOD 30 MIN: CPT | Performed by: STUDENT IN AN ORGANIZED HEALTH CARE EDUCATION/TRAINING PROGRAM

## 2024-06-17 RX ORDER — LEVOTHYROXINE SODIUM 112 MCG
1 TABLET ORAL DAILY
COMMUNITY
Start: 2024-05-21

## 2024-06-17 RX ORDER — POLYETHYLENE GLYCOL 3350 17 G/17G
17 POWDER, FOR SOLUTION ORAL DAILY
COMMUNITY
Start: 2024-06-16

## 2024-06-17 NOTE — PROGRESS NOTES
Adam Monahan D.O.  Internal Medicine  [facility]  [address], [address]  [place] [address]  [phone]      Chief Complaint  Abdominal Pain and Constipation    SUBJECTIVE    History of Present Illness    Sundeep Espino is a 38 y.o. female who presents to the office today as an established patient that last saw me on 5/20/2024.     Here today for constipation and ER follow up.   Has been dealing with constipation for 2 weeks. States she used a laxative for the first week and noticed blood in the stool with that. She had a few bowel movements at that time. She used that for 3 or 4 times , also tried an enema one time which didn't work. Last bowel movement was 1 week ago. She is passing gas. States she was told she had endometriosis at the ER. She feels that her abdominal cramps are all over. She was prescribed Miralax and used once yesterday.   She doesn't have an appetite. States she has a lot of nausea and bad breath but not vomiting. She reports taking her levothyroxine 112 mcg daily as prescribed.    Allergies   Allergen Reactions    Aspartame And Phenylalanine Other (See Comments)     headaches    Banana Nausea And Vomiting    Beta Adrenergic Blockers Hives, Itching and Swelling    Metoprolol Other (See Comments)     Face swelling, itching      Propranolol Other (See Comments)     Face swelling, itching          Outpatient Medications Marked as Taking for the 6/17/24 encounter (Office Visit) with Adam Monahan, DO   Medication Sig Dispense Refill    cetirizine (zyrTEC) 10 MG tablet Take 1 tablet by mouth Daily.      Multiple Vitamins-Minerals (MULTIVITAMIN ADULT PO) Take  by mouth.      ondansetron (Zofran) 4 MG tablet Take 1 tablet by mouth Every 8 (Eight) Hours As Needed for Nausea or Vomiting. 12 tablet 0    polyethylene glycol (MIRALAX) 17 g packet Take 17 g by mouth Daily.      PROAIR RESPICLICK 108 (90 Base) MCG/ACT inhaler Inhale 2 puffs As Needed.  3    Synthroid 112 MCG  "tablet Take 1 tablet by mouth Daily.      Tranexamic Acid (Lysteda) 650 MG tablet Take 1 tablet by mouth 1 (One) Time As Needed (On first 3 days of menstrual cycle) for up to 150 doses. 30 tablet 4        Past Medical History:   Diagnosis Date    ADHD     Allergic rhinitis     Anxiety and depression     Arthritis     Asthma     Chondromalacia patellae     DDD (degenerative disc disease), cervical     GERD (gastroesophageal reflux disease)     H/O radioactive iodine thyroid ablation 03/08/2017    Heart palpitations     Hyperthyroidism     Iron deficiency anemia     Menstrual problem     Ovarian cyst     Paratubal cyst 05/10/2023    Thyroid disease     Upper respiratory infection 05/02/2018    UTI (urinary tract infection) 05/02/2018    Vitamin D deficiency        OBJECTIVE    Vital Signs:   /70   Pulse 90   Ht 165.1 cm (65\")   Wt 68.9 kg (152 lb)   SpO2 94%   BMI 25.29 kg/m²        Physical Exam  Vitals reviewed.   Constitutional:       General: She is not in acute distress.     Appearance: Normal appearance. She is not ill-appearing.   Eyes:      General: No scleral icterus.  Pulmonary:      Effort: Pulmonary effort is normal. No respiratory distress.   Abdominal:      General: Bowel sounds are normal. There is distension.      Palpations: Abdomen is soft.      Tenderness: There is abdominal tenderness (diffuse). There is no guarding.   Skin:     Coloration: Skin is not jaundiced.   Neurological:      Mental Status: She is alert.   Psychiatric:         Mood and Affect: Mood normal.         Behavior: Behavior normal.         Thought Content: Thought content normal.                         Chris Coffman MD - 06/16/2024 8:59 AM EDT  Formatting of this note is different from the original.  Seen and treated with HODA.  Abdominal pain.  Hemodynamically stable.  Non surgical abdomen.    Labs Reviewed  CBC W/DIFF - Abnormal; Notable for the following components:  Result Value  Hemoglobin 10.7 " (*)  Hematocrit 34.8 (*)  Mean Corpuscular HGB Conc 30.7 (*)  All other components within normal limits  COMPREHENSIVE METABOLIC PANEL (CMP) - Abnormal; Notable for the following components:  Potassium 3.3 (*)  Blood Urea Nitrogen (BUN) 4 (*)  All other components within normal limits  URINALYSIS WITH REFLEX - Abnormal; Notable for the following components:  Ketone-Urine TRACE (*)  Occult Blood-Urine 3+ (*)  RBC-Urine 8 (*)  WBC-Urine 4 (*)  Bacteria-Urine 1+ (*)  All other components within normal limits  GC/CHLAMYDIA AMPLIFICATION  KOH PREP  TRICHOMONAS VAGINALIS  HCG,QUALITATIVE  LIPASE    US Pelvic Complete, Abd + Endovag  Final Result  REVIEWING YOUR TEST RESULTS IN [facility] IS NOT A SUBSTITUTE FOR  DISCUSSING THOSE RESULTS WITH YOUR HEALTH CARE PROVIDER.  PLEASE CONTACT YOUR PROVIDER VIA [employer] TO DISCUSS ANY QUESTIONS OR  CONCERNS YOU MAY HAVE REGARDING THESE TEST RESULTS.    RADIOLOGY REPORT    FACILITY: [facility]  UNIT/AGE/GENDER: J.ED ER AGE:38 Y SEX:F  PATIENT NAME/: DEB CHAVIRA L 1985  UNIT NUMBER: [id number]  ACCOUNT NUMBER: [id number]  ACCESSION NUMBER: [id number]    US PELVIS COMPLETE, TRANSABD + TRANSVAG 2024 6:47 AM    HISTORY: Bilateral hydrosalpinx or pyosalpinx, evaluate for torsion.    COMPARISON: CT 2024.    TECHNIQUE: Transabdominal and transvaginal scanning was performed of  the pelvis.    FINDINGS:  The uterus measures 6.2 x 4.4 x 3.6 cm. No myometrial mass is evident.  The endometrium is normal in thickness measuring 0.7 cm.    Both ovaries are identified. The right ovary measures 6.7 x 4.3 x 4.5  cm in the left ovary measures 6.2 x 3.1 x 4.2 cm. Normal Doppler  waveforms are noted in both ovaries. There is bilateral hydrosalpinx  with low-level internal echoes.    No significant pelvic free fluid is noted. Anterior to the cervix,  there is heterogeneously hypoechoic nodule with irregular margins  measuring approximately 2.8 x 1.2  x 1.1 cm in the region of the  uterosacral ligament.    IMPRESSION:  1. No sonographic evidence of ovarian torsion.  2. Bilateral hydrosalpinx.  3. Heterogeneously hypoechoic nodule with irregular margins anterior  to the cervix in the region of the uterosacral ligament suggestive of  endometriosis.        Dictated by: Valdo Gasca M.D.    Images and Report reviewed and interpreted by: Valdo Gasca M.D.    <PS><Electronically signed by: Valdo Gasca M.D.>  2024 0715    D: 2024 0659  T: [id number]    CT Abdomen & Pelvis With IV Contrast Without Oral Contrast  Final Result  REVIEWING YOUR TEST RESULTS IN [facility] IS NOT A SUBSTITUTE FOR  DISCUSSING THOSE RESULTS WITH YOUR HEALTH CARE PROVIDER.  PLEASE CONTACT YOUR PROVIDER VIA [employer] TO DISCUSS ANY QUESTIONS OR  CONCERNS YOU MAY HAVE REGARDING THESE TEST RESULTS.    RADIOLOGY REPORT    FACILITY: [facility]  UNIT/AGE/GENDER: J.ED ER AGE:38 Y SEX:F  PATIENT NAME/: DEB CHAVIRA L 1985  UNIT NUMBER: [id number]  ACCOUNT NUMBER: [id number]  ACCESSION NUMBER: [id number]    CT ABDOMEN AND PELVIS W IV CONTRAST 2024 4:00 AM    HISTORY: Nausea/vomiting, abdominal pain, acute, nonlocalized.    TECHNIQUE: CT images of the abdomen and pelvis were obtained with IV  contrast. CT scans at this facility use dose modulation, iterative  reconstruction, and/or weight-based dosing when appropriate to reduce  radiation dose to as low as reasonably achievable (ALARA).    COMPARISON: None.    CT ABDOMEN AND PELVIS FINDINGS:    The lung bases are clear.    The liver is normal in caliber. The gallbladder is unremarkable. No  biliary ductal dilatation is evident.    The spleen, adrenal glands, and pancreas are unremarkable.    The kidneys enhance symmetrically without nephrolithiasis or  hydronephrosis.    No small bowel dilatation is noted. There is a mild colonic fecal  burden. The appendix is surgically  absent.    No abdominopelvic adenopathy or fluid collection is identified. The  abdominal aorta is normal in caliber.    There is significant dilatation of the fallopian tubes bilaterally  with mild stranding in the left anterior adnexal region. There is a  possible left adnexal cyst measuring 2.8 cm with hyperattenuating  components. The bladder is partially distended without wall  thickening.    No acute osseous abnormality is identified within the visualized  skeleton.    IMPRESSION:  1. Bilateral hydrosalpinx/pyosalpinx with mild inflammatory change in  the left anterior adnexal region. Possible left ovarian hemorrhagic  cyst. Recommend gynecologic consultation and further evaluation with  dedicated pelvic ultrasound to help exclude torsion.  2. Status post appendectomy.    A preliminary report was provided.        Dictated by: Valdo Gasca M.D.    Images and Report reviewed and interpreted by: Valdo Gasca M.D.    <PS><Electronically signed by: Valdo Gasca M.D.>  06/16/2024 0628    D: 06/16/2024 0623  T: 06/16/2024 0623      Agree with the evaluation, plan, treatment, disposition-discharge for this patient.  I fully participated in medical decision making.    Chris Coffman MD  06/16/24 2119    Electronically signed by Chris Coffman MD at 06/16/2024 9:19 PM EDT   Back to top of ED Notes  Monserrat Bianchi RN - 06/16/2024 7:06 AM EDT  Formatting of this note might be different from the original.  Bedside report given to Toyin ANN who will resume care  Electronically signed by Monserrat Bianchi RN at 06/16/2024 7:06 AM EDT   Back to top of ED Notes  Monserrat Bianchi RN - 06/16/2024 6:46 AM EDT  Formatting of this note might be different from the original.  Ultrasound at bedside  Electronically signed by Monserrat Bianchi RN at 06/16/2024 6:46 AM EDT   Back to top of ED Notes  Kyra Nicole APRN - 06/16/2024 6:17 AM EDT  Formatting of this note is different from the original.  Images from the original  note were not included.  EMERGENCY DEPARTMENT ENCOUNTER    Room Number: 13/13  Date seen: 6/16/2024  Time of transfer: 0600  PCP: Adam Monahan MD      Medical Record Review:  38year-old Mr. Espino with a history of Graves' disease, iron deficiency anemia,. paraTubal cyst, acquired hypothyroidism, presents to the emergency department complaining of abdominal pain and constipation. She MiraLAX prior to coming to the emergency department. Any fever or chills. He denies any pelvic pain or abnormal vaginal discharge. Additionally she reports that she is on her menstrual period at this time.    Laboratory Results:  Recent Results (from the past 24 hour(s))  HCG, Qualitative  Collection Time: 06/16/24 12:05 AM  Result Value Ref Range  HCG Qualitative Serum Negative Negative  Comprehensive Metabolic Panel (CMP)  Collection Time: 06/16/24 12:05 AM  Result Value Ref Range  Sodium 136 136 - 145 mmol/L  Potassium 3.3 (L) 3.5 - 5.1 mmol/L  Chloride 104 98 - 107 mmol/L  Carbon Dioxide 25 22 - 29 mmol/L  Anion Gap 7 5 - 13 (arb'U)  Glucose 99 71 - 99 mg/dL  Blood Urea Nitrogen (BUN) 4 (L) 7 - 19 mg/dL  Creatinine-Blood 0.62 0.55 - 1.02 mg/dL  BUN/Creatinine Ratio 6.5 RATIO  Estimated GFR (Cr) 118 >60 /1.73 m2  Total Protein 7.4 6.4 - 8.2 g/dL  Albumin 3.8 3.5 - 5.2 g/dL  Globulin 3.6 1.5 - 4.5 g/dL  Albumin/Globulin Ratio 1.1 1.1 - 2.5 RATIO  Calcium 8.9 8.4 - 10.2 mg/dL  Total Bilirubin 0.9 0.2 - 1.2 mg/dL  AST/SGOT 18 5 - 34 U/L  ALT/SGPT 13 0 - 55 U/L  Alkaline Phosphatase 88 40 - 150 U/L  Lipase  Collection Time: 06/16/24 12:05 AM  Result Value Ref Range  Lipase 8 0 - 59 U/L  CBC w/Diff  Collection Time: 06/16/24 1:04 AM  Result Value Ref Range  White Blood Count 10.45 4.5 - 11.0 10*3/uL  Red Blood Count 4.12 4.0 - 5.2 10*6/uL  Hemoglobin 10.7 (L) 12.0 - 16.0 g/dL  Hematocrit 34.8 (L) 36.0 - 46.0 %  Mean Corpuscular Volume 84.5 80.0 - 100.0 fL  Mean Corpuscular Hemoglobin 26.0 26.0 - 34.0 pg  Mean Corpuscular HGB Conc 30.7  (L) 31.0 - 37.0 g/dL  Red Cell Distribution Width-CV 12.9 12.0 - 16.8 %  Platelet Count 230 140 - 440 10*3/uL  Mean Platelet Volume 10.0 8.4 - 12.4 fL  [facility] CBC w/AutoDiff (arb'U)  Neutrophils % 63.7 45 - 80 %  Lymphocyte % 25.0 15 - 50 %  Monocyte % 10.1 0 - 15 %  Eosinophil% 0.6 0 - 7 %  BASO% 0.4 0 - 2 %  Immature Granulocyte% 0.2 0.0 - 1.0 %  Nucleated RBC % 0 0 /100(WBC)  Neutrophil Abs 6.66 2.0 - 8.8 10*3/uL  Lymphocyte-Absolute 2.61 0.7 - 5.5 10*3/uL  Monocyte Absolute 1.06 0.0 - 1.7 10*3/uL  EOS-Absolute 0.06 0.0 - 0.8 10*3/uL  Basophil Abs 0.04 0.0 - 0.2 10*3/uL  Immature Granulocyte Abs 0.02 0.00 - 0.10 10*3/uL  Urinalysis with Reflex (Nursing to review process instructions prior to collecting specimen)  Collection Time: 06/16/24 1:04 AM  Result Value Ref Range  Color-Urine Light yellow  Clarity-Urine Clear  Specific Gravity Urine 1.005 1.005 - 1.030 (arb'U)  pH-Urine 6.5 5.0 - 9.0 (pH)  Protein-Urine Negative Negative mg/dL  Glucose-Urine Negative Negative mg/dL  Ketone-Urine TRACE (A) Negative mg/dL  Bilirubin-Urine Negative Negative mg/dL  Occult Blood-Urine 3+ (A) Negative (arb'U)  Nitrite-Urine Negative Negative (arb'U)  Urobilinogen-Urine Normal Normal (EhrlichU)/dL  Leukocyte Esterase-Urine Negative Negative (arb'U)  Source-Urine Voided  Reflex Microscopic? Microscopic performed  RBC-Urine 8 (H) 0 - 2 (HPF)  WBC-Urine 4 (H) 0 - 3 (HPF)  Squamous Epithelial-Urine 1 0 - 4 (HPF)  Bacteria-Urine 1+ (A) None Seen (HPF)  Hyaline Cast-Urine 1 0 - 5 (LPF)  CT Abdomen & Pelvis With IV Contrast Without Oral Contrast  Collection Time: 06/16/24 2:47 AM  Result Value Ref Range  [facility] RAD WORKSTATION ID [id number]  GC/Chlamydia Amplification Cervix  Collection Time: 06/16/24 5:20 AM  Specimen: Cervix  Result Value Ref Range  Chlamydia Trachomatis Not Detected Not Detected  Neisseria Gonorrhoeae Not Detected Not Detected  BARB Prep  Collection Time: 06/16/24 5:20 AM  Specimen: Cervix  Result Value Ref  Range  KOH Prep No Fungal Elements seen  Trichomonas Vaginalis  Collection Time: 24 5:20 AM  Specimen: Cervix  Result Value Ref Range  Trichomonas Vaginalis Not Detected Not Detected  US Pelvic Complete, Abd + Endovag  Collection Time: 24 6:47 AM  Result Value Ref Range  [facility] RAD WORKSTATION ID [id number]    I reviewed the above results.    Radiology:  US Pelvic Complete, Abd + Endovag    Result Date: 2024  REVIEWING YOUR TEST RESULTS IN [facility] IS NOT A SUBSTITUTE FOR DISCUSSING THOSE RESULTS WITH YOUR HEALTH CARE PROVIDER. PLEASE CONTACT YOUR PROVIDER [facility]IA [facility] TO DISCUSS ANY QUESTIONS OR CONCERNS YOU MAY HAVE REGARDING THESE TEST RESULTS. RADIOLOGY REPORT FACILITY: [facility] UNIT/AGE/GENDER: J.ED ER AGE:38 Y SEX:F PATIENT NAME/: DEB CHAVIRA L 1985 UNIT NUMBER: [id number] ACCOUNT NUMBER: [id number] ACCESSION NUMBER: [id number] US PELVIS COMPLETE, TRANSABD + TRANSVAG 2024 6:47 AM HISTORY: Bilateral hydrosalpinx or pyosalpinx, evaluate for torsion. COMPARISON: CT 2024. TECHNIQUE: Transabdominal and transvaginal scanning was performed of the pelvis. FINDINGS: The uterus measures 6.2 x 4.4 x 3.6 cm. No myometrial mass is evident. The endometrium is normal in thickness measuring 0.7 cm. Both ovaries are identified. The right ovary measures 6.7 x 4.3 x 4.5 cm in the left ovary measures 6.2 x 3.1 x 4.2 cm. Normal Doppler waveforms are noted in both ovaries. There is bilateral hydrosalpinx with low-level internal echoes. No significant pelvic free fluid is noted. Anterior to the cervix, there is heterogeneously hypoechoic nodule with irregular margins measuring approximately 2.8 x 1.2 x 1.1 cm in the region of the uterosacral ligament. IMPRESSION: 1. No sonographic evidence of ovarian torsion. 2. Bilateral hydrosalpinx. 3. Heterogeneously hypoechoic nodule with irregular margins anterior to the cervix in the region of the uterosacral ligament  suggestive of endometriosis. Dictated by: Valdo Gasca M.D. Images and Report reviewed and interpreted by: Valdo Gasca M.D. <PS><Electronically signed by: Valdo Gasca M.D.> 2024 0715 D: 2024 0659 T: 2024 0659    CT Abdomen & Pelvis With IV Contrast Without Oral Contrast    Result Date: 2024  REVIEWING YOUR TEST RESULTS IN [facility] IS NOT A SUBSTITUTE FOR DISCUSSING THOSE RESULTS WITH YOUR HEALTH CARE PROVIDER. PLEASE CONTACT YOUR PROVIDER [facility]IA [facility] TO DISCUSS ANY QUESTIONS OR CONCERNS YOU MAY HAVE REGARDING THESE TEST RESULTS. RADIOLOGY REPORT FACILITY: [facility] UNIT/AGE/GENDER: J.ED ER AGE:38 Y SEX:F PATIENT NAME/: DEB CHAVIRA L 1985 UNIT NUMBER: [id number] ACCOUNT NUMBER: [id number] ACCESSION NUMBER: [id number] CT ABDOMEN AND PELVIS W IV CONTRAST 2024 4:00 AM HISTORY: Nausea/vomiting, abdominal pain, acute, nonlocalized. TECHNIQUE: CT images of the abdomen and pelvis were obtained with IV contrast. CT scans at this facility use dose modulation, iterative reconstruction, and/or weight-based dosing when appropriate to reduce radiation dose to as low as reasonably achievable (ALARA). COMPARISON: None. CT ABDOMEN AND PELVIS FINDINGS: The lung bases are clear. The liver is normal in caliber. The gallbladder is unremarkable. No biliary ductal dilatation is evident. The spleen, adrenal glands, and pancreas are unremarkable. The kidneys enhance symmetrically without nephrolithiasis or hydronephrosis. No small bowel dilatation is noted. There is a mild colonic fecal burden. The appendix is surgically absent. No abdominopelvic adenopathy or fluid collection is identified. The abdominal aorta is normal in caliber. There is significant dilatation of the fallopian tubes bilaterally with mild stranding in the left anterior adnexal region. There is a possible left adnexal cyst measuring 2.8 cm with hyperattenuating components. The bladder  is partially distended without wall thickening. No acute osseous abnormality is identified within the visualized skeleton. IMPRESSION: 1. Bilateral hydrosalpinx/pyosalpinx with mild inflammatory change in the left anterior adnexal region. Possible left ovarian hemorrhagic cyst. Recommend gynecologic consultation and further evaluation with dedicated pelvic ultrasound to help exclude torsion. 2. Status post appendectomy. A preliminary report was provided. Dictated by: Valdo Gasca M.D. Images and Report reviewed and interpreted by: Valdo Gasca M.D. <PS><Electronically signed by: Valdo Gasca M.D.> 06/16/2024 0628 D: 06/16/2024 0623 T: 06/16/2024 0623  I reviewed the above results.    Medications ordered in ED:  Medications  lactated ringers bolus 1,000 mL (0 mLs Intravenous Stopped 6/16/24 0312)  ondansetron (ZOFRAN) injection 4 mg (4 mg Intravenous Given 6/16/24 0149)  Morphine (PF) injection 4 mg (4 mg Intravenous Given 6/16/24 0150)  iohexol (OMNIPAQUE) 350 MG/ML injection 100 mL (80 mLs Intravenous Given 6/16/24 0248)  famotidine (PEPCID) injection 20 mg (20 mg Intravenous Given 6/16/24 0413)  ketorolac (TORADOL) injection 15 mg (15 mg Intravenous Given 6/16/24 0413)  ondansetron (ZOFRAN) injection 4 mg (4 mg Intravenous Given 6/16/24 0819)  ketorolac (TORADOL) injection 30 mg (30 mg Intravenous Given 6/16/24 0819)    Progress Notes:  0600: Patient care transferred from [facility] pending US and disposition.  0800: Discussed lab results and CT scan results and ultrasound results with patient. Labs are reassuring with normal WBC. Formed her regarding the hemoglobin of 10.7, however she denies any heaviness or..  Urinalysis showed 3+ blood from her menstrual period. Patient reports mild nausea and lower abdominal cramping. Another round of IV medication including Zofran and Toradol was ordered here for pain control  0845: Patient looks hemodynamically stable at this time.  She is not in any  acute distress. Patient is resting.  Instructed patient to follow-up with her OB/GYN for finding of endometriosis probably contributing to her pain. She also is constipated. Instructed patient to follow-up PCP and GI consult as needed.  Re-assessment: Discussed the results of the patient’s evaluation in the ED today, including the differential diagnosis and the most likely diagnosis.  Reviewed the implications of today’s evaluation, the limitations of any labs or imaging, and the plan for discharge (as there is no indication for additional workup or admission at this time).  Pt is agreeable with this plan and voices understanding for the need for close follow up for repeat evaluation and monitoring.  Discharge instructions were discussed with the patient.Discussed reasons to emergently return to the ED or call 911. Other potential OTC medications available for treatment of their condition, given their PMH, current meds, and allergies.  Pt is aware that discharge does not mean that nothing is wrong; it indicates no emergency is present and they must continue their care with their primary care provider  -Patient was reassessed and remains hemodynamically stable.  -Patient's condition has improved, vital signs are stable, and no new complaints are reported.  -A repeat exam reveals no new worrisome changes from initial exam findings.  -The patient was comfortably being discharged. Discussed the plan and they are comfortable with this.  - I stressed the need and encouraged patient that they must followup with and a GI consult as needed. Tomorrow. Patient will be discharged home with NSAIDs for endometriosis and abdominal pain. Instructed patient to take MiraLAX twice daily for abdominal pain from constipation.      Diagnosis:  ICD-10-CM ICD-9-CM  1. Abdominal pain, unspecified abdominal location R10.9 789.00  2. Constipation, unspecified constipation type K59.00 564.00  3. Pelvic pain R10.2 [id number]  4. Bilateral ovarian  "cysts N83.201 620.2  N83.202  5. Hydrosalpinx N70.11 614.1  6. Endometriosis N80.9 617.9  7. Anemia, unspecified type D64.9 285.9      Follow Up:  Adam Monahan MD  [address]  [facility] [id number] [phone]    Follow up    Tino, Javon POLANCO MD  [address] [facility] [address] [phone]    Follow up    Rx:  Encounter Medications    Signed Prescriptions Disp Refills  polyethylene glycol (GLYCOLAX) 17 g packet 12 each 0  Sig: Take 1 packet by mouth daily.  diclofenac (VOLTAREN) 75 MG EC tablet 20 tablet 0  Sig: Take 1 tablet by mouth 2 (two) times daily.    The pt was seen and examined by myself and Dr. Coffman, who agrees with plan.    [employer] Payment System  Data Reporting Section    The following information is for internal use only for reporting specific care related data as required by The Medicare Access & Chip Reauthorization Act of 2015. This data will be made available in aggregate to respective government and regulatory agencies.    CMS #317) Preventative Care and Screening: Screening for High Blood Pressure and Follow-Up Documented  * All patient's aged 18 and older who have been screened for high blood pressure and recommended follow-up.    Vitals:    6/15/2024  2350 6/16/2024  0108 6/16/2024  0400    BP: 121/70 112/66 123/74  BP Location: Right arm Right arm --  MAP (mmHg): 81 87 91  Orthostatic Position: Sitting Lying --  Pulse: 101 100 105  Resp: 18 20 20  Temp: 98.2 °F (36.8 °C) -- --  Temp src: Oral -- --  SpO2: 100 % 100 % 100 %  Weight: 74.8 kg (165 lb) -- --  Height: 5' 5\" (1.651 m) -- --     ASSESSMENT & PLAN     Diagnoses and all orders for this visit:    1. Constipation, unspecified constipation type (Primary)  2. Postablative hypothyroidism  3. BRBPR (bright red blood per rectum)  -pt here for ER follow up as described above, overall for complaints of abdominal distention and constipation  -she is not on any offending medications . She is " compliant with her levothyroxine and TSH was normal 1 month ago. Will recheck today.  -she had a recent ER visit yesterday at [facility] which I reviewed the discharge summary of as above, including CBC, urinalysis, serum hCG, CMP, lipase.  Also reviewed CT abdomen pelvis and pelvic ultrasound.  -at this point she needs to be more aggressive and consistent with her bowel regimen. She seems to only be using treatments for a few days at a time and then switching to something else. For 3 days I would like her to take miralax twice daily. She should also start OTC Docusate and take this daily continuously. She should also start a fiber supplement daily. If not having a bowel movement after 3 days I would like her to do an enema and also magnesium citrate OTC for one day. At that point contact the office if still having constipation. At any point if she begins vomiting or having worsening symptoms she can report back to an ER.  -I will refer her to GI for further eval of BRBPR and she should follow up with her GYN for findings of endometriosis           Follow Up  No follow-ups on file.    Patient/family had no further questions at this time and verbalized understanding of the plan discussed today.

## 2024-06-18 LAB
T4 FREE SERPL-MCNC: 2.03 NG/DL (ref 0.93–1.7)
TSH SERPL DL<=0.005 MIU/L-ACNC: 0.03 UIU/ML (ref 0.27–4.2)

## 2024-06-27 ENCOUNTER — OFFICE VISIT (OUTPATIENT)
Dept: INTERNAL MEDICINE | Facility: CLINIC | Age: 39
End: 2024-06-27
Payer: COMMERCIAL

## 2024-06-27 VITALS
HEART RATE: 89 BPM | SYSTOLIC BLOOD PRESSURE: 100 MMHG | OXYGEN SATURATION: 99 % | WEIGHT: 152 LBS | BODY MASS INDEX: 25.33 KG/M2 | HEIGHT: 65 IN | DIASTOLIC BLOOD PRESSURE: 70 MMHG

## 2024-06-27 DIAGNOSIS — K59.00 CONSTIPATION, UNSPECIFIED CONSTIPATION TYPE: ICD-10-CM

## 2024-06-27 DIAGNOSIS — Z00.00 ANNUAL PHYSICAL EXAM: Primary | ICD-10-CM

## 2024-06-27 PROCEDURE — 99395 PREV VISIT EST AGE 18-39: CPT | Performed by: STUDENT IN AN ORGANIZED HEALTH CARE EDUCATION/TRAINING PROGRAM

## 2024-06-27 PROCEDURE — 99214 OFFICE O/P EST MOD 30 MIN: CPT | Performed by: STUDENT IN AN ORGANIZED HEALTH CARE EDUCATION/TRAINING PROGRAM

## 2024-06-27 RX ORDER — ALBUTEROL SULFATE AND BUDESONIDE 90; 80 UG/1; UG/1
2 AEROSOL, METERED RESPIRATORY (INHALATION) EVERY 4 HOURS PRN
Qty: 10.7 G | Refills: 5 | Status: SHIPPED | OUTPATIENT
Start: 2024-06-27

## 2024-06-27 RX ORDER — ALBUTEROL SULFATE 90 UG/1
2 POWDER, METERED RESPIRATORY (INHALATION) EVERY 4 HOURS PRN
Qty: 1 EACH | Refills: 5 | Status: SHIPPED | OUTPATIENT
Start: 2024-06-27 | End: 2024-06-27

## 2024-06-27 NOTE — PROGRESS NOTES
"  Adam Monahan D.O.  Internal Medicine  [facility]  [address], [address]  [place] [address]  [phone]    Chief Complaint  Annual checkup    HISTORY    Sundeep Espino is a 38 y.o. female who presents to the office today as a  an established patient for their annual preventative exam.     No hospitalization(s) within the last year.     Current exercise regimen: walking at her job     Status of chronic medical conditions:    Allergies: takes cetirizine and states she used to take Xyzal as well for hives      Asthma: taking symbicort 80-4.5 2 puffs twice daily . States she has not been using her Symbicort inhaler recently because of lack of symptoms. She states she is out of her rescue inhaler which she uses when exercising.      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 She doesn't really like greasy foods.    Lab Results   Component Value Date    CHLPL 160 12/29/2021    TRIG 58 12/29/2021    HDL 45 12/29/2021     (H) 12/29/2021     GERD: resolved currently       Menorrhagia , Iron deficiency anemia: currently taking tranzenamic acid for heavy menstural bleeding which helps. 2018 colonoscopy and EGD normal aside from hiatal hernia. Has received iron infusions with [facility].      Anxiety, Depression, ADHD: diagnosed years and years ago. States she worries about a lot of different things. Remembers being on a Straterra before. Not currently on treatment. States she evgeny with ADHD and depression is not active right now . Anxiety is \"normal , everyone has anxiety\". States she is not to the point of needing to stay in the house and doesn't feel treatment is needed at this moment.     Overweight: previously took phentermine    Palpitations, trace mitral " "regurgitation, Trace aortic regurgitation, Trace-to-mild tricuspid regurgitation: has seen [facility].     Patient's overall comments about their health or any other specific health concerns they report:     Still dealing with constipation for the last month. At last visit 2 weeks ago we tried an intensive bowel regimen. She states the magnesium citrate helped a little and caused her to go every 20 or 30 minutes and those bowel movements were soft and some liquid but now she is without bowel movement since Monday . She is still having discomfort around the belly button and right light lower side \"like something is pulling\".  She is passing gas. She is taking miralax and stool softener daily.     GYN History:     *Patient's GYN Practice: Dr Jiménez    *regular periods     *no method at present time, not trying trying to get pregnant nor sexually active    *Previous pregnancies: 1.  Live births: 0.  Miscarriages: 0 . Elective abortions: 1.      Health Maintenance Summary            Overdue - TDAP/TD VACCINES (2 - Tdap) Overdue since 8/5/2009 08/05/1999  Imm Admin: Td (TDVAX)              Overdue - LIPID PANEL (Yearly) Overdue since 12/29/2022 12/29/2021  Lipid Panel    10/22/2019  Lipid Panel    11/02/2018  Lipid Panel With LDL / HDL Ratio    05/02/2018  Lipid Panel With LDL / HDL Ratio    03/29/2018  Outside Procedure: CHG LIPID PANEL    Only the first 5 history entries have been loaded, but more history exists.              Overdue - COVID-19 Vaccine (4 - 2023-24 season) Overdue since 9/1/2023 01/30/2022  Imm Admin: Covid-19 (Pfizer) Gray Cap Monovalent    05/10/2021  Imm Admin: COVID-19 (PFIZER) PURPLE CAP    04/19/2021  Imm Admin: COVID-19 (PFIZER) PURPLE CAP              INFLUENZA VACCINE (Yearly - August to March) Next due on 8/1/2024      10/28/2022  Imm Admin: FluLaval/Fluzone >6mos    12/29/2021  Imm Admin: FluLaval/Fluzone >6mos    12/28/2021  SCANNED - INFLUENZA    12/08/2020  Imm Admin: " Flu Vaccine Intradermal Quad 18-64YR    10/22/2019  Done    Only the first 5 history entries have been loaded, but more history exists.              BMI FOLLOWUP (Yearly) Next due on 3/27/2025      03/27/2024  Registry Metric: BMI Follow-up    05/02/2018  SmartData: WORKFLOW - QUALITY MEASUREMENT - DOCUMENTED WEIGHT FOLLOW-UP PLAN              ANNUAL PHYSICAL (Yearly) Next due on 5/13/2025 05/13/2024  Registry Metric: Last Annual Physical    10/28/2022  Done    10/22/2019  Done              PAP SMEAR (Every 3 Years) Next due on 5/14/2027 05/14/2024  IGP, Apt HPV,rfx 16 / 18,45    12/06/2022  IGP,CtNgTv,Apt HPV,rfx 16 / 18,45    01/11/2021  SCANNED - PAP SMEAR    12/28/2015  HM Pap smear component of HM PAP SMEAR              HEPATITIS C SCREENING  Completed      12/28/2020  Hep C Virus Ab component of Hepatitis C Antibody              Pneumococcal Vaccine 0-64 (Series Information) Completed      10/28/2022  Imm Admin: Pneumococcal Conjugate 20-Valent (PCV20)    01/13/2020  Imm Admin: Pneumococcal Conjugate 13-Valent (PCV13)                     Allergies   Allergen Reactions    Aspartame And Phenylalanine Other (See Comments)     headaches    Banana Nausea And Vomiting    Beta Adrenergic Blockers Hives, Itching and Swelling    Metoprolol Other (See Comments)     Face swelling, itching      Propranolol Other (See Comments)     Face swelling, itching          No outpatient medications have been marked as taking for the 6/27/24 encounter (Appointment) with Adam Monahan DO.        Past Medical History:   Diagnosis Date    ADHD     Allergic rhinitis     Anxiety and depression     Arthritis     Asthma     Chondromalacia patellae     DDD (degenerative disc disease), cervical     GERD (gastroesophageal reflux disease)     H/O radioactive iodine thyroid ablation 03/08/2017    Heart palpitations     Hyperthyroidism     Iron deficiency anemia     Menstrual problem     Ovarian cyst     Paratubal cyst 05/10/2023     Thyroid disease     Upper respiratory infection 05/02/2018    UTI (urinary tract infection) 05/02/2018    Vitamin D deficiency      Past Surgical History:   Procedure Laterality Date    APPENDECTOMY  2012    BREAST LUMPECTOMY      benign    COLONOSCOPY  approx 2009    normal per patient    COLONOSCOPY W/ POLYPECTOMY N/A 08/01/2018    Normal    EAR TUBES      ENDOSCOPY N/A 08/01/2018    [facility]    KELOID EXCISION      right ear    MANDIBLE SURGERY  05/2016    ORIF ULNA/RADIUS FRACTURES      Distal radius ORIF with [facility] on 8/25/22     Family History   Problem Relation Age of Onset    Hypertension Mother     Diabetes Mother     Hypertension Father     Hyperlipidemia Father     Diabetes Father     Heart disease Father         pacemaker    Coronary artery disease Father     Diabetes Maternal Aunt     Hypertension Maternal Uncle     Diabetes Maternal Uncle     Alcohol abuse Other     Diabetes Other     Hypertension Other     Hypertension Half-Brother     No Known Problems Half-Sister     reports that she has never smoked. She has never used smokeless tobacco. She reports current alcohol use. She reports that she does not use drugs.    Immunization History   Administered Date(s) Administered    COVID-19 (PFIZER) Purple Cap Monovalent 04/19/2021, 05/10/2021    Covid-19 (Pfizer) Gray Cap Monovalent 01/30/2022    Flu Vaccine Intradermal Quad 18-64YR 12/08/2020    Fluzone (or Fluarix & Flulaval for VFC) >6mos 12/29/2021, 10/28/2022    Hep B, Adolescent or Pediatric 08/05/1999    Pneumococcal Conjugate 13-Valent (PCV13) 01/13/2020    Pneumococcal Conjugate 20-Valent (PCV20) 10/28/2022    Td (TDVAX) 08/05/1999    flucelvax quad pfs =>4 YRS 11/02/2018, 10/22/2019        OBJECTIVE    Vital Signs:   There were no vitals taken for this visit.    Physical Exam  Vitals reviewed.   Constitutional:       General: She is not in acute distress.     Appearance: Normal appearance. She is not ill-appearing.   HENT:      Head: Normocephalic and  atraumatic.      Right Ear: Tympanic membrane, ear canal and external ear normal. There is no impacted cerumen.      Left Ear: Tympanic membrane, ear canal and external ear normal. There is no impacted cerumen.      Mouth/Throat:      Mouth: Mucous membranes are moist.      Pharynx: No oropharyngeal exudate or posterior oropharyngeal erythema.   Eyes:      General: No scleral icterus.     Extraocular Movements: Extraocular movements intact.      Conjunctiva/sclera: Conjunctivae normal.      Pupils: Pupils are equal, round, and reactive to light.   Cardiovascular:      Rate and Rhythm: Normal rate and regular rhythm.      Heart sounds: Normal heart sounds. No murmur heard.  Pulmonary:      Effort: Pulmonary effort is normal. No respiratory distress.      Breath sounds: Normal breath sounds. No wheezing.   Abdominal:      General: Bowel sounds are normal. There is no distension.      Palpations: Abdomen is soft.      Tenderness: There is abdominal tenderness (mild LLQ). There is no guarding.   Musculoskeletal:      Cervical back: Neck supple.      Right lower leg: No edema.      Left lower leg: No edema.   Lymphadenopathy:      Cervical: No cervical adenopathy.   Skin:     General: Skin is warm and dry.      Coloration: Skin is not jaundiced.   Neurological:      General: No focal deficit present.      Mental Status: She is alert and oriented to person, place, and time.      Cranial Nerves: No cranial nerve deficit.      Motor: No weakness.   Psychiatric:         Mood and Affect: Mood normal.         Behavior: Behavior normal.         Thought Content: Thought content normal.                           ASSESSMENT & PLAN     #Annual Preventative Health Examination   -Age and sex appropriate physical exam performed and documented. Updated past medical, family, social and surgical histories as well as allergies and care team list. Addressed care gaps listed in the medical record.  -Encouraged annual dental and vision exams  as part of their overall health.  -Encouraged minimum of 30 minutes or more of exercise at a brisk walk or higher 5 days per week combined with a well-balanced diet.  -Advised that all women who are planning or capable of pregnancy take a daily supplement containing 0.4 to 0.8 mg (400 to 800 ?g) of folic acid.  -Immunizations reviewed and updated in EMR. Td and COVID19 recommended.  -Lipid screening:  Patient is less than age 40 and has had a screening lipid panel in the last 4 years that was abnormal.   -Aspirin for primary or secondary prevention: Not applicable, patient is less than age 50.  -Depression and Anxiety screening: Patient has known diagnosis of depression and / or anxiety.  -Diabetes screening:  Patient is 35-70 years of age and overweight, screening for diabetes is indicated every 3 years. Screening is up to date.    Lab Results   Component Value Date    HGBA1C 5.6 12/29/2021     -Tobacco use screening: Conducted and addressed if indicated.   -Alcohol use screening: Conducted and addressed if indicated.   -Illicit drug screening: Conducted and addressed if indicated.   -Hypertension screening: Patient screened negative for HTN today.  -HIV screening: pt declines  -Syphilis screening: Syphilis screening not indicated.  -Hepatitis B virus screening: Screening not indicated, not in a high-risk group.  -Hepatitis C virus screening:  Patient has already completed Hepatitis C screening. Negative screening on file.   -Colon cancer screening: Patient is less than age 45 and colon cancer screening is not indicated.  -Lung cancer screening: Patient is less than age 50, screening not indicated.  -Cervical cancer screening: Lasp pap : was normal 5/2024 with cotesting   -Breast cancer screening:  Breast cancer screening is not applicable at this time.  -BRCA related cancer screening:  Patient does not have a known personal or family history.   -Osteoporosis screening: Informed patient that the USPSTF recommends  screening for osteoporosis with bone measurement testing to prevent osteoporotic fractures in women 65 years and older. Screening is not applicable at this time.    Follow up in 1 year for annual physical exam.    Patient/family had no further questions at this time and verbalized understanding of the plan discussed today.     A problem-based visit was also conducted on the same day, see below for assessment and plan    Diagnoses and all orders for this visit:    1. Constipation, unspecified constipation type (Primary)  -continues to deal with constipation for 4 month, has had CT scan and lab eval as previously discussed in last note without cause identified. A referral to GI has been placed.  -she has tried an intensive bowel regimen over the last 2 weeks with only partial and intermittent improvement  -at this point I recommend we do a trial of Linzess as below. We discussed side effects including diarrhea, nausea, abdominal discomfort, gas, reflux. She was given a sample of Linzess and I asked her to call me in 4-8 days on how she is doing.  -     Ambulatory Referral to Gastroenterology  -     linaclotide (Linzess) 145 MCG capsule capsule; Take 1 capsule by mouth Every Morning Before Breakfast.  Dispense: 8 capsule; Refill: 0            The following social determinates of health impact the patient's medical decision making: No social determinates of health were factored in to today's visit.     Follow Up  Return in about 3 weeks (around 7/18/2024) for Recheck.

## 2024-06-29 RX ORDER — LEVOTHYROXINE SODIUM 112 MCG
112 TABLET ORAL DAILY
Qty: 90 TABLET | Refills: 1 | Status: SHIPPED | OUTPATIENT
Start: 2024-06-29

## 2024-07-08 ENCOUNTER — OFFICE VISIT (OUTPATIENT)
Dept: INTERNAL MEDICINE | Facility: CLINIC | Age: 39
End: 2024-07-08
Payer: COMMERCIAL

## 2024-07-08 VITALS
OXYGEN SATURATION: 100 % | SYSTOLIC BLOOD PRESSURE: 100 MMHG | BODY MASS INDEX: 25.33 KG/M2 | DIASTOLIC BLOOD PRESSURE: 60 MMHG | HEART RATE: 88 BPM | WEIGHT: 152 LBS | HEIGHT: 65 IN

## 2024-07-08 DIAGNOSIS — K59.00 CONSTIPATION, UNSPECIFIED CONSTIPATION TYPE: Primary | ICD-10-CM

## 2024-07-08 PROCEDURE — 99214 OFFICE O/P EST MOD 30 MIN: CPT | Performed by: STUDENT IN AN ORGANIZED HEALTH CARE EDUCATION/TRAINING PROGRAM

## 2024-07-08 NOTE — PROGRESS NOTES
"  Adam Monahan D.O.  Internal Medicine  [facility]  [address]  [place] [address]  [phone]      Chief Complaint  Constipation    SUBJECTIVE    History of Present Illness    Sundeep Espino is a 38 y.o. female who presents to the office today as an established patient that last saw me on 6/27/2024.     Constipation: at last visit started a trial of linzess 145 mcg daily. States she had softer stools at some points of the day but then had diarrhea later in the day. It kept her going to the bathroom \"but it worked, cleared my stomach out\". States her last dose of the sample of linzess was 2-3 days ago and she has not had a Bowel movement since. No nausea or vomiting since last visit.     Allergies   Allergen Reactions    Aspartame And Phenylalanine Other (See Comments)     headaches    Banana Nausea And Vomiting    Beta Adrenergic Blockers Hives, Itching and Swelling    Metoprolol Other (See Comments)     Face swelling, itching      Propranolol Other (See Comments)     Face swelling, itching          Outpatient Medications Marked as Taking for the 7/8/24 encounter (Office Visit) with Adam Monahan, DO   Medication Sig Dispense Refill    Albuterol-Budesonide (Airsupra) 90-80 MCG/ACT aerosol Inhale 2 Inhalations Every 4 (Four) Hours As Needed (ASTHMA SYMPTOMS). 10.7 g 5    cetirizine (zyrTEC) 10 MG tablet Take 1 tablet by mouth Daily.      linaclotide (Linzess) 145 MCG capsule capsule Take 1 capsule by mouth Every Morning Before Breakfast. 8 capsule 0    Multiple Vitamins-Minerals (MULTIVITAMIN ADULT PO) Take  by mouth.      ondansetron (Zofran) 4 MG tablet Take 1 tablet by mouth Every 8 (Eight) Hours As Needed for Nausea or Vomiting. 12 tablet 0    polyethylene glycol (MIRALAX) 17 g packet Take 17 g by mouth Daily.      Symbicort 80-4.5 MCG/ACT inhaler Inhale 2 puffs 2 (Two) Times a Day. 10.2 g 1    Synthroid 112 MCG tablet Take 1 tablet by mouth Daily. Take at least 30 " "minutes before having breakfast with a sip of water. For low thyroid. 90 tablet 1    Tranexamic Acid (Lysteda) 650 MG tablet Take 1 tablet by mouth 1 (One) Time As Needed (On first 3 days of menstrual cycle) for up to 150 doses. 30 tablet 4        Past Medical History:   Diagnosis Date    ADHD     Allergic rhinitis     Anxiety and depression     Arthritis     Asthma     Chondromalacia patellae     DDD (degenerative disc disease), cervical     GERD (gastroesophageal reflux disease)     H/O radioactive iodine thyroid ablation 03/08/2017    Heart palpitations     Hyperthyroidism     Iron deficiency anemia     Menstrual problem     Ovarian cyst     Paratubal cyst 05/10/2023    Thyroid disease     Upper respiratory infection 05/02/2018    UTI (urinary tract infection) 05/02/2018    Vitamin D deficiency        OBJECTIVE    Vital Signs:   /60   Pulse 88   Ht 165.1 cm (65\")   Wt 68.9 kg (152 lb)   SpO2 100%   BMI 25.29 kg/m²        Physical Exam  Vitals reviewed.   Constitutional:       General: She is not in acute distress.     Appearance: Normal appearance. She is not ill-appearing.   Eyes:      General: No scleral icterus.  Pulmonary:      Effort: Pulmonary effort is normal. No respiratory distress.   Abdominal:      General: Bowel sounds are normal. There is no distension.      Palpations: Abdomen is soft.      Tenderness: There is no abdominal tenderness. There is no guarding.   Skin:     Coloration: Skin is not jaundiced.   Neurological:      Mental Status: She is alert.   Psychiatric:         Mood and Affect: Mood normal.         Behavior: Behavior normal.         Thought Content: Thought content normal.                             ASSESSMENT & PLAN     Diagnoses and all orders for this visit:    1. Constipation, unspecified constipation type (Primary)  -Patient here for continued evaluation of constipation.  At last visit started Linzess 145 mcg daily.  She states she had improvement in bowel movement but " actually a little too much improvement and her stools will be soft and runny by the end of the day.  She has been without medicine now for 2 or 3 days and states her bowel movements have stopped again.  She does not evaluation with GI later this month.  At this point I will reduce her Linzess to 72 mcg daily in an attempt to balance benefit and side effects.  She should continue evaluation for this condition with gastroenterology.          Follow Up  Return in about 3 months (around 10/8/2024) for Annual physical.    Patient/family had no further questions at this time and verbalized understanding of the plan discussed today.

## 2024-07-24 ENCOUNTER — OFFICE VISIT (OUTPATIENT)
Dept: OBSTETRICS AND GYNECOLOGY | Age: 39
End: 2024-07-24
Payer: COMMERCIAL

## 2024-07-24 VITALS
DIASTOLIC BLOOD PRESSURE: 68 MMHG | SYSTOLIC BLOOD PRESSURE: 102 MMHG | BODY MASS INDEX: 26.49 KG/M2 | HEIGHT: 65 IN | TEMPERATURE: 98.5 F | WEIGHT: 159 LBS

## 2024-07-24 DIAGNOSIS — N92.0 MENORRHAGIA WITH REGULAR CYCLE: ICD-10-CM

## 2024-07-24 DIAGNOSIS — R14.0 BLOATING SYMPTOM: ICD-10-CM

## 2024-07-24 DIAGNOSIS — R10.2 PELVIC PAIN: Primary | ICD-10-CM

## 2024-07-24 DIAGNOSIS — N83.8 PARATUBAL CYST: ICD-10-CM

## 2024-07-24 PROCEDURE — 99213 OFFICE O/P EST LOW 20 MIN: CPT | Performed by: PHYSICIAN ASSISTANT

## 2024-07-24 RX ORDER — IBUPROFEN 600 MG/1
600 TABLET ORAL
COMMUNITY
Start: 2024-07-21 | End: 2024-07-24 | Stop reason: ALTCHOICE

## 2024-07-24 RX ORDER — METRONIDAZOLE 500 MG/1
500 TABLET ORAL
COMMUNITY
Start: 2024-07-21 | End: 2024-07-24

## 2024-07-24 RX ORDER — FLUCONAZOLE 150 MG/1
150 TABLET ORAL ONCE
Qty: 1 TABLET | Refills: 0 | Status: SHIPPED | OUTPATIENT
Start: 2024-07-24 | End: 2024-07-24

## 2024-07-24 RX ORDER — NORELGESTROMIN AND ETHINYL ESTRADIOL 35; 150 UG/MG; UG/MG
1 PATCH TRANSDERMAL WEEKLY
Qty: 3 PATCH | Refills: 12 | Status: SHIPPED | OUTPATIENT
Start: 2024-07-24 | End: 2024-07-24 | Stop reason: ALTCHOICE

## 2024-07-24 RX ORDER — DOXYCYCLINE 100 MG/1
100 TABLET ORAL
COMMUNITY
Start: 2024-07-21 | End: 2024-08-04

## 2024-07-24 RX ORDER — RELUGOLIX, ESTRADIOL HEMIHYDRATE, AND NORETHINDRONE ACETATE 40; 1; .5 MG/1; MG/1; MG/1
1 TABLET, FILM COATED ORAL DAILY
Qty: 30 TABLET | Refills: 3 | Status: SHIPPED | OUTPATIENT
Start: 2024-07-24

## 2024-07-24 RX ORDER — PANTOPRAZOLE SODIUM 20 MG/1
20 TABLET, DELAYED RELEASE ORAL DAILY
COMMUNITY
Start: 2024-07-21 | End: 2024-08-04

## 2024-07-24 NOTE — PROGRESS NOTES
"Subjective     Chief Complaint   Patient presents with    Follow-up     Follow up from ER, was seen on 07/21/2024 for pelvic pain (stomach pain)  Ultrasound done today.        Sundeep Espino is a 38 y.o. No obstetric history on file. whose LMP is Patient's last menstrual period was 07/16/2024 (approximate). presents with pelvic pain f/u    Sundeep was seen in the ER in June for constipation and then in July for severe pelvic pain    Had pain with constipation in June  Started miralax and mag citrate and that helped  She was seen in June for this   Had been 1 wk without a BM  Now she has been having regular BM's    Onset of pain on Saturday  Movement worsened the pain  \"Pressure\"  Pain was 10-10  Did take tylenol and ibuprofen but that didn't help  They treated her for PID  I have reviewed all her results and she was neg for ng/ct and trich  Urine culture neg also  White count was wnl    She has heavy menses   Takes lysteda which helps  Has always had significant pain with her periods  There has been discussion about endometriosis in the past    She is in a relationship  They have been together for 17 years  She is not very SA but also not using BC  Is still considering fertility     Mom had similar history    No Additional Complaints Reported    The following portions of the patient's history were reviewed and updated as appropriate:no additional history reviewed, vital signs, allergies, current medications, past medical history, past social history, past surgical history, and problem list      Review of Systems   Genitourinary:positive for abnormal menstrual periods and pelvic pain     Objective      /68   Temp 98.5 °F (36.9 °C)   Ht 165.1 cm (65\")   Wt 72.1 kg (159 lb)   LMP 07/16/2024 (Approximate)   BMI 26.46 kg/m²     Physical Exam    General:   alert, comfortable, and no distress   Heart: Not performed today   Lungs: Not performed today.   Breast: Not performed today   Neck: Not performed today "   Abdomen: Swelling and bloating.    CVA: Not performed today   Pelvis: Not performed today   Extremities: Not performed today   Neurologic: Not performed today   Psychiatric: Normal affect, judgement, and mood       Lab Review   Labs: see below  CBC AND DIFFERENTIAL (07/21/2024 02:37)   COMPREHENSIVE METABOLIC PANEL (07/21/2024 02:37)   LIPASE (07/21/2024 02:37)     Imaging   Ultrasound - Pelvic Vaginal  1.  Uterus: Normal size, with uterine volume of 40 ml, and with uterine dimensions 5 x 4 x 3 cm     2.  Endometrium: Normal non-menopausal thickness and 7.5 mm      3.  Myometrium: Normal homogenous texture      4.  Ovaries             Left: Normal/unremarkable , Normal small follicles, and 2.6 x 2.9 x 2.6             Right: Enlarged, with ovarian volume: 94 ml , and 4.6 x 3.1 and 4.1 x 3 cm    Assessment & Plan     ASSESSMENT  1. Pelvic pain    2. Bloating symptom    3. Menorrhagia with regular cycle    4. Paratubal cyst          PLAN  1. Her findings and her sxs are suspicious for endometriosis. We will start her on the patch for now but I would like her to f/u with Dr Jiménez to discuss next steps. Diclofenac has worked for her pain in the past, I will send this in for her. She is going to complete the doxy but d/c the flagyl (itch) and c/w the pantoprazole. I have recommended staying on top of her bowels as well since I suspect her constipation is r/t progressing endometriosis    2. Medications prescribed this encounter:        New Medications Ordered This Visit   Medications    norelgestromin-ethinyl estradiol (ORTHO EVRA) 150-35 MCG/24HR     Sig: Place 1 patch on the skin as directed by provider 1 (One) Time Per Week.     Dispense:  3 patch     Refill:  12    diclofenac (VOLTAREN) 50 MG EC tablet     Sig: Take 1 tablet by mouth 2 (Two) Times a Day As Needed (pain).     Dispense:  30 tablet     Refill:  3    fluconazole (Diflucan) 150 MG tablet     Sig: Take 1 tablet by mouth 1 (One) Time for 1 dose.      Dispense:  1 tablet     Refill:  0           Follow up: 4 week(s)    SALMA Aragon  7/24/2024

## 2024-07-24 NOTE — Clinical Note
HI. Please review u/s. She is having pretty significant discomfort so I have asked her to come back to see you. Maybe a candidate for a diagnostic lap? Please let me know what else you would like me to do

## 2024-08-15 ENCOUNTER — OFFICE VISIT (OUTPATIENT)
Dept: ONCOLOGY | Facility: CLINIC | Age: 39
End: 2024-08-15
Payer: COMMERCIAL

## 2024-08-15 ENCOUNTER — OFFICE VISIT (OUTPATIENT)
Dept: GASTROENTEROLOGY | Facility: CLINIC | Age: 39
End: 2024-08-15
Payer: COMMERCIAL

## 2024-08-15 ENCOUNTER — LAB (OUTPATIENT)
Dept: LAB | Facility: HOSPITAL | Age: 39
End: 2024-08-15
Payer: COMMERCIAL

## 2024-08-15 ENCOUNTER — PREP FOR SURGERY (OUTPATIENT)
Dept: SURGERY | Facility: SURGERY CENTER | Age: 39
End: 2024-08-15
Payer: COMMERCIAL

## 2024-08-15 VITALS
OXYGEN SATURATION: 100 % | BODY MASS INDEX: 25.51 KG/M2 | SYSTOLIC BLOOD PRESSURE: 108 MMHG | HEART RATE: 87 BPM | TEMPERATURE: 98.4 F | DIASTOLIC BLOOD PRESSURE: 71 MMHG | WEIGHT: 153.1 LBS | RESPIRATION RATE: 16 BRPM | HEIGHT: 65 IN

## 2024-08-15 VITALS
HEART RATE: 77 BPM | TEMPERATURE: 98 F | HEIGHT: 65 IN | SYSTOLIC BLOOD PRESSURE: 98 MMHG | WEIGHT: 153.9 LBS | BODY MASS INDEX: 25.64 KG/M2 | OXYGEN SATURATION: 98 % | DIASTOLIC BLOOD PRESSURE: 58 MMHG

## 2024-08-15 DIAGNOSIS — K59.00 CONSTIPATION, UNSPECIFIED CONSTIPATION TYPE: Primary | Chronic | ICD-10-CM

## 2024-08-15 DIAGNOSIS — K21.9 GASTROESOPHAGEAL REFLUX DISEASE, UNSPECIFIED WHETHER ESOPHAGITIS PRESENT: ICD-10-CM

## 2024-08-15 DIAGNOSIS — R10.30 LOWER ABDOMINAL PAIN: ICD-10-CM

## 2024-08-15 DIAGNOSIS — K59.00 CONSTIPATION, UNSPECIFIED CONSTIPATION TYPE: Primary | ICD-10-CM

## 2024-08-15 DIAGNOSIS — D50.9 IRON DEFICIENCY ANEMIA, UNSPECIFIED IRON DEFICIENCY ANEMIA TYPE: Primary | ICD-10-CM

## 2024-08-15 DIAGNOSIS — K44.9 HIATAL HERNIA: Chronic | ICD-10-CM

## 2024-08-15 DIAGNOSIS — R10.30 LOWER ABDOMINAL PAIN: Chronic | ICD-10-CM

## 2024-08-15 DIAGNOSIS — K21.9 GASTROESOPHAGEAL REFLUX DISEASE, UNSPECIFIED WHETHER ESOPHAGITIS PRESENT: Chronic | ICD-10-CM

## 2024-08-15 DIAGNOSIS — R11.2 NAUSEA AND VOMITING, UNSPECIFIED VOMITING TYPE: ICD-10-CM

## 2024-08-15 DIAGNOSIS — R11.2 NAUSEA AND VOMITING, UNSPECIFIED VOMITING TYPE: Chronic | ICD-10-CM

## 2024-08-15 DIAGNOSIS — D50.9 IRON DEFICIENCY ANEMIA, UNSPECIFIED IRON DEFICIENCY ANEMIA TYPE: ICD-10-CM

## 2024-08-15 LAB
BASOPHILS # BLD AUTO: 0.07 10*3/MM3 (ref 0–0.2)
BASOPHILS NFR BLD AUTO: 1.3 % (ref 0–1.5)
DEPRECATED RDW RBC AUTO: 46.1 FL (ref 37–54)
EOSINOPHIL # BLD AUTO: 0.11 10*3/MM3 (ref 0–0.4)
EOSINOPHIL NFR BLD AUTO: 2 % (ref 0.3–6.2)
ERYTHROCYTE [DISTWIDTH] IN BLOOD BY AUTOMATED COUNT: 14.7 % (ref 12.3–15.4)
FERRITIN SERPL-MCNC: 257 NG/ML (ref 13–150)
HCT VFR BLD AUTO: 38.3 % (ref 34–46.6)
HGB BLD-MCNC: 11.5 G/DL (ref 12–15.9)
IMM GRANULOCYTES # BLD AUTO: 0.01 10*3/MM3 (ref 0–0.05)
IMM GRANULOCYTES NFR BLD AUTO: 0.2 % (ref 0–0.5)
IRON 24H UR-MRATE: 47 MCG/DL (ref 37–145)
IRON SATN MFR SERPL: 16 % (ref 20–50)
LYMPHOCYTES # BLD AUTO: 2.35 10*3/MM3 (ref 0.7–3.1)
LYMPHOCYTES NFR BLD AUTO: 43.6 % (ref 19.6–45.3)
MCH RBC QN AUTO: 25.6 PG (ref 26.6–33)
MCHC RBC AUTO-ENTMCNC: 30 G/DL (ref 31.5–35.7)
MCV RBC AUTO: 85.3 FL (ref 79–97)
MONOCYTES # BLD AUTO: 0.35 10*3/MM3 (ref 0.1–0.9)
MONOCYTES NFR BLD AUTO: 6.5 % (ref 5–12)
NEUTROPHILS NFR BLD AUTO: 2.5 10*3/MM3 (ref 1.7–7)
NEUTROPHILS NFR BLD AUTO: 46.4 % (ref 42.7–76)
NRBC BLD AUTO-RTO: 0 /100 WBC (ref 0–0.2)
PLATELET # BLD AUTO: 234 10*3/MM3 (ref 140–450)
PMV BLD AUTO: 9.2 FL (ref 6–12)
RBC # BLD AUTO: 4.49 10*6/MM3 (ref 3.77–5.28)
TIBC SERPL-MCNC: 286 MCG/DL (ref 298–536)
TRANSFERRIN SERPL-MCNC: 192 MG/DL (ref 200–360)
WBC NRBC COR # BLD AUTO: 5.39 10*3/MM3 (ref 3.4–10.8)

## 2024-08-15 PROCEDURE — 84466 ASSAY OF TRANSFERRIN: CPT

## 2024-08-15 PROCEDURE — 36415 COLL VENOUS BLD VENIPUNCTURE: CPT

## 2024-08-15 PROCEDURE — 82728 ASSAY OF FERRITIN: CPT

## 2024-08-15 PROCEDURE — 99213 OFFICE O/P EST LOW 20 MIN: CPT

## 2024-08-15 PROCEDURE — 85025 COMPLETE CBC W/AUTO DIFF WBC: CPT

## 2024-08-15 PROCEDURE — 83540 ASSAY OF IRON: CPT

## 2024-08-15 RX ORDER — SODIUM CHLORIDE, SODIUM LACTATE, POTASSIUM CHLORIDE, CALCIUM CHLORIDE 600; 310; 30; 20 MG/100ML; MG/100ML; MG/100ML; MG/100ML
30 INJECTION, SOLUTION INTRAVENOUS CONTINUOUS PRN
OUTPATIENT
Start: 2024-08-15

## 2024-08-15 RX ORDER — SODIUM CHLORIDE 0.9 % (FLUSH) 0.9 %
10 SYRINGE (ML) INJECTION AS NEEDED
OUTPATIENT
Start: 2024-08-15

## 2024-08-15 RX ORDER — SODIUM CHLORIDE 0.9 % (FLUSH) 0.9 %
3 SYRINGE (ML) INJECTION EVERY 12 HOURS SCHEDULED
OUTPATIENT
Start: 2024-08-15

## 2024-08-15 NOTE — PROGRESS NOTES
Subjective     REASON FOR CONSULTATION: Iron deficiency anemia  Provide an opinion on any further workup or treatment                             REQUESTING PHYSICIAN: Hero    RECORDS OBTAINED:  Records of the patients history including those obtained from the referring provider were reviewed and summarized in detail.    HISTORY OF PRESENT ILLNESS:  The patient is a 38 y.o. year old female who is here for an opinion about the above issue.    History of Present Illness   She presents today for follow up on the above diagnosis. She continues to struggle with GI upset and is consistently bloating. She just came from her GI appt and anticipates undergoing an EGD and colonscopy. She also follows closely with OBGYN. She was experiencing cramping, however, over the last few days that has subsided. She reports fatigue. She denies s/s of bleeding.     Past Medical History:   Diagnosis Date    ADHD     Allergic rhinitis     Anxiety and depression     Arthritis     Asthma     Chondromalacia patellae     DDD (degenerative disc disease), cervical     GERD (gastroesophageal reflux disease)     H/O radioactive iodine thyroid ablation 03/08/2017    Heart palpitations     Hyperthyroidism     Iron deficiency anemia     Menstrual problem     Ovarian cyst     Paratubal cyst 05/10/2023    Thyroid disease     Upper respiratory infection 05/02/2018    UTI (urinary tract infection) 05/02/2018    Vitamin D deficiency         Past Surgical History:   Procedure Laterality Date    APPENDECTOMY  2012    BREAST LUMPECTOMY      benign    COLONOSCOPY  approx 2009    normal per patient    COLONOSCOPY W/ POLYPECTOMY N/A 08/01/2018    Normal    EAR TUBES      ENDOSCOPY N/A 08/01/2018    [facility]    KELOID EXCISION      right ear    MANDIBLE SURGERY  05/2016    ORIF ULNA/RADIUS FRACTURES      Distal radius ORIF with [facility] on 8/25/22        Current Outpatient Medications on File Prior to Visit   Medication Sig Dispense Refill    Albuterol-Budesonide  (Airsupra) 90-80 MCG/ACT aerosol Inhale 2 Inhalations Every 4 (Four) Hours As Needed (ASTHMA SYMPTOMS). 10.7 g 5    cetirizine (zyrTEC) 10 MG tablet Take 1 tablet by mouth Daily.      diclofenac (VOLTAREN) 50 MG EC tablet Take 1 tablet by mouth 2 (Two) Times a Day As Needed (pain). 30 tablet 3    linaclotide (Linzess) 72 MCG capsule capsule Take 1 capsule by mouth Every Morning Before Breakfast. 8 capsule 0    Multiple Vitamins-Minerals (MULTIVITAMIN ADULT PO) Take  by mouth.      polyethylene glycol (MIRALAX) 17 g packet Take 17 g by mouth Daily. (Patient not taking: Reported on 7/24/2024)      Relugolix-Estradiol-Norethind (Myfembree) 40-1-0.5 MG tablet Take 1 tablet by mouth Daily. 30 tablet 3    Symbicort 80-4.5 MCG/ACT inhaler Inhale 2 puffs 2 (Two) Times a Day. 10.2 g 1    Synthroid 112 MCG tablet Take 1 tablet by mouth Daily. Take at least 30 minutes before having breakfast with a sip of water. For low thyroid. 90 tablet 1    Tranexamic Acid (Lysteda) 650 MG tablet Take 1 tablet by mouth 1 (One) Time As Needed (On first 3 days of menstrual cycle) for up to 150 doses. 30 tablet 4     No current facility-administered medications on file prior to visit.        ALLERGIES:    Allergies   Allergen Reactions    Aspartame And Phenylalanine Other (See Comments)     headaches    Banana Nausea And Vomiting    Beta Adrenergic Blockers Hives, Itching and Swelling    Flagyl [Metronidazole] Itching and Rash    Metoprolol Other (See Comments)     Face swelling, itching      Propranolol Other (See Comments)     Face swelling, itching          Social History     Socioeconomic History    Marital status: Single    Years of education: College   Tobacco Use    Smoking status: Never     Passive exposure: Never    Smokeless tobacco: Never   Vaping Use    Vaping status: Never Used   Substance and Sexual Activity    Alcohol use: Yes     Alcohol/week: 1.0 standard drink of alcohol     Types: 1 Glasses of wine per week    Drug use:  "Never    Sexual activity: Not Currently     Partners: Male     Birth control/protection: Condom, Abstinence        Family History   Problem Relation Age of Onset    Hypertension Mother     Diabetes Mother     Hypertension Father     Hyperlipidemia Father     Diabetes Father     Heart disease Father         pacemaker    Coronary artery disease Father     Diabetes Maternal Aunt     Hypertension Maternal Uncle     Diabetes Maternal Uncle     Hypertension Half-Brother     Hyperlipidemia Half-Brother     Hypertension Half-Sister     Alcohol abuse Other     Diabetes Other     Hypertension Other     Coronary artery disease Maternal Uncle     Diabetes Maternal Uncle     Diabetes Maternal Aunt           Objective     Vitals:    08/15/24 1108   BP: 108/71   Pulse: 87   Resp: 16   Temp: 98.4 °F (36.9 °C)   TempSrc: Oral   SpO2: 100%   Weight: 69.4 kg (153 lb 1.6 oz)   Height: 165.1 cm (65\")   PainSc: 0-No pain             1/4/2024     1:19 PM   Current Status   ECOG score 0       Physical Exam    CONSTITUTIONAL: Pleasant well-developed adult woman  HEENT: no icterus, no thrush, moist membranes  LYMPH: no cervical or supraclavicular lad  CV: RRR, S1S2, no murmur  RESP: cta bilat, no wheezing, no rales  GI: soft, non-tender, no splenomegaly, +bs.+distension.   MUSC: no edema, normal gait   NEURO: alert and oriented x3, normal strength  PSYCH: normal mood and affect      RECENT LABS:  Hematology WBC   Date Value Ref Range Status   07/21/2024 10.96 4.5 - 11.0 10*3/uL Final     RBC   Date Value Ref Range Status   07/21/2024 4.56 4.0 - 5.2 10*6/uL Final     Hemoglobin   Date Value Ref Range Status   07/21/2024 11.8 (L) 12.0 - 16.0 g/dL Final     Hematocrit   Date Value Ref Range Status   07/21/2024 37.7 36.0 - 46.0 % Final     Platelets   Date Value Ref Range Status   07/21/2024 278 140 - 440 10*3/uL Final        Lab Results   Component Value Date    GLUCOSE 89 08/21/2022    BUN 9 08/21/2022    CREATININE 0.76 08/21/2022    " EGFRIFNONA 90 12/29/2021    EGFRIFAFRI 104 12/29/2021    BCR 11.8 08/21/2022    K 3.8 08/21/2022    CO2 27.0 08/21/2022    CALCIUM 8.4 (L) 08/21/2022    PROTENTOTREF 6.7 06/24/2022    ALBUMIN 3.50 08/21/2022    LABIL2 1.2 06/24/2022    AST 18 08/21/2022    ALT 12 08/21/2022          Assessment & Plan   *Recurrent iron deficiency anemia secondary to menorrhagia requiring intermittent iron replacement IV (intolerant to oral iron secondary to GI upset  Hemoglobin 12.4 today  She has had an EGD and colonoscopy in the past  IV iron (Venofer) reaction-Will need premedicated for future venofer with benadryl 25mg, tylenol 650mg, and solucortef 300mg   8/15/2024: Hemoglobin 11.5. Ferritin 257, iron saturation 16% improved from 12%.     *Menorrhagia  She is tranexamic acid from her gynecologist which improves menorrhagia  8/15/2024: Continues to follow closely with OBGYN.     *Hypothyroidism status post treatment of Graves' disease  Currently on Synthroid replacement per endocrinology    PLAN:  Return to clinic in 4 months for MD visit, cbc, iron profile, and ferritin.  Instructed her to reach out sooner should she develop new symptoms or concerns   In future if she requires IV venofer, will need premedication benadryl 25mg, tylenol 650 mg, solucortef 650 mg

## 2024-08-15 NOTE — PROGRESS NOTES
Chief Complaint   Patient presents with    Constipation         History of Present Illness  38-year-old female presents the office today for evaluation of constipation.  She is currently taking Linzess 72 mcg once daily, but has ran out of samples. Linzess works, as the middle dose was too strong. She is having a Bm daily within about 3 hours of taking the medication. Stool starts off normal and then tapers to loose stool. She is not having Bms daily. If she skips a day of Linzess she will not have a BM.  Previous treatment regimens have included MiraLax ,which was ineffective. She has also tried Magnesium Citrate. She reports occasional rectal bleeding that is bright red in color from straining.     She reports that she was having some nausea and vomiting prior to starting treatment for her constipation. Last episode was a couple of weeks ago. She was later prescribed pain medication and an antibiotic regimen (see CT scan findings below). She was having abdominal pain that was so uncomfortable that she could hardly walk. That pain has improved. She would like to have an EGD and colonoscopy to ensure that her GI tract does not show any abnormalities due to her most recent symptoms.     She reports a history of endometriosis and states that she has a follow up scheduled with GYN in 4 weeks. It if felt that the endometriosis might be infiltrating her bowel. CT of the abdomen and pelvis revealed PID and possible tubo-ovarian abscesses. She followed up with GYN. She was placed on a course of antibiotics. She reports that her GYN provider did not feel that she had PID. No diagnostic laparoscopy has been scheduled.     She underwent EGD and colonoscopy on 8/1/2018.  EGD revealed a 2 cm hiatal hernia.  Colonoscopy was unremarkable.  Very low suspicions for Crohn's disease.    She has a history of acid reflux, but this is well controlled at this time. She has to avoid chocolate and tomatoes. She denies any nausea, vomiting,  "or dysphagia.      Result Review :       UPPER GI ENDOSCOPY (08/01/2018 13:49)   COLONOSCOPY (08/01/2018 13:49)   Tissue Pathology Exam (08/01/2018 14:15)   US Non-ob Transvaginal (07/24/2024 08:49)   CT ABDOMEN PELVIS W CONTRAST (07/21/2024 04:28)     Vital Signs:   BP 98/58   Pulse 77   Temp 98 °F (36.7 °C)   Ht 165.1 cm (65\")   Wt 69.8 kg (153 lb 14.4 oz)   SpO2 98%   BMI 25.61 kg/m²     Body mass index is 25.61 kg/m².     Physical Exam  Vitals reviewed.   Constitutional:       Appearance: Normal appearance.   HENT:      Head: Normocephalic.      Nose: Nose normal.      Mouth/Throat:      Mouth: Mucous membranes are moist.   Eyes:      General: No scleral icterus.     Extraocular Movements: Extraocular movements intact.   Cardiovascular:      Rate and Rhythm: Normal rate and regular rhythm.      Pulses: Normal pulses.      Heart sounds: Normal heart sounds.   Pulmonary:      Effort: Pulmonary effort is normal. No respiratory distress.      Breath sounds: Normal breath sounds.   Abdominal:      General: Abdomen is flat. Bowel sounds are normal. There is no distension.      Palpations: Abdomen is soft. There is no mass.      Tenderness: There is no abdominal tenderness. There is no guarding.   Musculoskeletal:         General: Normal range of motion.      Cervical back: Normal range of motion and neck supple.   Skin:     General: Skin is warm and dry.   Neurological:      General: No focal deficit present.      Mental Status: She is alert and oriented to person, place, and time.   Psychiatric:         Mood and Affect: Mood normal.         Behavior: Behavior normal.         Thought Content: Thought content normal.         Judgment: Judgment normal.       Assessment and Plan    Diagnoses and all orders for this visit:    1. Constipation, unspecified constipation type (Primary)  -     linaclotide (Linzess) 72 MCG capsule capsule; Take 1 capsule by mouth Every Morning Before Breakfast.  Dispense: 90 capsule; " Refill: 3    2. Nausea and vomiting, unspecified vomiting type  Comments:  resolved    3. Gastroesophageal reflux disease, unspecified whether esophagitis present    4. Hiatal hernia  Comments:  2 cm    5. Lower abdominal pain  Comments:  resolved             Patient Instructions   Continue Linzess 290 mcg once daily for constipation. Refills haven been sent to your pharmacy.     2.  For further evaluation of previous symptoms of nausea, vomiting, GERD, and abdominal pain we will schedule an EGD and colonoscopy.     3.  Plan for office follow up 4 weeks after procedures to discuss results and reassess symptoms.       Discussion:    Patient to continue Linzess 290 mcg once daily for constipation.  Refills have been sent to her pharmacy.    Due to her previous reports of abdominal pain, nausea, vomiting, and her personal history of GERD and hiatal hernia she would like to proceed with EGD and colonoscopy for further evaluation.  We will schedule these procedures and plan for office follow-up 4 weeks afterwards to discuss results and reassess symptoms.  Patient has concerns that her endometriosis has potentially infiltrated her bowel.  She has followed up with GYN and has an upcoming follow-up again in 4 weeks.  She states it was felt that she did not have PID, but did have resolution of symptoms following antibiotic course of therapy.  Patient verbalized understanding of above plan of care and is in agreement.  All questions answered and support provided.    EMR Dragon/Transcription Disclaimer:  This document has been Dictated utilizing Dragon dictation.

## 2024-08-15 NOTE — PATIENT INSTRUCTIONS
Continue Linzess 290 mcg once daily for constipation. Refills haven been sent to your pharmacy.     2.  For further evaluation of previous symptoms of nausea, vomiting, GERD, and abdominal pain we will schedule an EGD and colonoscopy.     3.  Plan for office follow up 4 weeks after procedures to discuss results and reassess symptoms.

## 2024-08-15 NOTE — PROGRESS NOTES
Subjective     REASON FOR FOLLOW UP: Iron deficiency anemia    REQUESTING PHYSICIAN: Hero    RECORDS OBTAINED:  Records of the patients history including those obtained from the referring provider were reviewed and summarized in detail.    HISTORY OF PRESENT ILLNESS:  The patient is a 38 y.o. year old female who is here for an opinion about the above issue.    History of Present Illness    The patient is a 38 y.o. female who presents today for follow up on the above diagnosis. She continues to struggle with GI upset and is consistently bloating. She just came from her GI appt and anticipates undergoing an EGD and colonscopy. She also follows closely with OBGYN. She was experiencing cramping, however, over the last few days that has subsided. She reports fatigue. She denies s/s of bleeding.     Past Medical History:   Diagnosis Date    ADHD     Allergic rhinitis     Anxiety and depression     Arthritis     Asthma     Chondromalacia patellae     DDD (degenerative disc disease), cervical     GERD (gastroesophageal reflux disease)     H/O radioactive iodine thyroid ablation 03/08/2017    Heart palpitations     Hyperthyroidism     Iron deficiency anemia     Menstrual problem     Ovarian cyst     Paratubal cyst 05/10/2023    Thyroid disease     Upper respiratory infection 05/02/2018    UTI (urinary tract infection) 05/02/2018    Vitamin D deficiency         Past Surgical History:   Procedure Laterality Date    APPENDECTOMY  2012    BREAST LUMPECTOMY      benign    COLONOSCOPY  approx 2009    normal per patient    COLONOSCOPY W/ POLYPECTOMY N/A 08/01/2018    Normal    EAR TUBES      ENDOSCOPY N/A 08/01/2018    [facility]    KELOID EXCISION      right ear    MANDIBLE SURGERY  05/2016    ORIF ULNA/RADIUS FRACTURES      Distal radius ORIF with [facility] on 8/25/22    UPPER GASTROINTESTINAL ENDOSCOPY          Current Outpatient Medications on File Prior to Visit   Medication Sig Dispense Refill    Albuterol-Budesonide (Airsupra) 90-80  MCG/ACT aerosol Inhale 2 Inhalations Every 4 (Four) Hours As Needed (ASTHMA SYMPTOMS). 10.7 g 5    cetirizine (zyrTEC) 10 MG tablet Take 1 tablet by mouth Daily.      diclofenac (VOLTAREN) 50 MG EC tablet Take 1 tablet by mouth 2 (Two) Times a Day As Needed (pain). 30 tablet 3    Multiple Vitamins-Minerals (MULTIVITAMIN ADULT PO) Take  by mouth.      Relugolix-Estradiol-Norethind (Myfembree) 40-1-0.5 MG tablet Take 1 tablet by mouth Daily. 30 tablet 3    Symbicort 80-4.5 MCG/ACT inhaler Inhale 2 puffs 2 (Two) Times a Day. 10.2 g 1    Synthroid 112 MCG tablet Take 1 tablet by mouth Daily. Take at least 30 minutes before having breakfast with a sip of water. For low thyroid. 90 tablet 1    Tranexamic Acid (Lysteda) 650 MG tablet Take 1 tablet by mouth 1 (One) Time As Needed (On first 3 days of menstrual cycle) for up to 150 doses. 30 tablet 4    [DISCONTINUED] linaclotide (Linzess) 72 MCG capsule capsule Take 1 capsule by mouth Every Morning Before Breakfast. 8 capsule 0    [DISCONTINUED] polyethylene glycol (MIRALAX) 17 g packet Take 17 g by mouth Daily.       No current facility-administered medications on file prior to visit.        ALLERGIES:    Allergies   Allergen Reactions    Aspartame And Phenylalanine Other (See Comments)     headaches    Banana Nausea And Vomiting    Beta Adrenergic Blockers Hives, Itching and Swelling    Flagyl [Metronidazole] Itching and Rash    Metoprolol Other (See Comments)     Face swelling, itching      Propranolol Other (See Comments)     Face swelling, itching          Social History     Socioeconomic History    Marital status: Significant Other    Years of education: College   Tobacco Use    Smoking status: Never     Passive exposure: Never    Smokeless tobacco: Never   Vaping Use    Vaping status: Never Used   Substance and Sexual Activity    Alcohol use: Yes     Alcohol/week: 1.0 standard drink of alcohol     Types: 1 Glasses of wine per week    Drug use: Never    Sexual  "activity: Not Currently     Partners: Male     Birth control/protection: Condom, Abstinence        Family History   Problem Relation Age of Onset    Hypertension Mother     Diabetes Mother     Hypertension Father     Hyperlipidemia Father     Diabetes Father     Heart disease Father         pacemaker    Coronary artery disease Father     Diabetes Maternal Aunt     Irritable bowel syndrome Maternal Aunt     Diabetes Maternal Aunt     Hypertension Maternal Uncle     Diabetes Maternal Uncle     Coronary artery disease Maternal Uncle     Diabetes Maternal Uncle     Hypertension Half-Brother     Hyperlipidemia Half-Brother     Hypertension Half-Sister     Alcohol abuse Other     Diabetes Other     Hypertension Other     Colon cancer Neg Hx     Colon polyps Neg Hx     Crohn's disease Neg Hx     Ulcerative colitis Neg Hx         Review of Systems   ROS as per HPI    Objective     Vitals:    08/15/24 1108   BP: 108/71   Pulse: 87   Resp: 16   Temp: 98.4 °F (36.9 °C)   TempSrc: Oral   SpO2: 100%   Weight: 69.4 kg (153 lb 1.6 oz)   Height: 165.1 cm (65\")   PainSc: 0-No pain           8/15/2024    11:09 AM   Current Status   ECOG score 0       Physical Exam    CONSTITUTIONAL: pleasant well-developed adult woman  HEENT: no icterus, no thrush, moist membranes  LYMPH: no cervical or supraclavicular lad  CV: RRR, S1S2, no murmur  RESP: cta bilat, no wheezing, no rales  MUSC: no edema, normal gait   NEURO: alert and oriented x3, normal strength  PSYCH: normal mood and affect      RECENT LABS:  Results from last 7 days   Lab Units 08/15/24  1056   WBC 10*3/mm3 5.39   NEUTROS ABS 10*3/mm3 2.50   HEMOGLOBIN g/dL 11.5*   HEMATOCRIT % 38.3   PLATELETS 10*3/mm3 234     Results from last 7 days   Lab Units 08/15/24  1056   FERRITIN ng/mL 257.00*   IRON mcg/dL 47   TIBC mcg/dL 286*             Assessment & Plan   *Recurrent iron deficiency anemia secondary to menorrhagia requiring intermittent iron replacement IV (intolerant to oral iron " secondary to GI upset  Hemoglobin 12.4 today  She has had an EGD and colonoscopy in the past  IV iron (Venofer) reaction-Will need premedicated for future venofer with benadryl 25mg, tylenol 650mg, and solucortef 300mg   8/15/2024: Hemoglobin 11.5. Ferritin 257, iron saturation 16% improved from 12%.     *Menorrhagia  She is tranexamic acid from her gynecologist which improves menorrhagia  8/15/2024: Continues to follow closely with OBGYN.     *Hypothyroidism status post treatment of Graves' disease  Currently on Synthroid replacement per endocrinology      PLAN:  Return to clinic in 4 months for MD visit, cbc, iron profile, and ferritin.  Instructed her to reach out sooner should she develop new symptoms or concerns   In future if she requires IV venofer, will need premedication benadryl 25mg, tylenol 650 mg, solucortef 650 mg

## 2024-08-18 ENCOUNTER — PATIENT MESSAGE (OUTPATIENT)
Dept: GASTROENTEROLOGY | Facility: CLINIC | Age: 39
End: 2024-08-18
Payer: COMMERCIAL

## 2024-08-18 DIAGNOSIS — K59.00 CONSTIPATION, UNSPECIFIED CONSTIPATION TYPE: Chronic | ICD-10-CM

## 2024-08-20 ENCOUNTER — TELEPHONE (OUTPATIENT)
Dept: GASTROENTEROLOGY | Facility: CLINIC | Age: 39
End: 2024-08-20
Payer: COMMERCIAL

## 2024-08-27 ENCOUNTER — PREP FOR SURGERY (OUTPATIENT)
Dept: OTHER | Facility: HOSPITAL | Age: 39
End: 2024-08-27
Payer: COMMERCIAL

## 2024-08-27 ENCOUNTER — OFFICE VISIT (OUTPATIENT)
Dept: OBSTETRICS AND GYNECOLOGY | Age: 39
End: 2024-08-27
Payer: COMMERCIAL

## 2024-08-27 VITALS
BODY MASS INDEX: 25.99 KG/M2 | SYSTOLIC BLOOD PRESSURE: 106 MMHG | HEIGHT: 65 IN | WEIGHT: 156 LBS | DIASTOLIC BLOOD PRESSURE: 64 MMHG

## 2024-08-27 DIAGNOSIS — N83.8 PARATUBAL CYST: Primary | ICD-10-CM

## 2024-08-27 DIAGNOSIS — R10.2 PELVIC PAIN: ICD-10-CM

## 2024-08-27 DIAGNOSIS — R10.30 LOWER ABDOMINAL PAIN: ICD-10-CM

## 2024-08-27 PROCEDURE — 99213 OFFICE O/P EST LOW 20 MIN: CPT | Performed by: OBSTETRICS & GYNECOLOGY

## 2024-08-27 RX ORDER — SODIUM CHLORIDE 9 MG/ML
40 INJECTION, SOLUTION INTRAVENOUS AS NEEDED
OUTPATIENT
Start: 2024-08-27

## 2024-08-27 RX ORDER — SODIUM CHLORIDE 0.9 % (FLUSH) 0.9 %
10 SYRINGE (ML) INJECTION EVERY 12 HOURS SCHEDULED
OUTPATIENT
Start: 2024-08-27

## 2024-08-27 RX ORDER — SODIUM CHLORIDE 0.9 % (FLUSH) 0.9 %
10 SYRINGE (ML) INJECTION AS NEEDED
OUTPATIENT
Start: 2024-08-27

## 2024-08-27 NOTE — PROGRESS NOTES
Subjective       History of Present Illness  Sundeep Espino is a 38 y.o. female is being seen today for recent history of recurrent right sided pelvic pain she also had episode of abdominal bloating associated with some constipation.  CT scan and ultrasound consistent with previously diagnosis right paratubal cyst.  Patient was empirically treated with antibiotics and is finished those for possible PID.  Although, STD screen was negative.  She was prescribed some Myfembree but did not tolerate that well.  She would like to have a diagnostic laparoscopy with removal of this probable paratubal cyst as his pelvic pain has been longstanding.  Chief Complaint   Patient presents with    Gynecologic Exam     Gyn follow up: Pelvic pain,paratubal cyst,U/S performed 7/24/24   .        The following portions of the patient's history were reviewed and updated as appropriate: allergies, current medications, past family history, past medical history, past social history, past surgical history and problem list.    PAST MEDICAL HISTORY  Past Medical History:   Diagnosis Date    ADHD     Allergic rhinitis     Anxiety and depression     Arthritis     Asthma     Chondromalacia patellae     DDD (degenerative disc disease), cervical     GERD (gastroesophageal reflux disease)     H/O radioactive iodine thyroid ablation 03/08/2017    Heart palpitations     Hyperthyroidism     Iron deficiency anemia     Menstrual problem     Ovarian cyst     Paratubal cyst 05/10/2023    Thyroid disease     Upper respiratory infection 05/02/2018    UTI (urinary tract infection) 05/02/2018    Vitamin D deficiency      OB History   No obstetric history on file.     Past Surgical History:   Procedure Laterality Date    APPENDECTOMY  2012    BREAST LUMPECTOMY      benign    COLONOSCOPY  approx 2009    normal per patient    COLONOSCOPY W/ POLYPECTOMY N/A 08/01/2018    Normal    EAR TUBES      ENDOSCOPY N/A 08/01/2018    [facility]    KELOID EXCISION      right ear     MANDIBLE SURGERY  05/2016    ORIF ULNA/RADIUS FRACTURES      Distal radius ORIF with [facility] on 8/25/22    UPPER GASTROINTESTINAL ENDOSCOPY       Family History   Problem Relation Age of Onset    Hypertension Mother     Diabetes Mother     Hypertension Father     Hyperlipidemia Father     Diabetes Father     Heart disease Father         pacemaker    Coronary artery disease Father     Diabetes Maternal Aunt     Irritable bowel syndrome Maternal Aunt     Diabetes Maternal Aunt     Hypertension Maternal Uncle     Diabetes Maternal Uncle     Coronary artery disease Maternal Uncle     Diabetes Maternal Uncle     Hypertension Half-Brother     Hyperlipidemia Half-Brother     Hypertension Half-Sister     Alcohol abuse Other     Diabetes Other     Hypertension Other     Colon cancer Neg Hx     Colon polyps Neg Hx     Crohn's disease Neg Hx     Ulcerative colitis Neg Hx      Social History     Tobacco Use   Smoking Status Never    Passive exposure: Never   Smokeless Tobacco Never       Current Outpatient Medications:     Albuterol-Budesonide (Airsupra) 90-80 MCG/ACT aerosol, Inhale 2 Inhalations Every 4 (Four) Hours As Needed (ASTHMA SYMPTOMS)., Disp: 10.7 g, Rfl: 5    cetirizine (zyrTEC) 10 MG tablet, Take 1 tablet by mouth Daily., Disp: , Rfl:     linaclotide (Linzess) 72 MCG capsule capsule, Take 1 capsule by mouth Every Morning Before Breakfast., Disp: 90 capsule, Rfl: 3    Multiple Vitamins-Minerals (MULTIVITAMIN ADULT PO), Take  by mouth., Disp: , Rfl:     Symbicort 80-4.5 MCG/ACT inhaler, Inhale 2 puffs 2 (Two) Times a Day., Disp: 10.2 g, Rfl: 1    Synthroid 112 MCG tablet, Take 1 tablet by mouth Daily. Take at least 30 minutes before having breakfast with a sip of water. For low thyroid., Disp: 90 tablet, Rfl: 1    Tranexamic Acid (Lysteda) 650 MG tablet, Take 1 tablet by mouth 1 (One) Time As Needed (On first 3 days of menstrual cycle) for up to 150 doses., Disp: 30 tablet, Rfl: 4    Relugolix-Estradiol-Norethind  (Myfembree) 40-1-0.5 MG tablet, Take 1 tablet by mouth Daily. (Patient not taking: Reported on 8/27/2024), Disp: 30 tablet, Rfl: 3  Immunization History   Administered Date(s) Administered    COVID-19 (PFIZER) Purple Cap Monovalent 04/19/2021, 05/10/2021    Covid-19 (Pfizer) Gray Cap Monovalent 01/30/2022    Flu Vaccine Intradermal Quad 18-64YR 12/08/2020    Fluzone (or Fluarix & Flulaval for VFC) >6mos 12/29/2021, 10/28/2022    Hep B, Adolescent or Pediatric 08/05/1999    Pneumococcal Conjugate 13-Valent (PCV13) 01/13/2020    Pneumococcal Conjugate 20-Valent (PCV20) 10/28/2022    Td (TDVAX) 08/05/1999    flucelvax quad pfs =>4 YRS 11/02/2018, 10/22/2019       Review of Systems       Except as outlined in history of physical illness, patient denies any changes in her GYN, , GI systems. All other systems reviewed are negative.    Objective   Physical Exam   Alert and oriented, respirations unlabored, heart regular rate and rhythm   Pelvic see ultrasound report      Assessment & Plan   Diagnoses and all orders for this visit:    1. Paratubal cyst (Primary)    2. Pelvic pain      History as outlined above, we will proceed with laparoscopic a, removal of paratubal cyst and about duration for other causes of her symptoms including but not limited to endometriosis           No orders of the defined types were placed in this encounter.          EMR Dragon/ Transcription disclaimer:  Much of the encounter note is an electronic transcription/translation of spoken language to printed text. The electronic translation of spoken language may permit erroneous, or at times, nonessential words or phrases to be inadvertently transcribes; Although i have reviewed the note for such errors, some may still exist.

## 2024-09-09 ENCOUNTER — TELEPHONE (OUTPATIENT)
Dept: OBSTETRICS AND GYNECOLOGY | Age: 39
End: 2024-09-09
Payer: COMMERCIAL

## 2024-09-09 NOTE — TELEPHONE ENCOUNTER
Pt canceled pelvic us and visit on 9/16 because that is the day she is scheduled for surgery.Pt asking if she needs to have us before surgery

## 2024-09-18 NOTE — PAT
PAT call complete. Education provided to the patient on the following:    - Nothing to eat after midnight the night before your procedure, water and black coffee okay up to 2 hours before arrival time.  - If diabetic and procedure is after noon: No food 8 hours prior to arrival time, and only then only clear liquids 2 hours before arrival time.   - You will need to have someone drive you home after your procedure and remain with you for 24 hours after. The  will need to remain on site during your visit.  - Please remove all jewelry, including body piercing's, and leave any valuables at home. Only bring your drivers license and insurance card on day of procedure.  - Please arrive with a full bladder to provide a pregnancy test.   - Do not wear contact lenses; wear glasses and bring your case.  - Do not use any tobacco products on morning of procedure.  - Wash with antibacterial soap (such as Dial) the night before and morning of procedure.  - Be prepared to provide your last dose of all home medications.  - Coffee and vending available on the 1st and 5th floors; no cafeteria on site.  - You will need to arrive at 0600 on 09/25/24 at [facility] located at [address]. We are located on the 5th floor.  -Please be aware that arrival times may be subject to change up until the day of surgery.   - Feel free to contact us at: [phone] with any additional questions/concerns.  Patient has not meds to hold other than vitamin,  patient verbalized understanding of pre op directions.  LUANNE Archibald RN   Patient discharged from hospital.left hospital alert and responsive via transport to personal vehicle . After visit summary discuss with patient . Patient verbalizes understanding

## 2024-09-25 ENCOUNTER — ANESTHESIA (OUTPATIENT)
Age: 39
End: 2024-09-25
Payer: COMMERCIAL

## 2024-09-25 ENCOUNTER — ANESTHESIA EVENT (OUTPATIENT)
Age: 39
End: 2024-09-25
Payer: COMMERCIAL

## 2024-09-25 ENCOUNTER — HOSPITAL ENCOUNTER (OUTPATIENT)
Age: 39
Setting detail: HOSPITAL OUTPATIENT SURGERY
Discharge: HOME | End: 2024-09-25
Attending: OBSTETRICS & GYNECOLOGY | Admitting: OBSTETRICS & GYNECOLOGY
Payer: COMMERCIAL

## 2024-09-25 VITALS
TEMPERATURE: 97.8 F | OXYGEN SATURATION: 100 % | SYSTOLIC BLOOD PRESSURE: 120 MMHG | WEIGHT: 146.2 LBS | RESPIRATION RATE: 14 BRPM | DIASTOLIC BLOOD PRESSURE: 80 MMHG | BODY MASS INDEX: 24.36 KG/M2 | HEIGHT: 65 IN | HEART RATE: 78 BPM

## 2024-09-25 DIAGNOSIS — N80.9 ENDOMETRIOSIS DETERMINED BY LAPAROSCOPY: Primary | ICD-10-CM

## 2024-09-25 DIAGNOSIS — R10.30 LOWER ABDOMINAL PAIN: ICD-10-CM

## 2024-09-25 DIAGNOSIS — N70.11 HYDROSALPINX: ICD-10-CM

## 2024-09-25 DIAGNOSIS — N83.8 PARATUBAL CYST: ICD-10-CM

## 2024-09-25 DIAGNOSIS — R10.2 PELVIC PAIN: ICD-10-CM

## 2024-09-25 LAB
B-HCG UR QL: NEGATIVE
EXPIRATION DATE: NORMAL
INTERNAL NEGATIVE CONTROL: NEGATIVE
INTERNAL POSITIVE CONTROL: POSITIVE
Lab: NORMAL

## 2024-09-25 PROCEDURE — 25010000002 CEFTRIAXONE PER 250 MG: Performed by: OBSTETRICS & GYNECOLOGY

## 2024-09-25 PROCEDURE — 25010000002 SUGAMMADEX 200 MG/2ML SOLUTION: Performed by: ANESTHESIOLOGY

## 2024-09-25 PROCEDURE — 25010000002 DEXAMETHASONE PER 1 MG: Performed by: ANESTHESIOLOGY

## 2024-09-25 PROCEDURE — 58660 LAPAROSCOPY LYSIS: CPT | Performed by: OBSTETRICS & GYNECOLOGY

## 2024-09-25 PROCEDURE — 25810000003 LACTATED RINGERS PER 1000 ML: Performed by: ANESTHESIOLOGY

## 2024-09-25 PROCEDURE — 25010000002 KETOROLAC TROMETHAMINE PER 15 MG: Performed by: ANESTHESIOLOGY

## 2024-09-25 PROCEDURE — 49320 DIAG LAPARO SEPARATE PROC: CPT | Performed by: OBSTETRICS & GYNECOLOGY

## 2024-09-25 PROCEDURE — 25010000002 CEFTRIAXONE PER 250 MG: Performed by: ANESTHESIOLOGY

## 2024-09-25 PROCEDURE — 81025 URINE PREGNANCY TEST: CPT | Performed by: ANESTHESIOLOGY

## 2024-09-25 PROCEDURE — 25010000002 ONDANSETRON PER 1 MG: Performed by: ANESTHESIOLOGY

## 2024-09-25 PROCEDURE — 25010000002 PROPOFOL 10 MG/ML EMULSION: Performed by: ANESTHESIOLOGY

## 2024-09-25 PROCEDURE — 25010000002 HYDROMORPHONE PER 4 MG: Performed by: ANESTHESIOLOGY

## 2024-09-25 PROCEDURE — 25010000002 FENTANYL CITRATE (PF) 50 MCG/ML SOLUTION: Performed by: ANESTHESIOLOGY

## 2024-09-25 PROCEDURE — S0260 H&P FOR SURGERY: HCPCS | Performed by: OBSTETRICS & GYNECOLOGY

## 2024-09-25 RX ORDER — PROPOFOL 10 MG/ML
VIAL (ML) INTRAVENOUS CONTINUOUS PRN
Status: DISCONTINUED | OUTPATIENT
Start: 2024-09-25 | End: 2024-09-25 | Stop reason: SURG

## 2024-09-25 RX ORDER — ROCURONIUM BROMIDE 10 MG/ML
INJECTION, SOLUTION INTRAVENOUS AS NEEDED
Status: DISCONTINUED | OUTPATIENT
Start: 2024-09-25 | End: 2024-09-25 | Stop reason: SURG

## 2024-09-25 RX ORDER — DIPHENHYDRAMINE HYDROCHLORIDE 50 MG/ML
12.5 INJECTION INTRAMUSCULAR; INTRAVENOUS
Status: DISCONTINUED | OUTPATIENT
Start: 2024-09-25 | End: 2024-09-25 | Stop reason: HOSPADM

## 2024-09-25 RX ORDER — SODIUM CHLORIDE 0.9 % (FLUSH) 0.9 %
3 SYRINGE (ML) INJECTION EVERY 12 HOURS SCHEDULED
Status: DISCONTINUED | OUTPATIENT
Start: 2024-09-25 | End: 2024-09-25 | Stop reason: HOSPADM

## 2024-09-25 RX ORDER — DOXYCYCLINE 100 MG/1
100 CAPSULE ORAL 2 TIMES DAILY
Qty: 14 CAPSULE | Refills: 0 | Status: ON HOLD | OUTPATIENT
Start: 2024-09-25

## 2024-09-25 RX ORDER — KETOROLAC TROMETHAMINE 30 MG/ML
INJECTION, SOLUTION INTRAMUSCULAR; INTRAVENOUS AS NEEDED
Status: DISCONTINUED | OUTPATIENT
Start: 2024-09-25 | End: 2024-09-25 | Stop reason: SURG

## 2024-09-25 RX ORDER — SODIUM CHLORIDE 0.9 % (FLUSH) 0.9 %
3-10 SYRINGE (ML) INJECTION AS NEEDED
Status: DISCONTINUED | OUTPATIENT
Start: 2024-09-25 | End: 2024-09-25 | Stop reason: HOSPADM

## 2024-09-25 RX ORDER — LIDOCAINE HYDROCHLORIDE 20 MG/ML
INJECTION, SOLUTION INFILTRATION; PERINEURAL AS NEEDED
Status: DISCONTINUED | OUTPATIENT
Start: 2024-09-25 | End: 2024-09-25 | Stop reason: SURG

## 2024-09-25 RX ORDER — BUPIVACAINE HYDROCHLORIDE AND EPINEPHRINE 2.5; 5 MG/ML; UG/ML
INJECTION, SOLUTION EPIDURAL; INFILTRATION; INTRACAUDAL; PERINEURAL AS NEEDED
Status: DISCONTINUED | OUTPATIENT
Start: 2024-09-25 | End: 2024-09-25 | Stop reason: HOSPADM

## 2024-09-25 RX ORDER — DEXAMETHASONE SODIUM PHOSPHATE 4 MG/ML
INJECTION, SOLUTION INTRA-ARTICULAR; INTRALESIONAL; INTRAMUSCULAR; INTRAVENOUS; SOFT TISSUE AS NEEDED
Status: DISCONTINUED | OUTPATIENT
Start: 2024-09-25 | End: 2024-09-25 | Stop reason: SURG

## 2024-09-25 RX ORDER — PROMETHAZINE HYDROCHLORIDE 25 MG/1
25 SUPPOSITORY RECTAL ONCE AS NEEDED
Status: DISCONTINUED | OUTPATIENT
Start: 2024-09-25 | End: 2024-09-25 | Stop reason: HOSPADM

## 2024-09-25 RX ORDER — ONDANSETRON 2 MG/ML
4 INJECTION INTRAMUSCULAR; INTRAVENOUS ONCE AS NEEDED
Status: DISCONTINUED | OUTPATIENT
Start: 2024-09-25 | End: 2024-09-25 | Stop reason: HOSPADM

## 2024-09-25 RX ORDER — DROPERIDOL 2.5 MG/ML
0.62 INJECTION, SOLUTION INTRAMUSCULAR; INTRAVENOUS
Status: DISCONTINUED | OUTPATIENT
Start: 2024-09-25 | End: 2024-09-25 | Stop reason: HOSPADM

## 2024-09-25 RX ORDER — NALOXONE HCL 0.4 MG/ML
0.2 VIAL (ML) INJECTION AS NEEDED
Status: DISCONTINUED | OUTPATIENT
Start: 2024-09-25 | End: 2024-09-25 | Stop reason: HOSPADM

## 2024-09-25 RX ORDER — MIDAZOLAM HYDROCHLORIDE 1 MG/ML
1 INJECTION INTRAMUSCULAR; INTRAVENOUS
Status: DISCONTINUED | OUTPATIENT
Start: 2024-09-25 | End: 2024-09-25 | Stop reason: HOSPADM

## 2024-09-25 RX ORDER — SODIUM CHLORIDE 0.9 % (FLUSH) 0.9 %
10 SYRINGE (ML) INJECTION AS NEEDED
Status: DISCONTINUED | OUTPATIENT
Start: 2024-09-25 | End: 2024-09-25 | Stop reason: HOSPADM

## 2024-09-25 RX ORDER — HYDRALAZINE HYDROCHLORIDE 20 MG/ML
5 INJECTION INTRAMUSCULAR; INTRAVENOUS
Status: DISCONTINUED | OUTPATIENT
Start: 2024-09-25 | End: 2024-09-25 | Stop reason: HOSPADM

## 2024-09-25 RX ORDER — FLUMAZENIL 0.1 MG/ML
0.2 INJECTION INTRAVENOUS AS NEEDED
Status: DISCONTINUED | OUTPATIENT
Start: 2024-09-25 | End: 2024-09-25 | Stop reason: HOSPADM

## 2024-09-25 RX ORDER — FENTANYL CITRATE 50 UG/ML
50 INJECTION, SOLUTION INTRAMUSCULAR; INTRAVENOUS
Status: DISCONTINUED | OUTPATIENT
Start: 2024-09-25 | End: 2024-09-25 | Stop reason: HOSPADM

## 2024-09-25 RX ORDER — SODIUM CHLORIDE 0.9 % (FLUSH) 0.9 %
10 SYRINGE (ML) INJECTION EVERY 12 HOURS SCHEDULED
Status: DISCONTINUED | OUTPATIENT
Start: 2024-09-25 | End: 2024-09-25 | Stop reason: HOSPADM

## 2024-09-25 RX ORDER — FAMOTIDINE 10 MG/ML
20 INJECTION, SOLUTION INTRAVENOUS ONCE
Status: COMPLETED | OUTPATIENT
Start: 2024-09-25 | End: 2024-09-25

## 2024-09-25 RX ORDER — ONDANSETRON 2 MG/ML
INJECTION INTRAMUSCULAR; INTRAVENOUS AS NEEDED
Status: DISCONTINUED | OUTPATIENT
Start: 2024-09-25 | End: 2024-09-25 | Stop reason: SURG

## 2024-09-25 RX ORDER — HYDROCODONE BITARTRATE AND ACETAMINOPHEN 5; 325 MG/1; MG/1
1 TABLET ORAL EVERY 6 HOURS PRN
Qty: 8 TABLET | Refills: 0 | Status: ON HOLD | OUTPATIENT
Start: 2024-09-25

## 2024-09-25 RX ORDER — FENTANYL CITRATE 50 UG/ML
INJECTION, SOLUTION INTRAMUSCULAR; INTRAVENOUS AS NEEDED
Status: DISCONTINUED | OUTPATIENT
Start: 2024-09-25 | End: 2024-09-25 | Stop reason: SURG

## 2024-09-25 RX ORDER — HYDROMORPHONE HYDROCHLORIDE 1 MG/ML
0.5 INJECTION, SOLUTION INTRAMUSCULAR; INTRAVENOUS; SUBCUTANEOUS
Status: DISCONTINUED | OUTPATIENT
Start: 2024-09-25 | End: 2024-09-25 | Stop reason: HOSPADM

## 2024-09-25 RX ORDER — SODIUM CHLORIDE, SODIUM LACTATE, POTASSIUM CHLORIDE, CALCIUM CHLORIDE 600; 310; 30; 20 MG/100ML; MG/100ML; MG/100ML; MG/100ML
9 INJECTION, SOLUTION INTRAVENOUS CONTINUOUS
Status: DISCONTINUED | OUTPATIENT
Start: 2024-09-25 | End: 2024-09-25 | Stop reason: HOSPADM

## 2024-09-25 RX ORDER — FENTANYL CITRATE 50 UG/ML
50 INJECTION, SOLUTION INTRAMUSCULAR; INTRAVENOUS ONCE AS NEEDED
Status: DISCONTINUED | OUTPATIENT
Start: 2024-09-25 | End: 2024-09-25 | Stop reason: HOSPADM

## 2024-09-25 RX ORDER — HYDROCODONE BITARTRATE AND ACETAMINOPHEN 5; 325 MG/1; MG/1
1 TABLET ORAL ONCE AS NEEDED
Status: COMPLETED | OUTPATIENT
Start: 2024-09-25 | End: 2024-09-25

## 2024-09-25 RX ORDER — OXYCODONE AND ACETAMINOPHEN 7.5; 325 MG/1; MG/1
1 TABLET ORAL EVERY 4 HOURS PRN
Status: DISCONTINUED | OUTPATIENT
Start: 2024-09-25 | End: 2024-09-25 | Stop reason: HOSPADM

## 2024-09-25 RX ORDER — ACETAMINOPHEN 500 MG
1000 TABLET ORAL ONCE
Status: COMPLETED | OUTPATIENT
Start: 2024-09-25 | End: 2024-09-25

## 2024-09-25 RX ORDER — SODIUM CHLORIDE 9 MG/ML
40 INJECTION, SOLUTION INTRAVENOUS AS NEEDED
Status: DISCONTINUED | OUTPATIENT
Start: 2024-09-25 | End: 2024-09-25 | Stop reason: HOSPADM

## 2024-09-25 RX ORDER — PROMETHAZINE HYDROCHLORIDE 12.5 MG/1
25 TABLET ORAL ONCE AS NEEDED
Status: DISCONTINUED | OUTPATIENT
Start: 2024-09-25 | End: 2024-09-25 | Stop reason: HOSPADM

## 2024-09-25 RX ADMIN — LIDOCAINE HYDROCHLORIDE 80 MG: 20 INJECTION, SOLUTION INFILTRATION; PERINEURAL at 09:10

## 2024-09-25 RX ADMIN — DEXAMETHASONE SODIUM PHOSPHATE 8 MG: 4 INJECTION, SOLUTION INTRA-ARTICULAR; INTRALESIONAL; INTRAMUSCULAR; INTRAVENOUS; SOFT TISSUE at 09:28

## 2024-09-25 RX ADMIN — PROPOFOL 50 MCG/KG/MIN: 10 INJECTION, EMULSION INTRAVENOUS at 09:16

## 2024-09-25 RX ADMIN — HYDROMORPHONE HYDROCHLORIDE 0.5 MG: 1 INJECTION, SOLUTION INTRAMUSCULAR; INTRAVENOUS; SUBCUTANEOUS at 11:06

## 2024-09-25 RX ADMIN — CEFTRIAXONE 2000 MG: 2 INJECTION, POWDER, FOR SOLUTION INTRAMUSCULAR; INTRAVENOUS at 08:59

## 2024-09-25 RX ADMIN — FAMOTIDINE 20 MG: 10 INJECTION, SOLUTION INTRAVENOUS at 07:20

## 2024-09-25 RX ADMIN — HYDROCODONE BITARTRATE AND ACETAMINOPHEN 1 TABLET: 5; 325 TABLET ORAL at 10:56

## 2024-09-25 RX ADMIN — ONDANSETRON 4 MG: 2 INJECTION INTRAMUSCULAR; INTRAVENOUS at 09:51

## 2024-09-25 RX ADMIN — KETOROLAC TROMETHAMINE 30 MG: 30 INJECTION, SOLUTION INTRAMUSCULAR at 09:51

## 2024-09-25 RX ADMIN — SUGAMMADEX 200 MG: 100 INJECTION, SOLUTION INTRAVENOUS at 09:51

## 2024-09-25 RX ADMIN — ROCURONIUM BROMIDE 50 MG: 10 INJECTION, SOLUTION INTRAVENOUS at 09:11

## 2024-09-25 RX ADMIN — PROPOFOL 200 MG: 10 INJECTION, EMULSION INTRAVENOUS at 09:11

## 2024-09-25 RX ADMIN — SODIUM CHLORIDE, POTASSIUM CHLORIDE, SODIUM LACTATE AND CALCIUM CHLORIDE 9 ML/HR: 600; 310; 30; 20 INJECTION, SOLUTION INTRAVENOUS at 07:20

## 2024-09-25 RX ADMIN — ACETAMINOPHEN 1000 MG: 500 TABLET ORAL at 07:20

## 2024-09-25 RX ADMIN — CEFTRIAXONE 2000 MG: 2 INJECTION, POWDER, FOR SOLUTION INTRAMUSCULAR; INTRAVENOUS at 09:21

## 2024-09-25 RX ADMIN — FENTANYL CITRATE 50 MCG: 50 INJECTION, SOLUTION INTRAMUSCULAR; INTRAVENOUS at 09:32

## 2024-09-25 NOTE — OP NOTE
.[email or web address] LAPAROSCOPY  Procedure Report    Patient Name:  Sundeep Espino  YOB: 1985    Date of Surgery:  9/25/2024     Indications: Pelvic pain, dysmenorrhea, suspected hydrosalpinx or paratubal cyst    Pre-op Diagnosis:   Paratubal cyst [N83.8]  Pelvic pain [R10.2]  Lower abdominal pain [R10.30]       Post-Op Diagnosis Codes:     * Paratubal cyst [N83.8]     * Pelvic pain [R10.2]     * Lower abdominal pain [R10.30]  Endometriosis  Pelvic adhesions      Procedure/CPT® Codes:      Procedure(s):  DIAGNOSTIC LAPAROSCOPY    Adhesiolysis    Exam under anesthesia          Staff:  Surgeon(s):  Cristy Ayala MD Link, James M, MD         Anesthesia: General    Estimated Blood Loss: minimal    Implants:    Nothing was implanted during the procedure    Specimen:          None        Findings: EUA revealed a slightly enlarged right adnexal area.  Vagina and cervix appeared normal with out any discharge.    Complications: None    Description of Procedure: After reviewing procedure with patient and family she was taken to the OR and timeout was performed correctly identifying patient and planned procedure.  She was placed on the operating table and anesthetic was established.  She was then placed in the dorsal lithotomy position bladder was drained after she had been prepped and draped, EUA revealed a nulliparous cervix without any discharge or abnormalities.  Exam revealed fullness on the left.  Right felt normal single-tooth tenaculum was placed on the uterine cervix after sounding the uterus to 7 cm  Gloves were changed bladder had been drained and a 5 mm incision was made above the umbilicus through this a Veress needle was introduced, 2.4 L of CO2 gas were insufflated.  Through the same incision after removing the Veress needle, 5 mm trocar with Optiview was utilized and a safe entry to the abdominal peritoneal cavity was obtained.  Patient was placed in Trendelenburg.  2 similar 5 mm incisions  were made in the lower pelvis on either side.  This was done under visualization 5 mm trocars were inserted.    There were multiple omental and pelvic adhesions most of these were filmy and avascular.  They were taken down both bluntly and with harmonic.  The right tube was dilated consistent with hydrosalpinx.  The right ovary was buried in pelvic adhesions and some of these were not thin and filmy but thicker and more dense.  Cul-de-sac was obliterated with adhesions between the descending colon and the back of the uterus.  Left tube was also enlarged consistent with a hydrosalpinx.  The ovary on that side could not be seen.  Hydrodissection and irrigation was utilized.  His many of the adhesions that we could safely identify and ligate were cleaned up as outlined above.  Upper abdomen was visualized.  There were a couple adhesions between the anterior surface of the liver and the anterior abdominal wall.  These also were thin and avascular.  There was not a lot of mobility of the uterus with manipulation of the Hulka tenaculum.  After irrigating and suctioning and out any extra fluid CO2 gas was allowed to escape.  The trocars were removed under visualization.  Incisions were closed with 3-0 Vicryl.  Hulka tenaculum was removed.  Patient was awakened and taken recovery room in stable condition                      Javon Jiménez MD     Date: 9/25/2024  Time: 10:03 EDT

## 2024-09-25 NOTE — H&P
[facility]  Sundeep Espino  : 1985  MRN: [id number]  CSN: [id number]    History and Physical  No chief complaint on file.    Subjective   Sundeep Espino is a 39 y.o. year old No obstetric history on file. who present for surgery due to persistent right ovarian pain and suspected presence of a right paratubal cyst, possible bilateral hydrosalpinx.  Patient was seen in the office STD cultures were negative patient has a significant past medical history considering her age.  It is outlined below.  She has had intermittently heavy periods in the past.  She is also previously had an appendectomy.  Risk benefits potential complications of the surgery reviewed    Past Medical History:   Diagnosis Date    ADHD     Allergic rhinitis     Anxiety and depression     Arthritis     Asthma     Chondromalacia patellae     DDD (degenerative disc disease), cervical     GERD (gastroesophageal reflux disease)     H/O radioactive iodine thyroid ablation 2017    Heart palpitations     Hyperthyroidism     Iron deficiency anemia     Menstrual problem     Ovarian cyst     Paratubal cyst 05/10/2023    Thyroid disease     Upper respiratory infection 2018    UTI (urinary tract infection) 2018    Vitamin D deficiency      Past Surgical History:   Procedure Laterality Date    APPENDECTOMY  2012    BREAST LUMPECTOMY      benign    COLONOSCOPY  approx     normal per patient    COLONOSCOPY W/ POLYPECTOMY N/A 2018    Normal    EAR TUBES      ENDOSCOPY N/A 2018    [facility]    KELOID EXCISION      right ear    MANDIBLE SURGERY  2016    ORIF ULNA/RADIUS FRACTURES      Distal radius ORIF with [facility] on 22    UPPER GASTROINTESTINAL ENDOSCOPY       Social History    Tobacco Use      Smoking status: Never        Passive exposure: Never      Smokeless tobacco: Never      Current Facility-Administered Medications:     acetaminophen (TYLENOL) tablet 1,000 mg, 1,000 mg, Oral, Once, Estrada De La Paz MD     "Famotidine (PF) (PEPCID) injection 20 mg, 20 mg, Intravenous, Once, Estrada De La Paz MD    fentaNYL citrate (PF) (SUBLIMAZE) injection 50 mcg, 50 mcg, Intravenous, Once PRN, Estrada De La Paz MD    lactated ringers infusion, 9 mL/hr, Intravenous, Continuous, Estrada De La Paz MD    midazolam (VERSED) injection 1 mg, 1 mg, Intravenous, Q5 Min PRN, Estrada De La Paz MD    sodium chloride 0.9 % flush 10 mL, 10 mL, Intravenous, Q12H, Javon Jiménez MD    sodium chloride 0.9 % flush 10 mL, 10 mL, Intravenous, PRN, Javon Jiménez MD    sodium chloride 0.9 % flush 3 mL, 3 mL, Intravenous, Q12H, Estrada De La Paz MD    sodium chloride 0.9 % flush 3-10 mL, 3-10 mL, Intravenous, PRN, Estrada De La Paz MD    sodium chloride 0.9 % infusion 40 mL, 40 mL, Intravenous, PRN, Javon Jiménez MD    Allergies   Allergen Reactions    Aspartame And Phenylalanine Other (See Comments)     headaches    Banana Nausea And Vomiting    Beta Adrenergic Blockers Hives, Itching and Swelling    Flagyl [Metronidazole] Itching and Rash    Metoprolol Other (See Comments)     Face swelling, itching      Propranolol Other (See Comments)     Face swelling, itching         Review of Systems      Except as outlined in history of physical illness, patient denies any changes in her GYN, , GI systems. All other systems reviewed are negative.        Objective   Ht 165.1 cm (65\")   Wt 70.8 kg (156 lb)   LMP 08/26/2024 (Exact Date)   BMI 25.96 kg/m²   General: well developed; well nourished  no acute distress  mentation appropriate   Heart: regular rate and rhythm, S1, S2 normal, no murmur, click, rub or gallop   Lungs: breathing is unlabored  clear to auscultation bilaterally   Abdomen: soft, non-tender; no masses  no umbilical or inguinal hernias are present  no hepato-splenomegaly   Pelvis:: Clinical staff was present for exam  External genitalia female tight introitus, vagina clean dry intact cervix uterus adnexa nonenlarged difficult to palpate or " appreciate any adnexal enlargement on pelvic exam   Labs  Lab Results   Component Value Date     08/15/2024    HGB 11.5 (L) 08/15/2024    HCT 38.3 08/15/2024    WBC 5.39 08/15/2024     08/21/2022    K 3.8 08/21/2022     08/21/2022    CO2 27.0 08/21/2022    BUN 9 08/21/2022    CREATININE 0.76 08/21/2022    GLUCOSE 89 08/21/2022    ALBUMIN 3.50 08/21/2022    CALCIUM 8.4 (L) 08/21/2022    AST 18 08/21/2022    ALT 12 08/21/2022    BILITOT 0.7 08/21/2022        Assessment   Persistent right-sided pelvic pain  Suspected right paratubal cyst and possible hydrosalpinxs  History of intermittent menorrhagia and occasional dysmenorrhea     Plan   Exam under anesthesia  Diagnostic laparoscopy  Removal of paratubal cyst  Risk of surgery and anesthesia reviewed.  Including bleeding infection potential injury to other organs and possible need for corrective surgery.  Patient voices understanding and would like to proceed    Javon Jiménez MD  9/25/2024  07:04 EDT       EMR Dragon/ Transcription disclaimer:  Much of the encounter note is an electronic transcription/translation of spoken language to printed text. The electronic translation of spoken language may permit erroneous, or at times, nonessential words or phrases to be inadvertently transcribes; Although i have reviewed the note for such errors, some may still exist.

## 2024-09-28 ENCOUNTER — HOSPITAL ENCOUNTER (INPATIENT)
Facility: HOSPITAL | Age: 39
LOS: 3 days | Discharge: HOME | DRG: 759 | End: 2024-10-01
Attending: EMERGENCY MEDICINE | Admitting: STUDENT IN AN ORGANIZED HEALTH CARE EDUCATION/TRAINING PROGRAM
Payer: COMMERCIAL

## 2024-09-28 ENCOUNTER — APPOINTMENT (OUTPATIENT)
Dept: CT IMAGING | Facility: HOSPITAL | Age: 39
DRG: 759 | End: 2024-09-28
Payer: COMMERCIAL

## 2024-09-28 DIAGNOSIS — A41.9 SEPSIS WITHOUT ACUTE ORGAN DYSFUNCTION, DUE TO UNSPECIFIED ORGANISM: ICD-10-CM

## 2024-09-28 DIAGNOSIS — N70.11 HYDROSALPINX: ICD-10-CM

## 2024-09-28 DIAGNOSIS — R19.00 PELVIC MASS: Primary | ICD-10-CM

## 2024-09-28 DIAGNOSIS — R50.82 POSTOPERATIVE FEVER: ICD-10-CM

## 2024-09-28 PROBLEM — R10.30 LOWER ABDOMINAL PAIN: Status: RESOLVED | Noted: 2024-08-15 | Resolved: 2024-09-28

## 2024-09-28 PROBLEM — E05.00 GRAVES DISEASE: Status: RESOLVED | Noted: 2017-03-08 | Resolved: 2024-09-28

## 2024-09-28 PROBLEM — D50.9 IRON DEFICIENCY ANEMIA: Status: RESOLVED | Noted: 2022-12-09 | Resolved: 2024-09-28

## 2024-09-28 PROBLEM — K59.00 CONSTIPATION: Status: RESOLVED | Noted: 2024-08-15 | Resolved: 2024-09-28

## 2024-09-28 PROBLEM — M25.372 ANKLE INSTABILITY, LEFT: Status: RESOLVED | Noted: 2019-10-29 | Resolved: 2024-09-28

## 2024-09-28 PROBLEM — R14.0 BLOATING SYMPTOM: Status: RESOLVED | Noted: 2018-07-23 | Resolved: 2024-09-28

## 2024-09-28 PROBLEM — Z87.42 HISTORY OF OVARIAN CYST: Status: RESOLVED | Noted: 2022-12-06 | Resolved: 2024-09-28

## 2024-09-28 PROBLEM — E66.09 EXOGENOUS OBESITY: Status: RESOLVED | Noted: 2018-11-18 | Resolved: 2024-09-28

## 2024-09-28 PROBLEM — R11.2 NAUSEA AND VOMITING: Status: RESOLVED | Noted: 2024-08-15 | Resolved: 2024-09-28

## 2024-09-28 PROBLEM — E55.9 HYPOVITAMINOSIS D: Status: RESOLVED | Noted: 2018-11-02 | Resolved: 2024-09-28

## 2024-09-28 PROBLEM — R10.2 PELVIC PAIN: Status: RESOLVED | Noted: 2024-08-27 | Resolved: 2024-09-28

## 2024-09-28 PROBLEM — L50.9 URTICARIA: Status: RESOLVED | Noted: 2017-02-09 | Resolved: 2024-09-28

## 2024-09-28 PROBLEM — R63.5 ABNORMAL WEIGHT GAIN: Status: RESOLVED | Noted: 2017-03-08 | Resolved: 2024-09-28

## 2024-09-28 PROBLEM — R19.7 DIARRHEA: Status: RESOLVED | Noted: 2018-07-23 | Resolved: 2024-09-28

## 2024-09-28 PROBLEM — R22.0 FACIAL SWELLING: Status: RESOLVED | Noted: 2017-03-06 | Resolved: 2024-09-28

## 2024-09-28 PROBLEM — G47.01 INSOMNIA DUE TO MEDICAL CONDITION: Status: RESOLVED | Noted: 2017-02-09 | Resolved: 2024-09-28

## 2024-09-28 PROBLEM — Z79.899 CONTROLLED SUBSTANCE AGREEMENT SIGNED: Status: RESOLVED | Noted: 2018-11-02 | Resolved: 2024-09-28

## 2024-09-28 PROBLEM — Z82.49 FAMILY HISTORY OF ESSENTIAL HYPERTENSION: Status: RESOLVED | Noted: 2021-12-29 | Resolved: 2024-09-28

## 2024-09-28 PROBLEM — Z83.3 FAMILY HISTORY OF DIABETES MELLITUS IN MOTHER: Status: RESOLVED | Noted: 2021-12-29 | Resolved: 2024-09-28

## 2024-09-28 PROBLEM — E89.0 POSTABLATIVE HYPOTHYROIDISM: Status: RESOLVED | Noted: 2019-10-22 | Resolved: 2024-09-28

## 2024-09-28 LAB
ALBUMIN SERPL-MCNC: 3.8 G/DL (ref 3.5–5.2)
ALBUMIN/GLOB SERPL: 1.2 G/DL
ALP SERPL-CCNC: 105 U/L (ref 39–117)
ALT SERPL W P-5'-P-CCNC: 41 U/L (ref 1–33)
ANION GAP SERPL CALCULATED.3IONS-SCNC: 9.5 MMOL/L (ref 5–15)
AST SERPL-CCNC: 86 U/L (ref 1–32)
BACTERIA UR QL AUTO: ABNORMAL /HPF
BASOPHILS # BLD AUTO: 0.04 10*3/MM3 (ref 0–0.2)
BASOPHILS NFR BLD AUTO: 0.3 % (ref 0–1.5)
BILIRUB SERPL-MCNC: 0.5 MG/DL (ref 0–1.2)
BILIRUB UR QL STRIP: NEGATIVE
BUN SERPL-MCNC: 6 MG/DL (ref 6–20)
BUN/CREAT SERPL: 10.5 (ref 7–25)
CALCIUM SPEC-SCNC: 9.3 MG/DL (ref 8.6–10.5)
CHLORIDE SERPL-SCNC: 104 MMOL/L (ref 98–107)
CLARITY UR: ABNORMAL
CO2 SERPL-SCNC: 28.5 MMOL/L (ref 22–29)
COLOR UR: YELLOW
CREAT SERPL-MCNC: 0.57 MG/DL (ref 0.57–1)
D-LACTATE SERPL-SCNC: 1.3 MMOL/L (ref 0.5–2)
DEPRECATED RDW RBC AUTO: 40.2 FL (ref 37–54)
EGFRCR SERPLBLD CKD-EPI 2021: 119.5 ML/MIN/1.73
EOSINOPHIL # BLD AUTO: 0.07 10*3/MM3 (ref 0–0.4)
EOSINOPHIL NFR BLD AUTO: 0.5 % (ref 0.3–6.2)
ERYTHROCYTE [DISTWIDTH] IN BLOOD BY AUTOMATED COUNT: 13.3 % (ref 12.3–15.4)
GLOBULIN UR ELPH-MCNC: 3.1 GM/DL
GLUCOSE SERPL-MCNC: 138 MG/DL (ref 65–99)
GLUCOSE UR STRIP-MCNC: NEGATIVE MG/DL
HCG SERPL QL: NEGATIVE
HCT VFR BLD AUTO: 35.4 % (ref 34–46.6)
HGB BLD-MCNC: 11.1 G/DL (ref 12–15.9)
HGB UR QL STRIP.AUTO: ABNORMAL
HOLD SPECIMEN: NORMAL
HYALINE CASTS UR QL AUTO: ABNORMAL /LPF
IMM GRANULOCYTES # BLD AUTO: 0.06 10*3/MM3 (ref 0–0.05)
IMM GRANULOCYTES NFR BLD AUTO: 0.4 % (ref 0–0.5)
KETONES UR QL STRIP: ABNORMAL
LEUKOCYTE ESTERASE UR QL STRIP.AUTO: ABNORMAL
LIPASE SERPL-CCNC: 12 U/L (ref 13–60)
LYMPHOCYTES # BLD AUTO: 0.73 10*3/MM3 (ref 0.7–3.1)
LYMPHOCYTES NFR BLD AUTO: 5.4 % (ref 19.6–45.3)
MCH RBC QN AUTO: 26.2 PG (ref 26.6–33)
MCHC RBC AUTO-ENTMCNC: 31.4 G/DL (ref 31.5–35.7)
MCV RBC AUTO: 83.5 FL (ref 79–97)
MONOCYTES # BLD AUTO: 0.58 10*3/MM3 (ref 0.1–0.9)
MONOCYTES NFR BLD AUTO: 4.3 % (ref 5–12)
NEUTROPHILS NFR BLD AUTO: 11.95 10*3/MM3 (ref 1.7–7)
NEUTROPHILS NFR BLD AUTO: 89.1 % (ref 42.7–76)
NITRITE UR QL STRIP: NEGATIVE
NRBC BLD AUTO-RTO: 0 /100 WBC (ref 0–0.2)
PH UR STRIP.AUTO: 6.5 [PH] (ref 5–8)
PLATELET # BLD AUTO: 250 10*3/MM3 (ref 140–450)
PMV BLD AUTO: 9.7 FL (ref 6–12)
POTASSIUM SERPL-SCNC: 3.2 MMOL/L (ref 3.5–5.2)
PROT SERPL-MCNC: 6.9 G/DL (ref 6–8.5)
PROT UR QL STRIP: ABNORMAL
RBC # BLD AUTO: 4.24 10*6/MM3 (ref 3.77–5.28)
RBC # UR STRIP: ABNORMAL /HPF
REF LAB TEST METHOD: ABNORMAL
SODIUM SERPL-SCNC: 142 MMOL/L (ref 136–145)
SP GR UR STRIP: 1.03 (ref 1–1.03)
SQUAMOUS #/AREA URNS HPF: ABNORMAL /HPF
UROBILINOGEN UR QL STRIP: ABNORMAL
WBC # UR STRIP: ABNORMAL /HPF
WBC NRBC COR # BLD AUTO: 13.43 10*3/MM3 (ref 3.4–10.8)
WHOLE BLOOD HOLD COAG: NORMAL
WHOLE BLOOD HOLD SPECIMEN: NORMAL

## 2024-09-28 PROCEDURE — 25010000002 MORPHINE PER 10 MG: Performed by: EMERGENCY MEDICINE

## 2024-09-28 PROCEDURE — 25810000003 SODIUM CHLORIDE 0.9 % SOLUTION: Performed by: STUDENT IN AN ORGANIZED HEALTH CARE EDUCATION/TRAINING PROGRAM

## 2024-09-28 PROCEDURE — 83605 ASSAY OF LACTIC ACID: CPT | Performed by: EMERGENCY MEDICINE

## 2024-09-28 PROCEDURE — 25010000002 PIPERACILLIN SOD-TAZOBACTAM PER 1 G: Performed by: STUDENT IN AN ORGANIZED HEALTH CARE EDUCATION/TRAINING PROGRAM

## 2024-09-28 PROCEDURE — 25010000002 KETOROLAC TROMETHAMINE PER 15 MG: Performed by: STUDENT IN AN ORGANIZED HEALTH CARE EDUCATION/TRAINING PROGRAM

## 2024-09-28 PROCEDURE — 25010000002 ONDANSETRON PER 1 MG: Performed by: EMERGENCY MEDICINE

## 2024-09-28 PROCEDURE — 85025 COMPLETE CBC W/AUTO DIFF WBC: CPT | Performed by: EMERGENCY MEDICINE

## 2024-09-28 PROCEDURE — 25010000002 HYDROMORPHONE PER 4 MG: Performed by: EMERGENCY MEDICINE

## 2024-09-28 PROCEDURE — 99285 EMERGENCY DEPT VISIT HI MDM: CPT

## 2024-09-28 PROCEDURE — 94640 AIRWAY INHALATION TREATMENT: CPT

## 2024-09-28 PROCEDURE — 84703 CHORIONIC GONADOTROPIN ASSAY: CPT | Performed by: EMERGENCY MEDICINE

## 2024-09-28 PROCEDURE — 80053 COMPREHEN METABOLIC PANEL: CPT | Performed by: EMERGENCY MEDICINE

## 2024-09-28 PROCEDURE — 25510000001 IOPAMIDOL 61 % SOLUTION: Performed by: EMERGENCY MEDICINE

## 2024-09-28 PROCEDURE — 74177 CT ABD & PELVIS W/CONTRAST: CPT

## 2024-09-28 PROCEDURE — 83690 ASSAY OF LIPASE: CPT | Performed by: EMERGENCY MEDICINE

## 2024-09-28 PROCEDURE — 81001 URINALYSIS AUTO W/SCOPE: CPT | Performed by: EMERGENCY MEDICINE

## 2024-09-28 PROCEDURE — 25810000003 LACTATED RINGERS SOLUTION: Performed by: EMERGENCY MEDICINE

## 2024-09-28 PROCEDURE — 99223 1ST HOSP IP/OBS HIGH 75: CPT | Performed by: STUDENT IN AN ORGANIZED HEALTH CARE EDUCATION/TRAINING PROGRAM

## 2024-09-28 PROCEDURE — 87040 BLOOD CULTURE FOR BACTERIA: CPT | Performed by: STUDENT IN AN ORGANIZED HEALTH CARE EDUCATION/TRAINING PROGRAM

## 2024-09-28 PROCEDURE — 36415 COLL VENOUS BLD VENIPUNCTURE: CPT

## 2024-09-28 RX ORDER — PROMETHAZINE HYDROCHLORIDE 12.5 MG/1
12.5 TABLET ORAL EVERY 6 HOURS PRN
Status: DISCONTINUED | OUTPATIENT
Start: 2024-09-28 | End: 2024-10-01 | Stop reason: HOSPADM

## 2024-09-28 RX ORDER — IOPAMIDOL 612 MG/ML
100 INJECTION, SOLUTION INTRAVASCULAR
Status: COMPLETED | OUTPATIENT
Start: 2024-09-28 | End: 2024-09-28

## 2024-09-28 RX ORDER — MORPHINE SULFATE 2 MG/ML
2 INJECTION, SOLUTION INTRAMUSCULAR; INTRAVENOUS ONCE
Status: COMPLETED | OUTPATIENT
Start: 2024-09-28 | End: 2024-09-28

## 2024-09-28 RX ORDER — SODIUM CHLORIDE 0.9 % (FLUSH) 0.9 %
10 SYRINGE (ML) INJECTION AS NEEDED
Status: DISCONTINUED | OUTPATIENT
Start: 2024-09-28 | End: 2024-10-01 | Stop reason: HOSPADM

## 2024-09-28 RX ORDER — SODIUM CHLORIDE 9 MG/ML
100 INJECTION, SOLUTION INTRAVENOUS CONTINUOUS
Status: DISCONTINUED | OUTPATIENT
Start: 2024-09-28 | End: 2024-10-01 | Stop reason: HOSPADM

## 2024-09-28 RX ORDER — FAMOTIDINE 20 MG/1
40 TABLET, FILM COATED ORAL DAILY
Status: DISCONTINUED | OUTPATIENT
Start: 2024-09-28 | End: 2024-10-01 | Stop reason: HOSPADM

## 2024-09-28 RX ORDER — AMOXICILLIN 250 MG
2 CAPSULE ORAL 2 TIMES DAILY PRN
Status: DISCONTINUED | OUTPATIENT
Start: 2024-09-28 | End: 2024-09-29

## 2024-09-28 RX ORDER — SODIUM CHLORIDE 0.9 % (FLUSH) 0.9 %
10 SYRINGE (ML) INJECTION EVERY 12 HOURS SCHEDULED
Status: DISCONTINUED | OUTPATIENT
Start: 2024-09-28 | End: 2024-10-01 | Stop reason: HOSPADM

## 2024-09-28 RX ORDER — BISACODYL 5 MG/1
5 TABLET, DELAYED RELEASE ORAL DAILY PRN
Status: DISCONTINUED | OUTPATIENT
Start: 2024-09-28 | End: 2024-09-29

## 2024-09-28 RX ORDER — LEVOTHYROXINE SODIUM 112 UG/1
112 TABLET ORAL DAILY
Status: DISCONTINUED | OUTPATIENT
Start: 2024-09-28 | End: 2024-10-01 | Stop reason: HOSPADM

## 2024-09-28 RX ORDER — SODIUM CHLORIDE 9 MG/ML
40 INJECTION, SOLUTION INTRAVENOUS AS NEEDED
Status: DISCONTINUED | OUTPATIENT
Start: 2024-09-28 | End: 2024-10-01 | Stop reason: HOSPADM

## 2024-09-28 RX ORDER — OXYCODONE HYDROCHLORIDE 5 MG/1
5 TABLET ORAL EVERY 6 HOURS PRN
Status: DISCONTINUED | OUTPATIENT
Start: 2024-09-28 | End: 2024-10-01 | Stop reason: HOSPADM

## 2024-09-28 RX ORDER — BUDESONIDE AND FORMOTEROL FUMARATE DIHYDRATE 160; 4.5 UG/1; UG/1
1 AEROSOL RESPIRATORY (INHALATION) 2 TIMES DAILY
Status: DISCONTINUED | OUTPATIENT
Start: 2024-09-28 | End: 2024-10-01 | Stop reason: HOSPADM

## 2024-09-28 RX ORDER — KETOROLAC TROMETHAMINE 30 MG/ML
30 INJECTION, SOLUTION INTRAMUSCULAR; INTRAVENOUS EVERY 6 HOURS
Status: COMPLETED | OUTPATIENT
Start: 2024-09-28 | End: 2024-09-29

## 2024-09-28 RX ORDER — PROMETHAZINE HYDROCHLORIDE 12.5 MG/1
12.5 SUPPOSITORY RECTAL EVERY 6 HOURS PRN
Status: DISCONTINUED | OUTPATIENT
Start: 2024-09-28 | End: 2024-10-01 | Stop reason: HOSPADM

## 2024-09-28 RX ORDER — HYDROMORPHONE HYDROCHLORIDE 1 MG/ML
0.5 INJECTION, SOLUTION INTRAMUSCULAR; INTRAVENOUS; SUBCUTANEOUS ONCE
Status: COMPLETED | OUTPATIENT
Start: 2024-09-28 | End: 2024-09-28

## 2024-09-28 RX ORDER — POTASSIUM CHLORIDE 750 MG/1
40 TABLET, FILM COATED, EXTENDED RELEASE ORAL EVERY 4 HOURS
Status: COMPLETED | OUTPATIENT
Start: 2024-09-28 | End: 2024-09-28

## 2024-09-28 RX ORDER — POLYETHYLENE GLYCOL 3350 17 G/17G
17 POWDER, FOR SOLUTION ORAL DAILY PRN
Status: DISCONTINUED | OUTPATIENT
Start: 2024-09-28 | End: 2024-09-29

## 2024-09-28 RX ORDER — CALCIUM CARBONATE 500 MG/1
2 TABLET, CHEWABLE ORAL 2 TIMES DAILY PRN
Status: DISCONTINUED | OUTPATIENT
Start: 2024-09-28 | End: 2024-10-01 | Stop reason: HOSPADM

## 2024-09-28 RX ORDER — ONDANSETRON 2 MG/ML
4 INJECTION INTRAMUSCULAR; INTRAVENOUS ONCE
Status: COMPLETED | OUTPATIENT
Start: 2024-09-28 | End: 2024-09-28

## 2024-09-28 RX ORDER — ACETAMINOPHEN 500 MG
1000 TABLET ORAL 3 TIMES DAILY
Status: DISCONTINUED | OUTPATIENT
Start: 2024-09-28 | End: 2024-09-29

## 2024-09-28 RX ORDER — BISACODYL 10 MG
10 SUPPOSITORY, RECTAL RECTAL DAILY PRN
Status: DISCONTINUED | OUTPATIENT
Start: 2024-09-28 | End: 2024-09-29

## 2024-09-28 RX ADMIN — SODIUM CHLORIDE, POTASSIUM CHLORIDE, SODIUM LACTATE AND CALCIUM CHLORIDE 1000 ML: 600; 310; 30; 20 INJECTION, SOLUTION INTRAVENOUS at 06:05

## 2024-09-28 RX ADMIN — OXYCODONE HYDROCHLORIDE 5 MG: 5 TABLET ORAL at 15:29

## 2024-09-28 RX ADMIN — KETOROLAC TROMETHAMINE 30 MG: 30 INJECTION, SOLUTION INTRAMUSCULAR at 16:36

## 2024-09-28 RX ADMIN — Medication 10 ML: at 20:19

## 2024-09-28 RX ADMIN — KETOROLAC TROMETHAMINE 30 MG: 30 INJECTION, SOLUTION INTRAMUSCULAR at 21:58

## 2024-09-28 RX ADMIN — PIPERACILLIN AND TAZOBACTAM 3.38 G: 3; .375 INJECTION, POWDER, LYOPHILIZED, FOR SOLUTION INTRAVENOUS at 15:29

## 2024-09-28 RX ADMIN — ACETAMINOPHEN 1000 MG: 500 TABLET ORAL at 15:29

## 2024-09-28 RX ADMIN — PIPERACILLIN AND TAZOBACTAM 3.38 G: 3; .375 INJECTION, POWDER, FOR SOLUTION INTRAVENOUS at 09:57

## 2024-09-28 RX ADMIN — FAMOTIDINE 40 MG: 20 TABLET, FILM COATED ORAL at 10:46

## 2024-09-28 RX ADMIN — HYDROMORPHONE HYDROCHLORIDE 0.5 MG: 1 INJECTION, SOLUTION INTRAMUSCULAR; INTRAVENOUS; SUBCUTANEOUS at 07:46

## 2024-09-28 RX ADMIN — MORPHINE SULFATE 2 MG: 2 INJECTION, SOLUTION INTRAMUSCULAR; INTRAVENOUS at 07:00

## 2024-09-28 RX ADMIN — ONDANSETRON 4 MG: 2 INJECTION, SOLUTION INTRAMUSCULAR; INTRAVENOUS at 06:05

## 2024-09-28 RX ADMIN — LEVOTHYROXINE SODIUM 112 MCG: 112 TABLET ORAL at 10:46

## 2024-09-28 RX ADMIN — BUDESONIDE AND FORMOTEROL FUMARATE DIHYDRATE 1 PUFF: 160; 4.5 AEROSOL RESPIRATORY (INHALATION) at 21:18

## 2024-09-28 RX ADMIN — KETOROLAC TROMETHAMINE 30 MG: 30 INJECTION, SOLUTION INTRAMUSCULAR at 10:45

## 2024-09-28 RX ADMIN — ACETAMINOPHEN 1000 MG: 500 TABLET ORAL at 10:46

## 2024-09-28 RX ADMIN — Medication 10 ML: at 10:43

## 2024-09-28 RX ADMIN — ACETAMINOPHEN 1000 MG: 500 TABLET ORAL at 20:18

## 2024-09-28 RX ADMIN — MORPHINE SULFATE 2 MG: 2 INJECTION, SOLUTION INTRAMUSCULAR; INTRAVENOUS at 06:04

## 2024-09-28 RX ADMIN — SODIUM CHLORIDE 100 ML/HR: 9 INJECTION, SOLUTION INTRAVENOUS at 10:43

## 2024-09-28 RX ADMIN — SENNOSIDES AND DOCUSATE SODIUM 2 TABLET: 50; 8.6 TABLET ORAL at 15:45

## 2024-09-28 RX ADMIN — IOPAMIDOL 100 ML: 612 INJECTION, SOLUTION INTRAVENOUS at 06:52

## 2024-09-28 RX ADMIN — OXYCODONE HYDROCHLORIDE 5 MG: 5 TABLET ORAL at 21:58

## 2024-09-28 RX ADMIN — POTASSIUM CHLORIDE 40 MEQ: 750 TABLET, EXTENDED RELEASE ORAL at 15:29

## 2024-09-28 RX ADMIN — POTASSIUM CHLORIDE 40 MEQ: 750 TABLET, EXTENDED RELEASE ORAL at 18:33

## 2024-09-28 NOTE — Clinical Note
Level of Care: Med/Surg [1]   Diagnosis: Pelvic mass [202955]   Admitting Physician: CORY CLAY [[id number]]   Attending Physician: CORY CLAY [[id number]]   Is patient appropriate for Inpatient Observation Unit?: No [0]

## 2024-09-28 NOTE — ED NOTES
Nursing report ED to floor  Sundeep Espino  39 y.o.  female    HPI :   Chief Complaint   Patient presents with    Vomiting       Admitting doctor:   Nesha Forrester MD    Admitting diagnosis:   The primary encounter diagnosis was Pelvic mass. A diagnosis of Sepsis without acute organ dysfunction, due to unspecified organism was also pertinent to this visit.    Code status:   Current Code Status       Date Active Code Status Order ID Comments User Context       Prior            Allergies:   Aspartame and phenylalanine, Banana, Beta adrenergic blockers, Flagyl [metronidazole], Metoprolol, and Propranolol    Isolation:   No active isolations    Intake and Output  No intake or output data in the 24 hours ending 09/28/24 0914    Weight:       09/28/24 0527   Weight: 65.8 kg (145 lb)       Most recent vitals:   Vitals:    09/28/24 0608 09/28/24 0749 09/28/24 0801 09/28/24 0901   BP: 121/70  123/73 120/67   Pulse: 114  115 115   Resp:       Temp:  99.6 °F (37.6 °C)     TempSrc:  Tympanic     SpO2: 100%  100% 99%   Weight:       Height:           Active LDAs/IV Access:   Lines, Drains & Airways       Active LDAs       Name Placement date Placement time Site Days    Peripheral IV 09/28/24 0549 Anterior;Left Forearm 09/28/24  0549  Forearm  less than 1                    Labs (abnormal labs have a star):   Labs Reviewed   COMPREHENSIVE METABOLIC PANEL - Abnormal; Notable for the following components:       Result Value    Glucose 138 (*)     Potassium 3.2 (*)     ALT (SGPT) 41 (*)     AST (SGOT) 86 (*)     All other components within normal limits    Narrative:     GFR Normal >60  Chronic Kidney Disease <60  Kidney Failure <15     LIPASE - Abnormal; Notable for the following components:    Lipase 12 (*)     All other components within normal limits   URINALYSIS W/ MICROSCOPIC IF INDICATED (NO CULTURE) - Abnormal; Notable for the following components:    Appearance, UA Cloudy (*)     Ketones, UA 15 mg/dL (1+) (*)      Blood, UA Large (3+) (*)     Protein, UA Trace (*)     Leuk Esterase, UA Moderate (2+) (*)     All other components within normal limits   CBC WITH AUTO DIFFERENTIAL - Abnormal; Notable for the following components:    WBC 13.43 (*)     Hemoglobin 11.1 (*)     MCH 26.2 (*)     MCHC 31.4 (*)     Neutrophil % 89.1 (*)     Lymphocyte % 5.4 (*)     Monocyte % 4.3 (*)     Neutrophils, Absolute 11.95 (*)     Immature Grans, Absolute 0.06 (*)     All other components within normal limits   URINALYSIS, MICROSCOPIC ONLY - Abnormal; Notable for the following components:    RBC, UA Too Numerous to Count (*)     WBC, UA 3-5 (*)     Squamous Epithelial Cells, UA 3-6 (*)     All other components within normal limits   LACTIC ACID, PLASMA - Normal   HCG, SERUM, QUALITATIVE - Normal   BLOOD CULTURE   BLOOD CULTURE   RAINBOW DRAW    Narrative:     The following orders were created for panel order [place] Draw.  Procedure                               Abnormality         Status                     ---------                               -----------         ------                     Green Top (Gel)[[id number]]                                  Final result               Lavender Top[[id number]]                                     Final result               Gold Top - SST[[id number]]                                                              Light Blue Top[[id number]]                                   Final result                 Please view results for these tests on the individual orders.   CBC AND DIFFERENTIAL    Narrative:     The following orders were created for panel order CBC & Differential.  Procedure                               Abnormality         Status                     ---------                               -----------         ------                     CBC Auto Differential[[id number]]        Abnormal            Final result                 Please view results for these tests on the individual orders.   GREEN TOP    LAVENDER TOP   LIGHT BLUE TOP       EKG:   No orders to display       Meds given in ED:   Medications   sodium chloride 0.9 % flush 10 mL (has no administration in time range)   piperacillin-tazobactam (ZOSYN) 4.5 g IVPB in 100 mL NS MBP (CD) (has no administration in time range)   lactated ringers bolus 1,000 mL (1,000 mL Intravenous New Bag 9/28/24 0605)   morphine injection 2 mg (2 mg Intravenous Given 9/28/24 0604)   ondansetron (ZOFRAN) injection 4 mg (4 mg Intravenous Given 9/28/24 0605)   morphine injection 2 mg (2 mg Intravenous Given 9/28/24 0700)   iopamidol (ISOVUE-300) 61 % injection 100 mL (100 mL Intravenous Given by Other 9/28/24 0652)   HYDROmorphone (DILAUDID) injection 0.5 mg (0.5 mg Intravenous Given 9/28/24 0746)       Imaging results:  No radiology results for the last day    Ambulatory status:   -  ad eva    Social issues:   Social History     Socioeconomic History    Marital status: Significant Other    Years of education: College   Tobacco Use    Smoking status: Never     Passive exposure: Never    Smokeless tobacco: Never   Vaping Use    Vaping status: Never Used   Substance and Sexual Activity    Alcohol use: Yes     Alcohol/week: 1.0 standard drink of alcohol     Types: 1 Glasses of wine per week    Drug use: Never    Sexual activity: Defer     Partners: Male     Birth control/protection: Condom, Abstinence       NIH Stroke Scale:       Rigo Reyes RN  09/28/24 09:14 EDT

## 2024-09-28 NOTE — ED NOTES
Patient to ED with abdominal pain and back pain. Patient said she had a laparoscopy and had ovarian cysts removed on Wednesday. Patient stated that the surgical site is hot. Patient is actively vomiting in triage.

## 2024-09-28 NOTE — PLAN OF CARE
Goal Outcome Evaluation:  Plan of Care Reviewed With: patient        Progress: improving  Outcome Evaluation: From ER this morning. Scheduled medications given for pain. IVFs infusing. Abx for 24 hours. Walked outside room. Resting well between care. Heart rate tachy. K to be replaced.

## 2024-09-28 NOTE — ED PROVIDER NOTES
MD ATTESTATION NOTE     SHARED VISIT: This visit was performed by BOTH a physician and an APC. The substantive portion of the medical decision making was performed by this attesting physician who made or approved the management plan and takes responsibility for patient management. All studies in the APC note (if performed) were independently interpreted by me.  The HODA and I have discussed this patient's history, physical exam, and treatment plan. I have reviewed the documentation and personally had a face to face interaction with the patient. I affirm the documentation and agree with the treatment and plan. I provided a substantive portion of the care of the patient.  I personally performed the physical exam in its entirety, and below are my findings.      Brief HPI: Patient complains of acute lower abdominal pain, nausea, and vomiting.  Symptoms began yesterday.  She reports associated fever and chills.  Pain radiates into her lower back. She had a diagnostic laparoscopy and adhesiolysis on 9/25/2024.      PHYSICAL EXAM    GENERAL: Awake, alert, oriented x 3.  No acute distress.  Well-developed, well-nourished and somewhat ill-appearing female.  HENT: nares patent  EYES: no scleral icterus  CV: regular rhythm, normal rate  RESPIRATORY: normal effort, CTAB  ABDOMEN: soft, there is lower abdominal tenderness without rebound or guarding  MUSCULOSKELETAL: no deformity  NEURO: alert, moves all extremities, follows commands  PSYCH:  calm, cooperative  SKIN: warm, dry    Vital signs and nursing notes reviewed.        Plan: Patient presents to the ED with acute lower abdominal pain, nausea, vomiting, and fever.  White blood cell count is elevated.  Lactic acid is normal.  CT scan shows a fluid-filled and septated mass extending slightly more to the right which is likely combination of right ovary and dilated hydrosalpinx.  There is no bowel obstruction.  Patient has been given IV fluids and IV medications for pain and  nausea.  Case has been discussed with on-call OB/GYN and they will come see the patient in the ED.    ED Course as of 09/28/24 0924   Sat Sep 28, 2024   3555 First look: Patient is a 39-year-old female with history of endometriosis presents emergency department today complaining of generalized abdominal pain with associated nausea and vomiting that began while at work tonight.  Patient reports that she felt febrile but had not had a fever prior to tonight.  She had a  diagnostic laparoscopy 3 days ago at [facility].  She reports that they removed a cyst during that surgery.  She does report some dysuria but says she had a catheter during surgery.  She reports pain is both in her upper, lower abdomen, and her back.  Will initiate workup with labs, CT imaging, and pain control. [CC]   0628 WBC(!): 13.43 [DC]   0628 Temp(!): 100.7 °F (38.2 °C) [DC]   0735 I discussed the case with MD Emil with OB/GYN at this time regarding the patient.  I discussed work-up, results, concerns.  I discussed the consulting provider's desire for having one of the OB/GYN service physicians come to the ER to evaluate her.   [DC]   0911 Patient presentation and evaluation consistent with pelvic mass of uncertain origin with evidence of sepsis requiring admission to the OB/GYN service for continued monitoring and IV antibiotics.  Patient agreeable and all questions answered. [DC]   0912 I discussed the case with MD Usama with OB/GYN at this time regarding the patient.  I discussed work-up, results, concerns.  I discussed the consulting provider's desire for IV antibiotics and observation admission to the OB/GYN service.   [DC]      ED Course User Index  [CC] Sandy Agudelo PA-C  [DC] Xavier Benavidez PA     MDM: Patient presented to the ED complaining of lower abdominal pain, nausea, and vomiting.  She recently had a diagnostic laparoscopy and adhesiolysis.  Here in the ED, she had a low-grade fever.  White blood cell count  was elevated.  CT scan showed a fluid-filled mass in the right pelvis.  She was evaluated by GYN and will be admitted.  She will be started on IV Zosyn.       Cresencio Robert MD  09/28/24 0929

## 2024-09-28 NOTE — ED PROVIDER NOTES
EMERGENCY DEPARTMENT ENCOUNTER      Room Number:  03/03  PCP: Adam Monahan DO  Independent Historians: Patient  Patient Care Team:  Adam Monahan DO as PCP - General (Internal Medicine)  Javon Schaeffer MD as Consulting Physician (Hematology and Oncology)  Estrella Garibay MD (Pediatric Endocrinology)  Javon Marinelli MD as Consulting Physician (Allergy)  Adam oMnahan DO as Referring Physician (Internal Medicine)  Janet Hancock APRN as Nurse Practitioner (Gastroenterology)       HPI:  Chief Complaint: Abdominal pain    A complete HPI/ROS/PMH/PSH/SH/FH are unobtainable due to: None    Chronic or social conditions impacting patient care (Social Determinants of Health): None      Context: Sundeep Espino is a 39 y.o. female with a PMH significant for GERD, lumbosacral radiculopathy, hydrosalpinx, endometriosis who presents to the ED c/o acute lower abdominal pain, flank pain, nausea and vomiting.  The patient reports having a recent diagnostic laparoscopy on September 25 to remove a paratubal cyst and hydrosalpinx.  Yesterday evening she started with chills and some associated nausea but started having vomiting as well as lower back pain this morning and decided to come to the ER.  She reports having chills and associated fever prior to coming in as well.  Denies blood in the vomit or stool.  No recent antibiotics.      Upon review of prior external notes (non-ED) -and- Review of prior external test results outside of this encounter it appears the patient was evaluated in the office with internal medicine for annual physical exam on 6/27/2024.  The patient had a normal urine pregnancy test on 9/25/2024 and a normal hCG on 6/16/2024.      PAST MEDICAL HISTORY  Active Ambulatory Problems     Diagnosis Date Noted    Magnetic resonance imaging of brain abnormal 10/21/2013    Abnormal electrocardiogram 11/14/2016    Abnormal magnetic resonance imaging study 08/28/2014    Allergic rhinitis 11/14/2016    Iron  deficiency anemia due to chronic blood loss 11/14/2016    Asthma 05/01/2013    Chronic post-traumatic headache 10/21/2013    Gastroesophageal reflux disease 11/14/2016    Lumbosacral radiculopathy 01/06/2014    Menorrhagia 05/01/2013    Menstrual disorder 11/14/2016    Neck pain 10/21/2013    Paresthesia of lower extremity 10/21/2013    Numbness and tingling sensation of skin 05/01/2013    Muscle weakness (generalized) 08/21/2013    Health care maintenance 11/14/2016    Thyrotoxicosis without thyroid storm 12/15/2016    Weight loss 12/15/2016    Urticaria 02/09/2017    Insomnia due to medical condition 02/09/2017    Facial swelling 03/06/2017    Abnormal weight gain 03/08/2017    Graves disease 03/08/2017    Other symptoms and signs involving the musculoskeletal system 08/21/2013    Bloating symptom 07/23/2018    Diarrhea 07/23/2018    Hypovitaminosis D 11/02/2018    Controlled substance agreement signed 11/02/2018    Exogenous obesity 11/18/2018    Postablative hypothyroidism 10/22/2019    Ankle instability, left 10/29/2019    Family history of diabetes mellitus in mother 12/29/2021    Family history of essential hypertension 12/29/2021    History of ovarian cyst 12/06/2022    Iron deficiency anemia 12/09/2022    Constipation 08/15/2024    Nausea and vomiting 08/15/2024    Lower abdominal pain 08/15/2024    Pelvic pain 08/27/2024    Hydrosalpinx 09/25/2024    Endometriosis determined by laparoscopy 09/25/2024     Resolved Ambulatory Problems     Diagnosis Date Noted    Intractable chronic migraine without aura 05/01/2013    Mast cell disease 03/06/2017    H/O radioactive iodine thyroid ablation 03/08/2017    Yeast vaginitis 05/02/2018    UTI (urinary tract infection) 05/02/2018    Upper respiratory infection 05/02/2018    Paratubal cyst 05/10/2023     Past Medical History:   Diagnosis Date    ADHD     Anxiety and depression     Arthritis     Chondromalacia patellae     DDD (degenerative disc disease), cervical      GERD (gastroesophageal reflux disease)     Heart palpitations     Hyperthyroidism     Menstrual problem     Ovarian cyst     Thyroid disease     Vitamin D deficiency          PAST SURGICAL HISTORY  Past Surgical History:   Procedure Laterality Date    APPENDECTOMY  2012    BREAST LUMPECTOMY      benign    COLONOSCOPY  approx 2009    normal per patient    COLONOSCOPY W/ POLYPECTOMY N/A 08/01/2018    Normal    DIAGNOSTIC LAPAROSCOPY N/A 9/25/2024    Procedure: DIAGNOSTIC LAPAROSCOPY;  Surgeon: Javon Jiménez MD;  Location: [facility] MAIN OR;  Service: Obstetrics/Gynecology;  Laterality: N/A;    EAR TUBES      ENDOSCOPY N/A 08/01/2018    HH    KELOID EXCISION      right ear    MANDIBLE SURGERY  05/2016    ORIF ULNA/RADIUS FRACTURES      Distal radius ORIF with [facility] on 8/25/22    UPPER GASTROINTESTINAL ENDOSCOPY           FAMILY HISTORY  Family History   Problem Relation Age of Onset    Hypertension Mother     Diabetes Mother     Hypertension Father     Hyperlipidemia Father     Diabetes Father     Heart disease Father         pacemaker    Coronary artery disease Father     Diabetes Maternal Aunt     Irritable bowel syndrome Maternal Aunt     Diabetes Maternal Aunt     Hypertension Maternal Uncle     Diabetes Maternal Uncle     Coronary artery disease Maternal Uncle     Diabetes Maternal Uncle     Hypertension Half-Brother     Hyperlipidemia Half-Brother     Hypertension Half-Sister     Alcohol abuse Other     Diabetes Other     Hypertension Other     Colon cancer Neg Hx     Colon polyps Neg Hx     Crohn's disease Neg Hx     Ulcerative colitis Neg Hx     Malig Hyperthermia Neg Hx          SOCIAL HISTORY  Social History     Socioeconomic History    Marital status: Significant Other    Years of education: College   Tobacco Use    Smoking status: Never     Passive exposure: Never    Smokeless tobacco: Never   Vaping Use    Vaping status: Never Used   Substance and Sexual Activity    Alcohol use: Yes     Alcohol/week: 1.0  standard drink of alcohol     Types: 1 Glasses of wine per week    Drug use: Never    Sexual activity: Defer     Partners: Male     Birth control/protection: Condom, Abstinence         ALLERGIES  Aspartame and phenylalanine, Banana, Beta adrenergic blockers, Flagyl [metronidazole], Metoprolol, and Propranolol      REVIEW OF SYSTEMS  Included in HPI  All systems reviewed and negative except for those discussed in HPI.      PHYSICAL EXAM    I have reviewed the triage vital signs and nursing notes.    ED Triage Vitals   Temp Heart Rate Resp BP SpO2   09/28/24 0527 09/28/24 0527 09/28/24 0527 09/28/24 0530 09/28/24 0527   (!) 100.7 °F (38.2 °C) 68 20 133/80 99 %      Temp src Heart Rate Source Patient Position BP Location FiO2 (%)   09/28/24 0527 -- -- -- --   Tympanic           Physical Exam  Constitutional:       General: She is not in acute distress.     Appearance: She is well-developed. She is ill-appearing. She is not toxic-appearing.   HENT:      Head: Normocephalic and atraumatic.   Eyes:      General: No scleral icterus.     Conjunctiva/sclera: Conjunctivae normal.   Neck:      Trachea: No tracheal deviation.   Cardiovascular:      Rate and Rhythm: Regular rhythm. Tachycardia present.   Pulmonary:      Effort: Pulmonary effort is normal.      Breath sounds: Normal breath sounds.   Abdominal:      Palpations: Abdomen is soft.      Tenderness: There is abdominal tenderness in the suprapubic area.   Musculoskeletal:         General: No deformity.      Cervical back: Normal range of motion.   Lymphadenopathy:      Cervical: No cervical adenopathy.   Skin:     General: Skin is warm and dry.   Neurological:      Mental Status: She is alert and oriented to person, place, and time.   Psychiatric:         Behavior: Behavior normal.         Vital signs and nursing notes reviewed.      PPE: I wore a surgical mask throughout my encounters with the pt. I performed hand hygiene on entry into the pt room and upon exit.      LAB RESULTS  Recent Results (from the past 24 hour(s))   Comprehensive Metabolic Panel    Collection Time: 09/28/24  5:48 AM    Specimen: Blood   Result Value Ref Range    Glucose 138 (H) 65 - 99 mg/dL    BUN 6 6 - 20 mg/dL    Creatinine 0.57 0.57 - 1.00 mg/dL    Sodium 142 136 - 145 mmol/L    Potassium 3.2 (L) 3.5 - 5.2 mmol/L    Chloride 104 98 - 107 mmol/L    CO2 28.5 22.0 - 29.0 mmol/L    Calcium 9.3 8.6 - 10.5 mg/dL    Total Protein 6.9 6.0 - 8.5 g/dL    Albumin 3.8 3.5 - 5.2 g/dL    ALT (SGPT) 41 (H) 1 - 33 U/L    AST (SGOT) 86 (H) 1 - 32 U/L    Alkaline Phosphatase 105 39 - 117 U/L    Total Bilirubin 0.5 0.0 - 1.2 mg/dL    Globulin 3.1 gm/dL    A/G Ratio 1.2 g/dL    BUN/Creatinine Ratio 10.5 7.0 - 25.0    Anion Gap 9.5 5.0 - 15.0 mmol/L    eGFR 119.5 >60.0 mL/min/1.73   Lipase    Collection Time: 09/28/24  5:48 AM    Specimen: Blood   Result Value Ref Range    Lipase 12 (L) 13 - 60 U/L   Lactic Acid, Plasma    Collection Time: 09/28/24  5:48 AM    Specimen: Blood   Result Value Ref Range    Lactate 1.3 0.5 - 2.0 mmol/L   hCG, Serum, Qualitative    Collection Time: 09/28/24  5:48 AM    Specimen: Blood   Result Value Ref Range    HCG Qualitative Negative Negative   Green Top (Gel)    Collection Time: 09/28/24  5:48 AM   Result Value Ref Range    Extra Tube Hold for add-ons.    Lavender Top    Collection Time: 09/28/24  5:48 AM   Result Value Ref Range    Extra Tube hold for add-on    Light Blue Top    Collection Time: 09/28/24  5:48 AM   Result Value Ref Range    Extra Tube Hold for add-ons.    CBC Auto Differential    Collection Time: 09/28/24  5:48 AM    Specimen: Blood   Result Value Ref Range    WBC 13.43 (H) 3.40 - 10.80 10*3/mm3    RBC 4.24 3.77 - 5.28 10*6/mm3    Hemoglobin 11.1 (L) 12.0 - 15.9 g/dL    Hematocrit 35.4 34.0 - 46.6 %    MCV 83.5 79.0 - 97.0 fL    MCH 26.2 (L) 26.6 - 33.0 pg    MCHC 31.4 (L) 31.5 - 35.7 g/dL    RDW 13.3 12.3 - 15.4 %    RDW-SD 40.2 37.0 - 54.0 fl    MPV 9.7 6.0 - 12.0  fL    Platelets 250 140 - 450 10*3/mm3    Neutrophil % 89.1 (H) 42.7 - 76.0 %    Lymphocyte % 5.4 (L) 19.6 - 45.3 %    Monocyte % 4.3 (L) 5.0 - 12.0 %    Eosinophil % 0.5 0.3 - 6.2 %    Basophil % 0.3 0.0 - 1.5 %    Immature Grans % 0.4 0.0 - 0.5 %    Neutrophils, Absolute 11.95 (H) 1.70 - 7.00 10*3/mm3    Lymphocytes, Absolute 0.73 0.70 - 3.10 10*3/mm3    Monocytes, Absolute 0.58 0.10 - 0.90 10*3/mm3    Eosinophils, Absolute 0.07 0.00 - 0.40 10*3/mm3    Basophils, Absolute 0.04 0.00 - 0.20 10*3/mm3    Immature Grans, Absolute 0.06 (H) 0.00 - 0.05 10*3/mm3    nRBC 0.0 0.0 - 0.2 /100 WBC   Urinalysis With Microscopic If Indicated (No Culture) - Urine, Clean Catch    Collection Time: 09/28/24  7:27 AM    Specimen: Urine, Clean Catch   Result Value Ref Range    Color, UA Yellow Yellow, Straw    Appearance, UA Cloudy (A) Clear    pH, UA 6.5 5.0 - 8.0    Specific Gravity, UA 1.028 1.005 - 1.030    Glucose, UA Negative Negative    Ketones, UA 15 mg/dL (1+) (A) Negative    Bilirubin, UA Negative Negative    Blood, UA Large (3+) (A) Negative    Protein, UA Trace (A) Negative    Leuk Esterase, UA Moderate (2+) (A) Negative    Nitrite, UA Negative Negative    Urobilinogen, UA 0.2 E.U./dL 0.2 - 1.0 E.U./dL   Urinalysis, Microscopic Only - Urine, Clean Catch    Collection Time: 09/28/24  7:27 AM    Specimen: Urine, Clean Catch   Result Value Ref Range    RBC, UA Too Numerous to Count (A) None Seen, 0-2 /HPF    WBC, UA 3-5 (A) None Seen, 0-2 /HPF    Bacteria, UA None Seen None Seen /HPF    Squamous Epithelial Cells, UA 3-6 (A) None Seen, 0-2 /HPF    Hyaline Casts, UA 0-2 None Seen /LPF    Methodology Automated Microscopy          RADIOLOGY  CT Abdomen Pelvis With Contrast    Result Date: 9/28/2024  CT SCAN OF THE ABDOMEN AND PELVIS WITH INTRAVENOUS CONTRAST  HISTORY: Laparoscopy 3 days ago for endometriosis and adhesions. FINDINGS of right tubal dictation consistent with hydrosalpinx and prominent adhesions surrounding the right  ovary. Generalized abdominal pain.  The CT scan was performed as an emergency procedure through the abdomen and pelvis with intravenous contrast and compared to previous CT scans of the abdomen and pelvis dated 8/21/2022. The following findings are present: 1. The lung bases are clear. The liver, spleen, pancreas, both adrenal glands, both kidneys are unremarkable. The gallbladder is somewhat contracted and shows no gallstones. There is a small amount of free air scattered in the upper abdomen as well as air within the fat planes of the lower abdominal wall consistent with recent laparoscopy.  2. The aorta and retroperitoneal structures are unremarkable. The large and small bowel loops are normal in caliber. In the pelvis, there is a large predominately fluid-filled and septated mass extending slightly more to the right that I suspect represents a combination of right ovary and very dilated hydrosalpinx as described in the operative report. This measures up to 5.7 x 11.9 cm. The previous CT scan on 8/21/2022 demonstrated what appeared to be a septated cyst in the right ovary measuring 3.1 x 3.9 cm and no identifiable hydrosalpinx at that time. I cannot exclude the possibility of ovarian torsion or neoplasm but there is no free fluid in the pelvis and the adjacent urinary bladder appears normal.     Radiation dose reduction techniques were utilized, including automated exposure control and exposure modulation based on body size.   This report was finalized on 9/28/2024 7:19 AM by Dr. Cooper Mckinney M.D on Workstation: [id number]         MEDICATIONS GIVEN IN ER  Medications   sodium chloride 0.9 % flush 10 mL (has no administration in time range)   piperacillin-tazobactam (ZOSYN) 4.5 g IVPB in 100 mL NS MBP (CD) (has no administration in time range)   lactated ringers bolus 1,000 mL (1,000 mL Intravenous New Bag 9/28/24 0605)   morphine injection 2 mg (2 mg Intravenous Given 9/28/24 0604)   ondansetron (ZOFRAN)  injection 4 mg (4 mg Intravenous Given 9/28/24 0605)   morphine injection 2 mg (2 mg Intravenous Given 9/28/24 0700)   iopamidol (ISOVUE-300) 61 % injection 100 mL (100 mL Intravenous Given by Other 9/28/24 0652)   HYDROmorphone (DILAUDID) injection 0.5 mg (0.5 mg Intravenous Given 9/28/24 0746)         ORDERS PLACED DURING THIS VISIT:  Orders Placed This Encounter   Procedures    Blood Culture - Blood,    Blood Culture - Blood,    CT Abdomen Pelvis With Contrast    [place] Draw    Comprehensive Metabolic Panel    Lipase    Urinalysis With Microscopic If Indicated (No Culture) - Urine, Clean Catch    Lactic Acid, Plasma    hCG, Serum, Qualitative    CBC Auto Differential    Urinalysis, Microscopic Only - Urine, Clean Catch    OB/GYN (on-call MD unless specified)    Insert Peripheral IV    Initiate Observation Status    CBC & Differential    Green Top (Gel)    Lavender Top    Light Blue Top         OUTPATIENT MEDICATION MANAGEMENT:  Current Facility-Administered Medications Ordered in Epic   Medication Dose Route Frequency Provider Last Rate Last Admin    piperacillin-tazobactam (ZOSYN) 4.5 g IVPB in 100 mL NS MBP (CD)  4.5 g Intravenous Once Xavier Benavidez PA        sodium chloride 0.9 % flush 10 mL  10 mL Intravenous PRN Colby Barraza MD         Current Outpatient Medications Ordered in Epic   Medication Sig Dispense Refill    Albuterol-Budesonide (Airsupra) 90-80 MCG/ACT aerosol Inhale 2 Inhalations Every 4 (Four) Hours As Needed (ASTHMA SYMPTOMS). 10.7 g 5    cetirizine (zyrTEC) 10 MG tablet Take 1 tablet by mouth Daily.      diclofenac (VOLTAREN) 50 MG EC tablet Take 1 tablet by mouth 2 (Two) Times a Day As Needed. for pain      doxycycline (VIBRAMYCIN) 100 MG capsule Take 1 capsule by mouth 2 (Two) Times a Day. 14 capsule 0    HYDROcodone-acetaminophen (Norco) 5-325 MG per tablet Take 1 tablet by mouth Every 6 (Six) Hours As Needed for Moderate Pain. 8 tablet 0    linaclotide (Linzess) 72 MCG  capsule capsule Take 1 capsule by mouth Every Morning Before Breakfast. 90 capsule 3    Multiple Vitamins-Minerals (MULTIVITAMIN ADULT PO) Take  by mouth.      Symbicort 80-4.5 MCG/ACT inhaler Inhale 2 puffs 2 (Two) Times a Day. 10.2 g 1    Synthroid 112 MCG tablet Take 1 tablet by mouth Daily. Take at least 30 minutes before having breakfast with a sip of water. For low thyroid. 90 tablet 1    Tranexamic Acid (Lysteda) 650 MG tablet Take 1 tablet by mouth 1 (One) Time As Needed (On first 3 days of menstrual cycle) for up to 150 doses. 30 tablet 4           PROGRESS, DATA ANALYSIS, CONSULTS, AND MEDICAL DECISION MAKING  All labs have been independently interpreted by me.  All radiology studies have been reviewed by me. All EKG's have been independently viewed and interpreted by me.  Discussion below represents my analysis of pertinent findings related to patient's condition, differential diagnosis, treatment plan and final disposition.      DIFFERENTIAL DIAGNOSIS INCLUDE BUT NOT LIMITED TO:     Differential diagnosis includes but is not limited to:  - Hepatobiliary pathology such as cholecystitis, cholangitis, and symptomatic cholelithiasis  - Pancreatitis  - Dyspepsia  - Small bowel obstruction  - Appendicitis  - Diverticulitis  - UTI including pyelonephritis  - Ureteral stone  - Zoster  - Colitis, including infectious and ischemic  - Atypical ACS    Clinical Scores: N/A      ED Course as of 09/28/24 0912   Sat Sep 28, 2024   7922 First look: Patient is a 39-year-old female with history of endometriosis presents emergency department today complaining of generalized abdominal pain with associated nausea and vomiting that began while at work tonight.  Patient reports that she felt febrile but had not had a fever prior to tonight.  She had a  diagnostic laparoscopy 3 days ago at [facility].  She reports that they removed a cyst during that surgery.  She does report some dysuria but says she had a catheter  during surgery.  She reports pain is both in her upper, lower abdomen, and her back.  Will initiate workup with labs, CT imaging, and pain control. [CC]   0628 WBC(!): 13.43 [DC]   0628 Temp(!): 100.7 °F (38.2 °C) [DC]   0735 I discussed the case with MD Emil with OB/GYN at this time regarding the patient.  I discussed work-up, results, concerns.  I discussed the consulting provider's desire for having one of the OB/GYN service physicians come to the ER to evaluate her.   [DC]   0911 Patient presentation and evaluation consistent with pelvic mass of uncertain origin with evidence of sepsis requiring admission to the OB/GYN service for continued monitoring and IV antibiotics.  Patient agreeable and all questions answered. [DC]   0912 I discussed the case with MD Usama with OB/GYN at this time regarding the patient.  I discussed work-up, results, concerns.  I discussed the consulting provider's desire for IV antibiotics and observation admission to the OB/GYN service.   [DC]      ED Course User Index  [CC] Sandy Agudelo PA-C  [DC] Xavier Benavidez PA         0912 I rechecked the patient.  I discussed the patient's labs, radiology findings (including all incidental findings), diagnosis, and plan for admission. The patient understands and agrees with the plan.      AS OF 09:12 EDT VITALS:    BP - 120/67  HR - 115  TEMP - 99.6 °F (37.6 °C) (Tympanic)  O2 SATS - 99%    COMPLEXITY OF CARE  The patient requires admission.      DIAGNOSIS  Final diagnoses:   Pelvic mass   Sepsis without acute organ dysfunction, due to unspecified organism         DISPOSITION  ED Disposition       ED Disposition   Decision to Admit    Condition   --    Comment   Level of Care: Med/Surg [1]   Diagnosis: Pelvic mass [202955]   Admitting Physician: CORY CLAY [[id number]]   Attending Physician: CORY CLAY [[id number]]   Is patient appropriate for Inpatient Observation Unit?: No [0]                  Please note that portions of  this document were completed with a voice recognition program.    Note Disclaimer: At [facility], we believe that sharing information builds trust and better relationships. You are receiving this note because you recently visited [facility]. It is possible you will see health information before a provider has talked with you about it. This kind of information can be easy to misunderstand. To help you fully understand what it means for your health, we urge you to discuss this note with your provider.         Xavier Benavidez PA  09/28/24 0912

## 2024-09-28 NOTE — H&P
[facility]   Obstetrics and Gynecology   History & Physical    9/28/2024    Patient: Sundeep Espino          MR#:[id number]    Chief complaint:  POD3 s/p diagnostic laparoscopy with lysis of adhesions    Subjective     39 y.o. female POD#3 s/p diagnostic laparoscopy with lysis of adhesions presents with lower abdominal pain, nausea, vomiting, and fever.  Temp 100.7 upon arriving to the ER but soon normalized  with IV fluids only.  Pain improved with IV narcotics but still present.  She reports pain is in lower abdomen and back whereas her usual pain is more towards vagina.  She reports nausea has improved somewhat.      CT shows loculated mass in lower pelvis thought to represent hydrosalpinx and dense pelvic adhesions noted during recent surgery.      Patient Active Problem List   Diagnosis    Chronic post-traumatic headache    Gastroesophageal reflux disease    Lumbosacral radiculopathy    Hydrosalpinx    Endometriosis determined by laparoscopy    Pelvic mass    Postoperative fever       Past Medical History:   Diagnosis Date    ADHD     Allergic rhinitis     Anxiety and depression     Arthritis     Asthma     Chondromalacia patellae     DDD (degenerative disc disease), cervical     GERD (gastroesophageal reflux disease)     H/O radioactive iodine thyroid ablation 03/08/2017    Heart palpitations     Hyperthyroidism     Iron deficiency anemia     Menstrual problem     Ovarian cyst     Paratubal cyst 05/10/2023    Thyroid disease     Upper respiratory infection 05/02/2018    UTI (urinary tract infection) 05/02/2018    Vitamin D deficiency        Past Surgical History:   Procedure Laterality Date    APPENDECTOMY  2012    BREAST LUMPECTOMY      benign    COLONOSCOPY  approx 2009    normal per patient    COLONOSCOPY W/ POLYPECTOMY N/A 08/01/2018    Normal    DIAGNOSTIC LAPAROSCOPY N/A 9/25/2024    Procedure: DIAGNOSTIC LAPAROSCOPY;  Surgeon: Javon Jiménez MD;  Location: [facility] MAIN OR;  Service:  Obstetrics/Gynecology;  Laterality: N/A;    EAR TUBES      ENDOSCOPY N/A 08/01/2018    HH    KELOID EXCISION      right ear    MANDIBLE SURGERY  05/2016    ORIF ULNA/RADIUS FRACTURES      Distal radius ORIF with [facility] on 8/25/22    UPPER GASTROINTESTINAL ENDOSCOPY         Obstetric History:  OB History    No obstetric history on file.        Menstrual History:     Patient's last menstrual period was 09/21/2024 (exact date).       No obstetric history on file.    Family History   Problem Relation Age of Onset    Hypertension Mother     Diabetes Mother     Hypertension Father     Hyperlipidemia Father     Diabetes Father     Heart disease Father         pacemaker    Coronary artery disease Father     Diabetes Maternal Aunt     Irritable bowel syndrome Maternal Aunt     Diabetes Maternal Aunt     Hypertension Maternal Uncle     Diabetes Maternal Uncle     Coronary artery disease Maternal Uncle     Diabetes Maternal Uncle     Hypertension Half-Brother     Hyperlipidemia Half-Brother     Hypertension Half-Sister     Alcohol abuse Other     Diabetes Other     Hypertension Other     Colon cancer Neg Hx     Colon polyps Neg Hx     Crohn's disease Neg Hx     Ulcerative colitis Neg Hx     Malig Hyperthermia Neg Hx        Social History     Tobacco Use    Smoking status: Never     Passive exposure: Never    Smokeless tobacco: Never   Vaping Use    Vaping status: Never Used   Substance Use Topics    Alcohol use: Yes     Alcohol/week: 1.0 standard drink of alcohol     Types: 1 Glasses of wine per week    Drug use: Never       Aspartame and phenylalanine, Banana, Beta adrenergic blockers, Flagyl [metronidazole], Metoprolol, and Propranolol      Current Facility-Administered Medications:     acetaminophen (TYLENOL) tablet 1,000 mg, 1,000 mg, Oral, TID, Nesha Forrester MD, 1,000 mg at 09/28/24 1046    sennosides-docusate (PERICOLACE) 8.6-50 MG per tablet 2 tablet, 2 tablet, Oral, BID PRN **AND** polyethylene glycol (MIRALAX)  packet 17 g, 17 g, Oral, Daily PRN **AND** bisacodyl (DULCOLAX) EC tablet 5 mg, 5 mg, Oral, Daily PRN **AND** bisacodyl (DULCOLAX) suppository 10 mg, 10 mg, Rectal, Daily PRN, Nesha Forrester MD    budesonide-formoterol (SYMBICORT) 160-4.5 MCG/ACT inhaler 1 puff, 1 puff, Inhalation, BID, Nesha Forrester MD    calcium carbonate (TUMS) chewable tablet 500 mg (200 mg elemental), 2 tablet, Oral, BID PRN, Nesha Forrester MD    famotidine (PEPCID) tablet 40 mg, 40 mg, Oral, Daily, Nesha Forrester MD, 40 mg at 09/28/24 1046    ketorolac (TORADOL) injection 30 mg, 30 mg, Intravenous, Q6H, Nesha Forrester MD, 30 mg at 09/28/24 1045    levothyroxine (SYNTHROID, LEVOTHROID) tablet 112 mcg, 112 mcg, Oral, Daily, Nesha Forrester MD, 112 mcg at 09/28/24 1046    oxyCODONE (ROXICODONE) immediate release tablet 5 mg, 5 mg, Oral, Q6H PRN, Nesha Forrester MD    piperacillin-tazobactam (ZOSYN) 3.375 g IVPB in 100 mL NS MBP (CD), 3.375 g, Intravenous, Q6H, Nesha Forrester MD    promethazine (PHENERGAN) tablet 12.5 mg, 12.5 mg, Oral, Q6H PRN **OR** promethazine (PHENERGAN) suppository 12.5 mg, 12.5 mg, Rectal, Q6H PRN, Nesha Forrester MD    sodium chloride 0.9 % flush 10 mL, 10 mL, Intravenous, PRN, Nesha Forrester MD    sodium chloride 0.9 % flush 10 mL, 10 mL, Intravenous, Q12H, Nesha Forrester MD, 10 mL at 09/28/24 1043    sodium chloride 0.9 % flush 10 mL, 10 mL, Intravenous, PRNUsama Avital O'Brien, MD    sodium chloride 0.9 % infusion 40 mL, 40 mL, Intravenous, PRN, Nesha Forrester MD    sodium chloride 0.9 % infusion, 100 mL/hr, Intravenous, Continuous, Nesha Forrester MD, Last Rate: 100 mL/hr at 09/28/24 1043, 100 mL/hr at 09/28/24 1043    Review of Systems  Review of Systems   All other systems reviewed and are negative.      Objective     Vital Signs  Temp:  [98.5 °F (36.9 °C)-100.7 °F (38.2 °C)] 98.5 °F (36.9 °C)  Heart Rate:  [] 99  Resp:   [16-20] 16  BP: (110-133)/(67-80) 110/69    Physical Exam:  Physical Exam  Vitals and nursing note reviewed.   Constitutional:       General: She is not in acute distress.     Appearance: Normal appearance.   HENT:      Head: Normocephalic and atraumatic.   Eyes:      Extraocular Movements: Extraocular movements intact.   Cardiovascular:      Rate and Rhythm: Normal rate.   Pulmonary:      Effort: Pulmonary effort is normal. No respiratory distress.   Abdominal:      General: There is no distension.      Palpations: Abdomen is soft. There is no mass.      Tenderness: There is abdominal tenderness (mildly TTP in lower abdomen).   Musculoskeletal:         General: Normal range of motion.      Cervical back: Normal range of motion.   Skin:     General: Skin is warm and dry.   Neurological:      General: No focal deficit present.      Mental Status: She is alert and oriented to person, place, and time.   Psychiatric:         Mood and Affect: Mood normal.         Behavior: Behavior normal.         Labs:  Results from last 7 days   Lab Units 09/28/24  0548   WBC 10*3/mm3 13.43*   HEMOGLOBIN g/dL 11.1*   HEMATOCRIT % 35.4   PLATELETS 10*3/mm3 250     Results from last 7 days   Lab Units 09/28/24  0548   SODIUM mmol/L 142   POTASSIUM mmol/L 3.2*   CHLORIDE mmol/L 104   CO2 mmol/L 28.5   BUN mg/dL 6   CREATININE mg/dL 0.57   CALCIUM mg/dL 9.3   BILIRUBIN mg/dL 0.5   ALK PHOS U/L 105   ALT (SGPT) U/L 41*   AST (SGOT) U/L 86*   GLUCOSE mg/dL 138*         Assessment & Plan       Pelvic mass    Hydrosalpinx    Endometriosis determined by laparoscopy    Postoperative fever    38 yo F POD#3 s/p diagnostic laparoscopy with lysis of adhesions    -Surgical findings most significant for dense pelvic adhesions and hydrosalpinx.  -CT findings of loculated mass in pelvis, when compared to recent ultrasound, appear quite similar.  -Considering patient's fever upon initial presentation to the hospital, I have ordered Zosyn to cover for  possibility of abscess.  However, I suspect this more likely represents a large inflammatory response to surgery and lysis of adhesions.  Pain is currently appropriate for postop course.  -Plan to give 24 hours of antibiotics.  If vitals and labs are stable tomorrow, will discontinue.  IV toradol and po tylenol ordered to address inflammatory response.  Oxy pain scale available to patient if needed.  -Hypokalemia noted on admission, po KCl ordered    Dispo: admit to med/surg      Nesha Rashaun Forrester MD  09/28/24  14:13 EDT      Patient Care Team:  Adam Monahan DO as PCP - General (Internal Medicine)  Javon Schaeffer MD as Consulting Physician (Hematology and Oncology)  Estrella Garibay MD (Pediatric Endocrinology)  Javon Marinelli MD as Consulting Physician (Allergy)  Adam Monahan DO as Referring Physician (Internal Medicine)  Janet Hancock APRN as Nurse Practitioner (Gastroenterology)

## 2024-09-29 LAB
ANION GAP SERPL CALCULATED.3IONS-SCNC: 7 MMOL/L (ref 5–15)
BASOPHILS # BLD AUTO: 0.03 10*3/MM3 (ref 0–0.2)
BASOPHILS NFR BLD AUTO: 0.4 % (ref 0–1.5)
BUN SERPL-MCNC: 6 MG/DL (ref 6–20)
BUN/CREAT SERPL: 9.8 (ref 7–25)
CALCIUM SPEC-SCNC: 8 MG/DL (ref 8.6–10.5)
CHLORIDE SERPL-SCNC: 112 MMOL/L (ref 98–107)
CO2 SERPL-SCNC: 23 MMOL/L (ref 22–29)
CREAT SERPL-MCNC: 0.61 MG/DL (ref 0.57–1)
DEPRECATED RDW RBC AUTO: 39.3 FL (ref 37–54)
EGFRCR SERPLBLD CKD-EPI 2021: 117.5 ML/MIN/1.73
EOSINOPHIL # BLD AUTO: 0.14 10*3/MM3 (ref 0–0.4)
EOSINOPHIL NFR BLD AUTO: 1.9 % (ref 0.3–6.2)
ERYTHROCYTE [DISTWIDTH] IN BLOOD BY AUTOMATED COUNT: 13.2 % (ref 12.3–15.4)
GLUCOSE SERPL-MCNC: 108 MG/DL (ref 65–99)
HCT VFR BLD AUTO: 29.6 % (ref 34–46.6)
HGB BLD-MCNC: 9.4 G/DL (ref 12–15.9)
IMM GRANULOCYTES # BLD AUTO: 0.03 10*3/MM3 (ref 0–0.05)
IMM GRANULOCYTES NFR BLD AUTO: 0.4 % (ref 0–0.5)
LYMPHOCYTES # BLD AUTO: 1.95 10*3/MM3 (ref 0.7–3.1)
LYMPHOCYTES NFR BLD AUTO: 26.6 % (ref 19.6–45.3)
MCH RBC QN AUTO: 26.3 PG (ref 26.6–33)
MCHC RBC AUTO-ENTMCNC: 31.8 G/DL (ref 31.5–35.7)
MCV RBC AUTO: 82.7 FL (ref 79–97)
MONOCYTES # BLD AUTO: 0.63 10*3/MM3 (ref 0.1–0.9)
MONOCYTES NFR BLD AUTO: 8.6 % (ref 5–12)
NEUTROPHILS NFR BLD AUTO: 4.55 10*3/MM3 (ref 1.7–7)
NEUTROPHILS NFR BLD AUTO: 62.1 % (ref 42.7–76)
NRBC BLD AUTO-RTO: 0 /100 WBC (ref 0–0.2)
PLATELET # BLD AUTO: 198 10*3/MM3 (ref 140–450)
PMV BLD AUTO: 9.9 FL (ref 6–12)
POTASSIUM SERPL-SCNC: 4.7 MMOL/L (ref 3.5–5.2)
RBC # BLD AUTO: 3.58 10*6/MM3 (ref 3.77–5.28)
SODIUM SERPL-SCNC: 142 MMOL/L (ref 136–145)
WBC NRBC COR # BLD AUTO: 7.33 10*3/MM3 (ref 3.4–10.8)

## 2024-09-29 PROCEDURE — 25010000002 KETOROLAC TROMETHAMINE PER 15 MG: Performed by: STUDENT IN AN ORGANIZED HEALTH CARE EDUCATION/TRAINING PROGRAM

## 2024-09-29 PROCEDURE — 94664 DEMO&/EVAL PT USE INHALER: CPT

## 2024-09-29 PROCEDURE — 25810000003 SODIUM CHLORIDE 0.9 % SOLUTION: Performed by: STUDENT IN AN ORGANIZED HEALTH CARE EDUCATION/TRAINING PROGRAM

## 2024-09-29 PROCEDURE — 36415 COLL VENOUS BLD VENIPUNCTURE: CPT | Performed by: STUDENT IN AN ORGANIZED HEALTH CARE EDUCATION/TRAINING PROGRAM

## 2024-09-29 PROCEDURE — 85025 COMPLETE CBC W/AUTO DIFF WBC: CPT | Performed by: STUDENT IN AN ORGANIZED HEALTH CARE EDUCATION/TRAINING PROGRAM

## 2024-09-29 PROCEDURE — 63710000001 PROMETHAZINE PER 12.5 MG: Performed by: STUDENT IN AN ORGANIZED HEALTH CARE EDUCATION/TRAINING PROGRAM

## 2024-09-29 PROCEDURE — 94799 UNLISTED PULMONARY SVC/PX: CPT

## 2024-09-29 PROCEDURE — 99232 SBSQ HOSP IP/OBS MODERATE 35: CPT | Performed by: STUDENT IN AN ORGANIZED HEALTH CARE EDUCATION/TRAINING PROGRAM

## 2024-09-29 PROCEDURE — 25010000002 PIPERACILLIN SOD-TAZOBACTAM PER 1 G: Performed by: STUDENT IN AN ORGANIZED HEALTH CARE EDUCATION/TRAINING PROGRAM

## 2024-09-29 PROCEDURE — 80048 BASIC METABOLIC PNL TOTAL CA: CPT | Performed by: STUDENT IN AN ORGANIZED HEALTH CARE EDUCATION/TRAINING PROGRAM

## 2024-09-29 RX ORDER — AMOXICILLIN 250 MG
2 CAPSULE ORAL 2 TIMES DAILY PRN
Status: DISCONTINUED | OUTPATIENT
Start: 2024-09-29 | End: 2024-10-01 | Stop reason: HOSPADM

## 2024-09-29 RX ORDER — BISACODYL 5 MG/1
5 TABLET, DELAYED RELEASE ORAL DAILY PRN
Status: DISCONTINUED | OUTPATIENT
Start: 2024-09-29 | End: 2024-10-01 | Stop reason: HOSPADM

## 2024-09-29 RX ORDER — BISACODYL 10 MG
10 SUPPOSITORY, RECTAL RECTAL DAILY PRN
Status: DISCONTINUED | OUTPATIENT
Start: 2024-09-29 | End: 2024-10-01 | Stop reason: HOSPADM

## 2024-09-29 RX ORDER — SIMETHICONE 80 MG
80 TABLET,CHEWABLE ORAL 4 TIMES DAILY PRN
Status: DISCONTINUED | OUTPATIENT
Start: 2024-09-29 | End: 2024-10-01 | Stop reason: HOSPADM

## 2024-09-29 RX ORDER — POLYETHYLENE GLYCOL 3350 17 G/17G
17 POWDER, FOR SOLUTION ORAL 2 TIMES DAILY
Status: DISCONTINUED | OUTPATIENT
Start: 2024-09-29 | End: 2024-10-01 | Stop reason: HOSPADM

## 2024-09-29 RX ADMIN — Medication 10 ML: at 08:30

## 2024-09-29 RX ADMIN — SODIUM CHLORIDE 100 ML/HR: 9 INJECTION, SOLUTION INTRAVENOUS at 16:06

## 2024-09-29 RX ADMIN — BUDESONIDE AND FORMOTEROL FUMARATE DIHYDRATE 1 PUFF: 160; 4.5 AEROSOL RESPIRATORY (INHALATION) at 07:56

## 2024-09-29 RX ADMIN — OXYCODONE HYDROCHLORIDE 5 MG: 5 TABLET ORAL at 04:16

## 2024-09-29 RX ADMIN — SENNOSIDES AND DOCUSATE SODIUM 2 TABLET: 50; 8.6 TABLET ORAL at 06:22

## 2024-09-29 RX ADMIN — ACETAMINOPHEN 1000 MG: 500 TABLET ORAL at 08:29

## 2024-09-29 RX ADMIN — SIMETHICONE 80 MG: 80 TABLET, CHEWABLE ORAL at 16:05

## 2024-09-29 RX ADMIN — POLYETHYLENE GLYCOL 3350 17 G: 17 POWDER, FOR SOLUTION ORAL at 09:18

## 2024-09-29 RX ADMIN — BUDESONIDE AND FORMOTEROL FUMARATE DIHYDRATE 1 PUFF: 160; 4.5 AEROSOL RESPIRATORY (INHALATION) at 19:53

## 2024-09-29 RX ADMIN — PIPERACILLIN AND TAZOBACTAM 3.38 G: 3; .375 INJECTION, POWDER, LYOPHILIZED, FOR SOLUTION INTRAVENOUS at 08:29

## 2024-09-29 RX ADMIN — POLYETHYLENE GLYCOL 3350 17 G: 17 POWDER, FOR SOLUTION ORAL at 21:08

## 2024-09-29 RX ADMIN — Medication 10 ML: at 21:08

## 2024-09-29 RX ADMIN — FAMOTIDINE 40 MG: 20 TABLET, FILM COATED ORAL at 08:29

## 2024-09-29 RX ADMIN — PROMETHAZINE HYDROCHLORIDE 12.5 MG: 12.5 TABLET ORAL at 13:59

## 2024-09-29 RX ADMIN — LEVOTHYROXINE SODIUM 112 MCG: 112 TABLET ORAL at 08:29

## 2024-09-29 RX ADMIN — OXYCODONE HYDROCHLORIDE 5 MG: 5 TABLET ORAL at 16:05

## 2024-09-29 RX ADMIN — PIPERACILLIN AND TAZOBACTAM 3.38 G: 3; .375 INJECTION, POWDER, LYOPHILIZED, FOR SOLUTION INTRAVENOUS at 00:31

## 2024-09-29 RX ADMIN — OXYCODONE HYDROCHLORIDE 5 MG: 5 TABLET ORAL at 10:13

## 2024-09-29 RX ADMIN — KETOROLAC TROMETHAMINE 30 MG: 30 INJECTION, SOLUTION INTRAMUSCULAR at 04:16

## 2024-09-29 RX ADMIN — KETOROLAC TROMETHAMINE 30 MG: 30 INJECTION, SOLUTION INTRAMUSCULAR at 09:18

## 2024-09-29 NOTE — PLAN OF CARE
Goal Outcome Evaluation:              Outcome Evaluation: VSS, IVF infusing, IV abx given, scheduled pain med along with PO roxicodone given as ordered, up ad eva, tolerating diet, voiding, lap sites from prior sx CDI, EMMA, no nausea, resting between care and friend at bedside. will continue to monitor.

## 2024-09-29 NOTE — PROGRESS NOTES
[facility]   Obstetrics and Gynecology     9/29/2024      Patient:  Sundeep Espino   MR#:[id number]    Office note    Chief Complaint   Patient presents with    Vomiting       Subjective     Vomiting       39 y.o. female No obstetric history on file. Who initially presented yesterday with pain, n/v and fever on POD#3 from dx laparoscopy with lysis of adhesions. She has now been on Zosyn for 24 hours and has been on scheduled tylenol as well. She has been on Toradol and Oxy5 mg for pain. She states she feels that the pain is improved somewhat this morning, but she is nervous to be discharged as she is unsure if the pain or fever will return when she is off antibiotics and scheduled tylenol. She has been tolerating PO as of this AM and denies nausea.             Patient Active Problem List   Diagnosis    Chronic post-traumatic headache    Gastroesophageal reflux disease    Lumbosacral radiculopathy    Hydrosalpinx    Endometriosis determined by laparoscopy    Pelvic mass    Postoperative fever       Past Medical History:   Diagnosis Date    ADHD     Allergic rhinitis     Anxiety and depression     Arthritis     Asthma     Chondromalacia patellae     DDD (degenerative disc disease), cervical     Endometriosis     GERD (gastroesophageal reflux disease)     H/O radioactive iodine thyroid ablation 03/08/2017    Heart palpitations     Hydrosalpinx     Hyperthyroidism     Iron deficiency anemia     Ovarian cyst     Paratubal cyst 05/10/2023    Upper respiratory infection 05/02/2018    UTI (urinary tract infection) 05/02/2018    Vitamin D deficiency      Past Surgical History:   Procedure Laterality Date    APPENDECTOMY  2012    BREAST LUMPECTOMY      benign    COLONOSCOPY  approx 2009    normal per patient    COLONOSCOPY W/ POLYPECTOMY N/A 08/01/2018    Normal    DIAGNOSTIC LAPAROSCOPY N/A 9/25/2024    Procedure: DIAGNOSTIC LAPAROSCOPY;  Surgeon: Javon Jiménez MD;  Location: [facility] MAIN OR;  Service:  Obstetrics/Gynecology;  Laterality: N/A;    EAR TUBES      ENDOSCOPY N/A 08/01/2018    HH    KELOID EXCISION      right ear    MANDIBLE SURGERY  05/2016    ORIF ULNA/RADIUS FRACTURES      Distal radius ORIF with [facility] on 8/25/22    UPPER GASTROINTESTINAL ENDOSCOPY       Obstetric History:  OB History    No obstetric history on file.        Menstrual History:     Patient's last menstrual period was 09/21/2024 (exact date).       No obstetric history on file.  Family History   Problem Relation Age of Onset    Hypertension Mother     Diabetes Mother     Hypertension Father     Hyperlipidemia Father     Diabetes Father     Heart disease Father         pacemaker    Coronary artery disease Father     Diabetes Maternal Aunt     Irritable bowel syndrome Maternal Aunt     Diabetes Maternal Aunt     Hypertension Maternal Uncle     Diabetes Maternal Uncle     Coronary artery disease Maternal Uncle     Diabetes Maternal Uncle     Hypertension Half-Brother     Hyperlipidemia Half-Brother     Hypertension Half-Sister     Alcohol abuse Other     Diabetes Other     Hypertension Other     Colon cancer Neg Hx     Colon polyps Neg Hx     Crohn's disease Neg Hx     Ulcerative colitis Neg Hx     Malig Hyperthermia Neg Hx      Social History     Tobacco Use    Smoking status: Never     Passive exposure: Never    Smokeless tobacco: Never   Vaping Use    Vaping status: Never Used   Substance Use Topics    Alcohol use: Yes     Alcohol/week: 1.0 standard drink of alcohol     Types: 1 Glasses of wine per week    Drug use: Never     Aspartame and phenylalanine, Banana, Beta adrenergic blockers, Flagyl [metronidazole], Metoprolol, and Propranolol    Current Facility-Administered Medications:     sennosides-docusate (PERICOLACE) 8.6-50 MG per tablet 2 tablet, 2 tablet, Oral, BID PRN, 2 tablet at 09/29/24 0622 **AND** polyethylene glycol (MIRALAX) packet 17 g, 17 g, Oral, Daily PRN, 17 g at 09/29/24 0918 **AND** bisacodyl (DULCOLAX) EC tablet 5  mg, 5 mg, Oral, Daily PRN **AND** bisacodyl (DULCOLAX) suppository 10 mg, 10 mg, Rectal, Daily PRN, Nesha Forrester MD    budesonide-formoterol (SYMBICORT) 160-4.5 MCG/ACT inhaler 1 puff, 1 puff, Inhalation, BID, Nesha Forrester MD, 1 puff at 09/29/24 0756    calcium carbonate (TUMS) chewable tablet 500 mg (200 mg elemental), 2 tablet, Oral, BID PRN, Nesha Forrester MD    famotidine (PEPCID) tablet 40 mg, 40 mg, Oral, Daily, Nesha Forrester MD, 40 mg at 09/29/24 0829    levothyroxine (SYNTHROID, LEVOTHROID) tablet 112 mcg, 112 mcg, Oral, Daily, Nesha Forrester MD, 112 mcg at 09/29/24 0829    oxyCODONE (ROXICODONE) immediate release tablet 5 mg, 5 mg, Oral, Q6H PRN, Nesha Forrester MD, 5 mg at 09/29/24 1013    promethazine (PHENERGAN) tablet 12.5 mg, 12.5 mg, Oral, Q6H PRN, 12.5 mg at 09/29/24 1359 **OR** promethazine (PHENERGAN) suppository 12.5 mg, 12.5 mg, Rectal, Q6H PRN, Nesha Forrester MD    sodium chloride 0.9 % flush 10 mL, 10 mL, Intravenous, PRN, Nesha Forrester MD    sodium chloride 0.9 % flush 10 mL, 10 mL, Intravenous, Q12H, Nesha Forrester MD, 10 mL at 09/29/24 0830    sodium chloride 0.9 % flush 10 mL, 10 mL, Intravenous, PRN, Nesha Forrester MD    sodium chloride 0.9 % infusion 40 mL, 40 mL, Intravenous, PRN, Nesha Forrester MD    sodium chloride 0.9 % infusion, 100 mL/hr, Intravenous, Continuous, Nesha Forrester MD, Last Rate: 100 mL/hr at 09/28/24 1043, 100 mL/hr at 09/28/24 1043      Review of Systems   Gastrointestinal:  Positive for vomiting.   All other systems reviewed and are negative.      BP Readings from Last 3 Encounters:   09/29/24 112/76   09/25/24 120/80   08/27/24 106/64      Wt Readings from Last 3 Encounters:   09/28/24 68.8 kg (151 lb 10.8 oz)   09/25/24 66.3 kg (146 lb 3.2 oz)   08/27/24 70.8 kg (156 lb)      BMI: Estimated body mass index is 24.48 kg/m² as calculated from the following:    Height as of this  "encounter: 167.6 cm (66\").    Weight as of this encounter: 68.8 kg (151 lb 10.8 oz). BSA: Estimated body surface area is 1.78 meters squared as calculated from the following:    Height as of this encounter: 167.6 cm (66\").    Weight as of this encounter: 68.8 kg (151 lb 10.8 oz).    Objective   Physical Exam  Vitals and nursing note reviewed.   Constitutional:       Appearance: Normal appearance.   HENT:      Head: Normocephalic and atraumatic.   Pulmonary:      Effort: Pulmonary effort is normal.   Abdominal:      General: There is distension.      Palpations: Abdomen is soft.      Tenderness: There is abdominal tenderness. There is no guarding or rebound.   Neurological:      Mental Status: She is alert and oriented to person, place, and time.   Psychiatric:         Mood and Affect: Mood normal.         Assessment & Plan   39 y.o. female No obstetric history on file. Who initially presented yesterday with pain, n/v and fever on POD#3 from dx laparoscopy with lysis of adhesions.    - Plan to DC Zosyn and scheduled Tylenol today and observe for pain/fever x 24 hours.   - Patient has distension and constipation which may be contributing to pain. Increase Miralax to BID and add simethicone.   - blood cultures negative and WBC improved today.          No follow-ups on file.    Bianca Edwards MD   9/29/2024 15:14 EDT    "

## 2024-09-29 NOTE — PLAN OF CARE
Goal Outcome Evaluation:  Plan of Care Reviewed With: patient        Progress: improving  Outcome Evaluation: Walked in halls. Pain meds given as prescribed. IVFs infusing. Abx and tylenol discontinued. Resting well between care. Max temp 99 so far this shift.

## 2024-09-30 LAB
ANION GAP SERPL CALCULATED.3IONS-SCNC: 6.3 MMOL/L (ref 5–15)
BASOPHILS # BLD AUTO: 0.05 10*3/MM3 (ref 0–0.2)
BASOPHILS NFR BLD AUTO: 0.6 % (ref 0–1.5)
BUN SERPL-MCNC: 4 MG/DL (ref 6–20)
BUN/CREAT SERPL: 6.9 (ref 7–25)
CALCIUM SPEC-SCNC: 8.5 MG/DL (ref 8.6–10.5)
CHLORIDE SERPL-SCNC: 110 MMOL/L (ref 98–107)
CO2 SERPL-SCNC: 23.7 MMOL/L (ref 22–29)
CREAT SERPL-MCNC: 0.58 MG/DL (ref 0.57–1)
DEPRECATED RDW RBC AUTO: 40 FL (ref 37–54)
EGFRCR SERPLBLD CKD-EPI 2021: 119 ML/MIN/1.73
EOSINOPHIL # BLD AUTO: 0.16 10*3/MM3 (ref 0–0.4)
EOSINOPHIL NFR BLD AUTO: 2.1 % (ref 0.3–6.2)
ERYTHROCYTE [DISTWIDTH] IN BLOOD BY AUTOMATED COUNT: 13.2 % (ref 12.3–15.4)
GLUCOSE SERPL-MCNC: 94 MG/DL (ref 65–99)
HCT VFR BLD AUTO: 31 % (ref 34–46.6)
HGB BLD-MCNC: 9.7 G/DL (ref 12–15.9)
IMM GRANULOCYTES # BLD AUTO: 0.03 10*3/MM3 (ref 0–0.05)
IMM GRANULOCYTES NFR BLD AUTO: 0.4 % (ref 0–0.5)
LYMPHOCYTES # BLD AUTO: 2.17 10*3/MM3 (ref 0.7–3.1)
LYMPHOCYTES NFR BLD AUTO: 27.9 % (ref 19.6–45.3)
MCH RBC QN AUTO: 26.1 PG (ref 26.6–33)
MCHC RBC AUTO-ENTMCNC: 31.3 G/DL (ref 31.5–35.7)
MCV RBC AUTO: 83.3 FL (ref 79–97)
MONOCYTES # BLD AUTO: 0.49 10*3/MM3 (ref 0.1–0.9)
MONOCYTES NFR BLD AUTO: 6.3 % (ref 5–12)
NEUTROPHILS NFR BLD AUTO: 4.88 10*3/MM3 (ref 1.7–7)
NEUTROPHILS NFR BLD AUTO: 62.7 % (ref 42.7–76)
NRBC BLD AUTO-RTO: 0 /100 WBC (ref 0–0.2)
PLATELET # BLD AUTO: 244 10*3/MM3 (ref 140–450)
PMV BLD AUTO: 10.1 FL (ref 6–12)
POTASSIUM SERPL-SCNC: 3.8 MMOL/L (ref 3.5–5.2)
RBC # BLD AUTO: 3.72 10*6/MM3 (ref 3.77–5.28)
SODIUM SERPL-SCNC: 140 MMOL/L (ref 136–145)
WBC NRBC COR # BLD AUTO: 7.78 10*3/MM3 (ref 3.4–10.8)

## 2024-09-30 PROCEDURE — 85025 COMPLETE CBC W/AUTO DIFF WBC: CPT | Performed by: STUDENT IN AN ORGANIZED HEALTH CARE EDUCATION/TRAINING PROGRAM

## 2024-09-30 PROCEDURE — 94664 DEMO&/EVAL PT USE INHALER: CPT

## 2024-09-30 PROCEDURE — 94799 UNLISTED PULMONARY SVC/PX: CPT

## 2024-09-30 PROCEDURE — 99232 SBSQ HOSP IP/OBS MODERATE 35: CPT | Performed by: STUDENT IN AN ORGANIZED HEALTH CARE EDUCATION/TRAINING PROGRAM

## 2024-09-30 PROCEDURE — 80048 BASIC METABOLIC PNL TOTAL CA: CPT | Performed by: STUDENT IN AN ORGANIZED HEALTH CARE EDUCATION/TRAINING PROGRAM

## 2024-09-30 PROCEDURE — 25810000003 SODIUM CHLORIDE 0.9 % SOLUTION: Performed by: STUDENT IN AN ORGANIZED HEALTH CARE EDUCATION/TRAINING PROGRAM

## 2024-09-30 PROCEDURE — 36415 COLL VENOUS BLD VENIPUNCTURE: CPT | Performed by: STUDENT IN AN ORGANIZED HEALTH CARE EDUCATION/TRAINING PROGRAM

## 2024-09-30 RX ORDER — SULFAMETHOXAZOLE/TRIMETHOPRIM 800-160 MG
1 TABLET ORAL EVERY 12 HOURS SCHEDULED
Status: DISCONTINUED | OUTPATIENT
Start: 2024-09-30 | End: 2024-10-01 | Stop reason: HOSPADM

## 2024-09-30 RX ADMIN — BUDESONIDE AND FORMOTEROL FUMARATE DIHYDRATE 1 PUFF: 160; 4.5 AEROSOL RESPIRATORY (INHALATION) at 08:21

## 2024-09-30 RX ADMIN — SODIUM CHLORIDE 100 ML/HR: 9 INJECTION, SOLUTION INTRAVENOUS at 10:59

## 2024-09-30 RX ADMIN — BUDESONIDE AND FORMOTEROL FUMARATE DIHYDRATE 1 PUFF: 160; 4.5 AEROSOL RESPIRATORY (INHALATION) at 21:23

## 2024-09-30 RX ADMIN — SULFAMETHOXAZOLE AND TRIMETHOPRIM 1 TABLET: 800; 160 TABLET ORAL at 21:34

## 2024-09-30 RX ADMIN — FAMOTIDINE 40 MG: 20 TABLET, FILM COATED ORAL at 08:35

## 2024-09-30 RX ADMIN — OXYCODONE HYDROCHLORIDE 5 MG: 5 TABLET ORAL at 19:26

## 2024-09-30 RX ADMIN — SULFAMETHOXAZOLE AND TRIMETHOPRIM 1 TABLET: 800; 160 TABLET ORAL at 12:26

## 2024-09-30 RX ADMIN — SODIUM CHLORIDE 100 ML/HR: 9 INJECTION, SOLUTION INTRAVENOUS at 00:51

## 2024-09-30 RX ADMIN — OXYCODONE HYDROCHLORIDE 5 MG: 5 TABLET ORAL at 06:44

## 2024-09-30 RX ADMIN — OXYCODONE HYDROCHLORIDE 5 MG: 5 TABLET ORAL at 13:08

## 2024-09-30 RX ADMIN — OXYCODONE HYDROCHLORIDE 5 MG: 5 TABLET ORAL at 00:51

## 2024-09-30 RX ADMIN — POLYETHYLENE GLYCOL 3350 17 G: 17 POWDER, FOR SOLUTION ORAL at 21:34

## 2024-09-30 RX ADMIN — LEVOTHYROXINE SODIUM 112 MCG: 112 TABLET ORAL at 08:35

## 2024-09-30 RX ADMIN — SODIUM CHLORIDE 100 ML/HR: 9 INJECTION, SOLUTION INTRAVENOUS at 21:03

## 2024-09-30 RX ADMIN — POLYETHYLENE GLYCOL 3350 17 G: 17 POWDER, FOR SOLUTION ORAL at 08:35

## 2024-09-30 RX ADMIN — Medication 10 ML: at 21:34

## 2024-09-30 NOTE — PAYOR COMM NOTE
"Sundeep Chavira \"Jonna\" (39 y.o. Female)          INPATIENT REQUEST FOR [id number]    CONTACT JANET FINK  F# [phone]         Date of Birth   1985    Social Security Number       Address   [address] [place] [address] [address] [place] [id number]    Home Phone       MRN   [id number]       [facility]    Marital Status   Significant Other                            Admission Date   9/28/24    Admission Type   Emergency    Admitting Provider   Nesha Forrester MD    Attending Provider   Nesha Forrester MD    Department, Room/Bed   [facility] [address] [place], P694/1       Discharge Date       Discharge Disposition       Discharge Destination                                 Attending Provider: Nesha Forrester MD    Allergies: Aspartame And Phenylalanine, Banana, Beta Adrenergic Blockers, Flagyl [Metronidazole], Metoprolol, Propranolol    Isolation: None   Infection: None   Code Status: CPR    Ht: 167.6 cm (66\")   Wt: 68.8 kg (151 lb 10.8 oz)    Admission Cmt: None   Principal Problem: Pelvic mass [R19.00]                   Active Insurance as of 9/28/2024       Primary Coverage       Payor Plan Insurance Group Employer/Plan Group    [employer]HEM BLUE CROSS ANTHEM BLUE CROSS BLUE SHIELD PPO [id number]       Payor Plan Address Payor Plan Phone Number Payor Plan Fax Number Effective Dates    [address] [phone]  1/1/2019 - None Entered    [place] [address]         Subscriber Name Subscriber Birth Date Member ID       SUNDEEP CHAVIRA 1985 [id number]                     Emergency Contacts        (Rel.) Home Phone Work Phone Mobile Phone    Yari Putnam (Aunt) (Relative) [phone] -- --    Antonio Mena (Significant Other) -- -- [phone]                 History & Physical        Nesha Forrester MD at 09/28/24 0930          [facility]   Obstetrics and Gynecology   History & Physical    9/28/2024    Patient: Sundeep Chavira          " MR#:[id number]    Chief complaint:  POD3 s/p diagnostic laparoscopy with lysis of adhesions    Subjective    39 y.o. female POD#3 s/p diagnostic laparoscopy with lysis of adhesions presents with lower abdominal pain, nausea, vomiting, and fever.  Temp 100.7 upon arriving to the ER but soon normalized  with IV fluids only.  Pain improved with IV narcotics but still present.  She reports pain is in lower abdomen and back whereas her usual pain is more towards vagina.  She reports nausea has improved somewhat.      CT shows loculated mass in lower pelvis thought to represent hydrosalpinx and dense pelvic adhesions noted during recent surgery.      Patient Active Problem List   Diagnosis    Chronic post-traumatic headache    Gastroesophageal reflux disease    Lumbosacral radiculopathy    Hydrosalpinx    Endometriosis determined by laparoscopy    Pelvic mass    Postoperative fever       Past Medical History:   Diagnosis Date    ADHD     Allergic rhinitis     Anxiety and depression     Arthritis     Asthma     Chondromalacia patellae     DDD (degenerative disc disease), cervical     GERD (gastroesophageal reflux disease)     H/O radioactive iodine thyroid ablation 03/08/2017    Heart palpitations     Hyperthyroidism     Iron deficiency anemia     Menstrual problem     Ovarian cyst     Paratubal cyst 05/10/2023    Thyroid disease     Upper respiratory infection 05/02/2018    UTI (urinary tract infection) 05/02/2018    Vitamin D deficiency        Past Surgical History:   Procedure Laterality Date    APPENDECTOMY  2012    BREAST LUMPECTOMY      benign    COLONOSCOPY  approx 2009    normal per patient    COLONOSCOPY W/ POLYPECTOMY N/A 08/01/2018    Normal    DIAGNOSTIC LAPAROSCOPY N/A 9/25/2024    Procedure: DIAGNOSTIC LAPAROSCOPY;  Surgeon: Javon Jiménez MD;  Location: [facility] MAIN OR;  Service: Obstetrics/Gynecology;  Laterality: N/A;    EAR TUBES      ENDOSCOPY N/A 08/01/2018    HH    KELOID EXCISION      right ear     MANDIBLE SURGERY  05/2016    ORIF ULNA/RADIUS FRACTURES      Distal radius ORIF with [facility] on 8/25/22    UPPER GASTROINTESTINAL ENDOSCOPY         Obstetric History:  OB History    No obstetric history on file.        Menstrual History:     Patient's last menstrual period was 09/21/2024 (exact date).       No obstetric history on file.    Family History   Problem Relation Age of Onset    Hypertension Mother     Diabetes Mother     Hypertension Father     Hyperlipidemia Father     Diabetes Father     Heart disease Father         pacemaker    Coronary artery disease Father     Diabetes Maternal Aunt     Irritable bowel syndrome Maternal Aunt     Diabetes Maternal Aunt     Hypertension Maternal Uncle     Diabetes Maternal Uncle     Coronary artery disease Maternal Uncle     Diabetes Maternal Uncle     Hypertension Half-Brother     Hyperlipidemia Half-Brother     Hypertension Half-Sister     Alcohol abuse Other     Diabetes Other     Hypertension Other     Colon cancer Neg Hx     Colon polyps Neg Hx     Crohn's disease Neg Hx     Ulcerative colitis Neg Hx     Malig Hyperthermia Neg Hx        Social History     Tobacco Use    Smoking status: Never     Passive exposure: Never    Smokeless tobacco: Never   Vaping Use    Vaping status: Never Used   Substance Use Topics    Alcohol use: Yes     Alcohol/week: 1.0 standard drink of alcohol     Types: 1 Glasses of wine per week    Drug use: Never       Aspartame and phenylalanine, Banana, Beta adrenergic blockers, Flagyl [metronidazole], Metoprolol, and Propranolol      Current Facility-Administered Medications:     acetaminophen (TYLENOL) tablet 1,000 mg, 1,000 mg, Oral, TID, Nesha Forrester MD, 1,000 mg at 09/28/24 1046    sennosides-docusate (PERICOLACE) 8.6-50 MG per tablet 2 tablet, 2 tablet, Oral, BID PRN **AND** polyethylene glycol (MIRALAX) packet 17 g, 17 g, Oral, Daily PRN **AND** bisacodyl (DULCOLAX) EC tablet 5 mg, 5 mg, Oral, Daily PRN **AND** bisacodyl  (DULCOLAX) suppository 10 mg, 10 mg, Rectal, Daily PRN, Nesha Forrester MD    budesonide-formoterol (SYMBICORT) 160-4.5 MCG/ACT inhaler 1 puff, 1 puff, Inhalation, BID, Nesha Forrester MD    calcium carbonate (TUMS) chewable tablet 500 mg (200 mg elemental), 2 tablet, Oral, BID PRN, Nesha Forrester MD    famotidine (PEPCID) tablet 40 mg, 40 mg, Oral, Daily, Nesha Forrester MD, 40 mg at 09/28/24 1046    ketorolac (TORADOL) injection 30 mg, 30 mg, Intravenous, Q6H, Nesha Forrester MD, 30 mg at 09/28/24 1045    levothyroxine (SYNTHROID, LEVOTHROID) tablet 112 mcg, 112 mcg, Oral, Daily, Nesha Forrester MD, 112 mcg at 09/28/24 1046    oxyCODONE (ROXICODONE) immediate release tablet 5 mg, 5 mg, Oral, Q6H PRN, Nesha Forrester MD    piperacillin-tazobactam (ZOSYN) 3.375 g IVPB in 100 mL NS MBP (CD), 3.375 g, Intravenous, Q6H, Nesha Forrester MD    promethazine (PHENERGAN) tablet 12.5 mg, 12.5 mg, Oral, Q6H PRN **OR** promethazine (PHENERGAN) suppository 12.5 mg, 12.5 mg, Rectal, Q6H PRN, Nesha Forrester MD    sodium chloride 0.9 % flush 10 mL, 10 mL, Intravenous, PRN, Nesha Forrester MD    sodium chloride 0.9 % flush 10 mL, 10 mL, Intravenous, Q12H, Nesha Forrester MD, 10 mL at 09/28/24 1043    sodium chloride 0.9 % flush 10 mL, 10 mL, Intravenous, PRN, Nesha Forrester MD    sodium chloride 0.9 % infusion 40 mL, 40 mL, Intravenous, PRN, Nesha Forrester MD    sodium chloride 0.9 % infusion, 100 mL/hr, Intravenous, Continuous, Nesha Forrester MD, Last Rate: 100 mL/hr at 09/28/24 1043, 100 mL/hr at 09/28/24 1043    Review of Systems  Review of Systems   All other systems reviewed and are negative.      Objective    Vital Signs  Temp:  [98.5 °F (36.9 °C)-100.7 °F (38.2 °C)] 98.5 °F (36.9 °C)  Heart Rate:  [] 99  Resp:  [16-20] 16  BP: (110-133)/(67-80) 110/69    Physical Exam:  Physical Exam  Vitals and nursing note reviewed.    Constitutional:       General: She is not in acute distress.     Appearance: Normal appearance.   HENT:      Head: Normocephalic and atraumatic.   Eyes:      Extraocular Movements: Extraocular movements intact.   Cardiovascular:      Rate and Rhythm: Normal rate.   Pulmonary:      Effort: Pulmonary effort is normal. No respiratory distress.   Abdominal:      General: There is no distension.      Palpations: Abdomen is soft. There is no mass.      Tenderness: There is abdominal tenderness (mildly TTP in lower abdomen).   Musculoskeletal:         General: Normal range of motion.      Cervical back: Normal range of motion.   Skin:     General: Skin is warm and dry.   Neurological:      General: No focal deficit present.      Mental Status: She is alert and oriented to person, place, and time.   Psychiatric:         Mood and Affect: Mood normal.         Behavior: Behavior normal.         Labs:  Results from last 7 days   Lab Units 09/28/24  0548   WBC 10*3/mm3 13.43*   HEMOGLOBIN g/dL 11.1*   HEMATOCRIT % 35.4   PLATELETS 10*3/mm3 250     Results from last 7 days   Lab Units 09/28/24  0548   SODIUM mmol/L 142   POTASSIUM mmol/L 3.2*   CHLORIDE mmol/L 104   CO2 mmol/L 28.5   BUN mg/dL 6   CREATININE mg/dL 0.57   CALCIUM mg/dL 9.3   BILIRUBIN mg/dL 0.5   ALK PHOS U/L 105   ALT (SGPT) U/L 41*   AST (SGOT) U/L 86*   GLUCOSE mg/dL 138*         Assessment & Plan      Pelvic mass    Hydrosalpinx    Endometriosis determined by laparoscopy    Postoperative fever    40 yo F POD#3 s/p diagnostic laparoscopy with lysis of adhesions    -Surgical findings most significant for dense pelvic adhesions and hydrosalpinx.  -CT findings of loculated mass in pelvis, when compared to recent ultrasound, appear quite similar.  -Considering patient's fever upon initial presentation to the hospital, I have ordered Zosyn to cover for possibility of abscess.  However, I suspect this more likely represents a large inflammatory response to surgery  and lysis of adhesions.  Pain is currently appropriate for postop course.  -Plan to give 24 hours of antibiotics.  If vitals and labs are stable tomorrow, will discontinue.  IV toradol and po tylenol ordered to address inflammatory response.  Oxy pain scale available to patient if needed.  -Hypokalemia noted on admission, po KCl ordered    Dispo: admit to med/surg      Nesha Forrester MD  09/28/24  14:13 EDT      Patient Care Team:  Adam Monahan DO as PCP - General (Internal Medicine)  Javon Schaeffer MD as Consulting Physician (Hematology and Oncology)  Estrella Garibay MD (Pediatric Endocrinology)  Javon Marinelli MD as Consulting Physician (Allergy)  Adam Monahan DO as Referring Physician (Internal Medicine)  Janet Hancock APRN as Nurse Practitioner (Gastroenterology)              Electronically signed by Nesha Forrester MD at 09/28/24 1418          Emergency Department Notes        Rigo Reyes RN at 09/28/24 0914          Nursing report ED to floor  Sundeep Espino  39 y.o.  female    HPI :   Chief Complaint   Patient presents with    Vomiting       Admitting doctor:   Nesha Forrester MD    Admitting diagnosis:   The primary encounter diagnosis was Pelvic mass. A diagnosis of Sepsis without acute organ dysfunction, due to unspecified organism was also pertinent to this visit.    Code status:   Current Code Status       Date Active Code Status Order ID Comments User Context       Prior            Allergies:   Aspartame and phenylalanine, Banana, Beta adrenergic blockers, Flagyl [metronidazole], Metoprolol, and Propranolol    Isolation:   No active isolations    Intake and Output  No intake or output data in the 24 hours ending 09/28/24 0914    Weight:       09/28/24  0527   Weight: 65.8 kg (145 lb)       Most recent vitals:   Vitals:    09/28/24 0608 09/28/24 0749 09/28/24 0801 09/28/24 0901   BP: 121/70  123/73 120/67   Pulse: 114  115 115   Resp:       Temp:  99.6 °F (37.6 °C)      TempSrc:  Tympanic     SpO2: 100%  100% 99%   Weight:       Height:           Active LDAs/IV Access:   Lines, Drains & Airways       Active LDAs       Name Placement date Placement time Site Days    Peripheral IV 09/28/24 0549 Anterior;Left Forearm 09/28/24  0549  Forearm  less than 1                    Labs (abnormal labs have a star):   Labs Reviewed   COMPREHENSIVE METABOLIC PANEL - Abnormal; Notable for the following components:       Result Value    Glucose 138 (*)     Potassium 3.2 (*)     ALT (SGPT) 41 (*)     AST (SGOT) 86 (*)     All other components within normal limits    Narrative:     GFR Normal >60  Chronic Kidney Disease <60  Kidney Failure <15     LIPASE - Abnormal; Notable for the following components:    Lipase 12 (*)     All other components within normal limits   URINALYSIS W/ MICROSCOPIC IF INDICATED (NO CULTURE) - Abnormal; Notable for the following components:    Appearance, UA Cloudy (*)     Ketones, UA 15 mg/dL (1+) (*)     Blood, UA Large (3+) (*)     Protein, UA Trace (*)     Leuk Esterase, UA Moderate (2+) (*)     All other components within normal limits   CBC WITH AUTO DIFFERENTIAL - Abnormal; Notable for the following components:    WBC 13.43 (*)     Hemoglobin 11.1 (*)     MCH 26.2 (*)     MCHC 31.4 (*)     Neutrophil % 89.1 (*)     Lymphocyte % 5.4 (*)     Monocyte % 4.3 (*)     Neutrophils, Absolute 11.95 (*)     Immature Grans, Absolute 0.06 (*)     All other components within normal limits   URINALYSIS, MICROSCOPIC ONLY - Abnormal; Notable for the following components:    RBC, UA Too Numerous to Count (*)     WBC, UA 3-5 (*)     Squamous Epithelial Cells, UA 3-6 (*)     All other components within normal limits   LACTIC ACID, PLASMA - Normal   HCG, SERUM, QUALITATIVE - Normal   BLOOD CULTURE   BLOOD CULTURE   RAINBOW DRAW    Narrative:     The following orders were created for panel order [place] Draw.  Procedure                               Abnormality         Status                      ---------                               -----------         ------                     Green Top (Gel)[[id number]]                                  Final result               Lavender Top[[id number]]                                     Final result               Gold Top - SST[[id number]]                                                              Light Blue Top[[id number]]                                   Final result                 Please view results for these tests on the individual orders.   CBC AND DIFFERENTIAL    Narrative:     The following orders were created for panel order CBC & Differential.  Procedure                               Abnormality         Status                     ---------                               -----------         ------                     CBC Auto Differential[[id number]]        Abnormal            Final result                 Please view results for these tests on the individual orders.   GREEN TOP   LAVENDER TOP   LIGHT BLUE TOP       EKG:   No orders to display       Meds given in ED:   Medications   sodium chloride 0.9 % flush 10 mL (has no administration in time range)   piperacillin-tazobactam (ZOSYN) 4.5 g IVPB in 100 mL NS MBP (CD) (has no administration in time range)   lactated ringers bolus 1,000 mL (1,000 mL Intravenous New Bag 9/28/24 0605)   morphine injection 2 mg (2 mg Intravenous Given 9/28/24 0604)   ondansetron (ZOFRAN) injection 4 mg (4 mg Intravenous Given 9/28/24 0605)   morphine injection 2 mg (2 mg Intravenous Given 9/28/24 0700)   iopamidol (ISOVUE-300) 61 % injection 100 mL (100 mL Intravenous Given by Other 9/28/24 0652)   HYDROmorphone (DILAUDID) injection 0.5 mg (0.5 mg Intravenous Given 9/28/24 0746)       Imaging results:  No radiology results for the last day    Ambulatory status:   -  ad eva    Social issues:   Social History     Socioeconomic History    Marital status: Significant Other    Years of education: College   Tobacco Use     Smoking status: Never     Passive exposure: Never    Smokeless tobacco: Never   Vaping Use    Vaping status: Never Used   Substance and Sexual Activity    Alcohol use: Yes     Alcohol/week: 1.0 standard drink of alcohol     Types: 1 Glasses of wine per week    Drug use: Never    Sexual activity: Defer     Partners: Male     Birth control/protection: Condom, Abstinence       NIH Stroke Scale:       Rigo Reyes RN  09/28/24 09:14 EDT          Electronically signed by Rigo Reyes RN at 09/28/24 0914       Cresencio Robert MD at 09/28/24 0751          MD ATTESTATION NOTE     SHARED VISIT: This visit was performed by BOTH a physician and an APC. The substantive portion of the medical decision making was performed by this attesting physician who made or approved the management plan and takes responsibility for patient management. All studies in the APC note (if performed) were independently interpreted by me.  The HODA and I have discussed this patient's history, physical exam, and treatment plan. I have reviewed the documentation and personally had a face to face interaction with the patient. I affirm the documentation and agree with the treatment and plan. I provided a substantive portion of the care of the patient.  I personally performed the physical exam in its entirety, and below are my findings.      Brief HPI: Patient complains of acute lower abdominal pain, nausea, and vomiting.  Symptoms began yesterday.  She reports associated fever and chills.  Pain radiates into her lower back. She had a diagnostic laparoscopy and adhesiolysis on 9/25/2024.      PHYSICAL EXAM    GENERAL: Awake, alert, oriented x 3.  No acute distress.  Well-developed, well-nourished and somewhat ill-appearing female.  HENT: nares patent  EYES: no scleral icterus  CV: regular rhythm, normal rate  RESPIRATORY: normal effort, CTAB  ABDOMEN: soft, there is lower abdominal tenderness without rebound or guarding  MUSCULOSKELETAL: no  deformity  NEURO: alert, moves all extremities, follows commands  PSYCH:  calm, cooperative  SKIN: warm, dry    Vital signs and nursing notes reviewed.        Plan: Patient presents to the ED with acute lower abdominal pain, nausea, vomiting, and fever.  White blood cell count is elevated.  Lactic acid is normal.  CT scan shows a fluid-filled and septated mass extending slightly more to the right which is likely combination of right ovary and dilated hydrosalpinx.  There is no bowel obstruction.  Patient has been given IV fluids and IV medications for pain and nausea.  Case has been discussed with on-call OB/GYN and they will come see the patient in the ED.    ED Course as of 09/28/24 0924   Sat Sep 28, 2024   3176 First look: Patient is a 39-year-old female with history of endometriosis presents emergency department today complaining of generalized abdominal pain with associated nausea and vomiting that began while at work tonight.  Patient reports that she felt febrile but had not had a fever prior to tonight.  She had a  diagnostic laparoscopy 3 days ago at [facility].  She reports that they removed a cyst during that surgery.  She does report some dysuria but says she had a catheter during surgery.  She reports pain is both in her upper, lower abdomen, and her back.  Will initiate workup with labs, CT imaging, and pain control. [CC]   0628 WBC(!): 13.43 [DC]   0628 Temp(!): 100.7 °F (38.2 °C) [DC]   0735 I discussed the case with MD Emil with OB/GYN at this time regarding the patient.  I discussed work-up, results, concerns.  I discussed the consulting provider's desire for having one of the OB/GYN service physicians come to the ER to evaluate her.   [DC]   0911 Patient presentation and evaluation consistent with pelvic mass of uncertain origin with evidence of sepsis requiring admission to the OB/GYN service for continued monitoring and IV antibiotics.  Patient agreeable and all questions answered.  [DC]   0912 I discussed the case with MD Usama with OB/GYN at this time regarding the patient.  I discussed work-up, results, concerns.  I discussed the consulting provider's desire for IV antibiotics and observation admission to the OB/GYN service.   [DC]      ED Course User Index  [CC] Sandy Agudelo PA-C  [DC] Xavier Benavidez PA     MDM: Patient presented to the ED complaining of lower abdominal pain, nausea, and vomiting.  She recently had a diagnostic laparoscopy and adhesiolysis.  Here in the ED, she had a low-grade fever.  White blood cell count was elevated.  CT scan showed a fluid-filled mass in the right pelvis.  She was evaluated by GYN and will be admitted.  She will be started on IV Zosyn.       Cresencio Robert MD  09/28/24 0925      Electronically signed by Cresencio Robert MD at 09/28/24 0925       Xavier Benavidez PA at 09/28/24 0602       Attestation signed by Cresencio Robert MD at 09/28/24 1111        SHARED APC FACE TO FACE: I performed a substantive part of the MDM during the patient's E/M visit. I personally evaluated and examined the patient. I personally made or approved the documented management plan and acknowledge its risk of complications.   Cresencio Robert MD 9/28/2024 11:11 EDT                          EMERGENCY DEPARTMENT ENCOUNTER      Room Number:  03/03  PCP: Adam Monahan DO  Independent Historians: Patient  Patient Care Team:  Adam Monahan DO as PCP - General (Internal Medicine)  Javon Schaeffer MD as Consulting Physician (Hematology and Oncology)  Estrella Garibay MD (Pediatric Endocrinology)  Javon Marinelli MD as Consulting Physician (Allergy)  Adam Monahan DO as Referring Physician (Internal Medicine)  Janet Hancock APRN as Nurse Practitioner (Gastroenterology)       HPI:  Chief Complaint: Abdominal pain    A complete HPI/ROS/PMH/PSH/SH/FH are unobtainable due to: None    Chronic or social conditions impacting patient care (Social Determinants of  Health): None      Context: Sundeep Espino is a 39 y.o. female with a PMH significant for GERD, lumbosacral radiculopathy, hydrosalpinx, endometriosis who presents to the ED c/o acute lower abdominal pain, flank pain, nausea and vomiting.  The patient reports having a recent diagnostic laparoscopy on September 25 to remove a paratubal cyst and hydrosalpinx.  Yesterday evening she started with chills and some associated nausea but started having vomiting as well as lower back pain this morning and decided to come to the ER.  She reports having chills and associated fever prior to coming in as well.  Denies blood in the vomit or stool.  No recent antibiotics.      Upon review of prior external notes (non-ED) -and- Review of prior external test results outside of this encounter it appears the patient was evaluated in the office with internal medicine for annual physical exam on 6/27/2024.  The patient had a normal urine pregnancy test on 9/25/2024 and a normal hCG on 6/16/2024.      PAST MEDICAL HISTORY  Active Ambulatory Problems     Diagnosis Date Noted    Magnetic resonance imaging of brain abnormal 10/21/2013    Abnormal electrocardiogram 11/14/2016    Abnormal magnetic resonance imaging study 08/28/2014    Allergic rhinitis 11/14/2016    Iron deficiency anemia due to chronic blood loss 11/14/2016    Asthma 05/01/2013    Chronic post-traumatic headache 10/21/2013    Gastroesophageal reflux disease 11/14/2016    Lumbosacral radiculopathy 01/06/2014    Menorrhagia 05/01/2013    Menstrual disorder 11/14/2016    Neck pain 10/21/2013    Paresthesia of lower extremity 10/21/2013    Numbness and tingling sensation of skin 05/01/2013    Muscle weakness (generalized) 08/21/2013    Health care maintenance 11/14/2016    Thyrotoxicosis without thyroid storm 12/15/2016    Weight loss 12/15/2016    Urticaria 02/09/2017    Insomnia due to medical condition 02/09/2017    Facial swelling 03/06/2017    Abnormal weight gain  03/08/2017    Graves disease 03/08/2017    Other symptoms and signs involving the musculoskeletal system 08/21/2013    Bloating symptom 07/23/2018    Diarrhea 07/23/2018    Hypovitaminosis D 11/02/2018    Controlled substance agreement signed 11/02/2018    Exogenous obesity 11/18/2018    Postablative hypothyroidism 10/22/2019    Ankle instability, left 10/29/2019    Family history of diabetes mellitus in mother 12/29/2021    Family history of essential hypertension 12/29/2021    History of ovarian cyst 12/06/2022    Iron deficiency anemia 12/09/2022    Constipation 08/15/2024    Nausea and vomiting 08/15/2024    Lower abdominal pain 08/15/2024    Pelvic pain 08/27/2024    Hydrosalpinx 09/25/2024    Endometriosis determined by laparoscopy 09/25/2024     Resolved Ambulatory Problems     Diagnosis Date Noted    Intractable chronic migraine without aura 05/01/2013    Mast cell disease 03/06/2017    H/O radioactive iodine thyroid ablation 03/08/2017    Yeast vaginitis 05/02/2018    UTI (urinary tract infection) 05/02/2018    Upper respiratory infection 05/02/2018    Paratubal cyst 05/10/2023     Past Medical History:   Diagnosis Date    ADHD     Anxiety and depression     Arthritis     Chondromalacia patellae     DDD (degenerative disc disease), cervical     GERD (gastroesophageal reflux disease)     Heart palpitations     Hyperthyroidism     Menstrual problem     Ovarian cyst     Thyroid disease     Vitamin D deficiency          PAST SURGICAL HISTORY  Past Surgical History:   Procedure Laterality Date    APPENDECTOMY  2012    BREAST LUMPECTOMY      benign    COLONOSCOPY  approx 2009    normal per patient    COLONOSCOPY W/ POLYPECTOMY N/A 08/01/2018    Normal    DIAGNOSTIC LAPAROSCOPY N/A 9/25/2024    Procedure: DIAGNOSTIC LAPAROSCOPY;  Surgeon: Javon Jiménez MD;  Location: [facility] MAIN OR;  Service: Obstetrics/Gynecology;  Laterality: N/A;    EAR TUBES      ENDOSCOPY N/A 08/01/2018    HH    KELOID EXCISION      right ear     MANDIBLE SURGERY  05/2016    ORIF ULNA/RADIUS FRACTURES      Distal radius ORIF with [facility] on 8/25/22    UPPER GASTROINTESTINAL ENDOSCOPY           FAMILY HISTORY  Family History   Problem Relation Age of Onset    Hypertension Mother     Diabetes Mother     Hypertension Father     Hyperlipidemia Father     Diabetes Father     Heart disease Father         pacemaker    Coronary artery disease Father     Diabetes Maternal Aunt     Irritable bowel syndrome Maternal Aunt     Diabetes Maternal Aunt     Hypertension Maternal Uncle     Diabetes Maternal Uncle     Coronary artery disease Maternal Uncle     Diabetes Maternal Uncle     Hypertension Half-Brother     Hyperlipidemia Half-Brother     Hypertension Half-Sister     Alcohol abuse Other     Diabetes Other     Hypertension Other     Colon cancer Neg Hx     Colon polyps Neg Hx     Crohn's disease Neg Hx     Ulcerative colitis Neg Hx     Malig Hyperthermia Neg Hx          SOCIAL HISTORY  Social History     Socioeconomic History    Marital status: Significant Other    Years of education: College   Tobacco Use    Smoking status: Never     Passive exposure: Never    Smokeless tobacco: Never   Vaping Use    Vaping status: Never Used   Substance and Sexual Activity    Alcohol use: Yes     Alcohol/week: 1.0 standard drink of alcohol     Types: 1 Glasses of wine per week    Drug use: Never    Sexual activity: Defer     Partners: Male     Birth control/protection: Condom, Abstinence         ALLERGIES  Aspartame and phenylalanine, Banana, Beta adrenergic blockers, Flagyl [metronidazole], Metoprolol, and Propranolol      REVIEW OF SYSTEMS  Included in HPI  All systems reviewed and negative except for those discussed in HPI.      PHYSICAL EXAM    I have reviewed the triage vital signs and nursing notes.    ED Triage Vitals   Temp Heart Rate Resp BP SpO2   09/28/24 0527 09/28/24 0527 09/28/24 0527 09/28/24 0530 09/28/24 0527   (!) 100.7 °F (38.2 °C) 68 20 133/80 99 %      Temp src  Heart Rate Source Patient Position BP Location FiO2 (%)   09/28/24 0527 -- -- -- --   Tympanic           Physical Exam  Constitutional:       General: She is not in acute distress.     Appearance: She is well-developed. She is ill-appearing. She is not toxic-appearing.   HENT:      Head: Normocephalic and atraumatic.   Eyes:      General: No scleral icterus.     Conjunctiva/sclera: Conjunctivae normal.   Neck:      Trachea: No tracheal deviation.   Cardiovascular:      Rate and Rhythm: Regular rhythm. Tachycardia present.   Pulmonary:      Effort: Pulmonary effort is normal.      Breath sounds: Normal breath sounds.   Abdominal:      Palpations: Abdomen is soft.      Tenderness: There is abdominal tenderness in the suprapubic area.   Musculoskeletal:         General: No deformity.      Cervical back: Normal range of motion.   Lymphadenopathy:      Cervical: No cervical adenopathy.   Skin:     General: Skin is warm and dry.   Neurological:      Mental Status: She is alert and oriented to person, place, and time.   Psychiatric:         Behavior: Behavior normal.         Vital signs and nursing notes reviewed.      PPE: I wore a surgical mask throughout my encounters with the pt. I performed hand hygiene on entry into the pt room and upon exit.     LAB RESULTS  Recent Results (from the past 24 hour(s))   Comprehensive Metabolic Panel    Collection Time: 09/28/24  5:48 AM    Specimen: Blood   Result Value Ref Range    Glucose 138 (H) 65 - 99 mg/dL    BUN 6 6 - 20 mg/dL    Creatinine 0.57 0.57 - 1.00 mg/dL    Sodium 142 136 - 145 mmol/L    Potassium 3.2 (L) 3.5 - 5.2 mmol/L    Chloride 104 98 - 107 mmol/L    CO2 28.5 22.0 - 29.0 mmol/L    Calcium 9.3 8.6 - 10.5 mg/dL    Total Protein 6.9 6.0 - 8.5 g/dL    Albumin 3.8 3.5 - 5.2 g/dL    ALT (SGPT) 41 (H) 1 - 33 U/L    AST (SGOT) 86 (H) 1 - 32 U/L    Alkaline Phosphatase 105 39 - 117 U/L    Total Bilirubin 0.5 0.0 - 1.2 mg/dL    Globulin 3.1 gm/dL    A/G Ratio 1.2 g/dL     BUN/Creatinine Ratio 10.5 7.0 - 25.0    Anion Gap 9.5 5.0 - 15.0 mmol/L    eGFR 119.5 >60.0 mL/min/1.73   Lipase    Collection Time: 09/28/24  5:48 AM    Specimen: Blood   Result Value Ref Range    Lipase 12 (L) 13 - 60 U/L   Lactic Acid, Plasma    Collection Time: 09/28/24  5:48 AM    Specimen: Blood   Result Value Ref Range    Lactate 1.3 0.5 - 2.0 mmol/L   hCG, Serum, Qualitative    Collection Time: 09/28/24  5:48 AM    Specimen: Blood   Result Value Ref Range    HCG Qualitative Negative Negative   Green Top (Gel)    Collection Time: 09/28/24  5:48 AM   Result Value Ref Range    Extra Tube Hold for add-ons.    Lavender Top    Collection Time: 09/28/24  5:48 AM   Result Value Ref Range    Extra Tube hold for add-on    Light Blue Top    Collection Time: 09/28/24  5:48 AM   Result Value Ref Range    Extra Tube Hold for add-ons.    CBC Auto Differential    Collection Time: 09/28/24  5:48 AM    Specimen: Blood   Result Value Ref Range    WBC 13.43 (H) 3.40 - 10.80 10*3/mm3    RBC 4.24 3.77 - 5.28 10*6/mm3    Hemoglobin 11.1 (L) 12.0 - 15.9 g/dL    Hematocrit 35.4 34.0 - 46.6 %    MCV 83.5 79.0 - 97.0 fL    MCH 26.2 (L) 26.6 - 33.0 pg    MCHC 31.4 (L) 31.5 - 35.7 g/dL    RDW 13.3 12.3 - 15.4 %    RDW-SD 40.2 37.0 - 54.0 fl    MPV 9.7 6.0 - 12.0 fL    Platelets 250 140 - 450 10*3/mm3    Neutrophil % 89.1 (H) 42.7 - 76.0 %    Lymphocyte % 5.4 (L) 19.6 - 45.3 %    Monocyte % 4.3 (L) 5.0 - 12.0 %    Eosinophil % 0.5 0.3 - 6.2 %    Basophil % 0.3 0.0 - 1.5 %    Immature Grans % 0.4 0.0 - 0.5 %    Neutrophils, Absolute 11.95 (H) 1.70 - 7.00 10*3/mm3    Lymphocytes, Absolute 0.73 0.70 - 3.10 10*3/mm3    Monocytes, Absolute 0.58 0.10 - 0.90 10*3/mm3    Eosinophils, Absolute 0.07 0.00 - 0.40 10*3/mm3    Basophils, Absolute 0.04 0.00 - 0.20 10*3/mm3    Immature Grans, Absolute 0.06 (H) 0.00 - 0.05 10*3/mm3    nRBC 0.0 0.0 - 0.2 /100 WBC   Urinalysis With Microscopic If Indicated (No Culture) - Urine, Clean Catch    Collection  Time: 09/28/24  7:27 AM    Specimen: Urine, Clean Catch   Result Value Ref Range    Color, UA Yellow Yellow, Straw    Appearance, UA Cloudy (A) Clear    pH, UA 6.5 5.0 - 8.0    Specific Gravity, UA 1.028 1.005 - 1.030    Glucose, UA Negative Negative    Ketones, UA 15 mg/dL (1+) (A) Negative    Bilirubin, UA Negative Negative    Blood, UA Large (3+) (A) Negative    Protein, UA Trace (A) Negative    Leuk Esterase, UA Moderate (2+) (A) Negative    Nitrite, UA Negative Negative    Urobilinogen, UA 0.2 E.U./dL 0.2 - 1.0 E.U./dL   Urinalysis, Microscopic Only - Urine, Clean Catch    Collection Time: 09/28/24  7:27 AM    Specimen: Urine, Clean Catch   Result Value Ref Range    RBC, UA Too Numerous to Count (A) None Seen, 0-2 /HPF    WBC, UA 3-5 (A) None Seen, 0-2 /HPF    Bacteria, UA None Seen None Seen /HPF    Squamous Epithelial Cells, UA 3-6 (A) None Seen, 0-2 /HPF    Hyaline Casts, UA 0-2 None Seen /LPF    Methodology Automated Microscopy          RADIOLOGY  CT Abdomen Pelvis With Contrast    Result Date: 9/28/2024  CT SCAN OF THE ABDOMEN AND PELVIS WITH INTRAVENOUS CONTRAST  HISTORY: Laparoscopy 3 days ago for endometriosis and adhesions. FINDINGS of right tubal dictation consistent with hydrosalpinx and prominent adhesions surrounding the right ovary. Generalized abdominal pain.  The CT scan was performed as an emergency procedure through the abdomen and pelvis with intravenous contrast and compared to previous CT scans of the abdomen and pelvis dated 8/21/2022. The following findings are present: 1. The lung bases are clear. The liver, spleen, pancreas, both adrenal glands, both kidneys are unremarkable. The gallbladder is somewhat contracted and shows no gallstones. There is a small amount of free air scattered in the upper abdomen as well as air within the fat planes of the lower abdominal wall consistent with recent laparoscopy.  2. The aorta and retroperitoneal structures are unremarkable. The large and small  bowel loops are normal in caliber. In the pelvis, there is a large predominately fluid-filled and septated mass extending slightly more to the right that I suspect represents a combination of right ovary and very dilated hydrosalpinx as described in the operative report. This measures up to 5.7 x 11.9 cm. The previous CT scan on 8/21/2022 demonstrated what appeared to be a septated cyst in the right ovary measuring 3.1 x 3.9 cm and no identifiable hydrosalpinx at that time. I cannot exclude the possibility of ovarian torsion or neoplasm but there is no free fluid in the pelvis and the adjacent urinary bladder appears normal.     Radiation dose reduction techniques were utilized, including automated exposure control and exposure modulation based on body size.   This report was finalized on 9/28/2024 7:19 AM by Dr. Cooper Mckinney M.D on Workstation: [id number]         MEDICATIONS GIVEN IN ER  Medications   sodium chloride 0.9 % flush 10 mL (has no administration in time range)   piperacillin-tazobactam (ZOSYN) 4.5 g IVPB in 100 mL NS MBP (CD) (has no administration in time range)   lactated ringers bolus 1,000 mL (1,000 mL Intravenous New Bag 9/28/24 0605)   morphine injection 2 mg (2 mg Intravenous Given 9/28/24 0604)   ondansetron (ZOFRAN) injection 4 mg (4 mg Intravenous Given 9/28/24 0605)   morphine injection 2 mg (2 mg Intravenous Given 9/28/24 0700)   iopamidol (ISOVUE-300) 61 % injection 100 mL (100 mL Intravenous Given by Other 9/28/24 0652)   HYDROmorphone (DILAUDID) injection 0.5 mg (0.5 mg Intravenous Given 9/28/24 0746)         ORDERS PLACED DURING THIS VISIT:  Orders Placed This Encounter   Procedures    Blood Culture - Blood,    Blood Culture - Blood,    CT Abdomen Pelvis With Contrast    [facility] Draw    Comprehensive Metabolic Panel    Lipase    Urinalysis With Microscopic If Indicated (No Culture) - Urine, Clean Catch    Lactic Acid, Plasma    hCG, Serum, Qualitative    CBC Auto Differential     Urinalysis, Microscopic Only - Urine, Clean Catch    OB/GYN (on-call MD unless specified)    Insert Peripheral IV    Initiate Observation Status    CBC & Differential    Green Top (Gel)    Lavender Top    Light Blue Top         OUTPATIENT MEDICATION MANAGEMENT:  Current Facility-Administered Medications Ordered in Epic   Medication Dose Route Frequency Provider Last Rate Last Admin    piperacillin-tazobactam (ZOSYN) 4.5 g IVPB in 100 mL NS MBP (CD)  4.5 g Intravenous Once Xavier Benavidez PA        sodium chloride 0.9 % flush 10 mL  10 mL Intravenous PRN Colby Barraza MD         Current Outpatient Medications Ordered in Epic   Medication Sig Dispense Refill    Albuterol-Budesonide (Airsupra) 90-80 MCG/ACT aerosol Inhale 2 Inhalations Every 4 (Four) Hours As Needed (ASTHMA SYMPTOMS). 10.7 g 5    cetirizine (zyrTEC) 10 MG tablet Take 1 tablet by mouth Daily.      diclofenac (VOLTAREN) 50 MG EC tablet Take 1 tablet by mouth 2 (Two) Times a Day As Needed. for pain      doxycycline (VIBRAMYCIN) 100 MG capsule Take 1 capsule by mouth 2 (Two) Times a Day. 14 capsule 0    HYDROcodone-acetaminophen (Norco) 5-325 MG per tablet Take 1 tablet by mouth Every 6 (Six) Hours As Needed for Moderate Pain. 8 tablet 0    linaclotide (Linzess) 72 MCG capsule capsule Take 1 capsule by mouth Every Morning Before Breakfast. 90 capsule 3    Multiple Vitamins-Minerals (MULTIVITAMIN ADULT PO) Take  by mouth.      Symbicort 80-4.5 MCG/ACT inhaler Inhale 2 puffs 2 (Two) Times a Day. 10.2 g 1    Synthroid 112 MCG tablet Take 1 tablet by mouth Daily. Take at least 30 minutes before having breakfast with a sip of water. For low thyroid. 90 tablet 1    Tranexamic Acid (Lysteda) 650 MG tablet Take 1 tablet by mouth 1 (One) Time As Needed (On first 3 days of menstrual cycle) for up to 150 doses. 30 tablet 4           PROGRESS, DATA ANALYSIS, CONSULTS, AND MEDICAL DECISION MAKING  All labs have been independently interpreted by me.  All  radiology studies have been reviewed by me. All EKG's have been independently viewed and interpreted by me.  Discussion below represents my analysis of pertinent findings related to patient's condition, differential diagnosis, treatment plan and final disposition.      DIFFERENTIAL DIAGNOSIS INCLUDE BUT NOT LIMITED TO:     Differential diagnosis includes but is not limited to:  - Hepatobiliary pathology such as cholecystitis, cholangitis, and symptomatic cholelithiasis  - Pancreatitis  - Dyspepsia  - Small bowel obstruction  - Appendicitis  - Diverticulitis  - UTI including pyelonephritis  - Ureteral stone  - Zoster  - Colitis, including infectious and ischemic  - Atypical ACS    Clinical Scores: N/A      ED Course as of 09/28/24 0912   Sat Sep 28, 2024   5153 First look: Patient is a 39-year-old female with history of endometriosis presents emergency department today complaining of generalized abdominal pain with associated nausea and vomiting that began while at work tonight.  Patient reports that she felt febrile but had not had a fever prior to tonight.  She had a  diagnostic laparoscopy 3 days ago at [facility].  She reports that they removed a cyst during that surgery.  She does report some dysuria but says she had a catheter during surgery.  She reports pain is both in her upper, lower abdomen, and her back.  Will initiate workup with labs, CT imaging, and pain control. [CC]   0628 WBC(!): 13.43 [DC]   0628 Temp(!): 100.7 °F (38.2 °C) [DC]   0735 I discussed the case with MD Emil with OB/GYN at this time regarding the patient.  I discussed work-up, results, concerns.  I discussed the consulting provider's desire for having one of the OB/GYN service physicians come to the ER to evaluate her.   [DC]   0911 Patient presentation and evaluation consistent with pelvic mass of uncertain origin with evidence of sepsis requiring admission to the OB/GYN service for continued monitoring and IV antibiotics.   Patient agreeable and all questions answered. [DC]   0912 I discussed the case with MD Usama with OB/GYN at this time regarding the patient.  I discussed work-up, results, concerns.  I discussed the consulting provider's desire for IV antibiotics and observation admission to the OB/GYN service.   [DC]      ED Course User Index  [CC] Sandy Agudelo PA-C  [DC] Xavier Benavidez PA         0912 I rechecked the patient.  I discussed the patient's labs, radiology findings (including all incidental findings), diagnosis, and plan for admission. The patient understands and agrees with the plan.      AS OF 09:12 EDT VITALS:    BP - 120/67  HR - 115  TEMP - 99.6 °F (37.6 °C) (Tympanic)  O2 SATS - 99%    COMPLEXITY OF CARE  The patient requires admission.      DIAGNOSIS  Final diagnoses:   Pelvic mass   Sepsis without acute organ dysfunction, due to unspecified organism         DISPOSITION  ED Disposition       ED Disposition   Decision to Admit    Condition   --    Comment   Level of Care: Med/Surg [1]   Diagnosis: Pelvic mass [202955]   Admitting Physician: CORY CLAY [[id number]]   Attending Physician: CORY CLAY [[id number]]   Is patient appropriate for Inpatient Observation Unit?: No [0]                  Please note that portions of this document were completed with a voice recognition program.    Note Disclaimer: At [facility], we believe that sharing information builds trust and better relationships. You are receiving this note because you recently visited [facility]. It is possible you will see health information before a provider has talked with you about it. This kind of information can be easy to misunderstand. To help you fully understand what it means for your health, we urge you to discuss this note with your provider.         Xavier Benavidez PA  09/28/24 0912      Electronically signed by Cresencio Robert MD at 09/28/24 1111       José Manuel Ascencio RN at 09/28/24 9710          Patient to  ED with abdominal pain and back pain. Patient said she had a laparoscopy and had ovarian cysts removed on Wednesday. Patient stated that the surgical site is hot. Patient is actively vomiting in triage.     Electronically signed by José Manuel Ascencio RN at 09/28/24 0561       Operative/Procedure Notes (last 72 hours)  Notes from 09/27/24 1437 through 09/30/24 1437   No notes of this type exist for this encounter.          Physician Progress Notes (last 72 hours)        Bianca Edwards MD at 09/29/24 1514          [facility]   Obstetrics and Gynecology     9/29/2024      Patient:  Sundeep Espino   MR#:[id number]    Office note    Chief Complaint   Patient presents with    Vomiting       Subjective     Vomiting       39 y.o. female No obstetric history on file. Who initially presented yesterday with pain, n/v and fever on POD#3 from dx laparoscopy with lysis of adhesions. She has now been on Zosyn for 24 hours and has been on scheduled tylenol as well. She has been on Toradol and Oxy5 mg for pain. She states she feels that the pain is improved somewhat this morning, but she is nervous to be discharged as she is unsure if the pain or fever will return when she is off antibiotics and scheduled tylenol. She has been tolerating PO as of this AM and denies nausea.             Patient Active Problem List   Diagnosis    Chronic post-traumatic headache    Gastroesophageal reflux disease    Lumbosacral radiculopathy    Hydrosalpinx    Endometriosis determined by laparoscopy    Pelvic mass    Postoperative fever       Past Medical History:   Diagnosis Date    ADHD     Allergic rhinitis     Anxiety and depression     Arthritis     Asthma     Chondromalacia patellae     DDD (degenerative disc disease), cervical     Endometriosis     GERD (gastroesophageal reflux disease)     H/O radioactive iodine thyroid ablation 03/08/2017    Heart palpitations     Hydrosalpinx     Hyperthyroidism     Iron deficiency anemia      Ovarian cyst     Paratubal cyst 05/10/2023    Upper respiratory infection 05/02/2018    UTI (urinary tract infection) 05/02/2018    Vitamin D deficiency      Past Surgical History:   Procedure Laterality Date    APPENDECTOMY  2012    BREAST LUMPECTOMY      benign    COLONOSCOPY  approx 2009    normal per patient    COLONOSCOPY W/ POLYPECTOMY N/A 08/01/2018    Normal    DIAGNOSTIC LAPAROSCOPY N/A 9/25/2024    Procedure: DIAGNOSTIC LAPAROSCOPY;  Surgeon: Javon Jiménez MD;  Location: [facility] MAIN OR;  Service: Obstetrics/Gynecology;  Laterality: N/A;    EAR TUBES      ENDOSCOPY N/A 08/01/2018    HH    KELOID EXCISION      right ear    MANDIBLE SURGERY  05/2016    ORIF ULNA/RADIUS FRACTURES      Distal radius ORIF with [facility] on 8/25/22    UPPER GASTROINTESTINAL ENDOSCOPY       Obstetric History:  OB History    No obstetric history on file.        Menstrual History:     Patient's last menstrual period was 09/21/2024 (exact date).       No obstetric history on file.  Family History   Problem Relation Age of Onset    Hypertension Mother     Diabetes Mother     Hypertension Father     Hyperlipidemia Father     Diabetes Father     Heart disease Father         pacemaker    Coronary artery disease Father     Diabetes Maternal Aunt     Irritable bowel syndrome Maternal Aunt     Diabetes Maternal Aunt     Hypertension Maternal Uncle     Diabetes Maternal Uncle     Coronary artery disease Maternal Uncle     Diabetes Maternal Uncle     Hypertension Half-Brother     Hyperlipidemia Half-Brother     Hypertension Half-Sister     Alcohol abuse Other     Diabetes Other     Hypertension Other     Colon cancer Neg Hx     Colon polyps Neg Hx     Crohn's disease Neg Hx     Ulcerative colitis Neg Hx     Malig Hyperthermia Neg Hx      Social History     Tobacco Use    Smoking status: Never     Passive exposure: Never    Smokeless tobacco: Never   Vaping Use    Vaping status: Never Used   Substance Use Topics    Alcohol use: Yes      Alcohol/week: 1.0 standard drink of alcohol     Types: 1 Glasses of wine per week    Drug use: Never     Aspartame and phenylalanine, Banana, Beta adrenergic blockers, Flagyl [metronidazole], Metoprolol, and Propranolol    Current Facility-Administered Medications:     sennosides-docusate (PERICOLACE) 8.6-50 MG per tablet 2 tablet, 2 tablet, Oral, BID PRN, 2 tablet at 09/29/24 0622 **AND** polyethylene glycol (MIRALAX) packet 17 g, 17 g, Oral, Daily PRN, 17 g at 09/29/24 0918 **AND** bisacodyl (DULCOLAX) EC tablet 5 mg, 5 mg, Oral, Daily PRN **AND** bisacodyl (DULCOLAX) suppository 10 mg, 10 mg, Rectal, Daily PRN, Nesha Forrester MD    budesonide-formoterol (SYMBICORT) 160-4.5 MCG/ACT inhaler 1 puff, 1 puff, Inhalation, BID, Nesha Forrester MD, 1 puff at 09/29/24 0756    calcium carbonate (TUMS) chewable tablet 500 mg (200 mg elemental), 2 tablet, Oral, BID PRN, Nesha Forrester MD    famotidine (PEPCID) tablet 40 mg, 40 mg, Oral, Daily, Nesha Forrester MD, 40 mg at 09/29/24 0829    levothyroxine (SYNTHROID, LEVOTHROID) tablet 112 mcg, 112 mcg, Oral, Daily, Nesha Forrester MD, 112 mcg at 09/29/24 0829    oxyCODONE (ROXICODONE) immediate release tablet 5 mg, 5 mg, Oral, Q6H PRN, Nesha Forrester MD, 5 mg at 09/29/24 1013    promethazine (PHENERGAN) tablet 12.5 mg, 12.5 mg, Oral, Q6H PRN, 12.5 mg at 09/29/24 1359 **OR** promethazine (PHENERGAN) suppository 12.5 mg, 12.5 mg, Rectal, Q6H PRN, Nesha Forresterien, MD    sodium chloride 0.9 % flush 10 mL, 10 mL, Intravenous, PRN, Nesha Forrester MD    sodium chloride 0.9 % flush 10 mL, 10 mL, Intravenous, Q12H, Nesha Forrester MD, 10 mL at 09/29/24 0830    sodium chloride 0.9 % flush 10 mL, 10 mL, Intravenous, PRN, Nesha Forrester MD    sodium chloride 0.9 % infusion 40 mL, 40 mL, Intravenous, PRN, Nesha Forrester MD    sodium chloride 0.9 % infusion, 100 mL/hr, Intravenous, Continuous, Nesha Forrester MD,  "Last Rate: 100 mL/hr at 09/28/24 1043, 100 mL/hr at 09/28/24 1043      Review of Systems   Gastrointestinal:  Positive for vomiting.   All other systems reviewed and are negative.      BP Readings from Last 3 Encounters:   09/29/24 112/76   09/25/24 120/80   08/27/24 106/64      Wt Readings from Last 3 Encounters:   09/28/24 68.8 kg (151 lb 10.8 oz)   09/25/24 66.3 kg (146 lb 3.2 oz)   08/27/24 70.8 kg (156 lb)      BMI: Estimated body mass index is 24.48 kg/m² as calculated from the following:    Height as of this encounter: 167.6 cm (66\").    Weight as of this encounter: 68.8 kg (151 lb 10.8 oz). BSA: Estimated body surface area is 1.78 meters squared as calculated from the following:    Height as of this encounter: 167.6 cm (66\").    Weight as of this encounter: 68.8 kg (151 lb 10.8 oz).    Objective   Physical Exam  Vitals and nursing note reviewed.   Constitutional:       Appearance: Normal appearance.   HENT:      Head: Normocephalic and atraumatic.   Pulmonary:      Effort: Pulmonary effort is normal.   Abdominal:      General: There is distension.      Palpations: Abdomen is soft.      Tenderness: There is abdominal tenderness. There is no guarding or rebound.   Neurological:      Mental Status: She is alert and oriented to person, place, and time.   Psychiatric:         Mood and Affect: Mood normal.         Assessment & Plan   39 y.o. female No obstetric history on file. Who initially presented yesterday with pain, n/v and fever on POD#3 from dx laparoscopy with lysis of adhesions.    - Plan to DC Zosyn and scheduled Tylenol today and observe for pain/fever x 24 hours.   - Patient has distension and constipation which may be contributing to pain. Increase Miralax to BID and add simethicone.   - blood cultures negative and WBC improved today.          No follow-ups on file.    Bianca Edwards MD   9/29/2024 15:14 EDT      Electronically signed by Bianca Edwards MD at 09/29/24 1529   "     Consult Notes (last 72 hours)  Notes from 09/27/24 1437 through 09/30/24 1437   No notes of this type exist for this encounter.

## 2024-09-30 NOTE — PLAN OF CARE
Goal Outcome Evaluation:              Outcome Evaluation: VSS. IV fluids infuising. Oxycodone given for pain. Ambulated in hallway. Dr. Forrester wants pt afebrile for 24 hours. Started on PO Bactrim today.

## 2024-09-30 NOTE — PROGRESS NOTES
Discharge Planning Assessment  [facility]     Patient Name: Sundeep Espino  MRN: [id number]  Today's Date: 9/30/2024    Admit Date: 9/28/2024    Plan: Home no needs   Discharge Needs Assessment       Row Name 09/30/24 1028       Living Environment    People in Home alone    Current Living Arrangements apartment    Primary Care Provided by self    Provides Primary Care For no one    Family Caregiver if Needed other relative(s);significant other    Quality of Family Relationships helpful;involved;supportive    Able to Return to Prior Arrangements yes       Transition Planning    Patient/Family Anticipates Transition to home    Patient/Family Anticipated Services at Transition none    Transportation Anticipated family or friend will provide       Discharge Needs Assessment    Equipment Currently Used at Home none    Concerns to be Addressed no discharge needs identified    Equipment Needed After Discharge none    Provided Post Acute Provider List? N/A    N/A Provider List Comment Denies needs. Plan is home.                   Discharge Plan       Row Name 09/30/24 1029       Plan    Plan Home no needs    Patient/Family in Agreement with Plan yes    Plan Comments Introduced self and role of CCP. Patient confirmed DC plan is to return to home. Stated she is independent with ADL's and uses no DMEs.Family/friends will assist as needed and will provide transportation at DC. Denies any needs/equipment.                  Continued Care and Services - Admitted Since 9/28/2024    No active coordination exists for this encounter.          Demographic Summary       Row Name 09/30/24 1027       General Information    Admission Type inpatient    Arrived From home    Reason for Consult discharge planning    Preferred Language English                   Functional Status       Row Name 09/30/24 1027       Functional Status    Usual Activity Tolerance good    Current Activity Tolerance good       Functional Status, IADL     Medications independent    Meal Preparation independent    Housekeeping independent    Laundry independent    Shopping independent       Mental Status    General Appearance WDL WDL       Employment/    Employment Status employed full-time                   Psychosocial       Row Name 09/30/24 1027       Values/Beliefs    Spiritual, Cultural Beliefs, Caodaism Practices, Values that Affect Care no       Behavior WDL    Behavior WDL WDL       Emotion Mood WDL    Emotion/Mood/Affect WDL WDL       Speech WDL    Speech WDL WDL       Perceptual State WDL    Perceptual State WDL WDL       Coping/Stress    Sources of Support other family members;significant other    Reaction to Health Status adjusting    Understanding of Condition and Treatment adequate understanding of treatment       Developmental Stage (Rahuliksson's)    Developmental Stage Stage 7 (35-65 years/Middle Adulthood) Generativity vs. Stagnation                   Abuse/Neglect       Row Name 09/30/24 1028       Personal Safety    Feels Unsafe at Home or Work/School no    Feels Threatened by Someone no    Does Anyone Try to Keep You From Having Contact with Others or Doing Things Outside Your Home? no    Physical Signs of Abuse Present no               Belen Palacio, RN

## 2024-09-30 NOTE — PROGRESS NOTES
[facility]     Progress Note    Patient Name: Sundeep Espino  : 1985  MRN: [id number]  Primary Care Physician:  Adam Monahan DO  Date of admission: 2024    Subjective   Subjective       History of Present Illness  39-year-old female POD#5 s/p diagnostic laparoscopy with lysis of adhesions admitted for fever and abdominal pain.  Temp initially 100.7 upon arriving to the ER but quickly normalized with IV fluids only.  She received 24 hours of IV Zosyn.  The fever was thought to represent an inflammatory process as opposed to pelvic abscess so oral antibiotics were not continued.  Approximately 13 hours after last dose of Zosyn, she developed a temperature of 100.6.  She reports that pain has greatly improved and she is now having regular bowel movements.  She is tolerating regular diet and urinating without issue.  Blood cultures are negative.    Review of Systems   All other systems reviewed and are negative.      Objective   Objective     Vitals:   Temp:  [98.3 °F (36.8 °C)-100.6 °F (38.1 °C)] 98.3 °F (36.8 °C)  Heart Rate:  [] 94  Resp:  [16] 16  BP: (105-121)/(67-81) 120/81    Physical Exam  Vitals and nursing note reviewed.   Constitutional:       General: She is not in acute distress.     Appearance: Normal appearance.   HENT:      Head: Normocephalic and atraumatic.   Eyes:      Extraocular Movements: Extraocular movements intact.   Cardiovascular:      Rate and Rhythm: Normal rate.   Pulmonary:      Effort: Pulmonary effort is normal. No respiratory distress.   Abdominal:      General: There is no distension.      Palpations: Abdomen is soft. There is no mass.      Tenderness: There is no abdominal tenderness.      Comments: Incision c/d/i   Musculoskeletal:         General: Normal range of motion.      Cervical back: Normal range of motion.   Skin:     General: Skin is warm and dry.   Neurological:      General: No focal deficit present.      Mental Status: She is alert and oriented  to person, place, and time.   Psychiatric:         Mood and Affect: Mood normal.         Behavior: Behavior normal.          Result Review    Result Review:  I have personally reviewed the results from the time of this admission to 9/30/2024 16:14 EDT and agree with these findings:  []  Laboratory list / accordion  []  Microbiology  []  Radiology  []  EKG/Telemetry   []  Cardiology/Vascular   []  Pathology  []  Old records  []  Other:  Most notable findings include:   CBC          9/28/2024    05:48 9/29/2024    03:53 9/30/2024    07:02   CBC   WBC 13.43  7.33  7.78    RBC 4.24  3.58  3.72    Hemoglobin 11.1  9.4  9.7    Hematocrit 35.4  29.6  31.0    MCV 83.5  82.7  83.3    MCH 26.2  26.3  26.1    MCHC 31.4  31.8  31.3    RDW 13.3  13.2  13.2    Platelets 250  198  244      CMP          9/28/2024    05:48 9/29/2024    03:53 9/30/2024    07:02   CMP   Glucose 138  108  94    BUN 6  6  4    Creatinine 0.57  0.61  0.58    EGFR 119.5  117.5  119.0    Sodium 142  142  140    Potassium 3.2  4.7  3.8    Chloride 104  112  110    Calcium 9.3  8.0  8.5    Total Protein 6.9      Albumin 3.8      Globulin 3.1      Total Bilirubin 0.5      Alkaline Phosphatase 105      AST (SGOT) 86      ALT (SGPT) 41      Albumin/Globulin Ratio 1.2      BUN/Creatinine Ratio 10.5  9.8  6.9    Anion Gap 9.5  7.0  6.3            Assessment & Plan   Assessment / Plan     Brief Patient Summary:  Sundeep Espino is a 39 y.o. female POD#5 s/p diagnostic laparoscopy with lysis of adhesions presents with fever and abdominal pain    Active Hospital Problems:  Active Hospital Problems    Diagnosis     **Pelvic mass     Postoperative fever     Endometriosis determined by laparoscopy     Hydrosalpinx      Plan:   -I discussed with patient unclear origin of fevers.  The abdomen is soft, nontender, and nondistended on my exam.  Incisions are clean, dry, and intact.  While CT did show a loculated fluid collection in the pelvis, it is nearly identical in  shape, size, and location to the preoperative ultrasound and is most consistent with the hydrosalpinx that was noted during surgery.  -Po bactrim ordered x 10 days  -Discussed may need inpatient until she is afebrile for 24 hours.      VTE Prophylaxis:  Mechanical VTE prophylaxis orders are present.        CODE STATUS:    Code Status (Patient has no pulse and is not breathing): CPR (Attempt to Resuscitate)  Medical Interventions (Patient has pulse or is breathing): Full Support    Disposition: continue inpatient admission    Nesha Forrester MD

## 2024-09-30 NOTE — PLAN OF CARE
Goal Outcome Evaluation:  Plan of Care Reviewed With: patient        Progress: improving  Outcome Evaluation: max temp 100.6, started on incentive spirometer, voiding freely, c/o pain medicated with oxycodone, encouragte to increase fluid intake and to ambulate more in the hallway, continue to monitor the pt.

## 2024-10-01 ENCOUNTER — READMISSION MANAGEMENT (OUTPATIENT)
Dept: CALL CENTER | Facility: HOSPITAL | Age: 39
End: 2024-10-01
Payer: COMMERCIAL

## 2024-10-01 VITALS
TEMPERATURE: 98.7 F | WEIGHT: 151.68 LBS | BODY MASS INDEX: 24.38 KG/M2 | SYSTOLIC BLOOD PRESSURE: 119 MMHG | DIASTOLIC BLOOD PRESSURE: 73 MMHG | HEART RATE: 89 BPM | RESPIRATION RATE: 16 BRPM | HEIGHT: 66 IN | OXYGEN SATURATION: 100 %

## 2024-10-01 LAB
ANION GAP SERPL CALCULATED.3IONS-SCNC: 8.5 MMOL/L (ref 5–15)
BASOPHILS # BLD AUTO: 0.05 10*3/MM3 (ref 0–0.2)
BASOPHILS NFR BLD AUTO: 0.6 % (ref 0–1.5)
BUN SERPL-MCNC: 2 MG/DL (ref 6–20)
BUN/CREAT SERPL: 3.4 (ref 7–25)
CALCIUM SPEC-SCNC: 8.2 MG/DL (ref 8.6–10.5)
CHLORIDE SERPL-SCNC: 110 MMOL/L (ref 98–107)
CO2 SERPL-SCNC: 22.5 MMOL/L (ref 22–29)
CREAT SERPL-MCNC: 0.59 MG/DL (ref 0.57–1)
DEPRECATED RDW RBC AUTO: 42.3 FL (ref 37–54)
EGFRCR SERPLBLD CKD-EPI 2021: 118.5 ML/MIN/1.73
EOSINOPHIL # BLD AUTO: 0.21 10*3/MM3 (ref 0–0.4)
EOSINOPHIL NFR BLD AUTO: 2.6 % (ref 0.3–6.2)
ERYTHROCYTE [DISTWIDTH] IN BLOOD BY AUTOMATED COUNT: 13.4 % (ref 12.3–15.4)
GLUCOSE SERPL-MCNC: 106 MG/DL (ref 65–99)
HCT VFR BLD AUTO: 31.5 % (ref 34–46.6)
HGB BLD-MCNC: 9.4 G/DL (ref 12–15.9)
IMM GRANULOCYTES # BLD AUTO: 0.03 10*3/MM3 (ref 0–0.05)
IMM GRANULOCYTES NFR BLD AUTO: 0.4 % (ref 0–0.5)
LYMPHOCYTES # BLD AUTO: 2.58 10*3/MM3 (ref 0.7–3.1)
LYMPHOCYTES NFR BLD AUTO: 32.4 % (ref 19.6–45.3)
MCH RBC QN AUTO: 25.3 PG (ref 26.6–33)
MCHC RBC AUTO-ENTMCNC: 29.8 G/DL (ref 31.5–35.7)
MCV RBC AUTO: 84.9 FL (ref 79–97)
MONOCYTES # BLD AUTO: 0.67 10*3/MM3 (ref 0.1–0.9)
MONOCYTES NFR BLD AUTO: 8.4 % (ref 5–12)
NEUTROPHILS NFR BLD AUTO: 4.43 10*3/MM3 (ref 1.7–7)
NEUTROPHILS NFR BLD AUTO: 55.6 % (ref 42.7–76)
NRBC BLD AUTO-RTO: 0 /100 WBC (ref 0–0.2)
PLATELET # BLD AUTO: 239 10*3/MM3 (ref 140–450)
PMV BLD AUTO: 10 FL (ref 6–12)
POTASSIUM SERPL-SCNC: 4 MMOL/L (ref 3.5–5.2)
RBC # BLD AUTO: 3.71 10*6/MM3 (ref 3.77–5.28)
SODIUM SERPL-SCNC: 141 MMOL/L (ref 136–145)
WBC NRBC COR # BLD AUTO: 7.97 10*3/MM3 (ref 3.4–10.8)

## 2024-10-01 PROCEDURE — 80048 BASIC METABOLIC PNL TOTAL CA: CPT | Performed by: STUDENT IN AN ORGANIZED HEALTH CARE EDUCATION/TRAINING PROGRAM

## 2024-10-01 PROCEDURE — 63710000001 PROMETHAZINE PER 12.5 MG: Performed by: STUDENT IN AN ORGANIZED HEALTH CARE EDUCATION/TRAINING PROGRAM

## 2024-10-01 PROCEDURE — 94799 UNLISTED PULMONARY SVC/PX: CPT

## 2024-10-01 PROCEDURE — 99238 HOSP IP/OBS DSCHRG MGMT 30/<: CPT | Performed by: OBSTETRICS & GYNECOLOGY

## 2024-10-01 PROCEDURE — 85025 COMPLETE CBC W/AUTO DIFF WBC: CPT | Performed by: STUDENT IN AN ORGANIZED HEALTH CARE EDUCATION/TRAINING PROGRAM

## 2024-10-01 PROCEDURE — 94664 DEMO&/EVAL PT USE INHALER: CPT

## 2024-10-01 RX ORDER — SULFAMETHOXAZOLE/TRIMETHOPRIM 800-160 MG
1 TABLET ORAL EVERY 12 HOURS SCHEDULED
Qty: 17 TABLET | Refills: 0 | Status: SHIPPED | OUTPATIENT
Start: 2024-10-01 | End: 2024-10-10

## 2024-10-01 RX ORDER — IBUPROFEN 800 MG/1
800 TABLET, FILM COATED ORAL EVERY 6 HOURS PRN
Qty: 30 TABLET | Refills: 0 | Status: SHIPPED | OUTPATIENT
Start: 2024-10-01

## 2024-10-01 RX ORDER — IBUPROFEN 800 MG/1
800 TABLET, FILM COATED ORAL EVERY 6 HOURS PRN
Status: DISCONTINUED | OUTPATIENT
Start: 2024-10-01 | End: 2024-10-01 | Stop reason: HOSPADM

## 2024-10-01 RX ORDER — OXYCODONE HYDROCHLORIDE 5 MG/1
5 TABLET ORAL EVERY 6 HOURS PRN
Qty: 8 TABLET | Refills: 0 | Status: SHIPPED | OUTPATIENT
Start: 2024-10-01 | End: 2024-10-03

## 2024-10-01 RX ADMIN — OXYCODONE HYDROCHLORIDE 5 MG: 5 TABLET ORAL at 01:31

## 2024-10-01 RX ADMIN — POLYETHYLENE GLYCOL 3350 17 G: 17 POWDER, FOR SOLUTION ORAL at 08:26

## 2024-10-01 RX ADMIN — SULFAMETHOXAZOLE AND TRIMETHOPRIM 1 TABLET: 800; 160 TABLET ORAL at 08:26

## 2024-10-01 RX ADMIN — PROMETHAZINE HYDROCHLORIDE 12.5 MG: 12.5 TABLET ORAL at 08:57

## 2024-10-01 RX ADMIN — OXYCODONE HYDROCHLORIDE 5 MG: 5 TABLET ORAL at 15:35

## 2024-10-01 RX ADMIN — OXYCODONE HYDROCHLORIDE 5 MG: 5 TABLET ORAL at 08:26

## 2024-10-01 RX ADMIN — LEVOTHYROXINE SODIUM 112 MCG: 112 TABLET ORAL at 08:26

## 2024-10-01 RX ADMIN — BUDESONIDE AND FORMOTEROL FUMARATE DIHYDRATE 1 PUFF: 160; 4.5 AEROSOL RESPIRATORY (INHALATION) at 10:14

## 2024-10-01 RX ADMIN — IBUPROFEN 800 MG: 800 TABLET, FILM COATED ORAL at 08:26

## 2024-10-01 RX ADMIN — FAMOTIDINE 40 MG: 20 TABLET, FILM COATED ORAL at 08:26

## 2024-10-01 RX ADMIN — IBUPROFEN 800 MG: 800 TABLET, FILM COATED ORAL at 15:35

## 2024-10-01 NOTE — NURSING NOTE
VSS, on room air, voiding without issue. Oxycodone and ibuprofen for pain w/ some relief - main complaint is of headache. Remained afebrile. Pt educated on follow up care, when to seek help, new meds, and she is aware of follow up appt. Meds from outside pharmacy. Pt iqra is taking her home and she is eager to go.

## 2024-10-01 NOTE — OUTREACH NOTE
Prep Survey      Flowsheet Row Responses   [facility] patient discharged from? [place]   Is LACE score < 7 ? No   Eligibility [facility]   Date of Admission 09/28/24   Date of Discharge 10/01/24   Discharge Disposition Home or Self Care   Discharge diagnosis diagnostic laparoscopy with lysis of adhesion   Does the patient have one of the following disease processes/diagnoses(primary or secondary)? General Surgery   Does the patient have Home health ordered? No   Is there a DME ordered? No   Prep survey completed? Yes            YOSI JACOBS - Registered Nurse

## 2024-10-01 NOTE — PLAN OF CARE
Goal Outcome Evaluation:  Plan of Care Reviewed With: patient        Progress: improving  Outcome Evaluation: afebrile since last night, on po antibiotic, oxycodone for pain, up ad eva, voiding freely. continue to monitor the pt.

## 2024-10-01 NOTE — DISCHARGE SUMMARY
Date of Discharge:  10/1/2024    Discharge Diagnosis: Postoperative fever    Presenting Problem/History of Present Illness  Active Hospital Problems    Diagnosis  POA    **Pelvic mass [R19.00]  Yes    Postoperative fever [R50.82]  Yes    Endometriosis determined by laparoscopy [N80.9]  Yes    Hydrosalpinx [N70.11]  Yes      Resolved Hospital Problems   No resolved problems to display.          Hospital Course  Patient is a 39 y.o. female who is postoperative day 6 from a laparoscopic lysis of adhesions.  Patient has been admitted low-grade temp.  She has received Zosyn and is now on oral Bactrim.  Her fever has been down for over 24 hours and her white count is not elevated.  Blood cultures are negative.  Patient did have significant findings on laparoscopy of bilateral hydrosalpinx.  Patient was thought to be due to inflammation.  Patient feels ready for discharge today.    Procedures Performed         Consults:   Consults       Date and Time Order Name Status Description    9/28/2024  7:21 AM OB/GYN (on-call MD unless specified)              Pertinent Test Results:   Results from last 7 days   Lab Units 10/01/24  0601   WBC 10*3/mm3 7.97   HEMOGLOBIN g/dL 9.4*   HEMATOCRIT % 31.5*   PLATELETS 10*3/mm3 239     Cultures are negative    Condition on Discharge: Improved    Vital Signs  Temp:  [97.9 °F (36.6 °C)-98.8 °F (37.1 °C)] 98.7 °F (37.1 °C)  Heart Rate:  [67-98] 89  Resp:  [16] 16  BP: (117-125)/(73-84) 119/73    Physical Exam:   Physical exam from progress note today    Discharge Disposition  Home or Self Care    Discharge Medications     Discharge Medications        New Medications        Instructions Start Date   ibuprofen 800 MG tablet  Commonly known as: ADVIL,MOTRIN   800 mg, Oral, Every 6 Hours PRN      oxyCODONE 5 MG immediate release tablet  Commonly known as: ROXICODONE   5 mg, Oral, Every 6 Hours PRN      sulfamethoxazole-trimethoprim 800-160 MG per tablet  Commonly known as: BACTRIM MARCELLOSEPTRA MARCELLO    1 tablet, Oral, Every 12 Hours Scheduled             Continue These Medications        Instructions Start Date   Airsupra 90-80 MCG/ACT aerosol  Generic drug: Albuterol-Budesonide   2 Inhalations, Inhalation, Every 4 Hours PRN      cetirizine 10 MG tablet  Commonly known as: zyrTEC   10 mg, Oral, Daily      diclofenac 50 MG EC tablet  Commonly known as: VOLTAREN   50 mg, Oral, 2 Times Daily PRN, for pain      doxycycline 100 MG capsule  Commonly known as: VIBRAMYCIN   100 mg, Oral, 2 Times Daily      HYDROcodone-acetaminophen 5-325 MG per tablet  Commonly known as: Norco   1 tablet, Oral, Every 6 Hours PRN      linaclotide 72 MCG capsule capsule  Commonly known as: Linzess   72 mcg, Oral, Every Morning Before Breakfast      multivitamin with minerals tablet tablet   Oral      Symbicort 80-4.5 MCG/ACT inhaler  Generic drug: budesonide-formoterol   2 puffs, Inhalation, 2 Times Daily      Synthroid 112 MCG tablet  Generic drug: levothyroxine   112 mcg, Oral, Daily, Take at least 30 minutes before having breakfast with a sip of water. For low thyroid.      Tranexamic Acid 650 MG tablet  Commonly known as: Lysteda   650 mg, Oral, Once As Needed               Discharge Diet: Regular    Activity at Discharge: Pelvic rest    Follow-up Appointments  Future Appointments   Date Time Provider Department Center   10/14/2024  8:30 AM Javon Jiménez MD [facility]  [facility]   10/18/2024  8:45 AM Adam Monahan DO [facility]   11/13/2024  8:30 AM Janet Hancock APRN [facility]   12/9/2024  3:20 PM LAB CHAIR 5 [facility]  [facility]   12/9/2024  3:40 PM Smiley Friend APRN [facility]   5/19/2025  2:45 PM Javon Jiménez MD [facility]  [facility]         Test Results Pending at Discharge  Pending Labs       Order Current Status    Blood Culture - Blood, Arm, Right Preliminary result    Blood Culture - Blood, Hand, Right Preliminary result             Casper Smith MD  10/01/24  14:04 EDT

## 2024-10-01 NOTE — PROGRESS NOTES
"  Subjective       Patient reports:    Patient reports she has not felt a fever.  She has been tolerating p.o.  She is having bowel movements and urinating without pain.  She would like to go home.    Objective     Objective:  Vital signs (most recent): Blood pressure 119/73, pulse 89, temperature 98.7 °F (37.1 °C), temperature source Oral, resp. rate 16, height 167.6 cm (66\"), weight 68.8 kg (151 lb 10.8 oz), last menstrual period 09/21/2024, SpO2 100%, not currently breastfeeding.    Physical Exam:  Incision: Incisions are clean dry and intact with Steri-Strips in place   Lungs: Nonlabored   Abdomen: abdomen is soft without significant tenderness, masses, organomegaly or guarding.   Extremities:  Nontender bilaterally           Vital Sign Min/Max    Temp  Min: 97.9 °F (36.6 °C)  Max: 98.8 °F (37.1 °C)   Pulse  Min: 67  Max: 98   Resp  Min: 16  Max: 16   BP  Min: 117/75  Max: 125/74   No data recorded   SpO2  Min: 97 %  Max: 100 %   No data recorded     Flowsheet Rows      Flowsheet Row First Filed Value   Admission Height 165.1 cm (65\") Documented at 09/28/2024 0527   Admission Weight 65.8 kg (145 lb) Documented at 09/28/2024 0527            Intake/Output last 24 hours:    Intake/Output Summary (Last 24 hours) at 10/1/2024 1357  Last data filed at 10/1/2024 1306  Gross per 24 hour   Intake 940 ml   Output 3800 ml   Net -2860 ml       Intake/Output this shift:  I/O this shift:  In: -   Out: 1600 [Urine:1600]    Labs/Studies reviewed:  Yes, white blood cell count is normal  Results from last 7 days   Lab Units 10/01/24  0601   WBC 10*3/mm3 7.97   HEMOGLOBIN g/dL 9.4*   HEMATOCRIT % 31.5*   PLATELETS 10*3/mm3 239       Blood Cultures are negative    Assessment & Plan         Pelvic mass    Hydrosalpinx    Endometriosis determined by laparoscopy    Postoperative fever      Post op day 6 diagnostic laparoscopy with lysis of adhesions.  Surgery was significant for bilateral hydrosalpinx.  Patient has had postoperative " low-grade temperatures.  She has been afebrile for over 24 hours on oral Bactrim.    CT scan did show loculated fluid collections.  Consistent with the operative findings of bilateral hydrosalpinx.    Plan:   Discharge home to complete 10 days of oral Bactrim  Follow-up within the week with Dr. Jiménez.  Call with any recurrent fevers or questions.            Casper Smith MD  10/01/24  13:57 EDT

## 2024-10-02 ENCOUNTER — TRANSITIONAL CARE MANAGEMENT TELEPHONE ENCOUNTER (OUTPATIENT)
Dept: CALL CENTER | Facility: HOSPITAL | Age: 39
End: 2024-10-02
Payer: COMMERCIAL

## 2024-10-02 NOTE — OUTREACH NOTE
Call Center TCM Note      Flowsheet Row Responses   [facility] patient discharged from? [place]   Does the patient have one of the following disease processes/diagnoses(primary or secondary)? General Surgery   TCM attempt successful? Yes   Call start time 1301   Call end time 1307   Discharge diagnosis diagnostic laparoscopy with lysis of adhesion   Meds reviewed with patient/caregiver? Yes   Is the patient having any side effects they believe may be caused by any medication additions or changes? No   Does the patient have all medications related to this admission filled (includes all antibiotics, pain medications, etc.) Yes   Comments No avialable HOSP DC FU appts that meet TCM guidelines.   Does the patient have an appointment with their PCP within 7-14 days of discharge? No appointments available   Nursing Interventions Routed TCM call to PCP office   Has home health visited the patient within 72 hours of discharge? N/A   Psychosocial issues? No   Did the patient receive a copy of their discharge instructions? Yes   Nursing interventions Reviewed instructions with patient   What is the patient's perception of their health status since discharge? Improving   Nursing interventions Nurse provided patient education   Is the patient /caregiver able to teach back basic post-op care? Lifting as instructed by MD in discharge instructions, Drive as instructed by MD in discharge instructions, Take showers only when approved by MD-sponge bathe until then, No tub bath, swimming, or hot tub until instructed by MD   Is the patient/caregiver able to teach back signs and symptoms of incisional infection? Increased redness, swelling or pain at the incisonal site, Increased drainage or bleeding, Incisional warmth, Pus or odor from incision, Fever   Is the patient/caregiver able to teach back steps to recovery at home? Set small, achievable goals for return to baseline health, Eat a well-balance diet   Is the  patient/caregiver able to teach back the hierarchy of who to call/visit for symptoms/problems? PCP, Specialist, Home health nurse, Urgent Care, ED, 911 Yes   TCM call completed? Yes   Wrap up additional comments Pt reports she is doing fine. No needs. Meds will be ready today for her .   Call end time [phone]            Katie Friend RN    10/2/2024, 13:08 EDT

## 2024-10-02 NOTE — CASE MANAGEMENT/SOCIAL WORK
Case Management Discharge Note      Final Note: Dc'd home. Nitin GILL RN    Provided Post Acute Provider List?: N/A  N/A Provider List Comment: Denies needs. Plan is home.    Selected Continued Care - Discharged on 10/1/2024 Admission date: 9/28/2024 - Discharge disposition: Home or Self Care      Destination    No services have been selected for the patient.                Durable Medical Equipment    No services have been selected for the patient.                Dialysis/Infusion    No services have been selected for the patient.                Home Medical Care    No services have been selected for the patient.                Therapy    No services have been selected for the patient.                Community Resources    No services have been selected for the patient.                Community & DME    No services have been selected for the patient.                    Transportation Services  Private: Car    Final Discharge Disposition Code: 01 - home or self-care

## 2024-10-03 LAB
BACTERIA SPEC AEROBE CULT: NORMAL
BACTERIA SPEC AEROBE CULT: NORMAL

## 2024-10-09 ENCOUNTER — OFFICE VISIT (OUTPATIENT)
Dept: OBSTETRICS AND GYNECOLOGY | Age: 39
End: 2024-10-09
Payer: COMMERCIAL

## 2024-10-09 VITALS
BODY MASS INDEX: 23.5 KG/M2 | HEIGHT: 66 IN | WEIGHT: 146.2 LBS | SYSTOLIC BLOOD PRESSURE: 116 MMHG | DIASTOLIC BLOOD PRESSURE: 68 MMHG

## 2024-10-09 DIAGNOSIS — R50.82 POSTOPERATIVE FEVER: Primary | ICD-10-CM

## 2024-10-09 DIAGNOSIS — N80.9 ENDOMETRIOSIS DETERMINED BY LAPAROSCOPY: ICD-10-CM

## 2024-10-09 PROBLEM — R11.2 NAUSEA AND VOMITING: Status: ACTIVE | Noted: 2024-08-15

## 2024-10-09 PROBLEM — K59.00 CONSTIPATION: Status: ACTIVE | Noted: 2024-08-15

## 2024-10-09 PROBLEM — R10.30 LOWER ABDOMINAL PAIN: Status: ACTIVE | Noted: 2024-08-15

## 2024-10-09 NOTE — PROGRESS NOTES
"Subjective   Sundeep Espino is a 39 y.o. female is being seen today for   Chief Complaint   Patient presents with    Postpartum Care     Pt has no complaint    .    History of Present Illness    Patient of Dr. Jiménez  Here for hospital follow-up  Had a laparoscopy with lysis of adhesions on 9/25  Was readmitted 3 days later for fever and possible postop infection  She is doing well and has no complaints today  She has 1 more day of antibiotics left and has not had any additional fevers  She is still having some vaginal bleeding and passing little clots  No significant pain, she is already back to work but is not doing any heavy lifting  She is having normal bowel and bladder function  Minimal pain on her incisions      The following portions of the patient's history were reviewed and updated as appropriate: allergies, current medications, past family history, past medical history, past social history, past surgical history and problem list.    /68   Ht 167.6 cm (65.98\")   Wt 66.3 kg (146 lb 3.2 oz)   LMP 09/21/2024 (Exact Date) Comment: (-) hcg 9/25/24  BMI 23.61 kg/m²         Review of Systems   Constitutional: Negative.  Negative for chills and fever.   HENT: Negative.     Eyes: Negative.    Respiratory: Negative.     Cardiovascular: Negative.    Gastrointestinal: Negative.  Negative for abdominal distention, constipation, diarrhea and vomiting.   Endocrine: Negative.    Genitourinary:  Positive for vaginal bleeding. Negative for difficulty urinating and dysuria.   Musculoskeletal: Negative.    Skin: Negative.    Allergic/Immunologic: Negative.    Neurological: Negative.    Hematological: Negative.    Psychiatric/Behavioral: Negative.         Objective   Physical Exam  Constitutional:       Appearance: She is well-developed.   Abdominal:      Palpations: Abdomen is soft.      Comments: Incision C/D/I x 3, no redness or indurations   Neurological:      Mental Status: She is alert and oriented to person, " place, and time.      Deep Tendon Reflexes: Reflexes are normal and symmetric.   Psychiatric:         Behavior: Behavior normal.           Assessment & Plan   Diagnoses and all orders for this visit:    1. Postoperative fever (Primary)    2. Endometriosis determined by laparoscopy      Exam appropriate today, no signs of postop infection at this point  Patient has a follow-up with Dr. Jiménez next week and would like to discuss options for treatment and prevention of future adhesions and endometriosis  She was instructed to continue her post op restrictions as discussed and to complete her antibiotics  Call with any fever or concerns           Total time spent today with Sundeep  was 20 minutes (level 3).  Greater than 50% of the time was spent coordinating care, answering her questions and counseling regarding pathophysiology of her presenting problem along with plans for any diagnositc work-up and treatment.

## 2024-10-14 ENCOUNTER — OFFICE VISIT (OUTPATIENT)
Dept: OBSTETRICS AND GYNECOLOGY | Age: 39
End: 2024-10-14
Payer: COMMERCIAL

## 2024-10-14 VITALS — BODY MASS INDEX: 23.58 KG/M2 | SYSTOLIC BLOOD PRESSURE: 102 MMHG | WEIGHT: 146 LBS | DIASTOLIC BLOOD PRESSURE: 64 MMHG

## 2024-10-14 DIAGNOSIS — N80.9 ENDOMETRIOSIS DETERMINED BY LAPAROSCOPY: Primary | ICD-10-CM

## 2024-10-14 DIAGNOSIS — R10.2 PELVIC PAIN: ICD-10-CM

## 2024-10-14 DIAGNOSIS — N70.11 HYDROSALPINX: ICD-10-CM

## 2024-10-14 PROBLEM — R50.82 POSTOPERATIVE FEVER: Status: RESOLVED | Noted: 2024-09-28 | Resolved: 2024-10-14

## 2024-10-14 PROBLEM — R19.00 PELVIC MASS: Status: RESOLVED | Noted: 2024-09-28 | Resolved: 2024-10-14

## 2024-10-14 PROBLEM — K59.00 CONSTIPATION: Status: RESOLVED | Noted: 2024-08-15 | Resolved: 2024-10-14

## 2024-10-14 PROBLEM — R11.2 NAUSEA AND VOMITING: Status: RESOLVED | Noted: 2024-08-15 | Resolved: 2024-10-14

## 2024-10-14 PROCEDURE — 99024 POSTOP FOLLOW-UP VISIT: CPT | Performed by: OBSTETRICS & GYNECOLOGY

## 2024-10-14 NOTE — PROGRESS NOTES
Subjective       History of Present Illness  Sundeep Espino is a 39 y.o. female is being seen today for follow-up evaluation for her history of endometriosis, bilateral hydrosalpinx, pelvic pain and pelvic adhesions  Patient underwent a diagnostic laparoscopy with adhesiolysis, several days later she had a low-grade temperature was admitted to the hospital no obvious etiology was determined it was thought to be likely an inflammatory response.  Chief Complaint   Patient presents with    Post-op     Cc: 2 wk's 5 days Post op from diagnostic laparoscopy on 9/25/24  , no complaints today    .        The following portions of the patient's history were reviewed and updated as appropriate: allergies, current medications, past family history, past medical history, past social history, past surgical history and problem list.    PAST MEDICAL HISTORY  Past Medical History:   Diagnosis Date    ADHD     Allergic rhinitis     Anxiety and depression     Arthritis     Asthma     Chondromalacia patellae     DDD (degenerative disc disease), cervical     Endometriosis     GERD (gastroesophageal reflux disease)     H/O radioactive iodine thyroid ablation 03/08/2017    Heart palpitations     Hydrosalpinx     Hyperthyroidism     Iron deficiency anemia     Ovarian cyst     Paratubal cyst 05/10/2023    Upper respiratory infection 05/02/2018    UTI (urinary tract infection) 05/02/2018    Vitamin D deficiency      OB History   No obstetric history on file.     Past Surgical History:   Procedure Laterality Date    APPENDECTOMY  2012    BREAST LUMPECTOMY      benign    COLONOSCOPY  approx 2009    normal per patient    COLONOSCOPY W/ POLYPECTOMY N/A 08/01/2018    Normal    CYST REMOVAL  09/25/2024    [place]    DIAGNOSTIC LAPAROSCOPY N/A 09/25/2024    Procedure: DIAGNOSTIC LAPAROSCOPY;  Surgeon: Javon Jiménez MD;  Location: [facility] OR;  Service: Obstetrics/Gynecology;  Laterality: N/A;    EAR TUBES      ENDOSCOPY N/A 08/01/2018         KELOID EXCISION      right ear    MANDIBLE SURGERY  05/2016    ORIF ULNA/RADIUS FRACTURES      Distal radius ORIF with [facility] on 8/25/22    UPPER GASTROINTESTINAL ENDOSCOPY       Family History   Problem Relation Age of Onset    Hypertension Mother     Diabetes Mother     Hypertension Father     Hyperlipidemia Father     Diabetes Father     Heart disease Father         pacemaker    Coronary artery disease Father     Diabetes Maternal Aunt     Irritable bowel syndrome Maternal Aunt     Diabetes Maternal Aunt     Hypertension Maternal Uncle     Diabetes Maternal Uncle     Coronary artery disease Maternal Uncle     Diabetes Maternal Uncle     Hypertension Half-Brother     Hyperlipidemia Half-Brother     Hypertension Half-Sister     Alcohol abuse Other     Diabetes Other     Hypertension Other     Colon cancer Neg Hx     Colon polyps Neg Hx     Crohn's disease Neg Hx     Ulcerative colitis Neg Hx     Malig Hyperthermia Neg Hx      Social History     Tobacco Use   Smoking Status Never    Passive exposure: Never   Smokeless Tobacco Never       Current Outpatient Medications:     Albuterol-Budesonide (Airsupra) 90-80 MCG/ACT aerosol, Inhale 2 Inhalations Every 4 (Four) Hours As Needed (ASTHMA SYMPTOMS)., Disp: 10.7 g, Rfl: 5    cetirizine (zyrTEC) 10 MG tablet, Take 1 tablet by mouth Daily., Disp: , Rfl:     diclofenac (VOLTAREN) 50 MG EC tablet, Take 1 tablet by mouth 2 (Two) Times a Day As Needed. for pain, Disp: , Rfl:     doxycycline (VIBRAMYCIN) 100 MG capsule, Take 1 capsule by mouth 2 (Two) Times a Day., Disp: 14 capsule, Rfl: 0    HYDROcodone-acetaminophen (Norco) 5-325 MG per tablet, Take 1 tablet by mouth Every 6 (Six) Hours As Needed for Moderate Pain., Disp: 8 tablet, Rfl: 0    ibuprofen (ADVIL,MOTRIN) 800 MG tablet, Take 1 tablet by mouth Every 6 (Six) Hours As Needed for Mild Pain., Disp: 30 tablet, Rfl: 0    linaclotide (Linzess) 72 MCG capsule capsule, Take 1 capsule by mouth Every Morning Before Breakfast.,  Disp: 90 capsule, Rfl: 3    Multiple Vitamins-Minerals (MULTIVITAMIN ADULT PO), Take  by mouth., Disp: , Rfl:     Symbicort 80-4.5 MCG/ACT inhaler, Inhale 2 puffs 2 (Two) Times a Day., Disp: 10.2 g, Rfl: 1    Synthroid 112 MCG tablet, Take 1 tablet by mouth Daily. Take at least 30 minutes before having breakfast with a sip of water. For low thyroid., Disp: 90 tablet, Rfl: 1    Tranexamic Acid (Lysteda) 650 MG tablet, Take 1 tablet by mouth 1 (One) Time As Needed (On first 3 days of menstrual cycle) for up to 150 doses., Disp: 30 tablet, Rfl: 4  Immunization History   Administered Date(s) Administered    COVID-19 (PFIZER) Purple Cap Monovalent 04/19/2021, 05/10/2021    Covid-19 (Pfizer) Gray Cap Monovalent 01/30/2022    Flu Vaccine Intradermal Quad 18-64YR 12/08/2020    Fluzone (or Fluarix & Flulaval for VFC) >6mos 12/29/2021, 10/28/2022    Hep B, Adolescent or Pediatric 08/05/1999    Pneumococcal Conjugate 13-Valent (PCV13) 01/13/2020    Pneumococcal Conjugate 20-Valent (PCV20) 10/28/2022    Td (TDVAX) 08/05/1999    flucelvax quad pfs =>4 YRS 11/02/2018, 10/22/2019       Review of Systems       Except as outlined in history of physical illness, patient denies any changes in her GYN, , GI systems. All other systems reviewed are negative.    Objective   Physical Exam   Alert and oriented, respirations unlabored, heart regular rate and rhythm   Pelvic deferred      Assessment & Plan   Diagnoses and all orders for this visit:    1. Endometriosis determined by laparoscopy (Primary)    2. Hydrosalpinx    3. Pelvic pain      History as outlined above, patient states she is feeling better since the laparoscopy with adhesiolysis, she has had a couple weeks to think about it she does not want to proceed with any type of fertility planning.  Would like to potentially have definitive therapy and we will refer her to a [facility] specialist given her bilateral hydrosalpinx pelvic adhesions and history of endometriosis as outlined  previously in other notes.           No orders of the defined types were placed in this encounter.          EMR Dragon/ Transcription disclaimer:  Much of the encounter note is an electronic transcription/translation of spoken language to printed text. The electronic translation of spoken language may permit erroneous, or at times, nonessential words or phrases to be inadvertently transcribes; Although i have reviewed the note for such errors, some may still exist.  Answers submitted by the patient for this visit:  Other (Submitted on 10/11/2024)  Have you had these symptoms before?: No  How long have you been having these symptoms?: 1-2 weeks  Primary Reason for Visit (Submitted on 10/11/2024)  What is the primary reason for your visit?: Problem Not Listed    Conflicting answers have been found for some questions. Please document the patient's answers manually.  To further discuss history of pelvic pain bilateral hallux pinks and history of endometriosis

## 2024-10-15 RX ORDER — FLUCONAZOLE 150 MG/1
150 TABLET ORAL DAILY
Qty: 3 TABLET | Refills: 0 | Status: SHIPPED | OUTPATIENT
Start: 2024-10-15 | End: 2024-10-18

## 2024-10-21 ENCOUNTER — OFFICE VISIT (OUTPATIENT)
Dept: INTERNAL MEDICINE | Facility: CLINIC | Age: 39
End: 2024-10-21
Payer: COMMERCIAL

## 2024-10-21 VITALS
SYSTOLIC BLOOD PRESSURE: 110 MMHG | WEIGHT: 144 LBS | HEIGHT: 66 IN | HEART RATE: 96 BPM | OXYGEN SATURATION: 98 % | BODY MASS INDEX: 23.14 KG/M2 | DIASTOLIC BLOOD PRESSURE: 80 MMHG | TEMPERATURE: 98 F

## 2024-10-21 DIAGNOSIS — E83.51 HYPOCALCEMIA: Primary | ICD-10-CM

## 2024-10-21 DIAGNOSIS — E89.0 POSTABLATIVE HYPOTHYROIDISM: ICD-10-CM

## 2024-10-21 DIAGNOSIS — D50.9 IRON DEFICIENCY ANEMIA, UNSPECIFIED IRON DEFICIENCY ANEMIA TYPE: ICD-10-CM

## 2024-10-21 PROCEDURE — 99214 OFFICE O/P EST MOD 30 MIN: CPT | Performed by: STUDENT IN AN ORGANIZED HEALTH CARE EDUCATION/TRAINING PROGRAM

## 2024-10-21 RX ORDER — LEVOTHYROXINE SODIUM 100 MCG
100 TABLET ORAL
COMMUNITY
Start: 2024-10-19

## 2024-10-21 NOTE — PROGRESS NOTES
Chief Complaint  Follow-up    SUBJECTIVE    History of Present Illness    Sundeep Espino is a 39 y.o. female who presents to the office today as an established patient that last saw me on 7/8/2024.     Post-ablative hypothyroidism secondary to I-131 TURPIN for Graves disease: takes branded Synthroid 100 mcg daily, reduced from 112 mcg daily a few months ago per her report by her endocrinologist. she follows with Dr Garibay endocrinologist at [place]     States she would like her iron retested, feeling fatigued after recent surgery. States she may have to get an earlier iron infusion.     Allergies   Allergen Reactions    Aspartame And Phenylalanine Other (See Comments)     headaches    Banana Nausea And Vomiting    Beta Adrenergic Blockers Hives, Itching and Swelling    Flagyl [Metronidazole] Itching and Rash    Metoprolol Other (See Comments)     Face swelling, itching      Propranolol Other (See Comments)     Face swelling, itching          Outpatient Medications Marked as Taking for the 10/21/24 encounter (Office Visit) with Adam Monahan,    Medication Sig Dispense Refill    Albuterol-Budesonide (Airsupra) 90-80 MCG/ACT aerosol Inhale 2 Inhalations Every 4 (Four) Hours As Needed (ASTHMA SYMPTOMS). 10.7 g 5    cetirizine (zyrTEC) 10 MG tablet Take 1 tablet by mouth Daily.      diclofenac (VOLTAREN) 50 MG EC tablet Take 1 tablet by mouth 2 (Two) Times a Day As Needed. for pain      doxycycline (VIBRAMYCIN) 100 MG capsule Take 1 capsule by mouth 2 (Two) Times a Day. 14 capsule 0    HYDROcodone-acetaminophen (Norco) 5-325 MG per tablet Take 1 tablet by mouth Every 6 (Six) Hours As Needed for Moderate Pain. 8 tablet 0    ibuprofen (ADVIL,MOTRIN) 800 MG tablet Take 1 tablet by mouth Every 6 (Six) Hours As Needed for Mild Pain. 30 tablet 0    Multiple Vitamins-Minerals (MULTIVITAMIN ADULT PO) Take  by mouth.      Symbicort 80-4.5 MCG/ACT inhaler Inhale 2 puffs 2 (Two) Times a Day. 10.2 g 1    Synthroid 100 MCG tablet  "Take 1 tablet by mouth Every Morning.      Tranexamic Acid (Lysteda) 650 MG tablet Take 1 tablet by mouth 1 (One) Time As Needed (On first 3 days of menstrual cycle) for up to 150 doses. 30 tablet 4        Past Medical History:   Diagnosis Date    ADHD     Allergic rhinitis     Anxiety and depression     Arthritis     Asthma     Chondromalacia patellae     DDD (degenerative disc disease), cervical     Endometriosis     GERD (gastroesophageal reflux disease)     H/O radioactive iodine thyroid ablation 03/08/2017    Heart palpitations     Hydrosalpinx     Hyperthyroidism     Iron deficiency anemia     Ovarian cyst     Paratubal cyst 05/10/2023    Upper respiratory infection 05/02/2018    UTI (urinary tract infection) 05/02/2018    Vitamin D deficiency        OBJECTIVE    Vital Signs:   /80   Pulse 96   Temp 98 °F (36.7 °C) (Infrared)   Ht 167.6 cm (65.98\")   Wt 65.3 kg (144 lb)   SpO2 98%   BMI 23.26 kg/m²        Physical Exam  Vitals reviewed.   Constitutional:       General: She is not in acute distress.     Appearance: Normal appearance. She is normal weight. She is not ill-appearing.   Pulmonary:      Effort: Pulmonary effort is normal. No respiratory distress.   Skin:     Coloration: Skin is not jaundiced.   Neurological:      Mental Status: She is alert.   Psychiatric:         Mood and Affect: Mood normal.         Behavior: Behavior normal.         Thought Content: Thought content normal.                             ASSESSMENT & PLAN     Diagnoses and all orders for this visit:    1. Hypocalcemia (Primary)  Lab Results   Component Value Date    GLUCOSE 106 (H) 10/01/2024    BUN 2 (L) 10/01/2024    CREATININE 0.59 10/01/2024     10/01/2024    K 4.0 10/01/2024     (H) 10/01/2024    CALCIUM 8.2 (L) 10/01/2024    PROTEINTOT 6.9 09/28/2024    ALBUMIN 3.8 09/28/2024    ALT 41 (H) 09/28/2024    AST 86 (H) 09/28/2024    ALKPHOS 105 09/28/2024    BILITOT 0.5 09/28/2024    GLOB 3.1 09/28/2024    " AGRATIO 1.2 09/28/2024    BCR 3.4 (L) 10/01/2024    ANIONGAP 8.5 10/01/2024    EGFR 118.5 10/01/2024     -During her September hospitalization she was found to have hypocalcemia with low of 8.0 on September 29, 2024.  Her last BMP shows calcium 8.2 10/1/2024.  I will obtain vitamin D, CMP and ionized calcium today to further evaluate.  -     Calcium, Ionized  -     Vitamin D 25 hydroxy  -     Comprehensive Metabolic Panel    2. Postablative hypothyroidism  -Post-ablative hypothyroidism secondary to I-131 TURPIN for Graves disease  - takes branded Synthroid 100 mcg daily, reduced from 112 mcg daily a few months ago per her report by her endocrinologist. she follows with Dr Garibay endocrinologist at [place]   -Recheck TSH to ensure at goal.  Adjust regimen further if needed.  -     TSH Rfx On Abnormal To Free T4    3. Iron deficiency anemia, unspecified iron deficiency anemia type  -States she would like her iron retested, feeling fatigued after recent surgery. States she may have to get an earlier iron infusion.   -Reviewed most recent hematology progress note.  Also reviewed most recent CBC as below.  Lab Results   Component Value Date    WBC 7.97 10/01/2024    HGB 9.4 (L) 10/01/2024    HCT 31.5 (L) 10/01/2024    MCV 84.9 10/01/2024     10/01/2024     -In the postoperative setting her anemia was worse than baseline.  Her baseline hemoglobin is in the 11's.  At this point I will recheck CBC and iron studies to determine if her hemoglobin is recovering.  If not she should contact her hematology team to consider early administration of her iron infusions.  -     CBC & Differential  -     Iron Profile  -     Ferritin            Follow Up  Return in about 4 months (around 2/21/2025) for Recheck.    Patient/family had no further questions at this time and verbalized understanding of the plan discussed today.

## 2024-10-22 ENCOUNTER — TELEPHONE (OUTPATIENT)
Dept: OBSTETRICS AND GYNECOLOGY | Age: 39
End: 2024-10-22
Payer: COMMERCIAL

## 2024-10-22 LAB
25(OH)D3+25(OH)D2 SERPL-MCNC: 20.7 NG/ML (ref 30–100)
ALBUMIN SERPL-MCNC: 4.1 G/DL (ref 3.5–5.2)
ALBUMIN/GLOB SERPL: 1.3 G/DL
ALP SERPL-CCNC: 111 U/L (ref 39–117)
ALT SERPL-CCNC: 9 U/L (ref 1–33)
AST SERPL-CCNC: 18 U/L (ref 1–32)
BASOPHILS # BLD AUTO: 0.05 10*3/MM3 (ref 0–0.2)
BASOPHILS NFR BLD AUTO: 1 % (ref 0–1.5)
BILIRUB SERPL-MCNC: 0.5 MG/DL (ref 0–1.2)
BUN SERPL-MCNC: 5 MG/DL (ref 6–20)
BUN/CREAT SERPL: 6.7 (ref 7–25)
CA-I SERPL ISE-MCNC: 5 MG/DL (ref 4.5–5.6)
CALCIUM SERPL-MCNC: 9.6 MG/DL (ref 8.6–10.5)
CHLORIDE SERPL-SCNC: 104 MMOL/L (ref 98–107)
CO2 SERPL-SCNC: 25.5 MMOL/L (ref 22–29)
CREAT SERPL-MCNC: 0.75 MG/DL (ref 0.57–1)
EGFRCR SERPLBLD CKD-EPI 2021: 104.7 ML/MIN/1.73
EOSINOPHIL # BLD AUTO: 0.1 10*3/MM3 (ref 0–0.4)
EOSINOPHIL NFR BLD AUTO: 2 % (ref 0.3–6.2)
ERYTHROCYTE [DISTWIDTH] IN BLOOD BY AUTOMATED COUNT: 12.9 % (ref 12.3–15.4)
FERRITIN SERPL-MCNC: 221 NG/ML (ref 13–150)
GLOBULIN SER CALC-MCNC: 3.1 GM/DL
GLUCOSE SERPL-MCNC: 87 MG/DL (ref 65–99)
HCT VFR BLD AUTO: 38.2 % (ref 34–46.6)
HGB BLD-MCNC: 12 G/DL (ref 12–15.9)
IMM GRANULOCYTES # BLD AUTO: 0.01 10*3/MM3 (ref 0–0.05)
IMM GRANULOCYTES NFR BLD AUTO: 0.2 % (ref 0–0.5)
IRON SATN MFR SERPL: 19 % (ref 20–50)
IRON SERPL-MCNC: 63 MCG/DL (ref 37–145)
LYMPHOCYTES # BLD AUTO: 1.83 10*3/MM3 (ref 0.7–3.1)
LYMPHOCYTES NFR BLD AUTO: 36 % (ref 19.6–45.3)
MCH RBC QN AUTO: 25.5 PG (ref 26.6–33)
MCHC RBC AUTO-ENTMCNC: 31.4 G/DL (ref 31.5–35.7)
MCV RBC AUTO: 81.1 FL (ref 79–97)
MONOCYTES # BLD AUTO: 0.47 10*3/MM3 (ref 0.1–0.9)
MONOCYTES NFR BLD AUTO: 9.3 % (ref 5–12)
NEUTROPHILS # BLD AUTO: 2.62 10*3/MM3 (ref 1.7–7)
NEUTROPHILS NFR BLD AUTO: 51.5 % (ref 42.7–76)
NRBC BLD AUTO-RTO: 0 /100 WBC (ref 0–0.2)
PLATELET # BLD AUTO: 286 10*3/MM3 (ref 140–450)
POTASSIUM SERPL-SCNC: 4.5 MMOL/L (ref 3.5–5.2)
PROT SERPL-MCNC: 7.2 G/DL (ref 6–8.5)
RBC # BLD AUTO: 4.71 10*6/MM3 (ref 3.77–5.28)
SODIUM SERPL-SCNC: 140 MMOL/L (ref 136–145)
T4 FREE SERPL-MCNC: 1.64 NG/DL (ref 0.93–1.7)
TIBC SERPL-MCNC: 325 MCG/DL
TSH SERPL DL<=0.005 MIU/L-ACNC: 0.3 UIU/ML (ref 0.27–4.2)
UIBC SERPL-MCNC: 262 MCG/DL (ref 112–346)
WBC # BLD AUTO: 5.08 10*3/MM3 (ref 3.4–10.8)

## 2024-10-23 ENCOUNTER — TELEPHONE (OUTPATIENT)
Dept: OBSTETRICS AND GYNECOLOGY | Age: 39
End: 2024-10-23
Payer: COMMERCIAL

## 2024-11-15 NOTE — PROGRESS NOTES
Chief Complaint:  Follow-up (Nausea, vomiting, GERD, constipation)              History of Present Illness  Patient is a 39 y.o. female with history of constipation, hiatal hernia, and endometriosis who presents for follow-up.  At last visit she complained of nausea, vomiting, GERD, and abdominal pain.  EGD and colonoscopy are scheduled for next month.    Patient states she has a bowel movement every 2 to 3 days, denies straining for bowel movements.  Constipation has been a bit better recently.  She never got the Linzess prescription that was sent in a couple months ago.  She did feel better when taking samples of Linzess.  Denies blood in stool.    She has history of acid reflux and burping that is worse if she eats trigger foods.  She used to take ranitidine until it was taken off the market.  She has trouble swallowing food at times.  She complains of buildup of mucus in her throat when laying flat and this makes it hard to swallow as well.  Nausea and vomiting has resolved.    She has adhesions from endometriosis and surgical lysis of adhesions is planned.         Result Review :      Progress Notes by Janet Hancock APRN (08/15/2024 08:30)     CT ABDOMEN PELVIS W CONTRAST (07/21/2024 04:28) -PID and possible tubo-ovarian abscesses    COLONOSCOPY (08/01/2018 13:49) EGD/CLS -2 cm hiatal hernia, otherwise normal exams  Tissue Pathology Exam (08/01/2018 14:15)     Medication list reviewed        Review of Systems   Constitutional: Negative.    HENT: Negative.     Eyes: Negative.    Respiratory: Negative.     Cardiovascular: Negative.    Gastrointestinal:  Positive for constipation. Negative for blood in stool, diarrhea, nausea and vomiting.   Endocrine: Negative.    Genitourinary: Negative.    Musculoskeletal: Negative.    Skin: Negative.    Allergic/Immunologic: Negative.    Neurological: Negative.    Hematological: Negative.    Psychiatric/Behavioral: Negative.           Vital Signs:   /68   Pulse 74   " Temp 97.9 °F (36.6 °C)   Ht 167.6 cm (66\")   Wt 65.7 kg (144 lb 12.8 oz)   SpO2 100%   BMI 23.37 kg/m²     Body mass index is 23.37 kg/m².     Physical Exam  Vitals reviewed.   Constitutional:       Appearance: Normal appearance.   HENT:      Head: Normocephalic.      Nose: Nose normal.   Eyes:      Pupils: Pupils are equal, round, and reactive to light.   Cardiovascular:      Rate and Rhythm: Normal rate.      Pulses: Normal pulses.   Pulmonary:      Effort: Pulmonary effort is normal.      Breath sounds: Normal breath sounds.   Abdominal:      General: Abdomen is flat.      Palpations: Abdomen is soft.   Musculoskeletal:         General: Normal range of motion.      Cervical back: Normal range of motion.   Skin:     General: Skin is warm and dry.   Neurological:      General: No focal deficit present.      Mental Status: She is alert and oriented to person, place, and time.   Psychiatric:         Mood and Affect: Mood normal.               Assessment and Plan    Diagnoses and all orders for this visit:    1. Irritable bowel syndrome with constipation (Primary)  -     linaclotide (Linzess) 145 MCG capsule capsule; Take 1 capsule by mouth Every Morning Before Breakfast.  Dispense: 90 capsule; Refill: 3    2. Hiatal hernia    3. Gastroesophageal reflux disease, unspecified whether esophagitis present  -     famotidine (PEPCID) 20 MG tablet; Take 1 tablet by mouth Every Night.  Dispense: 60 tablet; Refill: 5    4. Dysphagia, unspecified type             Discussion:  Patient has chronic constipation that responded well to samples of Linzess, I sent in prescription for Linzess 145 mcg daily.  For acid reflux, we will try low-dose famotidine.  I instructed her to contact office if any issues getting prescriptions.  She is scheduled for EGD and colonoscopy in a couple weeks.        Return in about 6 months (around 5/18/2025).       Patient Instructions   EGD and colonoscopy scheduled with Dr. Lay on December 5, " 2024    For GERD, we will try Famotidine 20mg at bedtime.     For constipation, I sent in prescription for Linzess 145mcg daily. Please contact our office through [employer] if any issues getting prescription.         Please contact our office if you have any questions or concerns - [phone].                   DIPIKA George  [facility] [place] [address]  Office: [phone]

## 2024-11-15 NOTE — H&P (VIEW-ONLY)
Chief Complaint:  Follow-up (Nausea, vomiting, GERD, constipation)              History of Present Illness  Patient is a 39 y.o. female with history of constipation, hiatal hernia, and endometriosis who presents for follow-up.  At last visit she complained of nausea, vomiting, GERD, and abdominal pain.  EGD and colonoscopy are scheduled for next month.    Patient states she has a bowel movement every 2 to 3 days, denies straining for bowel movements.  Constipation has been a bit better recently.  She never got the Linzess prescription that was sent in a couple months ago.  She did feel better when taking samples of Linzess.  Denies blood in stool.    She has history of acid reflux and burping that is worse if she eats trigger foods.  She used to take ranitidine until it was taken off the market.  She has trouble swallowing food at times.  She complains of buildup of mucus in her throat when laying flat and this makes it hard to swallow as well.  Nausea and vomiting has resolved.    She has adhesions from endometriosis and surgical lysis of adhesions is planned.         Result Review :      Progress Notes by Janet Hancock APRN (08/15/2024 08:30)     CT ABDOMEN PELVIS W CONTRAST (07/21/2024 04:28) -PID and possible tubo-ovarian abscesses    COLONOSCOPY (08/01/2018 13:49) EGD/CLS -2 cm hiatal hernia, otherwise normal exams  Tissue Pathology Exam (08/01/2018 14:15)     Medication list reviewed        Review of Systems   Constitutional: Negative.    HENT: Negative.     Eyes: Negative.    Respiratory: Negative.     Cardiovascular: Negative.    Gastrointestinal:  Positive for constipation. Negative for blood in stool, diarrhea, nausea and vomiting.   Endocrine: Negative.    Genitourinary: Negative.    Musculoskeletal: Negative.    Skin: Negative.    Allergic/Immunologic: Negative.    Neurological: Negative.    Hematological: Negative.    Psychiatric/Behavioral: Negative.           Vital Signs:   /68   Pulse 74   " Temp 97.9 °F (36.6 °C)   Ht 167.6 cm (66\")   Wt 65.7 kg (144 lb 12.8 oz)   SpO2 100%   BMI 23.37 kg/m²     Body mass index is 23.37 kg/m².     Physical Exam  Vitals reviewed.   Constitutional:       Appearance: Normal appearance.   HENT:      Head: Normocephalic.      Nose: Nose normal.   Eyes:      Pupils: Pupils are equal, round, and reactive to light.   Cardiovascular:      Rate and Rhythm: Normal rate.      Pulses: Normal pulses.   Pulmonary:      Effort: Pulmonary effort is normal.      Breath sounds: Normal breath sounds.   Abdominal:      General: Abdomen is flat.      Palpations: Abdomen is soft.   Musculoskeletal:         General: Normal range of motion.      Cervical back: Normal range of motion.   Skin:     General: Skin is warm and dry.   Neurological:      General: No focal deficit present.      Mental Status: She is alert and oriented to person, place, and time.   Psychiatric:         Mood and Affect: Mood normal.               Assessment and Plan    Diagnoses and all orders for this visit:    1. Irritable bowel syndrome with constipation (Primary)  -     linaclotide (Linzess) 145 MCG capsule capsule; Take 1 capsule by mouth Every Morning Before Breakfast.  Dispense: 90 capsule; Refill: 3    2. Hiatal hernia    3. Gastroesophageal reflux disease, unspecified whether esophagitis present  -     famotidine (PEPCID) 20 MG tablet; Take 1 tablet by mouth Every Night.  Dispense: 60 tablet; Refill: 5    4. Dysphagia, unspecified type             Discussion:  Patient has chronic constipation that responded well to samples of Linzess, I sent in prescription for Linzess 145 mcg daily.  For acid reflux, we will try low-dose famotidine.  I instructed her to contact office if any issues getting prescriptions.  She is scheduled for EGD and colonoscopy in a couple weeks.        Return in about 6 months (around 5/18/2025).       Patient Instructions   EGD and colonoscopy scheduled with Dr. Lay on December 5, " 2024    For GERD, we will try Famotidine 20mg at bedtime.     For constipation, I sent in prescription for Linzess 145mcg daily. Please contact our office through [employer] if any issues getting prescription.         Please contact our office if you have any questions or concerns - [phone].                   DIPIKA George  [facility] [place] [address]  Office: [phone]

## 2024-11-15 NOTE — PATIENT INSTRUCTIONS
EGD and colonoscopy scheduled with Dr. Lay on December 5, 2024    For GERD, we will try Famotidine 20mg at bedtime.     For constipation, I sent in prescription for Linzess 145mcg daily. Please contact our office through [employer] if any issues getting prescription.         Please contact our office if you have any questions or concerns - [phone].

## 2024-11-18 ENCOUNTER — OFFICE VISIT (OUTPATIENT)
Dept: GASTROENTEROLOGY | Facility: CLINIC | Age: 39
End: 2024-11-18
Payer: COMMERCIAL

## 2024-11-18 VITALS
HEIGHT: 66 IN | BODY MASS INDEX: 23.27 KG/M2 | DIASTOLIC BLOOD PRESSURE: 68 MMHG | HEART RATE: 74 BPM | TEMPERATURE: 97.9 F | WEIGHT: 144.8 LBS | OXYGEN SATURATION: 100 % | SYSTOLIC BLOOD PRESSURE: 108 MMHG

## 2024-11-18 DIAGNOSIS — K58.1 IRRITABLE BOWEL SYNDROME WITH CONSTIPATION: Primary | Chronic | ICD-10-CM

## 2024-11-18 DIAGNOSIS — R13.10 DYSPHAGIA, UNSPECIFIED TYPE: ICD-10-CM

## 2024-11-18 DIAGNOSIS — K44.9 HIATAL HERNIA: Chronic | ICD-10-CM

## 2024-11-18 DIAGNOSIS — K21.9 GASTROESOPHAGEAL REFLUX DISEASE, UNSPECIFIED WHETHER ESOPHAGITIS PRESENT: Chronic | ICD-10-CM

## 2024-11-18 PROCEDURE — 99214 OFFICE O/P EST MOD 30 MIN: CPT | Performed by: NURSE PRACTITIONER

## 2024-11-18 RX ORDER — NORETHINDRONE 5 MG/1
5 TABLET ORAL DAILY
COMMUNITY
Start: 2024-10-30

## 2024-11-18 RX ORDER — FAMOTIDINE 20 MG/1
20 TABLET, FILM COATED ORAL NIGHTLY
Qty: 60 TABLET | Refills: 5 | Status: SHIPPED | OUTPATIENT
Start: 2024-11-18

## 2024-12-02 RX ORDER — LEVOTHYROXINE SODIUM 100 MCG
100 TABLET ORAL
Qty: 90 TABLET | Refills: 1 | Status: SHIPPED | OUTPATIENT
Start: 2024-12-02

## 2024-12-03 DIAGNOSIS — Z12.11 ENCOUNTER FOR SCREENING COLONOSCOPY: Primary | ICD-10-CM

## 2024-12-05 ENCOUNTER — ANESTHESIA EVENT (OUTPATIENT)
Dept: SURGERY | Facility: SURGERY CENTER | Age: 39
End: 2024-12-05
Payer: COMMERCIAL

## 2024-12-05 ENCOUNTER — ANESTHESIA (OUTPATIENT)
Dept: SURGERY | Facility: SURGERY CENTER | Age: 39
End: 2024-12-05
Payer: COMMERCIAL

## 2024-12-05 ENCOUNTER — HOSPITAL ENCOUNTER (OUTPATIENT)
Facility: SURGERY CENTER | Age: 39
Setting detail: HOSPITAL OUTPATIENT SURGERY
Discharge: HOME | End: 2024-12-05
Attending: INTERNAL MEDICINE | Admitting: INTERNAL MEDICINE
Payer: COMMERCIAL

## 2024-12-05 VITALS
WEIGHT: 143.8 LBS | TEMPERATURE: 98 F | DIASTOLIC BLOOD PRESSURE: 88 MMHG | HEART RATE: 99 BPM | SYSTOLIC BLOOD PRESSURE: 124 MMHG | OXYGEN SATURATION: 100 % | RESPIRATION RATE: 16 BRPM | BODY MASS INDEX: 23.21 KG/M2

## 2024-12-05 DIAGNOSIS — K21.9 GASTROESOPHAGEAL REFLUX DISEASE, UNSPECIFIED WHETHER ESOPHAGITIS PRESENT: ICD-10-CM

## 2024-12-05 DIAGNOSIS — R10.30 LOWER ABDOMINAL PAIN: ICD-10-CM

## 2024-12-05 DIAGNOSIS — R11.2 NAUSEA AND VOMITING, UNSPECIFIED VOMITING TYPE: ICD-10-CM

## 2024-12-05 DIAGNOSIS — K59.00 CONSTIPATION, UNSPECIFIED CONSTIPATION TYPE: ICD-10-CM

## 2024-12-05 PROCEDURE — 43239 EGD BIOPSY SINGLE/MULTIPLE: CPT | Performed by: INTERNAL MEDICINE

## 2024-12-05 PROCEDURE — 25010000002 PROPOFOL 10 MG/ML EMULSION: Performed by: STUDENT IN AN ORGANIZED HEALTH CARE EDUCATION/TRAINING PROGRAM

## 2024-12-05 PROCEDURE — 45378 DIAGNOSTIC COLONOSCOPY: CPT | Performed by: INTERNAL MEDICINE

## 2024-12-05 PROCEDURE — 25010000002 PROPOFOL 1000 MG/100ML EMULSION: Performed by: STUDENT IN AN ORGANIZED HEALTH CARE EDUCATION/TRAINING PROGRAM

## 2024-12-05 PROCEDURE — 88305 TISSUE EXAM BY PATHOLOGIST: CPT | Performed by: INTERNAL MEDICINE

## 2024-12-05 PROCEDURE — 81025 URINE PREGNANCY TEST: CPT | Performed by: NURSE PRACTITIONER

## 2024-12-05 PROCEDURE — 25010000002 LIDOCAINE 2% SOLUTION: Performed by: STUDENT IN AN ORGANIZED HEALTH CARE EDUCATION/TRAINING PROGRAM

## 2024-12-05 RX ORDER — SODIUM CHLORIDE 0.9 % (FLUSH) 0.9 %
3 SYRINGE (ML) INJECTION EVERY 12 HOURS SCHEDULED
Status: DISCONTINUED | OUTPATIENT
Start: 2024-12-05 | End: 2024-12-05 | Stop reason: HOSPADM

## 2024-12-05 RX ORDER — SODIUM CHLORIDE, SODIUM LACTATE, POTASSIUM CHLORIDE, CALCIUM CHLORIDE 600; 310; 30; 20 MG/100ML; MG/100ML; MG/100ML; MG/100ML
30 INJECTION, SOLUTION INTRAVENOUS CONTINUOUS PRN
Status: DISCONTINUED | OUTPATIENT
Start: 2024-12-05 | End: 2024-12-05 | Stop reason: HOSPADM

## 2024-12-05 RX ORDER — SODIUM CHLORIDE 0.9 % (FLUSH) 0.9 %
10 SYRINGE (ML) INJECTION AS NEEDED
Status: DISCONTINUED | OUTPATIENT
Start: 2024-12-05 | End: 2024-12-05 | Stop reason: HOSPADM

## 2024-12-05 RX ORDER — PROPOFOL 10 MG/ML
INJECTION, EMULSION INTRAVENOUS AS NEEDED
Status: DISCONTINUED | OUTPATIENT
Start: 2024-12-05 | End: 2024-12-05 | Stop reason: SURG

## 2024-12-05 RX ORDER — LIDOCAINE HYDROCHLORIDE 20 MG/ML
INJECTION, SOLUTION INFILTRATION; PERINEURAL AS NEEDED
Status: DISCONTINUED | OUTPATIENT
Start: 2024-12-05 | End: 2024-12-05 | Stop reason: SURG

## 2024-12-05 RX ADMIN — LIDOCAINE HYDROCHLORIDE 60 MG: 20 INJECTION, SOLUTION INFILTRATION; PERINEURAL at 10:56

## 2024-12-05 RX ADMIN — PROPOFOL 120 MG: 10 INJECTION, EMULSION INTRAVENOUS at 10:56

## 2024-12-05 RX ADMIN — PROPOFOL 160 MCG/KG/MIN: 10 INJECTION, EMULSION INTRAVENOUS at 10:56

## 2024-12-05 NOTE — ANESTHESIA POSTPROCEDURE EVALUATION
Patient: Sundeep Espino    Procedure Summary       Date: 12/05/24 Room / Location: [facility]    Anesthesia Start: 1052 Anesthesia Stop: 1121    Procedures:       COLONOSCOPY TO CECUM      ESOPHAGOGASTRODUODENOSCOPY Diagnosis:       Constipation, unspecified constipation type      Nausea and vomiting, unspecified vomiting type      Gastroesophageal reflux disease, unspecified whether esophagitis present      Lower abdominal pain      (Constipation, unspecified constipation type [K59.00])      (Nausea and vomiting, unspecified vomiting type [R11.2])      (Gastroesophageal reflux disease, unspecified whether esophagitis present [K21.9])      (Lower abdominal pain [R10.30])    Surgeons: Volodymyr Lay MD Provider: Javon Hanson MD    Anesthesia Type: MAC ASA Status: 2            Anesthesia Type: MAC    Vitals  Vitals Value Taken Time   /88 12/05/24 1148   Temp 36.7 °C (98 °F) 12/05/24 1123   Pulse 84 12/05/24 1149   Resp 16 12/05/24 1123   SpO2 100 % 12/05/24 1140   Vitals shown include unfiled device data.        Post Anesthesia Care and Evaluation    Patient location during evaluation: bedside  Patient participation: complete - patient participated  Level of consciousness: awake and alert  Pain management: adequate    Airway patency: patent  Anesthetic complications: No anesthetic complications  PONV Status: controlled  Cardiovascular status: acceptable and hemodynamically stable  Respiratory status: acceptable, spontaneous ventilation and nonlabored ventilation  Hydration status: acceptable    Comments: /88   Pulse 99   Temp 36.7 °C (98 °F) (Tympanic)   Resp 16   Wt 65.2 kg (143 lb 12.8 oz)   SpO2 100%   BMI 23.21 kg/m²

## 2024-12-05 NOTE — ANESTHESIA PREPROCEDURE EVALUATION
Anesthesia Evaluation     Patient summary reviewed   no history of anesthetic complications:   NPO Solid Status: > 8 hours  NPO Liquid Status: > 8 hours           Airway   Mallampati: II  TM distance: >3 FB  Neck ROM: full  No difficulty expected  Dental      Pulmonary    (+) asthma,  (-) shortness of breath, recent URI, rhonchi, decreased breath sounds, wheezes, not a smokerSleep apnea: STOP BANG 1.  Cardiovascular   Exercise tolerance: good (4-7 METS)    Rhythm: regular  Rate: normal    (-) past MI, dysrhythmias, angina, murmur      Neuro/Psych  (+) headaches, numbness, psychiatric history Anxiety, Depression and ADHD  (-) seizures, CVA  GI/Hepatic/Renal/Endo    (+) GERD, thyroid problem hypothyroidism  (-)  obesity    Musculoskeletal     (+) back pain, radiculopathy  (-) neck stiffness  Abdominal    Substance History      OB/GYN      Comment:  ExLap for Ovarian cystectomy,9/2024      Other   arthritis,                     Anesthesia Plan    ASA 2     MAC     (MAC anesthesia discussed with patient and/or patient representative. Risks (including but not limited to intra-op awareness), benefits, and alternatives were discussed. Understanding was voiced with an agreement to proceed with a MAC technique and General as a backup option. )    Anesthetic plan, risks, benefits, and alternatives have been provided, discussed and informed consent has been obtained with: patient.      CODE STATUS:

## 2024-12-06 LAB
CYTO UR: NORMAL
LAB AP CASE REPORT: NORMAL
LAB AP CLINICAL INFORMATION: NORMAL
PATH REPORT.FINAL DX SPEC: NORMAL
PATH REPORT.GROSS SPEC: NORMAL

## 2024-12-06 NOTE — PROGRESS NOTES
Subjective     REASON FOR FOLLOW UP: Iron deficiency anemia    REQUESTING PHYSICIAN: Hero    RECORDS OBTAINED:  Records of the patients history including those obtained from the referring provider were reviewed and summarized in detail.    HISTORY OF PRESENT ILLNESS:  The patient is a 39 y.o. year old female who is here for an opinion about the above issue.    History of Present Illness   The patient returns today for lab review and follow-up.  She underwent EGD and colonoscopy 12/5/2024.  Colonoscopy without abnormalities.  EGD with a Z-line irregularity at the gastroesophageal junction, this was biopsied and biopsies are pending.  EGD was otherwise normal.  They recommended indefinite antireflux regimen, and plan to have her follow-up in 4 weeks with nurse practitioner.      She also continues to struggle with endometriosis.  Following with her gynecologist who recently started her on Aygestin, however this has not been helpful.  She reports her gynecologist told her she cannot take transaminase acid with Aygestin, so she has had heavier menstrual bleeding.  She reports feeling like her iron is low.  She is having fatigue and dyspnea on exertion.  Aside from menstrual bleeding, she denies any other signs or symptoms of bleeding.    Past Medical History:   Diagnosis Date    ADHD     Allergic rhinitis     Anxiety and depression     Arthritis     Asthma     Chondromalacia patellae     DDD (degenerative disc disease), cervical     Endometriosis     GERD (gastroesophageal reflux disease)     H/O radioactive iodine thyroid ablation 03/08/2017    Heart palpitations     Hydrosalpinx     Hyperthyroidism     Iron deficiency anemia     Ovarian cyst     Paratubal cyst 05/10/2023    Upper respiratory infection 05/02/2018    UTI (urinary tract infection) 05/02/2018    Vitamin D deficiency         Past Surgical History:   Procedure Laterality Date    APPENDECTOMY  2012    BREAST LUMPECTOMY      benign    COLONOSCOPY  approx  2009    normal per patient    COLONOSCOPY N/A 12/5/2024    Procedure: COLONOSCOPY TO CECUM;  Surgeon: Volodymyr Lay MD;  Location: [facility];  Service: Gastroenterology;  Laterality: N/A;  COLON: NORMAL    COLONOSCOPY W/ POLYPECTOMY N/A 08/01/2018    Normal    CYST REMOVAL  09/25/2024    [facility]    DIAGNOSTIC LAPAROSCOPY N/A 09/25/2024    Procedure: DIAGNOSTIC LAPAROSCOPY;  Surgeon: Javon Jiménez MD;  Location: [facility] MAIN OR;  Service: Obstetrics/Gynecology;  Laterality: N/A;    EAR TUBES      ENDOSCOPY N/A 08/01/2018    [facility]    ENDOSCOPY N/A 12/5/2024    Procedure: ESOPHAGOGASTRODUODENOSCOPY;  Surgeon: Volodymyr Lay MD;  Location: [facility];  Service: Gastroenterology;  Laterality: N/A;  EGD: IRREGULAR Z-LINE, HIATAL HERNIA///    KELOID EXCISION      right ear    MANDIBLE SURGERY  05/2016    ORIF ULNA/RADIUS FRACTURES      Distal radius ORIF with [facility] on 8/25/22    UPPER GASTROINTESTINAL ENDOSCOPY          Current Outpatient Medications on File Prior to Visit   Medication Sig Dispense Refill    Albuterol-Budesonide (Airsupra) 90-80 MCG/ACT aerosol Inhale 2 Inhalations Every 4 (Four) Hours As Needed (ASTHMA SYMPTOMS). 10.7 g 5    cetirizine (zyrTEC) 10 MG tablet Take 1 tablet by mouth Daily.      diclofenac (VOLTAREN) 50 MG EC tablet Take 1 tablet by mouth 2 (Two) Times a Day As Needed. for pain      famotidine (PEPCID) 20 MG tablet Take 1 tablet by mouth Every Night. 60 tablet 5    ibuprofen (ADVIL,MOTRIN) 800 MG tablet Take 1 tablet by mouth Every 6 (Six) Hours As Needed for Mild Pain. 30 tablet 0    linaclotide (Linzess) 145 MCG capsule capsule Take 1 capsule by mouth Every Morning Before Breakfast. (Patient taking differently: Take 1 capsule by mouth Every Morning Before Breakfast. New med) 90 capsule 3    Multiple Vitamins-Minerals (MULTIVITAMIN ADULT PO) Take  by mouth.      norethindrone (AYGESTIN) 5 MG tablet Take 1 tablet by mouth Daily.      polyethylene glycol (GoLYTELY) 236 g solution  Starting at noon on day prior to procedure, drink 8 ounces every 30 minutes until all gone or stools are clear. May add flavor packet. 4000 mL 0    Symbicort 80-4.5 MCG/ACT inhaler Inhale 2 puffs 2 (Two) Times a Day. 10.2 g 1    Synthroid 100 MCG tablet Take 1 tablet by mouth Every Morning. Take at least 30 minutes before having breakfast with a sip of water. For low thyroid. 90 tablet 1    Tranexamic Acid (Lysteda) 650 MG tablet Take 1 tablet by mouth 1 (One) Time As Needed (On first 3 days of menstrual cycle) for up to 150 doses. 30 tablet 4     No current facility-administered medications on file prior to visit.        ALLERGIES:    Allergies   Allergen Reactions    Aspartame And Phenylalanine Other (See Comments)     headaches    Banana Nausea And Vomiting    Beta Adrenergic Blockers Hives, Itching and Swelling    Flagyl [Metronidazole] Itching and Rash    Metoprolol Other (See Comments)     Face swelling, itching      Propranolol Other (See Comments)     Face swelling, itching          Social History     Socioeconomic History    Marital status: Significant Other    Years of education: College   Tobacco Use    Smoking status: Never     Passive exposure: Never    Smokeless tobacco: Never   Vaping Use    Vaping status: Never Used   Substance and Sexual Activity    Alcohol use: Yes     Alcohol/week: 1.0 standard drink of alcohol     Types: 1 Glasses of wine per week    Drug use: Never    Sexual activity: Defer     Partners: Male     Birth control/protection: Condom, Abstinence        Family History   Problem Relation Age of Onset    Hypertension Mother     Diabetes Mother     Hypertension Father     Hyperlipidemia Father     Diabetes Father     Heart disease Father         pacemaker    Coronary artery disease Father     Diabetes Maternal Aunt     Irritable bowel syndrome Maternal Aunt     Diabetes Maternal Aunt     Hypertension Maternal Uncle     Diabetes Maternal Uncle     Coronary artery disease Maternal Uncle   "   Diabetes Maternal Uncle     Hypertension Half-Brother     Hyperlipidemia Half-Brother     Hypertension Half-Sister     Alcohol abuse Other     Diabetes Other     Hypertension Other     Colon cancer Neg Hx     Colon polyps Neg Hx     Crohn's disease Neg Hx     Ulcerative colitis Neg Hx     Malig Hyperthermia Neg Hx         Review of Systems   ROS as per HPI    Objective     Vitals:    12/09/24 1539   BP: 112/70   Pulse: 105   Resp: 16   Temp: 97.8 °F (36.6 °C)   TempSrc: Oral   SpO2: 99%   Weight: 65 kg (143 lb 3.2 oz)   Height: 167.6 cm (65.98\")   PainSc: 10-Worst pain ever   PainLoc: Abdomen  Comment: back as well           12/9/2024     3:35 PM   Current Status   ECOG score 0     Physical Exam    CONSTITUTIONAL: pleasant well-developed adult woman  HEENT: no icterus, no thrush, moist membranes  LYMPH: no cervical or supraclavicular lad  CV: RRR, S1S2, no murmur  RESP: cta bilat, no wheezing, no rales  MUSC: no edema, normal gait   NEURO: alert and oriented x3, normal strength  PSYCH: normal mood and affect      RECENT LABS:  Results from last 7 days   Lab Units 12/09/24  1533   WBC 10*3/mm3 6.64   NEUTROS ABS 10*3/mm3 4.05   HEMOGLOBIN g/dL 11.8*   HEMATOCRIT % 37.4   PLATELETS 10*3/mm3 255                     Assessment & Plan   *Recurrent iron deficiency anemia secondary to menorrhagia requiring intermittent iron replacement IV (intolerant to oral iron secondary to GI upset  IV iron (Venofer) reaction-Will need premedicated for future venofer with benadryl 25mg, tylenol 650mg, and solucortef 300mg   12/5/2024 EGD and colonoscopy performed.  Colonoscopy normal.  EGD with a Z-line irregularity at the gastroesophageal junction.  Biopsies were taken and pending.  They recommended indefinite antireflux regimen and plan to have her follow-up in 4 weeks with a nurse practitioner.   12/9/2024: Hemoglobin 11.8, ferritin 144.0, iron saturation 17%.     *Menorrhagia  She is tranexamic acid from her gynecologist which " improves menorrhagia  8/15/2024: Continues to follow closely with OBGYN.  12/9/2024: Reports her gynecologist recently started her on Aygestin to help with endometriosis.  Her gynecologist told her she cannot take Tranexemic acid Aygestin, because of this she has had increased menstrual bleeding.    *Hypothyroidism status post treatment of Graves' disease  Currently on Synthroid replacement per endocrinology    PLAN:  Currently does not qualify for IV iron.  Return to clinic in 4 months for MD visit, cbc, iron profile, and ferritin.  Continue follow-up with GI.  Instructed her to reach out sooner should she develop new symptoms or concerns   In future if she requires IV venofer, will need premedication benadryl 25mg, tylenol 650 mg, solucortef 650 mg

## 2024-12-09 ENCOUNTER — LAB (OUTPATIENT)
Dept: LAB | Facility: HOSPITAL | Age: 39
End: 2024-12-09
Payer: COMMERCIAL

## 2024-12-09 ENCOUNTER — OFFICE VISIT (OUTPATIENT)
Dept: ONCOLOGY | Facility: CLINIC | Age: 39
End: 2024-12-09
Payer: COMMERCIAL

## 2024-12-09 VITALS
BODY MASS INDEX: 23.01 KG/M2 | OXYGEN SATURATION: 99 % | HEART RATE: 105 BPM | TEMPERATURE: 97.8 F | SYSTOLIC BLOOD PRESSURE: 112 MMHG | DIASTOLIC BLOOD PRESSURE: 70 MMHG | RESPIRATION RATE: 16 BRPM | WEIGHT: 143.2 LBS | HEIGHT: 66 IN

## 2024-12-09 DIAGNOSIS — D50.9 IRON DEFICIENCY ANEMIA, UNSPECIFIED IRON DEFICIENCY ANEMIA TYPE: ICD-10-CM

## 2024-12-09 DIAGNOSIS — D50.9 IRON DEFICIENCY ANEMIA, UNSPECIFIED IRON DEFICIENCY ANEMIA TYPE: Primary | ICD-10-CM

## 2024-12-09 LAB
BASOPHILS # BLD AUTO: 0.07 10*3/MM3 (ref 0–0.2)
BASOPHILS NFR BLD AUTO: 1.1 % (ref 0–1.5)
DEPRECATED RDW RBC AUTO: 42.4 FL (ref 37–54)
EOSINOPHIL # BLD AUTO: 0.12 10*3/MM3 (ref 0–0.4)
EOSINOPHIL NFR BLD AUTO: 1.8 % (ref 0.3–6.2)
ERYTHROCYTE [DISTWIDTH] IN BLOOD BY AUTOMATED COUNT: 13.8 % (ref 12.3–15.4)
FERRITIN SERPL-MCNC: 144 NG/ML (ref 13–150)
HCT VFR BLD AUTO: 37.4 % (ref 34–46.6)
HGB BLD-MCNC: 11.8 G/DL (ref 12–15.9)
IMM GRANULOCYTES # BLD AUTO: 0.02 10*3/MM3 (ref 0–0.05)
IMM GRANULOCYTES NFR BLD AUTO: 0.3 % (ref 0–0.5)
IRON 24H UR-MRATE: 49 MCG/DL (ref 37–145)
IRON SATN MFR SERPL: 17 % (ref 20–50)
LYMPHOCYTES # BLD AUTO: 1.85 10*3/MM3 (ref 0.7–3.1)
LYMPHOCYTES NFR BLD AUTO: 27.9 % (ref 19.6–45.3)
MCH RBC QN AUTO: 26.2 PG (ref 26.6–33)
MCHC RBC AUTO-ENTMCNC: 31.6 G/DL (ref 31.5–35.7)
MCV RBC AUTO: 83.1 FL (ref 79–97)
MONOCYTES # BLD AUTO: 0.53 10*3/MM3 (ref 0.1–0.9)
MONOCYTES NFR BLD AUTO: 8 % (ref 5–12)
NEUTROPHILS NFR BLD AUTO: 4.05 10*3/MM3 (ref 1.7–7)
NEUTROPHILS NFR BLD AUTO: 60.9 % (ref 42.7–76)
NRBC BLD AUTO-RTO: 0 /100 WBC (ref 0–0.2)
PLATELET # BLD AUTO: 255 10*3/MM3 (ref 140–450)
PMV BLD AUTO: 9.1 FL (ref 6–12)
RBC # BLD AUTO: 4.5 10*6/MM3 (ref 3.77–5.28)
TIBC SERPL-MCNC: 292 MCG/DL (ref 298–536)
TRANSFERRIN SERPL-MCNC: 196 MG/DL (ref 200–360)
WBC NRBC COR # BLD AUTO: 6.64 10*3/MM3 (ref 3.4–10.8)

## 2024-12-09 PROCEDURE — 85025 COMPLETE CBC W/AUTO DIFF WBC: CPT

## 2024-12-09 PROCEDURE — 82728 ASSAY OF FERRITIN: CPT

## 2024-12-09 PROCEDURE — 83540 ASSAY OF IRON: CPT

## 2024-12-09 PROCEDURE — 99213 OFFICE O/P EST LOW 20 MIN: CPT | Performed by: NURSE PRACTITIONER

## 2024-12-09 PROCEDURE — 84466 ASSAY OF TRANSFERRIN: CPT

## 2024-12-09 PROCEDURE — 36415 COLL VENOUS BLD VENIPUNCTURE: CPT

## 2025-02-03 NOTE — TELEPHONE ENCOUNTER
Dr. Garibay's office confirmed being able to see the results. They state she has not been seen in office in 2 years. Dr. Garibay is out of the office today, but staff will forward results to covering provider. Patient has an appointment soon and is encouraged to keep this appointment because Dr. Garibay is booked out for a while.   PROVIDER:[TOKEN:[[id number]],FOLLOWUP:[1 week]]

## 2025-03-07 ENCOUNTER — OFFICE VISIT (OUTPATIENT)
Dept: INTERNAL MEDICINE | Facility: CLINIC | Age: 40
End: 2025-03-07
Payer: COMMERCIAL

## 2025-03-07 VITALS
DIASTOLIC BLOOD PRESSURE: 60 MMHG | WEIGHT: 139 LBS | BODY MASS INDEX: 22.34 KG/M2 | SYSTOLIC BLOOD PRESSURE: 100 MMHG | OXYGEN SATURATION: 96 % | HEIGHT: 66 IN | HEART RATE: 81 BPM | TEMPERATURE: 97.8 F

## 2025-03-07 DIAGNOSIS — F41.9 ANXIETY AND DEPRESSION: ICD-10-CM

## 2025-03-07 DIAGNOSIS — E89.0 POSTABLATIVE HYPOTHYROIDISM: Primary | ICD-10-CM

## 2025-03-07 DIAGNOSIS — F32.A ANXIETY AND DEPRESSION: ICD-10-CM

## 2025-03-07 PROCEDURE — 99214 OFFICE O/P EST MOD 30 MIN: CPT | Performed by: STUDENT IN AN ORGANIZED HEALTH CARE EDUCATION/TRAINING PROGRAM

## 2025-03-07 NOTE — PROGRESS NOTES
Adam Monahan DO, [facility]  Internal Medicine  [facility]  [address], [address]  [place] [address]  [phone]    Chief Complaint  4 mos check-up    SUBJECTIVE    History of Present Illness    Sundeep Espino is a 39 y.o. female who presents to the office today as an established patient that last saw me on 10/21/2024.     Anxiety, Depression, ADHD: diagnosed years and years ago. States she believes mood is fine at this time without medication. States as long as people leave her alone she is fine.    Post-ablative hypothyroidism secondary to I-131 TURPIN for Graves disease: takes synthroid 100 mcg daily but this has been reduced to 88 mcg daily by endocrinology but she has not picked that new Rx up from the pharmacy yet, so has not made the change. she follows with Dr Garibay endocrinologist at [place]. States she is dealing with fatigue and diarrhea.       Allergies   Allergen Reactions    Aspartame And Phenylalanine Other (See Comments)     headaches    Banana Nausea And Vomiting    Beta Adrenergic Blockers Hives, Itching and Swelling    Flagyl [Metronidazole] Itching and Rash    Metoprolol Other (See Comments)     Face swelling, itching      Propranolol Other (See Comments)     Face swelling, itching          Outpatient Medications Marked as Taking for the 3/7/25 encounter (Office Visit) with Adam Monahan DO   Medication Sig Dispense Refill    Albuterol-Budesonide (Airsupra) 90-80 MCG/ACT aerosol Inhale 2 Inhalations Every 4 (Four) Hours As Needed (ASTHMA SYMPTOMS). 10.7 g 5    cetirizine (zyrTEC) 10 MG tablet Take 1 tablet by mouth Daily.      famotidine (PEPCID) 20 MG tablet Take 1 tablet by mouth Every Night. 60 tablet 5    Multiple Vitamins-Minerals (MULTIVITAMIN ADULT PO) Take  by mouth.      Synthroid 100 MCG tablet Take 1 tablet by mouth Every Morning. Take at least 30 minutes before having breakfast with a sip of water. For low thyroid. 90 tablet 1        Past Medical History:  "  Diagnosis Date    ADHD     Allergic rhinitis     Anxiety and depression     Arthritis     Asthma     Chondromalacia patellae     DDD (degenerative disc disease), cervical     Endometriosis     GERD (gastroesophageal reflux disease)     H/O radioactive iodine thyroid ablation 03/08/2017    Heart palpitations     Hydrosalpinx     Hyperthyroidism     Iron deficiency anemia     Ovarian cyst     Paratubal cyst 05/10/2023    Upper respiratory infection 05/02/2018    UTI (urinary tract infection) 05/02/2018    Vitamin D deficiency        OBJECTIVE    Vital Signs:   /60   Pulse 81   Temp 97.8 °F (36.6 °C) (Infrared)   Ht 167.6 cm (65.98\")   Wt 63 kg (139 lb)   SpO2 96%   BMI 22.45 kg/m²        Physical Exam  Vitals reviewed.   Constitutional:       General: She is not in acute distress.     Appearance: Normal appearance. She is normal weight. She is not ill-appearing.   Eyes:      General: No scleral icterus.  Pulmonary:      Effort: Pulmonary effort is normal. No respiratory distress.   Skin:     Coloration: Skin is not jaundiced.   Neurological:      Mental Status: She is alert.   Psychiatric:         Mood and Affect: Mood normal.         Behavior: Behavior normal.         Thought Content: Thought content normal.                             ASSESSMENT & PLAN     Diagnoses and all orders for this visit:    1. Postablative hypothyroidism (Primary)  - secondary to I-131 TURPIN for Graves disease  - takes synthroid 100 mcg daily but this has been reduced to 88 mcg daily by endocrinology but she has not picked that new Rx up from the pharmacy yet, so has not made the change. she follows with Dr Garibay endocrinologist at [place]. States she is dealing with fatigue and diarrhea.   -Most recent [place] endocrinology progress note from 2/28/2025 as well as associated TSH and free T4.  TSH was low at 0.09 and free T4 was high at 1.7.  I discussed with patient that she is being overtreated and this could account for some of " her symptoms.  Recommend she make the change immediately to 88 mcg daily Synthroid and return for lab check in 6 weeks with further dosage adjustment at that time as indicated.    2. Anxiety and depression  -good control without medication at this time. Advised her to let me know asap if anything changes.           Follow Up  Return in about 6 months (around 9/7/2025) for Annual physical.    Patient/family had no further questions at this time and verbalized understanding of the plan discussed today.

## 2025-03-11 NOTE — PROGRESS NOTES
Subjective     REASON FOR CONSULTATION: Iron deficiency anemia  Provide an opinion on any further workup or treatment                             REQUESTING PHYSICIAN: Hero    RECORDS OBTAINED:  Records of the patients history including those obtained from the referring provider were reviewed and summarized in detail.    HISTORY OF PRESENT ILLNESS:  The patient is a 39 y.o. year old female who is here for an opinion about the above issue.    History of Present Illness   This is a pleasant 37-year-old lady returning today for follow-up of recurrent iron deficiency anemia secondary to menorrhagia.  The patient underwent salpingo-oophorectomy adhesiolysis D&C on 2/7/2025.    The patient complains of fatigue.  She denies lightheadedness dizziness shortness of breath.    Past Medical History:   Diagnosis Date    ADHD     Allergic rhinitis     Anxiety and depression     Arthritis     Asthma     Chondromalacia patellae     DDD (degenerative disc disease), cervical     Endometriosis     GERD (gastroesophageal reflux disease)     H/O radioactive iodine thyroid ablation 03/08/2017    Heart palpitations     Hydrosalpinx     Hyperthyroidism     Iron deficiency anemia     Ovarian cyst     Paratubal cyst 05/10/2023    Upper respiratory infection 05/02/2018    UTI (urinary tract infection) 05/02/2018    Vitamin D deficiency         Past Surgical History:   Procedure Laterality Date    APPENDECTOMY  2012    BREAST LUMPECTOMY      benign    COLONOSCOPY  approx 2009    normal per patient    COLONOSCOPY N/A 12/5/2024    Procedure: COLONOSCOPY TO CECUM;  Surgeon: Volodymyr Lay MD;  Location: [facility] MAIN OR;  Service: Gastroenterology;  Laterality: N/A;  COLON: NORMAL    COLONOSCOPY W/ POLYPECTOMY N/A 08/01/2018    Normal    CYST REMOVAL  09/25/2024    [facility]    DIAGNOSTIC LAPAROSCOPY N/A 09/25/2024    Procedure: DIAGNOSTIC LAPAROSCOPY;  Surgeon: Javon Jiménez MD;  Location: [facility] MAIN OR;  Service: Obstetrics/Gynecology;   Laterality: N/A;    EAR TUBES      ENDOSCOPY N/A 08/01/2018    [facility]    ENDOSCOPY N/A 12/5/2024    Procedure: ESOPHAGOGASTRODUODENOSCOPY;  Surgeon: Volodymyr Lay MD;  [place] [facility];  Service: Gastroenterology;  Laterality: N/A;  EGD: IRREGULAR Z-LINE, HIATAL HERNIA///    KELOID EXCISION      right ear    MANDIBLE SURGERY  05/2016    ORIF ULNA/RADIUS FRACTURES      Distal radius ORIF with [facility] on 8/25/22    UPPER GASTROINTESTINAL ENDOSCOPY          Current Outpatient Medications on File Prior to Visit   Medication Sig Dispense Refill    Albuterol-Budesonide (Airsupra) 90-80 MCG/ACT aerosol Inhale 2 Inhalations Every 4 (Four) Hours As Needed (ASTHMA SYMPTOMS). 10.7 g 5    cetirizine (zyrTEC) 10 MG tablet Take 1 tablet by mouth Daily.      famotidine (PEPCID) 20 MG tablet Take 1 tablet by mouth Every Night. 60 tablet 5    Multiple Vitamins-Minerals (MULTIVITAMIN ADULT PO) Take  by mouth.      Synthroid 100 MCG tablet Take 1 tablet by mouth Every Morning. Take at least 30 minutes before having breakfast with a sip of water. For low thyroid. (Patient taking differently: Take 88 mcg by mouth Every Morning. Take at least 30 minutes before having breakfast with a sip of water. For low thyroid.) 90 tablet 1    [DISCONTINUED] linaclotide (Linzess) 145 MCG capsule capsule Take 1 capsule by mouth Every Morning Before Breakfast. (Patient not taking: Reported on 3/7/2025) 90 capsule 3    [DISCONTINUED] Symbicort 80-4.5 MCG/ACT inhaler Inhale 2 puffs 2 (Two) Times a Day. (Patient not taking: Reported on 3/7/2025) 10.2 g 1    [DISCONTINUED] Tranexamic Acid (Lysteda) 650 MG tablet Take 1 tablet by mouth 1 (One) Time As Needed (On first 3 days of menstrual cycle) for up to 150 doses. (Patient not taking: Reported on 3/7/2025) 30 tablet 4     No current facility-administered medications on file prior to visit.        ALLERGIES:    Allergies   Allergen Reactions    Aspartame And Phenylalanine Other (See Comments)      headaches    Banana Nausea And Vomiting    Beta Adrenergic Blockers Hives, Itching and Swelling    Flagyl [Metronidazole] Itching and Rash    Metoprolol Other (See Comments)     Face swelling, itching      Propranolol Other (See Comments)     Face swelling, itching          Social History     Socioeconomic History    Marital status: Significant Other    Years of education: College   Tobacco Use    Smoking status: Never     Passive exposure: Never    Smokeless tobacco: Never   Vaping Use    Vaping status: Never Used   Substance and Sexual Activity    Alcohol use: Yes     Alcohol/week: 1.0 standard drink of alcohol     Types: 1 Glasses of wine per week    Drug use: Never    Sexual activity: Defer     Partners: Male     Birth control/protection: Condom, Abstinence        Family History   Problem Relation Age of Onset    Hypertension Mother     Diabetes Mother     Hypertension Father     Hyperlipidemia Father     Diabetes Father     Heart disease Father         pacemaker    Coronary artery disease Father     Diabetes Maternal Aunt     Irritable bowel syndrome Maternal Aunt     Diabetes Maternal Aunt     Hypertension Maternal Uncle     Diabetes Maternal Uncle     Coronary artery disease Maternal Uncle     Diabetes Maternal Uncle     Hypertension Half-Brother     Hyperlipidemia Half-Brother     Hypertension Half-Sister     Alcohol abuse Other     Diabetes Other     Hypertension Other     Colon cancer Neg Hx     Colon polyps Neg Hx     Crohn's disease Neg Hx     Ulcerative colitis Neg Hx     Malig Hyperthermia Neg Hx         Review of Systems   Constitutional:  Positive for fatigue. Negative for fever.   Respiratory:  Negative for shortness of breath.    Cardiovascular: Negative.    Gastrointestinal: Negative.    Genitourinary:  Negative for menstrual problem.   Skin: Negative.    Neurological:  Negative for speech difficulty and headaches.   Hematological: Negative.    Psychiatric/Behavioral: Negative.       "      Objective     Vitals:    03/17/25 0847   BP: 108/72   Pulse: 81   Resp: 16   Temp: 97.4 °F (36.3 °C)   TempSrc: Oral   SpO2: 100%   Weight: 65 kg (143 lb 6.4 oz)   Height: 167.6 cm (65.98\")   PainSc: 0-No pain             3/17/2025     8:51 AM   Current Status   ECOG score 0       Physical Exam    CONSTITUTIONAL: pleasant well-developed adult woman  HEENT: no icterus, no thrush, moist membranes  LYMPH: no cervical or supraclavicular lad  CV: RRR, S1S2, no murmur  RESP: cta bilat, no wheezing, no rales  GI: soft, non-tender, no splenomegaly, +bs  MUSC: no edema, normal gait   NEURO: alert and oriented x3, normal strength  PSYCH: normal mood and affect  Unchanged-3/17/2025  RECENT LABS:  Hematology WBC   Date Value Ref Range Status   03/17/2025 6.34 3.40 - 10.80 10*3/mm3 Final   10/21/2024 5.08 3.40 - 10.80 10*3/mm3 Final   07/21/2024 10.96 4.5 - 11.0 10*3/uL Final     RBC   Date Value Ref Range Status   03/17/2025 4.41 3.77 - 5.28 10*6/mm3 Final   10/21/2024 4.71 3.77 - 5.28 10*6/mm3 Final   07/21/2024 4.56 4.0 - 5.2 10*6/uL Final     Hemoglobin   Date Value Ref Range Status   03/17/2025 11.4 (L) 12.0 - 15.9 g/dL Final   07/21/2024 11.8 (L) 12.0 - 16.0 g/dL Final     Hematocrit   Date Value Ref Range Status   03/17/2025 37.4 34.0 - 46.6 % Final   07/21/2024 37.7 36.0 - 46.0 % Final     Platelets   Date Value Ref Range Status   03/17/2025 268 140 - 450 10*3/mm3 Final   07/21/2024 278 140 - 440 10*3/uL Final        Lab Results   Component Value Date    GLUCOSE 87 10/21/2024    BUN 5 (L) 10/21/2024    CREATININE 0.75 10/21/2024    EGFRIFNONA 90 12/29/2021    EGFRIFAFRI 104 12/29/2021    BCR 6.7 (L) 10/21/2024    K 4.5 10/21/2024    CO2 25.5 10/21/2024    CALCIUM 9.6 10/21/2024    ALBUMIN 4.1 10/21/2024    AST 18 10/21/2024    ALT 9 10/21/2024          Assessment & Plan   *Recurrent iron deficiency anemia secondary to menorrhagia requiring intermittent iron replacement IV (intolerant to oral iron secondary to GI " upset  Hemoglobin 11.4 today  She has had an EGD and colonoscopy in the past  underwent salpingo-oophorectomy adhesiolysis D&C on 2/7/2025.  IV iron (Venofer) reaction-Will need premedicated for future venofer with benadryl 25mg, tylenol 650mg, and solucortef 300mg     *Menorrhagia  underwent salpingo-oophorectomy adhesiolysis D&C on 2/7/2025.    *Hypothyroidism status post treatment of Graves' disease  Currently on Synthroid replacement per endocrinology    Hematology plan/recommendations:  Recheck ferritin and iron profile today; we will call the patient with results and arrange follow-up accordingly  Will need premedicated for future venofer with benadryl 25mg, tylenol 650mg, and solucortef 100mg

## 2025-03-17 ENCOUNTER — TELEPHONE (OUTPATIENT)
Dept: ONCOLOGY | Facility: CLINIC | Age: 40
End: 2025-03-17
Payer: COMMERCIAL

## 2025-03-17 ENCOUNTER — LAB (OUTPATIENT)
Dept: LAB | Facility: HOSPITAL | Age: 40
End: 2025-03-17
Payer: COMMERCIAL

## 2025-03-17 ENCOUNTER — OFFICE VISIT (OUTPATIENT)
Dept: ONCOLOGY | Facility: CLINIC | Age: 40
End: 2025-03-17
Payer: COMMERCIAL

## 2025-03-17 VITALS
HEIGHT: 66 IN | RESPIRATION RATE: 16 BRPM | OXYGEN SATURATION: 100 % | WEIGHT: 143.4 LBS | BODY MASS INDEX: 23.05 KG/M2 | SYSTOLIC BLOOD PRESSURE: 108 MMHG | TEMPERATURE: 97.4 F | DIASTOLIC BLOOD PRESSURE: 72 MMHG | HEART RATE: 81 BPM

## 2025-03-17 DIAGNOSIS — D50.9 IRON DEFICIENCY ANEMIA, UNSPECIFIED IRON DEFICIENCY ANEMIA TYPE: ICD-10-CM

## 2025-03-17 DIAGNOSIS — D50.9 IRON DEFICIENCY ANEMIA, UNSPECIFIED IRON DEFICIENCY ANEMIA TYPE: Primary | ICD-10-CM

## 2025-03-17 LAB
BASOPHILS # BLD AUTO: 0.07 10*3/MM3 (ref 0–0.2)
BASOPHILS NFR BLD AUTO: 1.1 % (ref 0–1.5)
DEPRECATED RDW RBC AUTO: 43 FL (ref 37–54)
EOSINOPHIL # BLD AUTO: 0.17 10*3/MM3 (ref 0–0.4)
EOSINOPHIL NFR BLD AUTO: 2.7 % (ref 0.3–6.2)
ERYTHROCYTE [DISTWIDTH] IN BLOOD BY AUTOMATED COUNT: 13.8 % (ref 12.3–15.4)
FERRITIN SERPL-MCNC: 38.3 NG/ML (ref 13–150)
HCT VFR BLD AUTO: 37.4 % (ref 34–46.6)
HGB BLD-MCNC: 11.4 G/DL (ref 12–15.9)
IMM GRANULOCYTES # BLD AUTO: 0.01 10*3/MM3 (ref 0–0.05)
IMM GRANULOCYTES NFR BLD AUTO: 0.2 % (ref 0–0.5)
IRON 24H UR-MRATE: 34 MCG/DL (ref 37–145)
IRON SATN MFR SERPL: 11 % (ref 20–50)
LYMPHOCYTES # BLD AUTO: 2.63 10*3/MM3 (ref 0.7–3.1)
LYMPHOCYTES NFR BLD AUTO: 41.5 % (ref 19.6–45.3)
MCH RBC QN AUTO: 25.9 PG (ref 26.6–33)
MCHC RBC AUTO-ENTMCNC: 30.5 G/DL (ref 31.5–35.7)
MCV RBC AUTO: 84.8 FL (ref 79–97)
MONOCYTES # BLD AUTO: 0.38 10*3/MM3 (ref 0.1–0.9)
MONOCYTES NFR BLD AUTO: 6 % (ref 5–12)
NEUTROPHILS NFR BLD AUTO: 3.08 10*3/MM3 (ref 1.7–7)
NEUTROPHILS NFR BLD AUTO: 48.5 % (ref 42.7–76)
NRBC BLD AUTO-RTO: 0 /100 WBC (ref 0–0.2)
PLATELET # BLD AUTO: 268 10*3/MM3 (ref 140–450)
PMV BLD AUTO: 9.2 FL (ref 6–12)
RBC # BLD AUTO: 4.41 10*6/MM3 (ref 3.77–5.28)
TIBC SERPL-MCNC: 311 MCG/DL (ref 298–536)
TRANSFERRIN SERPL-MCNC: 209 MG/DL (ref 200–360)
WBC NRBC COR # BLD AUTO: 6.34 10*3/MM3 (ref 3.4–10.8)

## 2025-03-17 PROCEDURE — 84100 ASSAY OF PHOSPHORUS: CPT

## 2025-03-17 PROCEDURE — 36415 COLL VENOUS BLD VENIPUNCTURE: CPT

## 2025-03-17 PROCEDURE — 82728 ASSAY OF FERRITIN: CPT

## 2025-03-17 PROCEDURE — 83540 ASSAY OF IRON: CPT

## 2025-03-17 PROCEDURE — 85025 COMPLETE CBC W/AUTO DIFF WBC: CPT

## 2025-03-17 PROCEDURE — 84466 ASSAY OF TRANSFERRIN: CPT

## 2025-03-17 PROCEDURE — 99213 OFFICE O/P EST LOW 20 MIN: CPT | Performed by: INTERNAL MEDICINE

## 2025-03-17 NOTE — PROGRESS NOTES
Call to Ms. Espino to let her know her iron levels are low on today's lab results.  Let her know IV iron will ordered per Dr. Schaeffer for 4 weekly doses of IV Venofer.  Let her know she will also need a visit in 6 months for labs and NP, and scheduling will be calling to set up appointments.  Patient verbalized understanding.

## 2025-03-17 NOTE — TELEPHONE ENCOUNTER
----- Message from Toshia RODRIGUEZ sent at 3/17/2025  1:38 PM EDT -----  Please schedule patient for 4 weekly doses of IV Venofer, and then she will need follow up in 6 months for labs and NP visit per Dr. Schaeffer.Patient is aware, and expecting your call for these appointment dates.Thank you,Toshia

## 2025-03-18 LAB — PHOSPHATE SERPL-MCNC: 4.1 MG/DL (ref 2.5–4.5)

## 2025-04-01 RX ORDER — HYDROCORTISONE SODIUM SUCCINATE 100 MG/2ML
100 INJECTION INTRAMUSCULAR; INTRAVENOUS AS NEEDED
OUTPATIENT
Start: 2025-04-17

## 2025-04-01 RX ORDER — FAMOTIDINE 10 MG/ML
20 INJECTION, SOLUTION INTRAVENOUS AS NEEDED
OUTPATIENT
Start: 2025-04-17

## 2025-04-01 RX ORDER — HYDROCORTISONE SODIUM SUCCINATE 100 MG/2ML
100 INJECTION INTRAMUSCULAR; INTRAVENOUS AS NEEDED
OUTPATIENT
Start: 2025-04-24

## 2025-04-01 RX ORDER — HYDROCORTISONE SODIUM SUCCINATE 100 MG/2ML
100 INJECTION INTRAMUSCULAR; INTRAVENOUS ONCE
Start: 2025-04-24 | End: 2025-04-24

## 2025-04-01 RX ORDER — ACETAMINOPHEN 325 MG/1
650 TABLET ORAL ONCE
OUTPATIENT
Start: 2025-04-24

## 2025-04-01 RX ORDER — DIPHENHYDRAMINE HCL 25 MG
25 CAPSULE ORAL ONCE
OUTPATIENT
Start: 2025-04-24

## 2025-04-01 RX ORDER — DIPHENHYDRAMINE HYDROCHLORIDE 50 MG/ML
50 INJECTION, SOLUTION INTRAMUSCULAR; INTRAVENOUS AS NEEDED
Status: CANCELLED | OUTPATIENT
Start: 2025-04-03

## 2025-04-01 RX ORDER — FAMOTIDINE 10 MG/ML
20 INJECTION, SOLUTION INTRAVENOUS AS NEEDED
OUTPATIENT
Start: 2025-04-10

## 2025-04-01 RX ORDER — HYDROCORTISONE SODIUM SUCCINATE 100 MG/2ML
100 INJECTION INTRAMUSCULAR; INTRAVENOUS ONCE
Status: CANCELLED
Start: 2025-04-03 | End: 2025-04-03

## 2025-04-01 RX ORDER — DIPHENHYDRAMINE HYDROCHLORIDE 50 MG/ML
50 INJECTION, SOLUTION INTRAMUSCULAR; INTRAVENOUS AS NEEDED
OUTPATIENT
Start: 2025-04-24

## 2025-04-01 RX ORDER — DIPHENHYDRAMINE HCL 25 MG
25 CAPSULE ORAL ONCE
Status: CANCELLED | OUTPATIENT
Start: 2025-04-03

## 2025-04-01 RX ORDER — DIPHENHYDRAMINE HCL 25 MG
25 CAPSULE ORAL ONCE
OUTPATIENT
Start: 2025-04-17

## 2025-04-01 RX ORDER — HYDROCORTISONE SODIUM SUCCINATE 100 MG/2ML
100 INJECTION INTRAMUSCULAR; INTRAVENOUS AS NEEDED
OUTPATIENT
Start: 2025-04-10

## 2025-04-01 RX ORDER — HYDROCORTISONE SODIUM SUCCINATE 100 MG/2ML
100 INJECTION INTRAMUSCULAR; INTRAVENOUS ONCE
Start: 2025-04-10 | End: 2025-04-10

## 2025-04-01 RX ORDER — ACETAMINOPHEN 325 MG/1
650 TABLET ORAL ONCE
OUTPATIENT
Start: 2025-04-10

## 2025-04-01 RX ORDER — DIPHENHYDRAMINE HCL 25 MG
25 CAPSULE ORAL ONCE
OUTPATIENT
Start: 2025-04-10

## 2025-04-01 RX ORDER — ACETAMINOPHEN 325 MG/1
650 TABLET ORAL ONCE
OUTPATIENT
Start: 2025-04-17

## 2025-04-01 RX ORDER — SODIUM CHLORIDE 9 MG/ML
20 INJECTION, SOLUTION INTRAVENOUS ONCE
OUTPATIENT
Start: 2025-04-17

## 2025-04-01 RX ORDER — SODIUM CHLORIDE 9 MG/ML
20 INJECTION, SOLUTION INTRAVENOUS ONCE
OUTPATIENT
Start: 2025-04-10

## 2025-04-01 RX ORDER — DIPHENHYDRAMINE HYDROCHLORIDE 50 MG/ML
50 INJECTION, SOLUTION INTRAMUSCULAR; INTRAVENOUS AS NEEDED
OUTPATIENT
Start: 2025-04-17

## 2025-04-01 RX ORDER — HYDROCORTISONE SODIUM SUCCINATE 100 MG/2ML
100 INJECTION INTRAMUSCULAR; INTRAVENOUS ONCE
Start: 2025-04-17 | End: 2025-04-17

## 2025-04-01 RX ORDER — FAMOTIDINE 10 MG/ML
20 INJECTION, SOLUTION INTRAVENOUS AS NEEDED
Status: CANCELLED | OUTPATIENT
Start: 2025-04-03

## 2025-04-01 RX ORDER — SODIUM CHLORIDE 9 MG/ML
20 INJECTION, SOLUTION INTRAVENOUS ONCE
OUTPATIENT
Start: 2025-04-24

## 2025-04-01 RX ORDER — FAMOTIDINE 10 MG/ML
20 INJECTION, SOLUTION INTRAVENOUS AS NEEDED
OUTPATIENT
Start: 2025-04-24

## 2025-04-01 RX ORDER — ACETAMINOPHEN 325 MG/1
650 TABLET ORAL ONCE
Status: CANCELLED | OUTPATIENT
Start: 2025-04-03

## 2025-04-01 RX ORDER — HYDROCORTISONE SODIUM SUCCINATE 100 MG/2ML
100 INJECTION INTRAMUSCULAR; INTRAVENOUS AS NEEDED
Status: CANCELLED | OUTPATIENT
Start: 2025-04-03

## 2025-04-01 RX ORDER — SODIUM CHLORIDE 9 MG/ML
20 INJECTION, SOLUTION INTRAVENOUS ONCE
Status: CANCELLED | OUTPATIENT
Start: 2025-04-03

## 2025-04-01 RX ORDER — DIPHENHYDRAMINE HYDROCHLORIDE 50 MG/ML
50 INJECTION, SOLUTION INTRAMUSCULAR; INTRAVENOUS AS NEEDED
OUTPATIENT
Start: 2025-04-10

## 2025-04-02 ENCOUNTER — TELEPHONE (OUTPATIENT)
Dept: OBSTETRICS AND GYNECOLOGY | Age: 40
End: 2025-04-02
Payer: COMMERCIAL

## 2025-04-02 DIAGNOSIS — D50.9 IRON DEFICIENCY ANEMIA, UNSPECIFIED IRON DEFICIENCY ANEMIA TYPE: Primary | ICD-10-CM

## 2025-04-02 DIAGNOSIS — N80.9 ENDOMETRIOSIS DETERMINED BY LAPAROSCOPY: ICD-10-CM

## 2025-04-02 NOTE — TELEPHONE ENCOUNTER
Pt.not'marry.She has an iron infusion scheduled at  [facility] on 4/3/25 and will have her labs drawn there.

## 2025-04-03 ENCOUNTER — INFUSION (OUTPATIENT)
Dept: ONCOLOGY | Facility: HOSPITAL | Age: 40
End: 2025-04-03
Payer: COMMERCIAL

## 2025-04-03 VITALS
WEIGHT: 142.4 LBS | TEMPERATURE: 98.2 F | HEART RATE: 74 BPM | OXYGEN SATURATION: 100 % | DIASTOLIC BLOOD PRESSURE: 64 MMHG | BODY MASS INDEX: 23 KG/M2 | RESPIRATION RATE: 16 BRPM | SYSTOLIC BLOOD PRESSURE: 102 MMHG

## 2025-04-03 DIAGNOSIS — D50.9 IRON DEFICIENCY ANEMIA, UNSPECIFIED IRON DEFICIENCY ANEMIA TYPE: Primary | ICD-10-CM

## 2025-04-03 PROCEDURE — 25010000002 IRON SUCROSE PER 1 MG: Performed by: INTERNAL MEDICINE

## 2025-04-03 PROCEDURE — 96374 THER/PROPH/DIAG INJ IV PUSH: CPT

## 2025-04-03 PROCEDURE — 63710000001 DIPHENHYDRAMINE PER 50 MG: Performed by: INTERNAL MEDICINE

## 2025-04-03 PROCEDURE — 25810000003 SODIUM CHLORIDE 0.9 % SOLUTION: Performed by: INTERNAL MEDICINE

## 2025-04-03 PROCEDURE — 25010000002 HYDROCORTISONE SOD SUC (PF) 100 MG RECONSTITUTED SOLUTION: Performed by: INTERNAL MEDICINE

## 2025-04-03 PROCEDURE — 96375 TX/PRO/DX INJ NEW DRUG ADDON: CPT

## 2025-04-03 RX ORDER — SODIUM CHLORIDE 9 MG/ML
20 INJECTION, SOLUTION INTRAVENOUS ONCE
Status: COMPLETED | OUTPATIENT
Start: 2025-04-03 | End: 2025-04-03

## 2025-04-03 RX ORDER — DIPHENHYDRAMINE HCL 25 MG
25 CAPSULE ORAL ONCE
Status: COMPLETED | OUTPATIENT
Start: 2025-04-03 | End: 2025-04-03

## 2025-04-03 RX ORDER — ACETAMINOPHEN 325 MG/1
650 TABLET ORAL ONCE
Status: COMPLETED | OUTPATIENT
Start: 2025-04-03 | End: 2025-04-03

## 2025-04-03 RX ORDER — HYDROCORTISONE SODIUM SUCCINATE 100 MG/2ML
100 INJECTION INTRAMUSCULAR; INTRAVENOUS ONCE
Status: COMPLETED | OUTPATIENT
Start: 2025-04-03 | End: 2025-04-03

## 2025-04-03 RX ADMIN — ACETAMINOPHEN 650 MG: 325 TABLET ORAL at 08:26

## 2025-04-03 RX ADMIN — HYDROCORTISONE SODIUM SUCCINATE 100 MG: 100 INJECTION, POWDER, FOR SOLUTION INTRAMUSCULAR; INTRAVENOUS at 08:26

## 2025-04-03 RX ADMIN — DIPHENHYDRAMINE HYDROCHLORIDE 25 MG: 25 CAPSULE ORAL at 08:26

## 2025-04-03 RX ADMIN — IRON SUCROSE 200 MG: 20 INJECTION, SOLUTION INTRAVENOUS at 09:05

## 2025-04-03 RX ADMIN — SODIUM CHLORIDE 20 ML/HR: 9 INJECTION, SOLUTION INTRAVENOUS at 09:04

## 2025-04-04 LAB
ESTRADIOL SERPL-MCNC: 120 PG/ML
FSH SERPL-ACNC: 108 MIU/ML
T4 FREE SERPL-MCNC: 0.88 NG/DL (ref 0.82–1.77)

## 2025-04-10 ENCOUNTER — INFUSION (OUTPATIENT)
Dept: ONCOLOGY | Facility: HOSPITAL | Age: 40
End: 2025-04-10
Payer: COMMERCIAL

## 2025-04-10 VITALS
RESPIRATION RATE: 16 BRPM | DIASTOLIC BLOOD PRESSURE: 67 MMHG | TEMPERATURE: 97.8 F | WEIGHT: 141.8 LBS | SYSTOLIC BLOOD PRESSURE: 104 MMHG | HEART RATE: 81 BPM | BODY MASS INDEX: 22.9 KG/M2 | OXYGEN SATURATION: 97 %

## 2025-04-10 DIAGNOSIS — D50.9 IRON DEFICIENCY ANEMIA, UNSPECIFIED IRON DEFICIENCY ANEMIA TYPE: Primary | ICD-10-CM

## 2025-04-10 PROCEDURE — 96375 TX/PRO/DX INJ NEW DRUG ADDON: CPT

## 2025-04-10 PROCEDURE — 25010000002 IRON SUCROSE PER 1 MG: Performed by: INTERNAL MEDICINE

## 2025-04-10 PROCEDURE — 25010000002 HYDROCORTISONE SOD SUC (PF) 100 MG RECONSTITUTED SOLUTION: Performed by: INTERNAL MEDICINE

## 2025-04-10 PROCEDURE — 96374 THER/PROPH/DIAG INJ IV PUSH: CPT

## 2025-04-10 PROCEDURE — 63710000001 DIPHENHYDRAMINE PER 50 MG: Performed by: INTERNAL MEDICINE

## 2025-04-10 RX ORDER — HYDROCORTISONE SODIUM SUCCINATE 100 MG/2ML
100 INJECTION INTRAMUSCULAR; INTRAVENOUS ONCE
Status: COMPLETED | OUTPATIENT
Start: 2025-04-10 | End: 2025-04-10

## 2025-04-10 RX ORDER — DIPHENHYDRAMINE HCL 25 MG
25 CAPSULE ORAL ONCE
Status: COMPLETED | OUTPATIENT
Start: 2025-04-10 | End: 2025-04-10

## 2025-04-10 RX ORDER — ACETAMINOPHEN 325 MG/1
650 TABLET ORAL ONCE
Status: COMPLETED | OUTPATIENT
Start: 2025-04-10 | End: 2025-04-10

## 2025-04-10 RX ADMIN — HYDROCORTISONE SODIUM SUCCINATE 100 MG: 100 INJECTION, POWDER, FOR SOLUTION INTRAMUSCULAR; INTRAVENOUS at 08:21

## 2025-04-10 RX ADMIN — ACETAMINOPHEN 650 MG: 325 TABLET ORAL at 08:21

## 2025-04-10 RX ADMIN — DIPHENHYDRAMINE HYDROCHLORIDE 25 MG: 25 CAPSULE ORAL at 08:21

## 2025-04-10 RX ADMIN — IRON SUCROSE 200 MG: 20 INJECTION, SOLUTION INTRAVENOUS at 08:56

## 2025-04-17 ENCOUNTER — INFUSION (OUTPATIENT)
Dept: ONCOLOGY | Facility: HOSPITAL | Age: 40
End: 2025-04-17
Payer: COMMERCIAL

## 2025-04-17 VITALS
SYSTOLIC BLOOD PRESSURE: 115 MMHG | BODY MASS INDEX: 23.12 KG/M2 | HEART RATE: 79 BPM | OXYGEN SATURATION: 100 % | RESPIRATION RATE: 16 BRPM | DIASTOLIC BLOOD PRESSURE: 68 MMHG | TEMPERATURE: 98 F | WEIGHT: 143.2 LBS

## 2025-04-17 DIAGNOSIS — D50.9 IRON DEFICIENCY ANEMIA, UNSPECIFIED IRON DEFICIENCY ANEMIA TYPE: Primary | ICD-10-CM

## 2025-04-17 PROCEDURE — 96375 TX/PRO/DX INJ NEW DRUG ADDON: CPT

## 2025-04-17 PROCEDURE — 96374 THER/PROPH/DIAG INJ IV PUSH: CPT

## 2025-04-17 PROCEDURE — 63710000001 DIPHENHYDRAMINE PER 50 MG: Performed by: INTERNAL MEDICINE

## 2025-04-17 PROCEDURE — 25010000002 IRON SUCROSE PER 1 MG: Performed by: INTERNAL MEDICINE

## 2025-04-17 PROCEDURE — 25010000002 HYDROCORTISONE SOD SUC (PF) 100 MG RECONSTITUTED SOLUTION: Performed by: INTERNAL MEDICINE

## 2025-04-17 RX ORDER — DIPHENHYDRAMINE HCL 25 MG
25 CAPSULE ORAL ONCE
Status: COMPLETED | OUTPATIENT
Start: 2025-04-17 | End: 2025-04-17

## 2025-04-17 RX ORDER — ACETAMINOPHEN 325 MG/1
650 TABLET ORAL ONCE
Status: COMPLETED | OUTPATIENT
Start: 2025-04-17 | End: 2025-04-17

## 2025-04-17 RX ORDER — HYDROCORTISONE SODIUM SUCCINATE 100 MG/2ML
100 INJECTION INTRAMUSCULAR; INTRAVENOUS ONCE
Status: COMPLETED | OUTPATIENT
Start: 2025-04-17 | End: 2025-04-17

## 2025-04-17 RX ADMIN — DIPHENHYDRAMINE HYDROCHLORIDE 25 MG: 25 CAPSULE ORAL at 08:28

## 2025-04-17 RX ADMIN — HYDROCORTISONE SODIUM SUCCINATE 100 MG: 100 INJECTION, POWDER, FOR SOLUTION INTRAMUSCULAR; INTRAVENOUS at 08:28

## 2025-04-17 RX ADMIN — IRON SUCROSE 200 MG: 20 INJECTION, SOLUTION INTRAVENOUS at 08:40

## 2025-04-17 RX ADMIN — ACETAMINOPHEN 650 MG: 325 TABLET ORAL at 08:28

## 2025-04-22 ENCOUNTER — TELEPHONE (OUTPATIENT)
Dept: OBSTETRICS AND GYNECOLOGY | Age: 40
End: 2025-04-22
Payer: COMMERCIAL

## 2025-04-22 NOTE — TELEPHONE ENCOUNTER
See previous message response from .Pt.states she was referred back to  per . is out of the office until 4/29/25.She would like to speak with someone else regarding the way she is feeling on ocp's.

## 2025-04-23 ENCOUNTER — TELEPHONE (OUTPATIENT)
Dept: OBSTETRICS AND GYNECOLOGY | Age: 40
End: 2025-04-23
Payer: COMMERCIAL

## 2025-04-24 ENCOUNTER — INFUSION (OUTPATIENT)
Dept: ONCOLOGY | Facility: HOSPITAL | Age: 40
End: 2025-04-24
Payer: COMMERCIAL

## 2025-04-24 VITALS
HEART RATE: 71 BPM | RESPIRATION RATE: 16 BRPM | OXYGEN SATURATION: 99 % | DIASTOLIC BLOOD PRESSURE: 62 MMHG | BODY MASS INDEX: 23.74 KG/M2 | WEIGHT: 147 LBS | SYSTOLIC BLOOD PRESSURE: 98 MMHG | TEMPERATURE: 97.3 F

## 2025-04-24 DIAGNOSIS — D50.9 IRON DEFICIENCY ANEMIA, UNSPECIFIED IRON DEFICIENCY ANEMIA TYPE: Primary | ICD-10-CM

## 2025-04-24 PROCEDURE — 63710000001 DIPHENHYDRAMINE PER 50 MG: Performed by: INTERNAL MEDICINE

## 2025-04-24 PROCEDURE — 25010000002 IRON SUCROSE PER 1 MG: Performed by: INTERNAL MEDICINE

## 2025-04-24 PROCEDURE — 96375 TX/PRO/DX INJ NEW DRUG ADDON: CPT

## 2025-04-24 PROCEDURE — 96374 THER/PROPH/DIAG INJ IV PUSH: CPT

## 2025-04-24 PROCEDURE — 25010000002 HYDROCORTISONE SOD SUC (PF) 100 MG RECONSTITUTED SOLUTION: Performed by: INTERNAL MEDICINE

## 2025-04-24 RX ORDER — ACETAMINOPHEN 325 MG/1
650 TABLET ORAL ONCE
Status: COMPLETED | OUTPATIENT
Start: 2025-04-24 | End: 2025-04-24

## 2025-04-24 RX ORDER — HYDROCORTISONE SODIUM SUCCINATE 100 MG/2ML
100 INJECTION INTRAMUSCULAR; INTRAVENOUS ONCE
Status: COMPLETED | OUTPATIENT
Start: 2025-04-24 | End: 2025-04-24

## 2025-04-24 RX ORDER — DIPHENHYDRAMINE HCL 25 MG
25 CAPSULE ORAL ONCE
Status: COMPLETED | OUTPATIENT
Start: 2025-04-24 | End: 2025-04-24

## 2025-04-24 RX ADMIN — DIPHENHYDRAMINE HYDROCHLORIDE 25 MG: 25 CAPSULE ORAL at 08:17

## 2025-04-24 RX ADMIN — ACETAMINOPHEN 650 MG: 325 TABLET ORAL at 08:17

## 2025-04-24 RX ADMIN — IRON SUCROSE 200 MG: 20 INJECTION, SOLUTION INTRAVENOUS at 08:40

## 2025-04-24 RX ADMIN — HYDROCORTISONE SODIUM SUCCINATE 100 MG: 100 INJECTION, POWDER, FOR SOLUTION INTRAMUSCULAR; INTRAVENOUS at 08:21

## 2025-04-25 DIAGNOSIS — E03.9 HYPOTHYROIDISM, UNSPECIFIED TYPE: Primary | ICD-10-CM

## 2025-04-25 DIAGNOSIS — E03.9 HYPOTHYROIDISM, UNSPECIFIED TYPE: ICD-10-CM

## 2025-04-25 LAB — TSH SERPL DL<=0.005 MIU/L-ACNC: 7.39 UIU/ML (ref 0.27–4.2)

## 2025-04-28 ENCOUNTER — OFFICE VISIT (OUTPATIENT)
Dept: OBSTETRICS AND GYNECOLOGY | Age: 40
End: 2025-04-28
Payer: COMMERCIAL

## 2025-04-28 VITALS
BODY MASS INDEX: 23.63 KG/M2 | WEIGHT: 147 LBS | SYSTOLIC BLOOD PRESSURE: 108 MMHG | DIASTOLIC BLOOD PRESSURE: 64 MMHG | HEIGHT: 66 IN

## 2025-04-28 DIAGNOSIS — R53.83 OTHER FATIGUE: Primary | ICD-10-CM

## 2025-04-28 DIAGNOSIS — Z90.722 S/P BILATERAL SALPINGO-OOPHORECTOMY: ICD-10-CM

## 2025-04-28 DIAGNOSIS — R45.86 MOOD CHANGE: ICD-10-CM

## 2025-04-28 PROCEDURE — 99213 OFFICE O/P EST LOW 20 MIN: CPT | Performed by: PHYSICIAN ASSISTANT

## 2025-04-28 RX ORDER — NORETHINDRONE ACETATE AND ETHINYL ESTRADIOL .02; 1 MG/1; MG/1
1 TABLET ORAL DAILY
COMMUNITY
Start: 2025-04-09 | End: 2025-04-28

## 2025-04-28 RX ORDER — ETONOGESTREL AND ETHINYL ESTRADIOL VAGINAL RING .015; .12 MG/D; MG/D
RING VAGINAL
Qty: 1 EACH | Refills: 12 | Status: SHIPPED | OUTPATIENT
Start: 2025-04-28

## 2025-04-28 NOTE — PROGRESS NOTES
"Subjective     Chief Complaint   Patient presents with    Consult     Consult to discuss hormones.        Sundeep Espino is a 39 y.o. No obstetric history on file. whose LMP is Patient's last menstrual period was 03/31/2025 (exact date). presents with mood changes and fatigue since her BSO    She had a BSO done in February  Has noted mood changes and generally not feeling right  She has not been able to return to work d/t her sxs    She was given an estrogen patch but that caused skin irritation  She was switched to a BCP but doesn't like the way it makes  her feel  She is unsure what she wants to try next  She did well with depo provera but gained weight    She has had labs done with DR Gray  She is anemic and has received iron infusions  She has hypothyroidism and her recent TSH level was abnormal  She will contact them to find out what they want her to do with her synthroid.     No Additional Complaints Reported    The following portions of the patient's history were reviewed and updated as appropriate:no additional history reviewed, vital signs, allergies, current medications, past medical history, past social history, past surgical history, and problem list      Review of Systems   Genitourinary:positive for na     Objective      /64   Ht 167.6 cm (66\")   Wt 66.7 kg (147 lb)   LMP 03/31/2025 (Exact Date)   BMI 23.73 kg/m²     Physical Exam    General:   alert, comfortable, and no distress   Heart: Not performed today   Lungs: Not performed today.   Breast: Not performed today   Neck: Not performed today   Abdomen: Not performed today   CVA: Not performed today   Pelvis: Not performed today   Extremities: Not performed today   Neurologic: Not performed today   Psychiatric: Normal affect, judgement, and mood       Lab Review   Labs: No data reviewed    Imaging   No data reviewed    Assessment & Plan     ASSESSMENT  1. Other fatigue    2. Mood change    3. S/P bilateral salpingo-oophorectomy  "         PLAN  1. Disc options to treat her sxs. She is going to try nuvaring. If this doesn't work or isn't well tolerated, we could try a different estrogen patch and oral pill. We could also consider an IUD to protect her lining and then an oral estrogen or patch. She generally doesn't tolerate hormones well so she will report back with any SE    2. Medications prescribed this encounter:        New Medications Ordered This Visit   Medications    etonogestrel-ethinyl estradiol (NuvaRing) 0.12-0.015 MG/24HR vaginal ring     Sig: Insert vaginally and leave in place for 3 consecutive weeks, then remove for 1 week.     Dispense:  1 each     Refill:  12           Follow up: SALMA Cotton  4/28/2025

## 2025-04-29 ENCOUNTER — TELEPHONE (OUTPATIENT)
Dept: INTERNAL MEDICINE | Facility: CLINIC | Age: 40
End: 2025-04-29
Payer: COMMERCIAL

## 2025-04-29 NOTE — TELEPHONE ENCOUNTER
Left message for Lenny to faxed a medical release form to the office. Before records can be sent.

## 2025-04-29 NOTE — TELEPHONE ENCOUNTER
Caller: NILDA [employer] [facility] [employer]     Relationship:     Best call back number:     What is the best time to reach you: [phone]   EXT [id number]    Who are you requesting to speak with (clinical staff, provider,  specific staff member): CLINICAL     What was the call regarding: NILDA IS REQUESTING A CALLBACK CONFIRMING PATIENCE LAST VISIT AS WELL AS OFFICE NOTES REQUEST  FAX TO [phone]     Is it okay if the provider responds through [employer]: NO

## 2025-05-19 ENCOUNTER — OFFICE VISIT (OUTPATIENT)
Dept: OBSTETRICS AND GYNECOLOGY | Age: 40
End: 2025-05-19
Payer: COMMERCIAL

## 2025-05-19 VITALS
BODY MASS INDEX: 23.3 KG/M2 | HEIGHT: 66 IN | SYSTOLIC BLOOD PRESSURE: 100 MMHG | WEIGHT: 145 LBS | DIASTOLIC BLOOD PRESSURE: 60 MMHG

## 2025-05-19 DIAGNOSIS — N89.8 VAGINAL DISCHARGE: ICD-10-CM

## 2025-05-19 DIAGNOSIS — Z00.00 ENCOUNTER FOR ANNUAL PHYSICAL EXAM: ICD-10-CM

## 2025-05-19 DIAGNOSIS — Z90.722 HISTORY OF BILATERAL SALPINGO-OOPHORECTOMY: ICD-10-CM

## 2025-05-19 DIAGNOSIS — Z90.79 HISTORY OF BILATERAL SALPINGO-OOPHORECTOMY: ICD-10-CM

## 2025-05-19 DIAGNOSIS — R23.2 HOT FLASHES: Primary | ICD-10-CM

## 2025-05-19 DIAGNOSIS — N80.9 ENDOMETRIOSIS DETERMINED BY LAPAROSCOPY: ICD-10-CM

## 2025-05-19 PROBLEM — N70.11 HYDROSALPINX: Status: RESOLVED | Noted: 2024-09-25 | Resolved: 2025-05-19

## 2025-05-19 RX ORDER — MEDROXYPROGESTERONE ACETATE 10 MG
10 TABLET ORAL DAILY
COMMUNITY
Start: 2025-03-22 | End: 2025-05-19

## 2025-05-19 RX ORDER — ESTRADIOL 0.1 MG/D
1 PATCH TRANSDERMAL WEEKLY
COMMUNITY
Start: 2025-03-21 | End: 2025-05-19

## 2025-05-19 NOTE — PATIENT INSTRUCTIONS
Continue Famotidine 20 mg daily     If constipation returns, begin taking Miralax 1 dose daily and notify office if this does not help.     Follow antireflux precautions for management of GERD.  Recommend avoiding eating within 3 to 4 hours of bedtime.  Avoid foods that can trigger symptoms which may include citrus fruits, spicy foods, tomatoes and red sauces, chocolate, coffee/tea, caffeinated or carbonated beverages, alcohol.     Recommend avoiding NSAID medications (Ibuprofen, Advil, Aleve, Motrin, Naproxen, etc).  If needed for pain, it is okay to take Tylenol (acetaminophen) available over-the-counter.  Do not exceed recommended daily dose.     Plan for next colonoscopy at age 46

## 2025-05-19 NOTE — PROGRESS NOTES
Chief Complaint   Patient presents with    Follow-up     History of IBS-constipation, GERD, dysphagia           History of Present Illness  History of Present Illness  The patient presents for follow-up.  She has history of constipation, hiatal hernia, endometriosis, and adhesions.  She was last seen in clinic  in November 2024 with complaint of constipation, heartburn, and trouble swallowing.  EGD showed 1 cm hiatal hernia and irregular Z-line, esophagus was not dilated.  Colonoscopy had incomplete bowel prep but no concerning findings.    Since last visit she has been taking famotidine 20 mg nightly.  She reports improvement of acid reflux.  Overall she is feeling much better and she thinks it is due to having lysis of adhesions surgery in February 2025.  She also had removal of ovaries and fallopian tubes.  She reports mild nausea attributed to her hormone medications.  No abdominal pain.  Constipation has improved, she has bowel movements every 3 days without straining and states this is normal for her.  She is not taking Linzess that was prescribed at last visit.  No other GI complaints.     Result Review :      Progress Notes by Anitha Waldrop APRN (11/18/2024 08:30)     CBC AND DIFFERENTIAL (02/08/2025 10:46) Hgb 8.3  Iron Profile (03/17/2025 08:40) low iron    CT ABDOMEN PELVIS W CONTRAST (07/21/2024 04:28) PID and possible tubo-ovarian abscesses     Upper GI Endoscopy (12/05/2024 10:46) 1 cm hiatal hernia, irregular Z-line, normal stomach and duodenum  Colonoscopy (12/05/2024 10:46) stool in the entire examined colon  Tissue Pathology Exam (12/05/2024 10:59) benign  COLONOSCOPY (08/01/2018 13:49) EGD/CLS -2 cm hiatal hernia, otherwise normal exams  Tissue Pathology Exam (08/01/2018 14:15)     Medication list reviewed        Review of Systems   Constitutional: Negative.    HENT: Negative.     Eyes: Negative.    Respiratory: Negative.     Cardiovascular: Negative.    Gastrointestinal:  Positive for nausea.  "  Endocrine: Negative.    Genitourinary: Negative.    Musculoskeletal: Negative.    Skin: Negative.    Allergic/Immunologic: Negative.    Neurological: Negative.    Hematological: Negative.    Psychiatric/Behavioral: Negative.           Vital Signs:   /64   Pulse 84   Temp 97.7 °F (36.5 °C)   Ht 167.6 cm (66\")   Wt 66.6 kg (146 lb 12.8 oz)   SpO2 98%   BMI 23.69 kg/m²     Body mass index is 23.69 kg/m².     Physical Exam  Constitutional:       Appearance: Normal appearance.   HENT:      Head: Normocephalic and atraumatic.      Nose: Nose normal.   Eyes:      Extraocular Movements: Extraocular movements intact.      Conjunctiva/sclera: Conjunctivae normal.      Pupils: Pupils are equal, round, and reactive to light.   Cardiovascular:      Rate and Rhythm: Normal rate.   Pulmonary:      Effort: Pulmonary effort is normal.   Musculoskeletal:      Cervical back: Normal range of motion.   Neurological:      General: No focal deficit present.      Mental Status: She is alert and oriented to person, place, and time.   Psychiatric:         Mood and Affect: Mood normal.         Behavior: Behavior normal.         Thought Content: Thought content normal.         Judgment: Judgment normal.             Assessment and Plan    Diagnoses and all orders for this visit:    1. Irritable bowel syndrome with constipation (Primary)    2. Gastroesophageal reflux disease without esophagitis    3. Hiatal hernia    4. Iron deficiency anemia, unspecified iron deficiency anemia type           Discussion:  Patient reports improvement of GI symptoms which she thinks is largely due to lysis of adhesions.  I encouraged her to continue famotidine 20 mg nightly and she can eventually wean off of this if she continues to do well.  Currently not taking any bowel regimen, recommend she begin MiraLAX daily or resume Linzess if issues with constipation in the future.  Lab work shows anemia and she has received IV iron infusions, anemia likely " related to endometriosis and postoperative state, no evidence of GI bleeding on scopes.    Return if symptoms worsen or fail to improve.       Patient Instructions   Continue Famotidine 20 mg daily     If constipation returns, begin taking Miralax 1 dose daily and notify office if this does not help.     Follow antireflux precautions for management of GERD.  Recommend avoiding eating within 3 to 4 hours of bedtime.  Avoid foods that can trigger symptoms which may include citrus fruits, spicy foods, tomatoes and red sauces, chocolate, coffee/tea, caffeinated or carbonated beverages, alcohol.     Recommend avoiding NSAID medications (Ibuprofen, Advil, Aleve, Motrin, Naproxen, etc).  If needed for pain, it is okay to take Tylenol (acetaminophen) available over-the-counter.  Do not exceed recommended daily dose.     Plan for next colonoscopy at age 46          Patient or patient representative verbalized consent for the use of Ambient Listening during the visit with  DIPIKA Antoine for chart documentation. 5/20/2025  08:13 EDT            DIPIKA George  [facility] [address] [address]  Office: [phone]

## 2025-05-19 NOTE — PROGRESS NOTES
Subjective     Chief Complaint   Patient presents with    Gynecologic Exam     Annual:last pap 5/24, BSO 2/2025,Discuss vaginal discharge,white,no odor         History of Present Illness    Wellness exam  Sundeep Espino is a very pleasant  39 y.o. female who presents for annual exam.  Mammo Exam signed, Contraception laparoscopic BSO, Exercise walking 7 days a week  Patient underwent laparoscopic BSO with significant adhesiolysis and reduction of scarring as well as removal of hydrosalpinx.  She feels significantly better.  This was done February 7, 2025 she was started on HRT including patch with Prometrium.  Did not tolerate that, we will switch to oral HRT, did not tolerate that and wanted to a trial of NuvaRing which was started by our APC last month.  She started to feel little bit better.  This is not the optimal way to replace her hormones given her age but currently is the only thing that she agrees to.  She has a follow-up appointment with her surgeon tomorrow    Additionally she has some vaginal discharge white in nature without odor without burning and no dysuria..      Obstetric History:  OB History    No obstetric history on file.        Menstrual History:     Patient's last menstrual period was 03/31/2025 (exact date).       Sexual History:       Past Medical History:   Diagnosis Date    Abnormal Pap smear of cervix N/A    ADHD     Allergic N/A    Allergic rhinitis     Anxiety and depression     Arthritis     Asthma     Chlamydia N/A    Chondromalacia patellae     DDD (degenerative disc disease), cervical     Endometriosis     Fibroid     GERD (gastroesophageal reflux disease)     Gonorrhea N/A    H/O radioactive iodine thyroid ablation 03/08/2017    Heart palpitations     Hydrosalpinx     Hyperthyroidism     Hypothyroidism     Injury of back N/A    Injury of neck N/A    Iron deficiency anemia     Migraine N/A    Osteoarthritis     Ovarian cyst     Paratubal cyst 05/10/2023    Syphilis N/A    Upper  respiratory infection 05/02/2018    Urogenital trichomoniasis N/A    UTI (urinary tract infection) 05/02/2018    Varicella N/A    Vitamin D deficiency      Past Surgical History:   Procedure Laterality Date    APPENDECTOMY  2012    BREAST LUMPECTOMY      benign    COLONOSCOPY  approx 2009    normal per patient    COLONOSCOPY N/A 12/05/2024    Procedure: COLONOSCOPY TO CECUM;  Surgeon: Volodymyr Lay MD;  Location: [facility] MAIN OR;  Service: Gastroenterology;  Laterality: N/A;  COLON: NORMAL    COLONOSCOPY W/ POLYPECTOMY N/A 08/01/2018    Normal    CYST REMOVAL  09/25/2024    ov[place]    DIAGNOSTIC LAPAROSCOPY N/A 09/25/2024    Procedure: DIAGNOSTIC LAPAROSCOPY;  Surgeon: Javon Jiménez MD;  Location: [facility] MAIN OR;  Service: Obstetrics/Gynecology;  Laterality: N/A;    EAR TUBES      ENDOSCOPY N/A 08/01/2018    [facility]    ENDOSCOPY N/A 12/05/2024    Procedure: ESOPHAGOGASTRODUODENOSCOPY;  Surgeon: Volodymyr Lay MD;  Location: [facility] MAIN OR;  Service: Gastroenterology;  Laterality: N/A;  EGD: IRREGULAR Z-LINE, HIATAL HERNIA///    KELOID EXCISION      right ear    MANDIBLE SURGERY  05/2016    ORIF ULNA/RADIUS FRACTURES      Distal radius ORIF with [facility] on 8/25/22    SUBTOTAL HYSTERECTOMY  2/7/25    UPPER GASTROINTESTINAL ENDOSCOPY      WISDOM TOOTH EXTRACTION         SOCIAL Hx:      The following portions of the patient's history were reviewed and updated as appropriate: allergies, current medications, past family history, past medical history, past social history, past surgical history and problem list.    Review of Systems        Except as outlined in history of physical illness, patient denies any changes in her GYN, , GI systems.  All other systems reviewed were negative.  REVIEW OF SYSTEMS:   Constitutional: Oriented, no unexpected weight loss/gain, or fatigue   Cardiovascular: No chest pain, irregular heart beat   Respiratory: No wheezing or shortness of breath   Gastrointestinal: No nausea, vomiting,  "constipation, diarrhea, bloody stool   Genitourinary: No pelvic pain or incontinence   Musculoskeletal: No muscle pain or swollen joints   Skin/Breast: No rash or change in mole(s), no breast pain or lumps   Psychiatric: No depression or anxiety          Current Outpatient Medications:     Albuterol-Budesonide (Airsupra) 90-80 MCG/ACT aerosol, Inhale 2 Inhalations Every 4 (Four) Hours As Needed (ASTHMA SYMPTOMS)., Disp: 10.7 g, Rfl: 5    cetirizine (zyrTEC) 10 MG tablet, Take 1 tablet by mouth Daily., Disp: , Rfl:     etonogestrel-ethinyl estradiol (NuvaRing) 0.12-0.015 MG/24HR vaginal ring, Insert vaginally and leave in place for 3 consecutive weeks, then remove for 1 week., Disp: 1 each, Rfl: 12    famotidine (PEPCID) 20 MG tablet, Take 1 tablet by mouth Every Night., Disp: 60 tablet, Rfl: 5    levothyroxine (Synthroid) 100 MCG tablet, Take 1 tablet by mouth Every Morning and one day weekly take one extra tablet.Take at least 30 minutes before having breakfast with a sip of water., Disp: 102 tablet, Rfl: 0    Multiple Vitamins-Minerals (MULTIVITAMIN ADULT PO), Take  by mouth., Disp: , Rfl:    Objective   Physical Exam    /60   Ht 167.6 cm (66\")   Wt 65.8 kg (145 lb)   LMP 03/31/2025 (Exact Date)   BMI 23.40 kg/m²     General: Patient is alert and oriented and appears overall healthy  Neck: Is supple without thyromegaly, no carotid bruits and no lymphadenopathy  Lungs: Clear bilaterally, no wheezing, rhonchi, or rales.  Respiratory rate is normal  Breast: Even, symmetrical, no lymphadenopathy, no retraction, no discharge, no masses or cysts  Heart: Regular rate and rhythm are appreciated, no murmurs or rubs are heard  Abdomen: Is soft, without organomegaly, bowel sounds are positive, there is no rebound or guarding and palpation does not produce any discomfort  Back: Nontender without CVA tenderness  Pelvic: External genitalia appear normal and consistent with mature female.  BUS normal                    "         Vagina is clean dry without discharge and appears adequately estrogenized, no lesions or masses are present                         Cervix is noninflamed without discharge or lesions.  There is no cervical motion tenderness.  No discharge noted no lesions                Uterus is nonenlarged non tender                Adnexa are surgically removed             Rectal exam reveals adequate sphincter tone and no masses or lesions are appreciated on digital rectal examination.      Annual Well Woman Exam  Patient Active Problem List   Diagnosis    Chronic post-traumatic headache    Gastroesophageal reflux disease    Lumbosacral radiculopathy    Iron deficiency anemia    Endometriosis determined by laparoscopy    Lower abdominal pain    History of bilateral salpingo-oophorectomy                 Assessment & Plan   Diagnoses and all orders for this visit:    1. Hot flashes (Primary)    2. Endometriosis determined by laparoscopy  Overview:  Multiple peritoneal chocolate brown adhesions throughout the lower pelvis no obvious endometrioma      3. Encounter for annual physical exam    4. Vaginal discharge    5. History of bilateral salpingo-oophorectomy  Overview:  Dr. Johnson February 7, 2025      Will await culture for the vaginal discharge, possible mild yeast infection.  Patient will call for results in 3 days if she has not heard from us    Need for HRT in this 39-year-old woman who is undergone a laparoscopic BSO and declined accompanying hysterectomy at the time  As outlined above she has not tolerated preferred methods of HRT replacement and is on the first cycle of NuvaRing.  We will check some hormone levels today and she has a follow-up visit with her surgeon tomorrow    I am not able to write any type of note for the work that she is missed    Discussed today's findings and concerns with patient.  Continue to recommend regular exercise including cardiovascular and resistance training as well as  breast  self-exam. Wellness lab, mammography, & pap smear, in accordance with age guidelines.    I have encouraged her to call for today's test results if she has not received them within 10 days.  Patient is advised to call with any change in her condition or with any other questions, otherwise return  for annual examination.

## 2025-05-20 ENCOUNTER — OFFICE VISIT (OUTPATIENT)
Dept: GASTROENTEROLOGY | Facility: CLINIC | Age: 40
End: 2025-05-20
Payer: COMMERCIAL

## 2025-05-20 VITALS
OXYGEN SATURATION: 98 % | TEMPERATURE: 97.7 F | HEIGHT: 66 IN | WEIGHT: 146.8 LBS | DIASTOLIC BLOOD PRESSURE: 64 MMHG | HEART RATE: 84 BPM | SYSTOLIC BLOOD PRESSURE: 100 MMHG | BODY MASS INDEX: 23.59 KG/M2

## 2025-05-20 DIAGNOSIS — K44.9 HIATAL HERNIA: Chronic | ICD-10-CM

## 2025-05-20 DIAGNOSIS — K21.9 GASTROESOPHAGEAL REFLUX DISEASE WITHOUT ESOPHAGITIS: Chronic | ICD-10-CM

## 2025-05-20 DIAGNOSIS — D50.9 IRON DEFICIENCY ANEMIA, UNSPECIFIED IRON DEFICIENCY ANEMIA TYPE: ICD-10-CM

## 2025-05-20 DIAGNOSIS — K58.1 IRRITABLE BOWEL SYNDROME WITH CONSTIPATION: Primary | Chronic | ICD-10-CM

## 2025-05-20 LAB
ESTRADIOL SERPL-MCNC: <5 PG/ML
FSH SERPL-ACNC: 40.1 MIU/ML

## 2025-05-20 PROCEDURE — 99214 OFFICE O/P EST MOD 30 MIN: CPT | Performed by: NURSE PRACTITIONER

## 2025-05-20 RX ORDER — ALBUTEROL SULFATE 90 UG/1
2 INHALANT RESPIRATORY (INHALATION)
COMMUNITY

## 2025-05-21 LAB
A VAGINAE DNA VAG QL NAA+PROBE: NORMAL SCORE
BVAB2 DNA VAG QL NAA+PROBE: NORMAL SCORE
C ALBICANS DNA VAG QL NAA+PROBE: NEGATIVE
C GLABRATA DNA VAG QL NAA+PROBE: NEGATIVE
C TRACH DNA SPEC QL NAA+PROBE: NEGATIVE
MEGA1 DNA VAG QL NAA+PROBE: NORMAL SCORE
N GONORRHOEA DNA VAG QL NAA+PROBE: NEGATIVE
T VAGINALIS DNA VAG QL NAA+PROBE: NEGATIVE

## 2025-05-22 LAB
CYTOLOGIST CVX/VAG CYTO: ABNORMAL
CYTOLOGY CVX/VAG DOC CYTO: ABNORMAL
CYTOLOGY CVX/VAG DOC THIN PREP: ABNORMAL
DX ICD CODE: ABNORMAL
HPV I/H RISK 4 DNA CVX QL PROBE+SIG AMP: POSITIVE
HPV16 DNA CVX QL PROBE+SIG AMP: NEGATIVE
HPV18+45 E6+E7 MRNA CVX QL NAA+PROBE: NEGATIVE
OTHER STN SPEC: ABNORMAL
SERVICE CMNT-IMP: ABNORMAL
STAT OF ADQ CVX/VAG CYTO-IMP: ABNORMAL

## 2025-06-02 RX ORDER — CLOTRIMAZOLE AND BETAMETHASONE DIPROPIONATE 10; .64 MG/G; MG/G
1 CREAM TOPICAL 2 TIMES DAILY
Qty: 15 G | Refills: 0 | Status: SHIPPED | OUTPATIENT
Start: 2025-06-02

## 2025-06-02 RX ORDER — FLUCONAZOLE 150 MG/1
150 TABLET ORAL ONCE
Qty: 1 TABLET | Refills: 0 | Status: SHIPPED | OUTPATIENT
Start: 2025-06-02 | End: 2025-06-02

## 2025-07-11 ENCOUNTER — PATIENT MESSAGE (OUTPATIENT)
Dept: INTERNAL MEDICINE | Facility: CLINIC | Age: 40
End: 2025-07-11
Payer: COMMERCIAL

## 2025-07-18 ENCOUNTER — OFFICE VISIT (OUTPATIENT)
Dept: INTERNAL MEDICINE | Facility: CLINIC | Age: 40
End: 2025-07-18
Payer: COMMERCIAL

## 2025-07-18 VITALS
HEIGHT: 66 IN | OXYGEN SATURATION: 100 % | DIASTOLIC BLOOD PRESSURE: 70 MMHG | SYSTOLIC BLOOD PRESSURE: 102 MMHG | BODY MASS INDEX: 23.4 KG/M2 | HEART RATE: 84 BPM | TEMPERATURE: 97.8 F | WEIGHT: 145.6 LBS

## 2025-07-18 DIAGNOSIS — E03.9 HYPOTHYROIDISM, UNSPECIFIED TYPE: ICD-10-CM

## 2025-07-18 DIAGNOSIS — R68.89 COLD INTOLERANCE: ICD-10-CM

## 2025-07-18 DIAGNOSIS — R55 SYNCOPE, UNSPECIFIED SYNCOPE TYPE: Primary | ICD-10-CM

## 2025-07-18 DIAGNOSIS — D50.9 IRON DEFICIENCY ANEMIA, UNSPECIFIED IRON DEFICIENCY ANEMIA TYPE: ICD-10-CM

## 2025-07-18 PROCEDURE — 99214 OFFICE O/P EST MOD 30 MIN: CPT | Performed by: STUDENT IN AN ORGANIZED HEALTH CARE EDUCATION/TRAINING PROGRAM

## 2025-07-18 PROCEDURE — 93000 ELECTROCARDIOGRAM COMPLETE: CPT | Performed by: STUDENT IN AN ORGANIZED HEALTH CARE EDUCATION/TRAINING PROGRAM

## 2025-07-18 NOTE — PROGRESS NOTES
"  Adam Monahan DO, [facility]  Internal Medicine  [facility]  [address], [address]  [place] [address]  [phone]    Chief Complaint  Black out when she get cold.    SUBJECTIVE    History of Present Illness    Sundeep Espino is a 39 y.o. female who presents to the office today as an established patient that last saw me on 3/7/2025.     States numerous times per day she is experiencing black out episodes because she is in a cold environment at work.   This can happen multiple times per day. This has been going on for over a year. There was a several month period, maybe 3, where symptoms were not occurring.   These do not happen at home and only at work or places that are really really cold. She states she will lose consciousness for 5 minutes and then will pop back up. States she keeps her heated seat on in her car and doesn't put the air on so it has never happened in her car.  She never wakes up having bitten her tongue. She never wakes up having used the bathroom on herself. She feels a hot sensation once she wakes up. She can tell her body is getting cold before this occurs \"I'll go from hot to cold\". She states she will start shaking like she is shivering before this happens. States her coworkers don't really notice that she is passed out. States she wears blanket and a coat at her desk but it doesn't help because it has to be a heated blanket. When she has used a heated blanket at work it does seem to prevent these episodes from happening but the battery only lasts 2 hours.   States when she is in a cold pool she can have anxiety symptoms. She then clarifies that she can hear the things around her but she just cannot open her eyes and is huddled up for warmth but really isn't sure.     For hypothyroidism she is taking levothyroxine 100 mcg daily .     Allergies   Allergen Reactions    Na Ferric Gluc Cplx In Sucrose Itching     Patient states that she gets iron at [facility] and " they give her benedryl and pepcid to manage her reaction    Aspartame And Phenylalanine Headache     headaches    Banana Nausea And Vomiting    Beta Adrenergic Blockers Hives, Itching and Swelling    Estradiol Itching and Rash     Estradiol patch    Flagyl [Metronidazole] Itching and Rash    Metoprolol Itching     Face swelling, itching      Propranolol Itching     Face swelling, itching          Outpatient Medications Marked as Taking for the 7/18/25 encounter (Office Visit) with Adam Monahan,    Medication Sig Dispense Refill    albuterol sulfate  (90 Base) MCG/ACT inhaler Inhale 2 puffs.      Albuterol-Budesonide (Airsupra) 90-80 MCG/ACT aerosol Inhale 2 Inhalations Every 4 (Four) Hours As Needed (ASTHMA SYMPTOMS). 10.7 g 5    cetirizine (zyrTEC) 10 MG tablet Take 1 tablet by mouth Daily.      clotrimazole-betamethasone (LOTRISONE) 1-0.05 % cream Apply 1 Application topically to the appropriate area as directed 2 (Two) Times a Day. 15 g 0    famotidine (PEPCID) 20 MG tablet Take 1 tablet by mouth Every Night. 60 tablet 5    levothyroxine (Synthroid) 100 MCG tablet Take 1 tablet by mouth Every Morning and one day weekly take one extra tablet.Take at least 30 minutes before having breakfast with a sip of water. 102 tablet 0    Multiple Vitamins-Minerals (MULTIVITAMIN ADULT PO) Take  by mouth.          Past Medical History:   Diagnosis Date    Abnormal Pap smear of cervix N/A    ADHD     Allergic N/A    Allergic rhinitis     Anxiety and depression     Arthritis     Asthma     Chlamydia N/A    Chondromalacia patellae     DDD (degenerative disc disease), cervical     Endometriosis     Fibroid     GERD (gastroesophageal reflux disease)     Gonorrhea N/A    H/O radioactive iodine thyroid ablation 03/08/2017    Heart palpitations     Hydrosalpinx     Hyperthyroidism     Hypothyroidism     Injury of back N/A    Injury of neck N/A    Iron deficiency anemia     Migraine N/A    Osteoarthritis     Ovarian cyst      "Paratubal cyst 05/10/2023    Syphilis N/A    Upper respiratory infection 2018    Urogenital trichomoniasis N/A    UTI (urinary tract infection) 2018    Varicella N/A    Vitamin D deficiency        OBJECTIVE    Vital Signs:   /70   Pulse 84   Temp 97.8 °F (36.6 °C) (Infrared)   Ht 167.6 cm (66\")   Wt 66 kg (145 lb 9.6 oz)   SpO2 100%   BMI 23.50 kg/m²        Physical Exam       The following data was reviewed by: Adam Monahan DO on 2025:       Miscellaneous Results - Echo Doppler 2D Mmode Color Complete (2024 7:30 AM EDT)  Narrative   [facility] 4:42 [facility] EDT    REVIEWING YOUR TEST RESULTS IN [facility] IS NOT A SUBSTITUTE FOR DISCUSSING THOSE RESULTS WITH YOUR HEALTH CARE PROVIDER.  PLEASE CONTACT YOUR PROVIDER [facility] TO DISCUSS ANY QUESTIONS OR CONCERNS YOU MAY HAVE REGARDING THESE TEST RESULTS.    CARDIOLOGY REPORT  FACILITY: [facility]    PATIENT NAME/: DEB CHAVIRA    1985  UNIT/AGE/GENDER:      AGE: 38 YR        GENDER: F  UNIT NUMBER: [id number]  ACCOUNT NUMBER: [id number]  ACCESSION NUMBER: [id number]    DATE OF EXAM: 2024  06:58  EXAMINATION(S): ECHO DOPPLER 2D MMODE SPECT COLOR COMPLETE    FINAL REPORT                                                          Procedure Information  Procedure type:        Echo  Proc. sub type:        TTE procedure: ECHO DOPPLER 2D MMODE SPECT COLOR COMPLETE.  Start date time:       2024                                      Blood pressure:        102 / 66 mmHg  Accession no:          [id number]    Procedure Staff  Referring Physician:       Nickolas Mancia  Sonographer:               Danielle Chavez  Interpreting Physician: Nickolas Mancia        Clinical Indications  Palpitations and Syncope.    Physician Conclusions  Any valve disease noted in the report is non-rheumatic unless otherwise specifically " noted.  Summary:                 The ejection fraction biplane was calculated at 60%.                          The left ventricle chamber size, wall thickness, systolic and diastolic function are within normal limits.                          There are no regional wall motion abnormalities observed.                          Trace mitral regurgitation is present.                          Trace aortic regurgitation is noted.                          Right ventricular systolic pressure of 22 mmHg.                          Trace-to-mild tricuspid regurgitation.  Findings  Left Ventricle:           The ejection fraction biplane was calculated at 60%.                           The left ventricle chamber size, wall thickness, systolic and diastolic function are within normal limits.                           There are no regional wall motion abnormalities observed.  Left Atrium:              Left atrium appears normal.  Right Ventricle:          The right ventricular chamber size and systolic function are within normal limits.  Right Atrium:             The right atrial chamber size appears normal.  Aortic Valve:             Individual aortic valve leaflets are not clearly visualized.                           Trace aortic regurgitation is noted.  Mitral Valve:             Trace mitral regurgitation is present.  Tricuspid Valve:          Right ventricular systolic pressure of 22 mmHg.                           Trace-to-mild tricuspid regurgitation.  Pulmonic Valve:           The pulmonic valve is not adequately visualized but Doppler appears normal.  Pericardium:              There is no evidence of pericardial effusion.  Pleura:                   No evidence of pleural effusion.  Aorta/Great Vessels: Aortic root dimension within normal limits.                      IVC appears normal.    2D Measurements  LV Diastolic dimension: 3.76 cm                                       LV Systolic dimension:    2.74 cm  2D septum  diastolic:       0.8 cm                                     LV Area systolic:         16.1 cm^2  2D post wall diastolic:    0.8 cm                                     LVESV :                   34 ml  LV Area diastolic:         28.9 cm^2                                  LVESVI:                   19 ml/m^2  LVEDV:                     85 ml                                      LV RWT:                   0.43  LVEDVI :                   47.4 ml/m^2                                FS:                       27 %  EF Calculated:             60 %                                       LV SV (Teich):            32.4 ml  LV mass (ASE formula): 84.5 g                                         LVOT diameter:            1.9 cm  LV mass index:             47.1 g/m^2                                 LA volume:                33 ml  LV length:                 7.6 cm                                     LA volume index:          18.4 ml/m^2  LA diameter (2D):          3.5 cm                                     RV Systolic pressure:     22 mmHg  LA/Aorta (2D):             1.52                                       TAPSE:                    1.8 cm  RV Diastolic dimension: 3.5 cm                                        RA area:                  12.5 cm^2  Aortic root (2D):          2.3 cm                                     RA area index:            7 cm^2/m^2  Sinotubular junction:      2.1 cm                                     RA volume:                31 ml  ST junction index:         1.3 cm/m                                   RA volume index:          17.3 ml/m^2  Ascending aorta:           2.3 cm                                     RA pressure:              3 mmHg  Ascending aorta index:     1.4 cm/m      Doppler Measurements and Calculations  MV Peak E-wave:            0.87 m/s                                   MV Peak A-wave:           0.77 m/s  MV E' septal velocity:     0.1 m/s                                    E/A ratio:                 1.13  MV E/E' septal:            8.69                                       MV E' lateral velocity:   0.141 m/s  MV E/E' Average:           7.43                                       MV E/E' lateral:          6.16  Estimated RAP:             3 mmHg                                     TR velocity:              2.2 m/s  Estimated RVSP:            22 mmHg                                    TR gradient:              19.2 mmHg  Electronically signed by Nickolas Mancia (Interpreting Physician) on 2024 at       Miscellaneous Results - Holter Monitor Placement (2024 7:32 AM EDT)  Procedure Note   Nickolas Mancia MD - 2024    Formatting of this note might be different from the original.  [facility]ING YOUR TEST RESULTS [facility] IS NOT A SUBSTITUTE FOR DISCUSSING THOSE RESULTS WITH YOUR HEALTH CARE PROVIDER.  PLEASE CONTACT YOUR PROVIDER VIA [employer] TO DISCUSS ANY QUESTIONS OR CONCERNS YOU MAY HAVE REGARDING THESE TEST RESULTS.    CARDIOLOGY REPORT  FACILITY: [facility]    PATIENT NAME/: DEB CHAVIRA 1985  UNIT/AGE/GENDER: AGE: 38 YR GENDER: F  UNIT NUMBER: [id number]  ACCOUNT NUMBER: [id number]  ACCESSION NUMBER: [id number]    DATE OF EXAM: 2024 07:30  EXAMINATION(S): HOLTER MONITOR PLACEMENT    FINAL REPORT    Procedure: Holter Report  Indications: Palpitations    Symptoms: Palpitation, nausea    Findings: 48 hours of continuous electrocardiographic data was reviewed.  The predominant rhythm was sinus rhythm.  The average heart rate was 93 bpm.  The minimum heart rate was 57 bpm which was sinus bradycardia and the maximal heart rate was 135 bpm which was sinus tachycardia.  There was rare(85 beats, 0.0% burden) supraventricular ectopy noted.  There was rare (19 beats, 0.0% burden) ventricular ectopy noted.  There was no atrial fibrillation.  There was no pauses, no heart block.    Study Conclusions: The predominant rhythm was sinus  rhythm.  The average heart rate was 93 bpm.  The minimum heart rate was 57 bpm which was sinus bradycardia and the maximal heart rate was 135 bpm which was sinus tachycardia.  There was rare(85 beats, 0.0% burden) supraventricular ectopy noted.  There was rare (19 beats, 0.0% burden) ventricular ectopy noted.  There was no atrial fibrillation.  There was no pauses, no heart block.  Patient's symptom did not correlate with arrhythmia.  Electronically signed by Nickolas Mancia 06/08/2024 16:13       ECG 12 Lead    Date/Time: 7/18/2025 2:24 PM  Performed by: Adam Monahan DO    Authorized by: Adam Monahan DO  Comparison: compared with previous ECG from 5/20/2024  Similar to previous ECG  Rhythm: sinus rhythm  Rate: normal  Rate comments: 76  Conduction: conduction normal  QRS axis: normal  Other: no other findings    Clinical impression: normal ECG             ASSESSMENT & PLAN     Diagnoses and all orders for this visit:    1. Syncope, unspecified syncope type (Primary)  2. Hypothyroidism, unspecified type  3. Cold intolerance  4. Iron deficiency anemia, unspecified iron deficiency anemia type  -Patient presents to the office today with numerous concerns as described above.  Overall, she reports that over the last year or even slightly longer she has had month-long episodes where she is intolerant of cold environments specifically reporting that she has episodes of decreased consciousness.  She has not quite sure if these are complete episodes of syncope or if she just cannot function at a normal level during these cold environments.  She states it primarily happens at work where the temperature is apparently maintained at a very low level.  If she wears heated clothing she does have improvement in symptoms once for the battery dies on her heated blanket, symptoms get worse.  It does not seem that she is having seizure-like activity during these episodes.  She has never passed out while driving because she purposefully  keeps her car very warm.  She also reports symptoms of anxiety when being in a cold pool but never passing out.  - Of note, she has hypothyroidism with last TSH check April 25, 2025 at which time her TSH was uncontrolled at 7.39 With free T40.88.  At that point she was recommended to increase her levothyroxine from 100 mcg daily to 100 mcg daily with an extra 100 mcg tablet once per week.  She did not make this change and continues to take only 100 mcg daily.  Certainly that could have a component of her cold intolerance. I will also check B12 level today.   -she does have a history of iron deficiency anemia and has seen hematology in the past for IV iron. I will reassess CBC for anemia today as well as iron studies.  -she has seen cardiology for syncope concern in June 2024.  I reviewed that progress note and at that point they recommend echocardiogram and Holter monitor.  After having the studies she did not return to cardiology for any follow-up.  She wear the Holter monitor for 2 days and the report is as above.  Overall the predominant rhythm was sinus rhythm with rare supraventricular ectopy as well as rare ventricular ectopy and no atrial fibrillation pauses or heart block.  If her symptoms are truly syncope I would favor vasovagal syncope versus a true arrhythmia.  Nevertheless, I will obtain a longer monitoring study with a Zio patch for 7 days.  If any clear arrhythmia is present I will refer her back to cardiology.  The echocardiogram showed LVEF 60% with no significant valvular normalities noted.  EKG in office today for sinus rhythm.  - I would like for her to begin wearing compression stockings on both lower legs while at work.  -I will see her back in 1 month for close evaluation.  I advised her to update me immediately if her symptoms change in any way.        Follow Up  Return in about 4 weeks (around 8/15/2025) for Recheck.    Patient/family had no further questions at this time and verbalized  understanding of the plan discussed today.

## 2025-07-18 NOTE — LETTER
July 18, 2025    Sundeep Espino  [address]  [place] [address]      To Whom It May Concern:    This letter is to confirm that the above individual is a patient in our office under my care.  I am writing to request a work accommodation, specifically for the allowance of a desk with adjustable height.    Regards,        Adam Monahan, DO

## 2025-07-24 ENCOUNTER — RESULTS FOLLOW-UP (OUTPATIENT)
Dept: INTERNAL MEDICINE | Facility: CLINIC | Age: 40
End: 2025-07-24
Payer: COMMERCIAL

## 2025-07-24 DIAGNOSIS — E03.9 HYPOTHYROIDISM, UNSPECIFIED TYPE: Primary | ICD-10-CM

## 2025-07-24 LAB
ERYTHROCYTE [DISTWIDTH] IN BLOOD BY AUTOMATED COUNT: 15.3 % (ref 11.7–15.4)
FERRITIN SERPL-MCNC: 208 NG/ML (ref 15–150)
HCT VFR BLD AUTO: 46.7 % (ref 34–46.6)
HGB BLD-MCNC: 14.1 G/DL (ref 11.1–15.9)
IMP & REVIEW OF LAB RESULTS: NORMAL
IRON SATN MFR SERPL: 27 % (ref 15–55)
IRON SERPL-MCNC: 80 UG/DL (ref 27–159)
MCH RBC QN AUTO: 26.6 PG (ref 26.6–33)
MCHC RBC AUTO-ENTMCNC: 30.2 G/DL (ref 31.5–35.7)
MCV RBC AUTO: 88 FL (ref 79–97)
METHYLMALONATE SERPL-SCNC: 102 NMOL/L (ref 0–378)
PLATELET # BLD AUTO: 258 X10E3/UL (ref 150–450)
RBC # BLD AUTO: 5.3 X10E6/UL (ref 3.77–5.28)
T4 FREE SERPL-MCNC: <0.1 NG/DL (ref 0.82–1.77)
TIBC SERPL-MCNC: 301 UG/DL (ref 250–450)
TSH SERPL DL<=0.005 MIU/L-ACNC: 141 UIU/ML (ref 0.45–4.5)
UIBC SERPL-MCNC: 221 UG/DL (ref 131–425)
VIT B12 SERPL-MCNC: 379 PG/ML (ref 232–1245)
WBC # BLD AUTO: 5.8 X10E3/UL (ref 3.4–10.8)

## 2025-07-24 RX ORDER — LEVOTHYROXINE SODIUM 100 MCG
TABLET ORAL
Qty: 102 TABLET | Refills: 0 | Status: SHIPPED | OUTPATIENT
Start: 2025-07-24

## 2025-07-24 NOTE — TELEPHONE ENCOUNTER
Please call patient .   Her TSH is 141 and her T4 is not detectable. This is likely the cause of her symptoms. It appears she is NOT taking her levothyroxine. I need her to look at her pill bottles and confirm that she is taking levothyroxine. If she is taking levothyroxine, I need to know the dosage that is listed on the bottle and if she has been missing any dosages?     Iron and b12 are normal.        Dr Monahan will send a rx to [facility]  Pt informed

## 2025-08-14 ENCOUNTER — OFFICE VISIT (OUTPATIENT)
Dept: INTERNAL MEDICINE | Facility: CLINIC | Age: 40
End: 2025-08-14
Payer: COMMERCIAL

## 2025-08-14 VITALS
HEART RATE: 82 BPM | BODY MASS INDEX: 23.46 KG/M2 | WEIGHT: 146 LBS | OXYGEN SATURATION: 97 % | DIASTOLIC BLOOD PRESSURE: 70 MMHG | SYSTOLIC BLOOD PRESSURE: 120 MMHG | TEMPERATURE: 97.7 F | HEIGHT: 66 IN

## 2025-08-14 DIAGNOSIS — E03.9 HYPOTHYROIDISM, UNSPECIFIED TYPE: Primary | ICD-10-CM

## 2025-08-14 DIAGNOSIS — R68.89 COLD INTOLERANCE: ICD-10-CM

## 2025-08-14 PROCEDURE — 99214 OFFICE O/P EST MOD 30 MIN: CPT | Performed by: STUDENT IN AN ORGANIZED HEALTH CARE EDUCATION/TRAINING PROGRAM

## (undated) DEVICE — SOL NACL 0.9PCT 1000ML

## (undated) DEVICE — 3M™ STERI-DRAPE™ INSTRUMENT POUCH 1018L: Brand: STERI-DRAPE™

## (undated) DEVICE — DRAPE,UNDERBUTTOCKS,PCH,STERILE: Brand: MEDLINE

## (undated) DEVICE — HARMONIC 700 SHEARS, ADVANCED HEMOSTASIS: Brand: HARMONIC

## (undated) DEVICE — TBG 02 CRUSH RESIST LF CLR 7FT

## (undated) DEVICE — GOWN,SLEEVE,STERILE,W/CSR WRAP,1/P: Brand: MEDLINE

## (undated) DEVICE — CANN O2 ETCO2 FITS ALL CONN CO2 SMPL A/ 7IN DISP LF

## (undated) DEVICE — GLV SURG SENSICARE PI PF LF 7 GRN STRL

## (undated) DEVICE — VIAL FORMLN CAP 10PCT 20ML

## (undated) DEVICE — ERASECAUTI INTERMIT TRAY: Brand: MEDLINE INDUSTRIES, INC.

## (undated) DEVICE — GLV SURG SIGNATURE ESSENTIAL PF LTX SZ8

## (undated) DEVICE — SINGLE-USE BIOPSY FORCEPS: Brand: RADIAL JAW 4

## (undated) DEVICE — BLCK/BITE BLOX W/DENTL/RIM W/STRAP 54F

## (undated) DEVICE — ENDOPATH PNEUMONEEDLE INSUFFLATION NEEDLES WITH LUER LOCK CONNECTORS 120MM: Brand: ENDOPATH

## (undated) DEVICE — THE STERILE LIGHT HANDLE COVER IS USED WITH STERIS SURGICAL LIGHTING AND VISUALIZATION SYSTEMS.

## (undated) DEVICE — NDL BLNT 18G 1 1/2IN

## (undated) DEVICE — GOWN ISOL W/THUMB UNIV BLU BX/15

## (undated) DEVICE — GLV SURG SENSICARE PI MIC PF SZ7 LF STRL

## (undated) DEVICE — SUT MONOCRYL 4/0 PS2 27IN Y426H ETY426H

## (undated) DEVICE — MSK ENDO PORT O2 POM ELITE CURAPLEX A/

## (undated) DEVICE — SYR LUERLOK 30CC

## (undated) DEVICE — SYR LL TP 10ML STRL

## (undated) DEVICE — TUBING, SUCTION, 1/4" X 10', STRAIGHT: Brand: MEDLINE

## (undated) DEVICE — LAPAROVUE VISIBILITY SYSTEM LAPAROSCOPIC SOLUTIONS: Brand: LAPAROVUE

## (undated) DEVICE — BITEBLOCK OMNI BLOC

## (undated) DEVICE — GLV SURG BIOGEL LTX PF 6 1/2

## (undated) DEVICE — Device

## (undated) DEVICE — FLEX ADVANTAGE 1500CC: Brand: FLEX ADVANTAGE

## (undated) DEVICE — GLV SURG SENSICARE POLYISPRN W/ALOE PF LF 6.5 GRN STRL

## (undated) DEVICE — KT ORCA ORCAPOD DISP STRL

## (undated) DEVICE — Device: Brand: DEFENDO AIR/WATER/SUCTION AND BIOPSY VALVE

## (undated) DEVICE — LAPAROSCOPIC SMOKE FILTRATION SYSTEM: Brand: PALL LAPAROSHIELD® PLUS LAPAROSCOPIC SMOKE FILTRATION SYSTEM

## (undated) DEVICE — GLV SURG SENSICARE PI LF PF 7.5 GRN STRL

## (undated) DEVICE — GOWN ,SIRUS,NONREINFORCED 3XL: Brand: MEDLINE

## (undated) DEVICE — ADAPT TBG INSULF FOR/NO/HEAT/CO2/GAS REUS

## (undated) DEVICE — SUT VIC 0 TN 27IN DYED JTN0G

## (undated) DEVICE — TROCAR: Brand: KII SLEEVE

## (undated) DEVICE — TROCAR: Brand: KII OPTICAL ACCESS SYSTEM

## (undated) DEVICE — BLANKT WARM UPPR/BDY ARM/OUT 57X196CM

## (undated) DEVICE — ADAPT CLN SCPE ENDO PORPOISE BX/50 DISP

## (undated) DEVICE — BRECKENRIDGE GYN LAP: Brand: MEDLINE INDUSTRIES, INC.

## (undated) DEVICE — ANTIBACTERIAL UNDYED BRAIDED (POLYGLACTIN 910), SYNTHETIC ABSORBABLE SUTURE: Brand: COATED VICRYL

## (undated) DEVICE — GOWN,NON-REINFORCED,SIRUS,SET IN SLV,XL: Brand: MEDLINE

## (undated) DEVICE — CANN NASL CO2 TRULINK W/O2 A/

## (undated) DEVICE — 2, DISPOSABLE SUCTION/IRRIGATOR WITH DISPOSABLE TIP: Brand: STRYKEFLOW

## (undated) DEVICE — TOWEL,OR,DSP,ST,NATURAL,DLX,4/PK,20PK/CS: Brand: MEDLINE

## (undated) DEVICE — THE TORRENT IRRIGATION SCOPE CONNECTOR IS USED WITH THE TORRENT IRRIGATION TUBING TO PROVIDE IRRIGATION FLUIDS SUCH AS STERILE WATER DURING GASTROINTESTINAL ENDOSCOPIC PROCEDURES WHEN USED IN CONJUNCTION WITH AN IRRIGATION PUMP (OR ELECTROSURGICAL UNIT).: Brand: TORRENT

## (undated) DEVICE — STRIP CLS WND CURAD MEDI/STRIP HYPOALLERG 0.25X4IN PK/10

## (undated) DEVICE — ADHS LIQ MASTISOL 2/3ML